# Patient Record
Sex: MALE | Race: WHITE | RURAL
[De-identification: names, ages, dates, MRNs, and addresses within clinical notes are randomized per-mention and may not be internally consistent; named-entity substitution may affect disease eponyms.]

---

## 2017-03-09 ENCOUNTER — HOSPITAL ENCOUNTER (OUTPATIENT)
Dept: OTHER | Age: 67
Discharge: HOME | End: 2017-03-09
Attending: UROLOGY | Admitting: UROLOGY

## 2023-01-13 RX ORDER — SODIUM, POTASSIUM,MAG SULFATES 17.5-3.13G
SOLUTION, RECONSTITUTED, ORAL ORAL
Qty: 354 ML | Refills: 0 | Status: SHIPPED | OUTPATIENT
Start: 2023-01-13

## 2023-01-31 RX ORDER — SODIUM, POTASSIUM,MAG SULFATES 17.5-3.13G
SOLUTION, RECONSTITUTED, ORAL ORAL
Qty: 354 ML | Refills: 0 | Status: SHIPPED | OUTPATIENT
Start: 2023-01-31

## 2023-02-07 ENCOUNTER — OUTSIDE FACILITY SERVICE (OUTPATIENT)
Dept: GASTROENTEROLOGY | Facility: CLINIC | Age: 73
End: 2023-02-07
Payer: MEDICARE

## 2023-02-07 PROCEDURE — 43239 EGD BIOPSY SINGLE/MULTIPLE: CPT | Performed by: INTERNAL MEDICINE

## 2023-02-07 PROCEDURE — 45385 COLONOSCOPY W/LESION REMOVAL: CPT | Performed by: INTERNAL MEDICINE

## 2023-02-07 PROCEDURE — 99204 OFFICE O/P NEW MOD 45 MIN: CPT | Performed by: INTERNAL MEDICINE

## 2023-02-11 RX ORDER — OMEPRAZOLE 40 MG/1
40 CAPSULE, DELAYED RELEASE ORAL DAILY
Qty: 30 CAPSULE | Refills: 11 | Status: SHIPPED | OUTPATIENT
Start: 2023-02-11

## 2023-02-16 ENCOUNTER — PREP FOR SURGERY (OUTPATIENT)
Dept: OTHER | Facility: HOSPITAL | Age: 73
End: 2023-02-16
Payer: MEDICARE

## 2023-02-16 DIAGNOSIS — K31.819 GAVE (GASTRIC ANTRAL VASCULAR ECTASIA): Primary | ICD-10-CM

## 2023-02-17 RX ORDER — SIMETHICONE 20 MG/.3ML
200 EMULSION ORAL ONCE
Status: CANCELLED | OUTPATIENT
Start: 2023-02-17 | End: 2023-02-17

## 2023-02-17 RX ORDER — PEG-3350, SODIUM SULFATE, SODIUM CHLORIDE, POTASSIUM CHLORIDE, SODIUM ASCORBATE AND ASCORBIC ACID 7.5-2.691G
KIT ORAL
Qty: 1000 ML | Refills: 0 | Status: SHIPPED | OUTPATIENT
Start: 2023-02-17

## 2023-02-22 ENCOUNTER — HOSPITAL ENCOUNTER (OUTPATIENT)
Dept: GASTROENTEROLOGY | Facility: HOSPITAL | Age: 73
Discharge: HOME | End: 2023-02-22
Attending: INTERNAL MEDICINE | Admitting: INTERNAL MEDICINE
Payer: MEDICARE

## 2023-02-22 VITALS — DIASTOLIC BLOOD PRESSURE: 69 MMHG | TEMPERATURE: 97.2 F | SYSTOLIC BLOOD PRESSURE: 120 MMHG | OXYGEN SATURATION: 98 %

## 2023-02-22 DIAGNOSIS — K31.819 GAVE (GASTRIC ANTRAL VASCULAR ECTASIA): ICD-10-CM

## 2023-02-22 PROCEDURE — 91110 GI TRC IMG INTRAL ESOPH-ILE: CPT | Performed by: INTERNAL MEDICINE

## 2023-02-22 PROCEDURE — C1889 IMPLANT/INSERT DEVICE, NOC: HCPCS

## 2023-02-22 PROCEDURE — 91110 GI TRC IMG INTRAL ESOPH-ILE: CPT

## 2023-04-17 ENCOUNTER — TELEPHONE (OUTPATIENT)
Dept: GASTROENTEROLOGY | Facility: CLINIC | Age: 73
End: 2023-04-17
Payer: MEDICARE

## 2023-05-04 ENCOUNTER — TELEPHONE (OUTPATIENT)
Dept: GASTROENTEROLOGY | Facility: CLINIC | Age: 73
End: 2023-05-04
Payer: MEDICARE

## 2023-05-04 NOTE — TELEPHONE ENCOUNTER
"----- Message from Salty Amaro MD sent at 4/17/2023  2:01 PM EDT -----  Kimi  Please let patient know that his recent extensive evaluation in February revealed no evidence of vascular ectasia.  There was no evidence of any vascular ectasia in his esophagus, stomach, duodenum, small bowel or colon.      It would probably be best for the physician that is concerned about how to proceed contact me to see what their expectations are.      It may be reasonable to repeat a push enteroscopy but again, we did not identify the source of his bleeding despite extensive GI work-up and I have not received records from any other office showing that he is again becoming anemic or has ongoing blood loss.  When we last time there was nothing \"to take care of \"since there was nothing hemorrhaging and since then I have not seen him.      I am not aware that he is having problems with recurrent anemia, transfusion requirements or iron infusion requirements that would prompt me to make further recommendations.  This information needs to be obtained so we can make a decision on what the next best step is.    Hope that helps.  ----- Message -----  From: Sanjuana Gonzalez MA  Sent: 4/17/2023   1:19 PM EDT  To: Salty Amaro MD    Pt is referring to the Vascular ectasia. She stated that when she asked what does this mean he said that he was bleeding outside of his stomach  and he needs to get a gastro doctor. She said he has one and that is why she is calling. To see if he needs to be seen or what do they do now.   ----- Message -----  From: Salty Amaro MD  Sent: 4/13/2023   5:30 PM EDT  To: Sanjuana Gonzalez MA    Kimi  Patient's wife will need to be more specific.  I do not know what \"this \"is referring to.    Patient is status post recent EGD, colonoscopy and video capsule endoscopy to evaluate anemia.  Although the patient's primary doctor note is indicating that patient had watermelon " "stomach/gastric antral vascular ectasia, on my last EGD in February there were no abnormalities.  Likewise the colonoscopy was unremarkable as was a video capsule endoscopy.  Again when the patient's wife is staying \"gastro takes care of this \"I need to know what she is talking about and what is she requesting  ----- Message -----  From: Sanjuana Gonzalez MA  Sent: 4/13/2023   5:25 PM EDT  To: Salty Amaro MD    Pt wife called [facility] said Gastro takes care of this. Please Advise...        "

## 2023-05-10 ENCOUNTER — TELEPHONE (OUTPATIENT)
Dept: CARDIOLOGY | Facility: CLINIC | Age: 73
End: 2023-05-10

## 2023-05-10 NOTE — TELEPHONE ENCOUNTER
Caller: Yahaira Abraham    Relationship to patient: Emergency Contact    Best call back number:     Chief complaint: PT SPOUSE CALLED IN TO SEE DR THAKKAR FOR A CENTRAL APNEA FOLLOW UP    Type of visit: FOLLOW UP    Requested date: ASA, WANTS TO SEE WHATS AVAILABLE     Additional notes: PT DOESN'T HAVE ANY NOTES FROM CARDIOLOGY OR SLEEP TO SCHEDULE A FOLLOW UP. STATES THAT HE SEES DR THAKKAR FOR SLEEP.

## 2023-05-16 ENCOUNTER — HOSPITAL ENCOUNTER (OUTPATIENT)
Dept: CARDIOLOGY | Facility: HOSPITAL | Age: 73
Discharge: HOME | End: 2023-05-16
Attending: INTERNAL MEDICINE | Admitting: INTERNAL MEDICINE
Payer: MEDICARE

## 2023-05-16 VITALS
HEART RATE: 107 BPM | TEMPERATURE: 97.1 F | BODY MASS INDEX: 31.78 KG/M2 | RESPIRATION RATE: 16 BRPM | OXYGEN SATURATION: 98 % | WEIGHT: 227 LBS | DIASTOLIC BLOOD PRESSURE: 63 MMHG | SYSTOLIC BLOOD PRESSURE: 90 MMHG | HEIGHT: 71 IN

## 2023-05-16 DIAGNOSIS — I48.91 ATRIAL FIBRILLATION, UNSPECIFIED TYPE: ICD-10-CM

## 2023-05-16 LAB
ANION GAP SERPL CALCULATED.3IONS-SCNC: 12 MMOL/L (ref 5–15)
BUN BLDA-MCNC: 32 MG/DL (ref 8–26)
BUN SERPL-MCNC: 34 MG/DL (ref 8–23)
BUN/CREAT SERPL: 24.3 (ref 7–25)
CA-I BLDA-SCNC: 1.17 MMOL/L (ref 1.2–1.32)
CALCIUM SPEC-SCNC: 8.9 MG/DL (ref 8.6–10.5)
CHLORIDE BLDA-SCNC: 96 MMOL/L (ref 98–109)
CHLORIDE SERPL-SCNC: 98 MMOL/L (ref 98–107)
CO2 BLDA-SCNC: 26 MMOL/L (ref 24–29)
CO2 SERPL-SCNC: 27 MMOL/L (ref 22–29)
CREAT BLDA-MCNC: 1.7 MG/DL (ref 0.6–1.3)
CREAT SERPL-MCNC: 1.4 MG/DL (ref 0.76–1.27)
DEPRECATED RDW RBC AUTO: 45.8 FL (ref 37–54)
EGFRCR SERPLBLD CKD-EPI 2021: 42.6 ML/MIN/1.73
EGFRCR SERPLBLD CKD-EPI 2021: 53.7 ML/MIN/1.73
ERYTHROCYTE [DISTWIDTH] IN BLOOD BY AUTOMATED COUNT: 13.4 % (ref 12.3–15.4)
GLUCOSE BLDC GLUCOMTR-MCNC: 116 MG/DL (ref 70–130)
GLUCOSE SERPL-MCNC: 117 MG/DL (ref 65–99)
HCT VFR BLD AUTO: 26.5 % (ref 37.5–51)
HCT VFR BLDA CALC: 27 % (ref 38–51)
HGB BLD-MCNC: 8.8 G/DL (ref 13–17.7)
HGB BLDA-MCNC: 9.2 G/DL (ref 12–17)
INR PPP: 2.8 (ref 0.8–1.2)
INR PPP: 3.19 (ref 0.89–1.12)
MAXIMAL PREDICTED HEART RATE: 149 BPM
MCH RBC QN AUTO: 31.1 PG (ref 26.6–33)
MCHC RBC AUTO-ENTMCNC: 33.2 G/DL (ref 31.5–35.7)
MCV RBC AUTO: 93.6 FL (ref 79–97)
PLATELET # BLD AUTO: 206 10*3/MM3 (ref 140–450)
PMV BLD AUTO: 10.3 FL (ref 6–12)
POTASSIUM BLDA-SCNC: 4.5 MMOL/L (ref 3.5–4.9)
POTASSIUM SERPL-SCNC: 4.7 MMOL/L (ref 3.5–5.2)
PROTHROMBIN TIME: 31.9 SECONDS (ref 12.8–15.2)
PROTHROMBIN TIME: 32.9 SECONDS (ref 12.2–14.5)
RBC # BLD AUTO: 2.83 10*6/MM3 (ref 4.14–5.8)
SODIUM BLD-SCNC: 136 MMOL/L (ref 138–146)
SODIUM SERPL-SCNC: 137 MMOL/L (ref 136–145)
STRESS TARGET HR: 127 BPM
WBC NRBC COR # BLD: 5.19 10*3/MM3 (ref 3.4–10.8)

## 2023-05-16 PROCEDURE — 25010000002 MIDAZOLAM PER 1 MG: Performed by: INTERNAL MEDICINE

## 2023-05-16 PROCEDURE — 85610 PROTHROMBIN TIME: CPT

## 2023-05-16 PROCEDURE — 36415 COLL VENOUS BLD VENIPUNCTURE: CPT

## 2023-05-16 PROCEDURE — 80047 BASIC METABLC PNL IONIZED CA: CPT

## 2023-05-16 PROCEDURE — 85014 HEMATOCRIT: CPT

## 2023-05-16 PROCEDURE — 85027 COMPLETE CBC AUTOMATED: CPT | Performed by: INTERNAL MEDICINE

## 2023-05-16 PROCEDURE — 80048 BASIC METABOLIC PNL TOTAL CA: CPT | Performed by: INTERNAL MEDICINE

## 2023-05-16 PROCEDURE — 85610 PROTHROMBIN TIME: CPT | Performed by: INTERNAL MEDICINE

## 2023-05-16 PROCEDURE — 25010000002 FENTANYL CITRATE (PF) 50 MCG/ML SOLUTION: Performed by: INTERNAL MEDICINE

## 2023-05-16 PROCEDURE — 92960 CARDIOVERSION ELECTRIC EXT: CPT

## 2023-05-16 RX ORDER — FLUMAZENIL 0.1 MG/ML
0.5 INJECTION INTRAVENOUS ONCE AS NEEDED
Status: DISCONTINUED | OUTPATIENT
Start: 2023-05-16 | End: 2023-05-16 | Stop reason: HOSPADM

## 2023-05-16 RX ORDER — LEVOTHYROXINE SODIUM 0.03 MG/1
25 TABLET ORAL
COMMUNITY

## 2023-05-16 RX ORDER — ICOSAPENT ETHYL 1000 MG/1
2 CAPSULE ORAL 2 TIMES DAILY WITH MEALS
COMMUNITY

## 2023-05-16 RX ORDER — HYDROCODONE BITARTRATE AND ACETAMINOPHEN 10; 325 MG/1; MG/1
1 TABLET ORAL EVERY 8 HOURS PRN
COMMUNITY

## 2023-05-16 RX ORDER — BEMPEDOIC ACID AND EZETIMIBE 180; 10 MG/1; MG/1
1 TABLET, FILM COATED ORAL DAILY
COMMUNITY

## 2023-05-16 RX ORDER — ALIROCUMAB 150 MG/ML
150 INJECTION, SOLUTION SUBCUTANEOUS
COMMUNITY

## 2023-05-16 RX ORDER — OXYBUTYNIN CHLORIDE 10 MG/1
10 TABLET, EXTENDED RELEASE ORAL DAILY
COMMUNITY

## 2023-05-16 RX ORDER — ASCORBIC ACID 500 MG
500 TABLET ORAL DAILY
COMMUNITY

## 2023-05-16 RX ORDER — WARFARIN SODIUM 1 MG/1
1 TABLET ORAL
COMMUNITY

## 2023-05-16 RX ORDER — AMITRIPTYLINE HYDROCHLORIDE 25 MG/1
25 TABLET, FILM COATED ORAL NIGHTLY
COMMUNITY

## 2023-05-16 RX ORDER — TIZANIDINE 4 MG/1
4 TABLET ORAL NIGHTLY PRN
COMMUNITY

## 2023-05-16 RX ORDER — ALLOPURINOL 300 MG/1
300 TABLET ORAL DAILY
COMMUNITY

## 2023-05-16 RX ORDER — PHENOL 1.4 %
10 AEROSOL, SPRAY (ML) MUCOUS MEMBRANE DAILY
COMMUNITY

## 2023-05-16 RX ORDER — MIDAZOLAM HYDROCHLORIDE 1 MG/ML
INJECTION INTRAMUSCULAR; INTRAVENOUS
Status: COMPLETED | OUTPATIENT
Start: 2023-05-16 | End: 2023-05-16

## 2023-05-16 RX ORDER — MIDAZOLAM HYDROCHLORIDE 1 MG/ML
2-8 INJECTION INTRAMUSCULAR; INTRAVENOUS ONCE AS NEEDED
Status: DISCONTINUED | OUTPATIENT
Start: 2023-05-16 | End: 2023-05-16 | Stop reason: HOSPADM

## 2023-05-16 RX ORDER — FENOFIBRATE 160 MG/1
160 TABLET ORAL DAILY
COMMUNITY

## 2023-05-16 RX ORDER — PANTOPRAZOLE SODIUM 40 MG/1
40 TABLET, DELAYED RELEASE ORAL DAILY
COMMUNITY

## 2023-05-16 RX ORDER — ALFUZOSIN HYDROCHLORIDE 10 MG/1
10 TABLET, EXTENDED RELEASE ORAL DAILY
COMMUNITY

## 2023-05-16 RX ORDER — MULTIPLE VITAMINS W/ MINERALS TAB 9MG-400MCG
1 TAB ORAL DAILY
COMMUNITY

## 2023-05-16 RX ORDER — FENTANYL CITRATE 50 UG/ML
INJECTION, SOLUTION INTRAMUSCULAR; INTRAVENOUS
Status: COMPLETED | OUTPATIENT
Start: 2023-05-16 | End: 2023-05-16

## 2023-05-16 RX ORDER — NALOXONE HCL 0.4 MG/ML
0.4 VIAL (ML) INJECTION ONCE AS NEEDED
Status: DISCONTINUED | OUTPATIENT
Start: 2023-05-16 | End: 2023-05-16 | Stop reason: HOSPADM

## 2023-05-16 RX ORDER — GABAPENTIN 600 MG/1
600 TABLET ORAL 3 TIMES DAILY
COMMUNITY

## 2023-05-16 RX ORDER — AMIODARONE HYDROCHLORIDE 200 MG/1
200 TABLET ORAL 2 TIMES DAILY
Qty: 180 TABLET | Refills: 3 | Status: SHIPPED | OUTPATIENT
Start: 2023-05-16

## 2023-05-16 RX ORDER — METOPROLOL TARTRATE 50 MG/1
50 TABLET, FILM COATED ORAL 2 TIMES DAILY
COMMUNITY

## 2023-05-16 RX ORDER — LISINOPRIL 40 MG/1
20 TABLET ORAL DAILY
COMMUNITY

## 2023-05-16 RX ORDER — FERROUS SULFATE 325(65) MG
325 TABLET ORAL
COMMUNITY

## 2023-05-16 RX ORDER — FENTANYL CITRATE 50 UG/ML
50-100 INJECTION, SOLUTION INTRAMUSCULAR; INTRAVENOUS ONCE AS NEEDED
Status: DISCONTINUED | OUTPATIENT
Start: 2023-05-16 | End: 2023-05-16 | Stop reason: HOSPADM

## 2023-05-16 RX ADMIN — MIDAZOLAM 2 MG: 1 INJECTION INTRAMUSCULAR; INTRAVENOUS at 13:33

## 2023-05-16 RX ADMIN — FENTANYL CITRATE 50 MCG: 50 INJECTION, SOLUTION INTRAMUSCULAR; INTRAVENOUS at 13:35

## 2023-05-16 RX ADMIN — FENTANYL CITRATE 50 MCG: 50 INJECTION, SOLUTION INTRAMUSCULAR; INTRAVENOUS at 13:33

## 2023-05-16 RX ADMIN — MIDAZOLAM 2 MG: 1 INJECTION INTRAMUSCULAR; INTRAVENOUS at 13:35

## 2023-05-16 NOTE — H&P
[facility] - History & Physical     Kaleb Abraham  1950  2508/1      05/16/23      Identification:  Kaleb Abraham is a 72 y.o. male.      PCP:  Manolo Enamorado MD        Chief Complaint: Symptomatic AFib with RVR    Allergies:  has No Known Allergies.    Medications Prior to Admission   Medication Sig Dispense Refill Last Dose   • omeprazole (priLOSEC) 40 MG capsule Take 1 capsule by mouth Daily. Take a half hour before breakfast 30 capsule 11    • PEG-KCl-NaCl-NaSulf-Na Asc-C (MOVIPREP) 100 g reconstituted solution powder Starting at 5am on morning of procedure, drink 1/4 liter (250ml) every 15 minutes so full liter is consumed in 1 hour. 1000 mL 0    • sodium-potassium-magnesium sulfates (Suprep Bowel Prep Kit) 17.5-3.13-1.6 GM/177ML solution oral solution Please follow the directions in the letter mailed to you by our office for colonoscopy prep. If you have any questions call our office at [phone]. 354 mL 0    • sodium-potassium-magnesium sulfates (Suprep Bowel Prep Kit) 17.5-3.13-1.6 GM/177ML solution oral solution Please follow the directions in the letter mailed to you by our office for colonoscopy prep. If you have any questions call our office at [phone]. 354 mL 0        No current facility-administered medications for this encounter.          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  He was recently seen in the office with complaints of dyspnea, fatigue, pitting edema BLE.  Found to be in Atrial fibrillation.  He wore an outpt monitor that demonstrated AFib throughout.  Min HR 77, AVG , Max .  He presents today to undergo ECV with Dr. Peraza.  He has been compliant with his warfarin and INRs are managed by our office.          Social History     Socioeconomic History   • Marital status: Single     Family History   Problem Relation Age of Onset   • Diabetes Mother     • Stroke Mother    • Thyroid cancer Mother    • Hypertension Father    • Stroke Father      Past Surgical History:   Procedure Laterality Date   • CORONARY ARTERY BYPASS GRAFT     • CORONARY STENT PLACEMENT         Review of Systems:  Pertinent positives are listed above and in physical exam.  All others have been reviewed and are negative.     Objective:   Vitals:   vitals were not taken for this visit.  No intake or output data in the 24 hours ending 05/16/23 1152  Vitals reviewed in room.    Physical Exam:  General Appearance:    Alert, cooperative, in no acute distress   Head:    Normocephalic, without obvious abnormality, atraumatic   Eyes:            Lids and lashes normal, conjunctivae and sclerae normal, no   icterus, no pallor, corneas clear, PERRLA   Ears:    Ears appear intact with no abnormalities noted   Throat:   No oral lesions, no thrush, oral mucosa moist   Neck:   No adenopathy, supple, trachea midline, no thyromegaly, no carotid bruit, no JVD   Back:     No kyphosis present, no scoliosis present, no skin lesions,   erythema or scars, no tenderness to percussion or                  palpation,  range of motion normal   Lungs:     Clear to auscultation,respirations regular, even and               unlabored    Heart:    Irregular rhythm and normal rate, normal S1 and S2, no         murmur, no gallop, no rub, + click   Abdomen:     Normal bowel sounds, no masses, no organomegaly, soft     nontender, nondistended, no guarding, no rebound               tenderness   Extremities:   Moves all extremities well,  no cyanosis, no redness, 1+ edema   Pulses:   Pulses palpable and equal bilaterally   Skin:   No bleeding, bruising or rash   Lymph nodes:   No palpable adenopathy   Neurologic:   Cranial nerves 2 - 12 grossly intact, sensation intact, DTR    present and equal bilaterally          Results Review:  I personally reviewed the patient's clinical results.              Invalid input(s): LABALBU, PROT                         Radiology:  Imaging Results (Last 72 Hours)     ** No results found for the last 72 hours. **          Tele:  Atrial Fibrillation    Assessment and Plan:    1.  Symptomatic Atrial Fibrillation   -  71 yo with known VHDz presents with symptoms of dyspnea, pitting edema and found to be in AFib.   -  Presents today to undergo ECV under the care of Dr. Peraza.  Risks and benefits have been reviewed with the patient and he is willing to proceed.  Further recommendations based on outcomes.    I discussed the patient's findings and my recommendations with the patient, any present family members, and the nursing staff.  Jasvir Peraza MD saw and examined patient, verified hx and PE, read all radiographic studies, reviewed labs and micro data, and formulated dx, plan for treatment and all medical decision making.      Lulu Davison PA-C for Jasvir Peraza MD  05/16/23 11:52 EDT

## 2023-05-17 ENCOUNTER — HOSPITAL ENCOUNTER (EMERGENCY)
Facility: HOSPITAL | Age: 73
Discharge: HOME | End: 2023-05-17
Attending: EMERGENCY MEDICINE
Payer: MEDICARE

## 2023-05-17 ENCOUNTER — APPOINTMENT (OUTPATIENT)
Dept: GENERAL RADIOLOGY | Facility: HOSPITAL | Age: 73
End: 2023-05-17
Payer: MEDICARE

## 2023-05-17 VITALS
SYSTOLIC BLOOD PRESSURE: 114 MMHG | OXYGEN SATURATION: 98 % | BODY MASS INDEX: 32.35 KG/M2 | HEART RATE: 91 BPM | HEIGHT: 70 IN | DIASTOLIC BLOOD PRESSURE: 73 MMHG | WEIGHT: 226 LBS | RESPIRATION RATE: 16 BRPM | TEMPERATURE: 97.8 F

## 2023-05-17 DIAGNOSIS — R53.1 GENERALIZED WEAKNESS: ICD-10-CM

## 2023-05-17 DIAGNOSIS — I95.9 HYPOTENSION, UNSPECIFIED HYPOTENSION TYPE: Primary | ICD-10-CM

## 2023-05-17 LAB
ALBUMIN SERPL-MCNC: 3.8 G/DL (ref 3.5–5.2)
ALBUMIN/GLOB SERPL: 1.5 G/DL
ALP SERPL-CCNC: 30 U/L (ref 39–117)
ALT SERPL W P-5'-P-CCNC: 10 U/L (ref 1–41)
ANION GAP SERPL CALCULATED.3IONS-SCNC: 13 MMOL/L (ref 5–15)
AST SERPL-CCNC: 30 U/L (ref 1–40)
BACTERIA UR QL AUTO: ABNORMAL /HPF
BASOPHILS # BLD AUTO: 0.05 10*3/MM3 (ref 0–0.2)
BASOPHILS NFR BLD AUTO: 1.1 % (ref 0–1.5)
BILIRUB SERPL-MCNC: 0.4 MG/DL (ref 0–1.2)
BILIRUB UR QL STRIP: NEGATIVE
BUN SERPL-MCNC: 32 MG/DL (ref 8–23)
BUN/CREAT SERPL: 21.3 (ref 7–25)
CALCIUM SPEC-SCNC: 8.7 MG/DL (ref 8.6–10.5)
CHLORIDE SERPL-SCNC: 98 MMOL/L (ref 98–107)
CLARITY UR: ABNORMAL
CO2 SERPL-SCNC: 23 MMOL/L (ref 22–29)
COLOR UR: YELLOW
CREAT SERPL-MCNC: 1.5 MG/DL (ref 0.76–1.27)
DEPRECATED RDW RBC AUTO: 47.7 FL (ref 37–54)
EGFRCR SERPLBLD CKD-EPI 2021: 49.5 ML/MIN/1.73
EOSINOPHIL # BLD AUTO: 0.11 10*3/MM3 (ref 0–0.4)
EOSINOPHIL NFR BLD AUTO: 2.5 % (ref 0.3–6.2)
ERYTHROCYTE [DISTWIDTH] IN BLOOD BY AUTOMATED COUNT: 13.6 % (ref 12.3–15.4)
GLOBULIN UR ELPH-MCNC: 2.6 GM/DL
GLUCOSE SERPL-MCNC: 198 MG/DL (ref 65–99)
GLUCOSE UR STRIP-MCNC: ABNORMAL MG/DL
HCT VFR BLD AUTO: 26.7 % (ref 37.5–51)
HGB BLD-MCNC: 8.7 G/DL (ref 13–17.7)
HGB UR QL STRIP.AUTO: ABNORMAL
HOLD SPECIMEN: NORMAL
HYALINE CASTS UR QL AUTO: ABNORMAL /LPF
IMM GRANULOCYTES # BLD AUTO: 0.02 10*3/MM3 (ref 0–0.05)
IMM GRANULOCYTES NFR BLD AUTO: 0.5 % (ref 0–0.5)
INR PPP: 2.83 (ref 0.89–1.12)
KETONES UR QL STRIP: NEGATIVE
LEUKOCYTE ESTERASE UR QL STRIP.AUTO: ABNORMAL
LYMPHOCYTES # BLD AUTO: 1.8 10*3/MM3 (ref 0.7–3.1)
LYMPHOCYTES NFR BLD AUTO: 40.5 % (ref 19.6–45.3)
MAGNESIUM SERPL-MCNC: 1.3 MG/DL (ref 1.6–2.4)
MCH RBC QN AUTO: 31.1 PG (ref 26.6–33)
MCHC RBC AUTO-ENTMCNC: 32.6 G/DL (ref 31.5–35.7)
MCV RBC AUTO: 95.4 FL (ref 79–97)
MONOCYTES # BLD AUTO: 0.5 10*3/MM3 (ref 0.1–0.9)
MONOCYTES NFR BLD AUTO: 11.3 % (ref 5–12)
NEUTROPHILS NFR BLD AUTO: 1.96 10*3/MM3 (ref 1.7–7)
NEUTROPHILS NFR BLD AUTO: 44.1 % (ref 42.7–76)
NITRITE UR QL STRIP: NEGATIVE
NRBC BLD AUTO-RTO: 0 /100 WBC (ref 0–0.2)
PH UR STRIP.AUTO: 5.5 [PH] (ref 5–8)
PLATELET # BLD AUTO: 187 10*3/MM3 (ref 140–450)
PMV BLD AUTO: 10 FL (ref 6–12)
POTASSIUM SERPL-SCNC: 4.3 MMOL/L (ref 3.5–5.2)
PROT SERPL-MCNC: 6.4 G/DL (ref 6–8.5)
PROT UR QL STRIP: ABNORMAL
PROTHROMBIN TIME: 30 SECONDS (ref 12.2–14.5)
RBC # BLD AUTO: 2.8 10*6/MM3 (ref 4.14–5.8)
RBC # UR STRIP: ABNORMAL /HPF
REF LAB TEST METHOD: ABNORMAL
SODIUM SERPL-SCNC: 134 MMOL/L (ref 136–145)
SP GR UR STRIP: 1.02 (ref 1–1.03)
SQUAMOUS #/AREA URNS HPF: ABNORMAL /HPF
TROPONIN T SERPL HS-MCNC: 35 NG/L
UROBILINOGEN UR QL STRIP: ABNORMAL
WBC # UR STRIP: ABNORMAL /HPF
WBC NRBC COR # BLD: 4.44 10*3/MM3 (ref 3.4–10.8)
WHOLE BLOOD HOLD COAG: NORMAL
WHOLE BLOOD HOLD SPECIMEN: NORMAL

## 2023-05-17 PROCEDURE — 93005 ELECTROCARDIOGRAM TRACING: CPT | Performed by: EMERGENCY MEDICINE

## 2023-05-17 PROCEDURE — 36415 COLL VENOUS BLD VENIPUNCTURE: CPT

## 2023-05-17 PROCEDURE — 84484 ASSAY OF TROPONIN QUANT: CPT | Performed by: EMERGENCY MEDICINE

## 2023-05-17 PROCEDURE — 80053 COMPREHEN METABOLIC PANEL: CPT | Performed by: EMERGENCY MEDICINE

## 2023-05-17 PROCEDURE — 81001 URINALYSIS AUTO W/SCOPE: CPT | Performed by: EMERGENCY MEDICINE

## 2023-05-17 PROCEDURE — 96360 HYDRATION IV INFUSION INIT: CPT

## 2023-05-17 PROCEDURE — 99284 EMERGENCY DEPT VISIT MOD MDM: CPT

## 2023-05-17 PROCEDURE — 85025 COMPLETE CBC W/AUTO DIFF WBC: CPT | Performed by: EMERGENCY MEDICINE

## 2023-05-17 PROCEDURE — 93005 ELECTROCARDIOGRAM TRACING: CPT

## 2023-05-17 PROCEDURE — 85610 PROTHROMBIN TIME: CPT | Performed by: EMERGENCY MEDICINE

## 2023-05-17 PROCEDURE — 83735 ASSAY OF MAGNESIUM: CPT | Performed by: EMERGENCY MEDICINE

## 2023-05-17 PROCEDURE — 71045 X-RAY EXAM CHEST 1 VIEW: CPT

## 2023-05-17 RX ORDER — SODIUM CHLORIDE 0.9 % (FLUSH) 0.9 %
10 SYRINGE (ML) INJECTION AS NEEDED
Status: DISCONTINUED | OUTPATIENT
Start: 2023-05-17 | End: 2023-05-17 | Stop reason: HOSPADM

## 2023-05-17 RX ADMIN — SODIUM CHLORIDE 1000 ML: 9 INJECTION, SOLUTION INTRAVENOUS at 16:00

## 2023-05-17 NOTE — ED PROVIDER NOTES
Subjective   History of Present Illness  Mr. Abraham was sent from the hematology clinic with low blood pressure and lightheadedness.  He was seen for scheduled iron infusion.  He was noted to have low blood pressure and labile heart rate.  He was seen by Dr. Peraza yesterday and had cardioversion.  He was started on amnio.  He has had a dose last night and this morning.  He denies pain anywhere.  He denies fevers or chills.  He denies nausea or vomiting.  He does acknowledge pedal edema over the last month.        Review of Systems   Constitutional: Negative for fever.   Respiratory: Negative for shortness of breath.    Cardiovascular: Positive for leg swelling. Negative for chest pain.   Neurological: Positive for dizziness and weakness.   All other systems reviewed and are negative.      Past Medical History:   Diagnosis Date   • Anemia    • Aortic stenosis    • CAD (coronary artery disease)    • Diabetes mellitus    • Hyperlipidemia    • Hypertension    • Hypothyroidism    • Mitral regurgitation        No Known Allergies    Past Surgical History:   Procedure Laterality Date   • CORONARY ARTERY BYPASS GRAFT     • CORONARY STENT PLACEMENT         Family History   Problem Relation Age of Onset   • Diabetes Mother    • Stroke Mother    • Thyroid cancer Mother    • Hypertension Father    • Stroke Father        Social History     Socioeconomic History   • Marital status: Single   Tobacco Use   • Smoking status: Former     Types: Cigarettes   • Smokeless tobacco: Never   Substance and Sexual Activity   • Alcohol use: Never   • Drug use: Never           Objective   Physical Exam  Vitals and nursing note reviewed.   Constitutional:       General: He is not in acute distress.     Appearance: Normal appearance.   HENT:      Head: Normocephalic and atraumatic.      Nose: Nose normal. No congestion or rhinorrhea.   Eyes:      General: No scleral icterus.     Conjunctiva/sclera: Conjunctivae normal.   Neck:      Comments: No  JVD   Cardiovascular:      Rate and Rhythm: Normal rate. Rhythm irregular.      Heart sounds: No murmur heard.    No friction rub.   Pulmonary:      Effort: Pulmonary effort is normal.      Breath sounds: Normal breath sounds. No wheezing or rales.   Abdominal:      General: Bowel sounds are normal.      Palpations: Abdomen is soft.      Tenderness: There is no abdominal tenderness. There is no guarding or rebound.   Musculoskeletal:         General: No tenderness.      Cervical back: Normal range of motion and neck supple.      Right lower leg: Edema present.      Left lower leg: Edema present.   Skin:     General: Skin is warm and dry.      Coloration: Skin is not pale.      Findings: No erythema.   Neurological:      General: No focal deficit present.      Mental Status: He is alert and oriented to person, place, and time.      Motor: No weakness.      Coordination: Coordination normal.   Psychiatric:         Mood and Affect: Mood normal.         Behavior: Behavior normal.         Thought Content: Thought content normal.         Procedures           ED Course  ED Course as of 05/17/23 2254   Wed May 17, 2023   1553 Labs are at baseline. [DT]   1614 D/w Dr Peraza, He tells me his blood pressure was low yesterday as well.  When he was brought back to a room his blood pressure was rechecked and is 80/59. [DT]   1745 Blood pressure is now consistently over 100.  He feels better.  I told him to hold off on any more amiodarone till they talk to Dr. Peraza.  I suspect interaction with his prostate medication and blood pressure medicines causing pronounced hypotension. [DT]      ED Course User Index  [DT] Alen Lane MD                                           Medical Decision Making  On arrival he was hypotensive and had difficulty walking.  Differential diagnosis for this would be myocardial infarction, sepsis, volume depletion, and others.  I ruled these all out with labs and urine analysis.  I gave IV fluids  and had 1 repeat exam.  I reviewed his records thoroughly and felt this was due to medication interaction and had him stop that    Generalized weakness: acute illness or injury that poses a threat to life or bodily functions  Hypotension, unspecified hypotension type: acute illness or injury that poses a threat to life or bodily functions  Amount and/or Complexity of Data Reviewed  External Data Reviewed: notes.  Labs: ordered. Decision-making details documented in ED Course.  Radiology: ordered. Decision-making details documented in ED Course.  ECG/medicine tests: ordered. Decision-making details documented in ED Course.          Final diagnoses:   Hypotension, unspecified hypotension type   Generalized weakness       ED Disposition  ED Disposition     ED Disposition   Discharge    Condition   Stable    Comment   --             Manolo Enamorado MD  [address] [facility], [facility]  [facility] [id number] [phone]          Jasvir Peraza MD  [address] [address] [phone]               Medication List      No changes were made to your prescriptions during this visit.          Alen Lane MD  05/17/23 5134

## 2023-05-19 LAB
QT INTERVAL: 340 MS
QTC INTERVAL: 462 MS

## 2023-07-26 ENCOUNTER — OUTSIDE FACILITY SERVICE (OUTPATIENT)
Dept: GASTROENTEROLOGY | Facility: CLINIC | Age: 73
End: 2023-07-26
Payer: MEDICARE

## 2023-07-26 PROCEDURE — 44376 SMALL BOWEL ENDOSCOPY: CPT | Performed by: INTERNAL MEDICINE

## 2023-08-22 ENCOUNTER — OFFICE VISIT (OUTPATIENT)
Dept: CARDIOLOGY | Facility: CLINIC | Age: 73
End: 2023-08-22
Payer: MEDICARE

## 2023-08-22 VITALS
BODY MASS INDEX: 34.22 KG/M2 | HEART RATE: 120 BPM | DIASTOLIC BLOOD PRESSURE: 74 MMHG | WEIGHT: 239 LBS | HEIGHT: 70 IN | OXYGEN SATURATION: 97 % | SYSTOLIC BLOOD PRESSURE: 118 MMHG

## 2023-08-22 DIAGNOSIS — G47.10 HYPERSOMNIA, UNSPECIFIED: ICD-10-CM

## 2023-08-22 DIAGNOSIS — I48.20 CHRONIC ATRIAL FIBRILLATION: ICD-10-CM

## 2023-08-22 DIAGNOSIS — G47.33 OSA (OBSTRUCTIVE SLEEP APNEA): Primary | ICD-10-CM

## 2023-08-22 PROCEDURE — 3078F DIAST BP <80 MM HG: CPT | Performed by: NURSE PRACTITIONER

## 2023-08-22 PROCEDURE — 1160F RVW MEDS BY RX/DR IN RCRD: CPT | Performed by: NURSE PRACTITIONER

## 2023-08-22 PROCEDURE — 99214 OFFICE O/P EST MOD 30 MIN: CPT | Performed by: NURSE PRACTITIONER

## 2023-08-22 PROCEDURE — 3074F SYST BP LT 130 MM HG: CPT | Performed by: NURSE PRACTITIONER

## 2023-08-22 PROCEDURE — 1159F MED LIST DOCD IN RCRD: CPT | Performed by: NURSE PRACTITIONER

## 2023-08-22 RX ORDER — TIZANIDINE 2 MG/1
2 TABLET ORAL
COMMUNITY
Start: 2023-05-09

## 2023-08-22 RX ORDER — NITROGLYCERIN 0.4 MG/1
TABLET SUBLINGUAL
COMMUNITY
Start: 2015-03-06

## 2023-08-22 RX ORDER — DILTIAZEM HYDROCHLORIDE 240 MG/1
240 CAPSULE, EXTENDED RELEASE ORAL DAILY
COMMUNITY
Start: 2023-05-12

## 2023-08-22 NOTE — ASSESSMENT & PLAN NOTE
He has excessive daytime sleepiness and he begins falling asleep around 6 PM with the evening news.    He reports he will nap in his chair from 6 to 11 PM.    He sleeps in his reclining chair in front of the TV with the TV on.    We discussed good sleep hygiene practices and encouraged to turn the TV off.    Follow up in 3 months to reevaluate his symptoms on CPAP usage during all hours of sleep.

## 2023-08-22 NOTE — ASSESSMENT & PLAN NOTE
"He reports that he has been in A-fib with a fast heart rate and that his daily heart rate has been running about 120 bpm.    He denies any chest pain or shortness of breath with this rhythm or rate.    He follows with Dr. Peraza and the patient reports to me that the physician is aware of his rhythm and rate and that he is compliant with his medications including Coumadin for anticoagulation and medications for rhythm and rate control.  He reports that he was \"shocked out \"of A-fib he thinks it was in May 2023.  But reports that this did not last except about 5 minutes.    I discussed with him sleep apnea as it pertains to his afibrillation.  I have offered an in lab titration study and he has declined.  I have instructed him to increase his use of CPAP to all hours of sleep and to find a mask that fits, tighten up the mask and order to DME for mask fitting if needed to correct the air leak.  "

## 2023-08-22 NOTE — ASSESSMENT & PLAN NOTE
Baseline AHI is 9.  This is mild sleep apnea.  He did have a home sleep test that could have underestimated the severity of his sleep breathing disorder.    He is on CPAP therapy on auto settings.  He is not sleeping with CPAP on.  He reports he puts it on struggles with it for about an hour, mask is not fitting and he does not tighten it up.  Then he reports he takes it off.    He reports he sleeps without his Pap device and then he will experience excessive daytime sleepiness that starts around 6 PM.  He reports his best sleep is in front of the TV from 6 to 11 p.m.    We had a long discussion on sleep apnea and risk of untreated sleep apnea including sleep death, heart attack and stroke and blood clots.    We had a long discussion on CPAP benefits when used properly and nightly during all hours of sleep.    I have offered him an in lab titration study to help him not only with mask fit but with pressure adjustments.  He has declined in lab study.  He reports he wants to try 1 more time to get his mask fitting correctly and to increase his use.    He is given a follow-up appointment in 3 months.

## 2023-08-22 NOTE — PROGRESS NOTES
Follow-Up Sleep Consult     Date:   2023  Name: Kaleb Abraham  :   1950  PCP: Manolo Enamorado MD    Chief Complaint   Patient presents with    Sleep Apnea       Subjective     History of Present Illness  Kaleb Abraham is a 72 y.o. male who presents today for follow-up on MARTINEZ.  10-month follow-up on CPAP therapy for history of mild sleep apnea.  He reports that he puts the mask on but he does not tighten it up.  He reports he falls asleep without the Pap device most nights.  He reports if he wears the PAP device it is only for a few hours.    Sleep history:    MARTINEZ AHI 9 on HST 2022    Current MARTINEZ therapy auto CPAP 6 to 20 cm    Coexisting condition atrial fibrillation      Current mask used is FFM    Device Functioning Well: Yes  Mask Fit Comfortable: No, Air Leak   Air Flow Comfortable: Yes  DME Helpful for Supplies: Yes  Sleep is rested: No, Pain interrupts sleep, Bathroom trips interrupt sleep , and EDS in the last evening that he will doze off in front of the TV 6-8PM, sleeps 11PM. He sleeps in reclining chair in the living room.  He will wake up and put the PAP device on for just a little while and then he takes it back off.        Device Download:           The patient's relevant past medical, surgical, family, and social history reviewed and updated in Epic as appropriate.    Past Medical History:   Diagnosis Date    Anemia     Aortic stenosis     CAD (coronary artery disease)     Diabetes mellitus     Hyperlipidemia     Hypertension     Hypothyroidism     Mitral regurgitation      Past Surgical History:   Procedure Laterality Date    CORONARY ARTERY BYPASS GRAFT      CORONARY STENT PLACEMENT         No Known Allergies  Prior to Admission medications    Medication Sig Start Date End Date Taking? Authorizing Provider   alfuzosin (UROXATRAL) 10 MG 24 hr tablet Take 1 tablet by mouth Daily.   Yes Provider, MD Jessica   Alirocumab (Praluent) 150 MG/ML injection pen Inject 1 mL  under the skin into the appropriate area as directed Every 14 (Fourteen) Days.   Yes Jessica Garcia MD   allopurinol (ZYLOPRIM) 300 MG tablet Take 1 tablet by mouth Daily.   Yes Jessica Garcia MD   amiodarone (PACERONE) 200 MG tablet Take 1 tablet by mouth 2 (Two) Times a Day. 5/16/23  Yes Jasvir Peraza MD   amitriptyline (ELAVIL) 25 MG tablet Take 1 tablet by mouth Every Night.   Yes Jessica Garcia MD   ascorbic acid (VITAMIN C) 500 MG tablet Take 1 tablet by mouth Daily.   Yes Jessica Garcia MD   Bempedoic Acid-Ezetimibe (Nexlizet) 180-10 MG tablet Take 1 tablet by mouth Daily.   Yes Jessica Garcia MD   Cholecalciferol (Vitamin D3) 1.25 MG (38167 UT) tablet Take 1 tablet by mouth Daily.   Yes Jessica Garcia MD   dilTIAZem (TIAZAC) 240 MG 24 hr capsule Take 1 capsule by mouth Daily. 5/12/23  Yes Jessica Garcia MD   fenofibrate 160 MG tablet Take 1 tablet by mouth Daily.   Yes Jessica Garcia MD   ferrous sulfate 325 (65 FE) MG tablet Take 1 tablet by mouth Daily With Breakfast.   Yes Jessica Garcia MD   gabapentin (NEURONTIN) 600 MG tablet Take 1 tablet by mouth 3 (Three) Times a Day.   Yes Jessica Garcia MD   HYDROcodone-acetaminophen (NORCO)  MG per tablet Take 1 tablet by mouth Every 8 (Eight) Hours As Needed for Moderate Pain.   Yes Jessica Garcia MD   icosapent ethyl (VASCEPA) 1 g capsule capsule Take 2 g by mouth 2 (Two) Times a Day With Meals.   Yes Jessica Garcia MD   levothyroxine (SYNTHROID, LEVOTHROID) 25 MCG tablet Take 1 tablet by mouth Every Morning.   Yes Jessica Garcia MD   lisinopril (PRINIVIL,ZESTRIL) 40 MG tablet Take 0.5 tablets by mouth Daily.   Yes Provider, Historical, MD   Melatonin 10 MG tablet Take 1 tablet by mouth Daily.   Yes Provider, Historical, MD   metFORMIN (GLUCOPHAGE) 500 MG tablet Take 1 tablet by mouth Every Night.   Yes Jessica Garcia MD   metoprolol tartrate  "(LOPRESSOR) 50 MG tablet Take 1 tablet by mouth 2 (Two) Times a Day.   Yes Jessica Garcia MD   multivitamin with minerals tablet tablet Take 1 tablet by mouth Daily.   Yes Jessica Garcia MD   Nitrostat 0.4 MG SL tablet Place  under the tongue. 3/6/15  Yes Jessica Garcia MD   oxybutynin XL (DITROPAN-XL) 10 MG 24 hr tablet Take 1 tablet by mouth Daily.   Yes Jessica Garcia MD   pantoprazole (PROTONIX) 40 MG EC tablet Take 1 tablet by mouth Daily.   Yes Jessica Garcia MD   tiZANidine (ZANAFLEX) 2 MG tablet Take 1 tablet by mouth. 5/9/23  Yes Jessica Garcia MD   warfarin (COUMADIN) 1 MG tablet Take 1 tablet by mouth Daily. 2.5 mg for four days and 2 mg for three days   Yes Jessica Garcia MD   Zinc 50 MG capsule Take 50 mg by mouth Daily.   Yes Jessica Garcia MD   tiZANidine (ZANAFLEX) 4 MG tablet Take 1 tablet by mouth At Night As Needed for Muscle Spasms.  8/22/23  Jessica Garcia MD     Family History   Problem Relation Age of Onset    Diabetes Mother     Stroke Mother     Thyroid cancer Mother     Hypertension Father     Stroke Father        Objective     Vital Signs:  /74 (BP Location: Right arm, Patient Position: Sitting)   Pulse 120   Ht 177.8 cm (70\")   Wt 108 kg (239 lb)   SpO2 97%   BMI 34.29 kg/mý     BMI is >= 30 and <35. (Class 1 Obesity). The following options were offered after discussion;: referral to primary care        Physical Exam  HENT:      Head: Normocephalic.      Nose: Nose normal.      Mouth/Throat:      Mouth: Mucous membranes are moist.   Pulmonary:      Effort: Pulmonary effort is normal.   Skin:     General: Skin is warm and dry.   Neurological:      Mental Status: He is alert and oriented to person, place, and time.   Psychiatric:         Mood and Affect: Mood normal.         Behavior: Behavior normal.         Thought Content: Thought content normal.           PAP download reviewed: 7/23 through 8/21/2023.  30-day " download.  I have reviewed and interpreted the download.         Assessment and Plan     Diagnoses and all orders for this visit:    1. MARTINEZ (obstructive sleep apnea) (Primary)  Assessment & Plan:  Baseline AHI is 9.  This is mild sleep apnea.  He did have a home sleep test that could have underestimated the severity of his sleep breathing disorder.    He is on CPAP therapy on auto settings.  He is not sleeping with CPAP on.  He reports he puts it on struggles with it for about an hour, mask is not fitting and he does not tighten it up.  Then he reports he takes it off.    He reports he sleeps without his Pap device and then he will experience excessive daytime sleepiness that starts around 6 PM.  He reports his best sleep is in front of the TV from 6 to 11 p.m.    We had a long discussion on sleep apnea and risk of untreated sleep apnea including sleep death, heart attack and stroke and blood clots.    We had a long discussion on CPAP benefits when used properly and nightly during all hours of sleep.    I have offered him an in lab titration study to help him not only with mask fit but with pressure adjustments.  He has declined in lab study.  He reports he wants to try 1 more time to get his mask fitting correctly and to increase his use.    He is given a follow-up appointment in 3 months.    Orders:  -     PAP Therapy    2. Hypersomnia, unspecified  Assessment & Plan:  He has excessive daytime sleepiness and he begins falling asleep around 6 PM with the evening news.    He reports he will nap in his chair from 6 to 11 PM.    He sleeps in his reclining chair in front of the TV with the TV on.    We discussed good sleep hygiene practices and encouraged to turn the TV off.    Follow up in 3 months to reevaluate his symptoms on CPAP usage during all hours of sleep.      3. Chronic atrial fibrillation  Assessment & Plan:  He reports that he has been in A-fib with a fast heart rate and that his daily heart rate has been  "running about 120 bpm.    He denies any chest pain or shortness of breath with this rhythm or rate.    He follows with Dr. Peraza and the patient reports to me that the physician is aware of his rhythm and rate and that he is compliant with his medications including Coumadin for anticoagulation and medications for rhythm and rate control.  He reports that he was \"shocked out \"of A-fib he thinks it was in May 2023.  But reports that this did not last except about 5 minutes.    I discussed with him sleep apnea as it pertains to his afibrillation.  I have offered an in lab titration study and he has declined.  I have instructed him to increase his use of CPAP to all hours of sleep and to find a mask that fits, tighten up the mask and order to DME for mask fitting if needed to correct the air leak.          Report if any new/changing symptoms immediately, Sleep risks reviewed (driving, medical, sleep death, sedating agents), Increase pap therapy usage, and he was advised but he declined an in lab titration study.          Follow Up  Return in about 3 months (around 11/22/2023) for MARTINEZ.  Patient was given instructions and counseling regarding his condition or for health maintenance advice. Please see specific information pulled into the AVS if appropriate.  "

## 2023-08-23 ENCOUNTER — TELEPHONE (OUTPATIENT)
Dept: CARDIOLOGY | Facility: CLINIC | Age: 73
End: 2023-08-23
Payer: MEDICARE

## 2023-09-01 ENCOUNTER — PREP FOR SURGERY (OUTPATIENT)
Dept: OTHER | Facility: HOSPITAL | Age: 73
End: 2023-09-01
Payer: MEDICARE

## 2023-09-01 ENCOUNTER — TELEPHONE (OUTPATIENT)
Dept: GASTROENTEROLOGY | Facility: CLINIC | Age: 73
End: 2023-09-01
Payer: MEDICARE

## 2023-09-01 DIAGNOSIS — D64.9 ANEMIA, UNSPECIFIED TYPE: Primary | ICD-10-CM

## 2023-09-01 RX ORDER — PEG-3350, SODIUM SULFATE, SODIUM CHLORIDE, POTASSIUM CHLORIDE, SODIUM ASCORBATE AND ASCORBIC ACID 7.5-2.691G
KIT ORAL
Qty: 1000 ML | Refills: 0 | Status: SHIPPED | OUTPATIENT
Start: 2023-09-01

## 2023-09-01 RX ORDER — SIMETHICONE 20 MG/.3ML
200 EMULSION ORAL ONCE
OUTPATIENT
Start: 2023-09-01 | End: 2023-09-01

## 2023-09-01 NOTE — TELEPHONE ENCOUNTER
"Pt wife called and said that her  was supposed to have a pill cam but had not heard from us. I checked Plan and Dr. Amaro said \"Consider repeat Video capsule endoscopy\". She stated that Pt's Hemoglobin was 8.5 yesterday.  He has had a total of 14 iron infusions over 3 month period and his HR stays around 120. She says he has no energy and is just not himself.    Given her recurrent anemia, reasonable to pursue video capsule endoscopy.  Okay to set that up at patient's convenience.  "

## 2023-09-21 ENCOUNTER — TELEPHONE (OUTPATIENT)
Dept: CARDIOLOGY | Facility: CLINIC | Age: 73
End: 2023-09-21
Payer: MEDICARE

## 2023-09-21 NOTE — TELEPHONE ENCOUNTER
Caller: Yahaira Abraham    Relationship: Emergency Contact    Best call back number: [phone] (home)      What is the best time to reach you: ANY    Who are you requesting to speak with (clinical staff, provider,  specific staff member): ANY    What was the call regarding: PATIENT CALLED AND WAS INQUIRING ON THE STATUS OF THEIR SLEEP MASKS. PLEASE CONTACT PATIENT AND ADVISE ON THIS ISSUE.    Is it okay if the provider responds through MyChart: PLEASE CALL

## 2023-09-21 NOTE — TELEPHONE ENCOUNTER
Called pt's wife in regards to needing supplies from [facility] in [place], [place].  Also called Patrick.  Patrick has recent orders.  They will reach out to pt and discuss pt's needs.  She verbalizes understanding.

## 2023-09-25 ENCOUNTER — HOSPITAL ENCOUNTER (OUTPATIENT)
Dept: GASTROENTEROLOGY | Facility: HOSPITAL | Age: 73
Discharge: HOME | End: 2023-09-25
Payer: MEDICARE

## 2023-09-25 DIAGNOSIS — D64.9 ANEMIA, UNSPECIFIED TYPE: ICD-10-CM

## 2023-09-25 PROCEDURE — 91110 GI TRC IMG INTRAL ESOPH-ILE: CPT

## 2023-09-25 PROCEDURE — C1889 IMPLANT/INSERT DEVICE, NOC: HCPCS

## 2023-09-25 RX ORDER — SIMETHICONE 20 MG/.3ML
200 EMULSION ORAL ONCE
Status: COMPLETED | OUTPATIENT
Start: 2023-09-25 | End: 2023-09-25

## 2023-09-25 RX ADMIN — SIMETHICONE 200 MG: 20 SUSPENSION/ DROPS ORAL at 07:25

## 2023-09-29 NOTE — PROCEDURES
Video capsule endoscopy summary  See separate video capsule endoscopy report for details    Nondiagnostic study completed to distal small bowel with no stigmata of active or recent bleeding noted.  No explanation for patient's recurrent iron transfusion requirements noted.  May need to consider endoscopy assisted video capsule endoscopy to guarantee visualization of the distal small bowel.

## 2023-09-29 NOTE — OP NOTE
Video capsule endoscopy summary  See video capsule report for details    Video capsule endoscopy completed to the distal small bowel with excellent visualization.  No stigmata of active or recent bleeding identified nothing to explain patient's recurrent iron transfusion requirements.    Recommend consideration of endoscopy assisted video capsule endoscopy to allow visualization of the nonvisualized portion of the very distal small bowel.

## 2023-10-02 ENCOUNTER — TELEPHONE (OUTPATIENT)
Dept: GASTROENTEROLOGY | Facility: CLINIC | Age: 73
End: 2023-10-02
Payer: MEDICARE

## 2023-10-02 NOTE — TELEPHONE ENCOUNTER
"\"Normal pillcam (see my note). Agree with bone marrow biopsy. Pillcam with EGD to deliver pillcam to small bowel directly recommended if bone marrow biopsy proves non-diagnostic.Very end of small bowel not seen on both occasions.\"    Spoke with Yahaira, She verbalized understanding with no further questions.   "

## 2023-10-02 NOTE — TELEPHONE ENCOUNTER
Pt had Pill Cam 9- and is still not feeling well. Pt would like the results. If this cam does not show anything they was was think about a bone marrow biopsy. Pt is still fatigued no energy and is not eating good.

## 2024-02-05 ENCOUNTER — HOSPITAL ENCOUNTER (EMERGENCY)
Facility: HOSPITAL | Age: 74
Discharge: HOME | End: 2024-02-05
Attending: EMERGENCY MEDICINE | Admitting: EMERGENCY MEDICINE
Payer: MEDICARE

## 2024-02-05 ENCOUNTER — APPOINTMENT (OUTPATIENT)
Dept: GENERAL RADIOLOGY | Facility: HOSPITAL | Age: 74
End: 2024-02-05
Payer: MEDICARE

## 2024-02-05 VITALS
DIASTOLIC BLOOD PRESSURE: 86 MMHG | HEART RATE: 88 BPM | RESPIRATION RATE: 18 BRPM | HEIGHT: 71 IN | WEIGHT: 228 LBS | BODY MASS INDEX: 31.92 KG/M2 | OXYGEN SATURATION: 96 % | SYSTOLIC BLOOD PRESSURE: 110 MMHG | TEMPERATURE: 98.2 F

## 2024-02-05 DIAGNOSIS — I50.9 CONGESTIVE HEART FAILURE, UNSPECIFIED HF CHRONICITY, UNSPECIFIED HEART FAILURE TYPE: ICD-10-CM

## 2024-02-05 DIAGNOSIS — I48.91 ATRIAL FIBRILLATION AND FLUTTER: Primary | ICD-10-CM

## 2024-02-05 DIAGNOSIS — I48.92 ATRIAL FIBRILLATION AND FLUTTER: Primary | ICD-10-CM

## 2024-02-05 LAB
ALBUMIN SERPL-MCNC: 4.1 G/DL (ref 3.5–5.2)
ALBUMIN/GLOB SERPL: 1.8 G/DL
ALP SERPL-CCNC: 31 U/L (ref 39–117)
ALT SERPL W P-5'-P-CCNC: 12 U/L (ref 1–41)
ANION GAP SERPL CALCULATED.3IONS-SCNC: 10 MMOL/L (ref 5–15)
APTT PPP: 37.5 SECONDS (ref 60–90)
AST SERPL-CCNC: 36 U/L (ref 1–40)
BASOPHILS # BLD AUTO: 0.07 10*3/MM3 (ref 0–0.2)
BASOPHILS NFR BLD AUTO: 1 % (ref 0–1.5)
BILIRUB SERPL-MCNC: 0.6 MG/DL (ref 0–1.2)
BUN SERPL-MCNC: 35 MG/DL (ref 8–23)
BUN/CREAT SERPL: 18.5 (ref 7–25)
CALCIUM SPEC-SCNC: 9 MG/DL (ref 8.6–10.5)
CHLORIDE SERPL-SCNC: 99 MMOL/L (ref 98–107)
CO2 SERPL-SCNC: 27 MMOL/L (ref 22–29)
CREAT SERPL-MCNC: 1.89 MG/DL (ref 0.76–1.27)
DEPRECATED RDW RBC AUTO: 52.3 FL (ref 37–54)
EGFRCR SERPLBLD CKD-EPI 2021: 37.3 ML/MIN/1.73
EOSINOPHIL # BLD AUTO: 0.06 10*3/MM3 (ref 0–0.4)
EOSINOPHIL NFR BLD AUTO: 0.9 % (ref 0.3–6.2)
ERYTHROCYTE [DISTWIDTH] IN BLOOD BY AUTOMATED COUNT: 15 % (ref 12.3–15.4)
GLOBULIN UR ELPH-MCNC: 2.3 GM/DL
GLUCOSE SERPL-MCNC: 143 MG/DL (ref 65–99)
HCT VFR BLD AUTO: 29.8 % (ref 37.5–51)
HGB BLD-MCNC: 9.9 G/DL (ref 13–17.7)
HOLD SPECIMEN: NORMAL
IMM GRANULOCYTES # BLD AUTO: 0.03 10*3/MM3 (ref 0–0.05)
IMM GRANULOCYTES NFR BLD AUTO: 0.4 % (ref 0–0.5)
INR PPP: 2.11 (ref 0.89–1.12)
LYMPHOCYTES # BLD AUTO: 1.49 10*3/MM3 (ref 0.7–3.1)
LYMPHOCYTES NFR BLD AUTO: 21.3 % (ref 19.6–45.3)
MCH RBC QN AUTO: 31.3 PG (ref 26.6–33)
MCHC RBC AUTO-ENTMCNC: 33.2 G/DL (ref 31.5–35.7)
MCV RBC AUTO: 94.3 FL (ref 79–97)
MONOCYTES # BLD AUTO: 0.54 10*3/MM3 (ref 0.1–0.9)
MONOCYTES NFR BLD AUTO: 7.7 % (ref 5–12)
NEUTROPHILS NFR BLD AUTO: 4.82 10*3/MM3 (ref 1.7–7)
NEUTROPHILS NFR BLD AUTO: 68.7 % (ref 42.7–76)
NRBC BLD AUTO-RTO: 0 /100 WBC (ref 0–0.2)
NT-PROBNP SERPL-MCNC: 3020 PG/ML (ref 0–900)
PLATELET # BLD AUTO: 183 10*3/MM3 (ref 140–450)
PMV BLD AUTO: 9.8 FL (ref 6–12)
POTASSIUM SERPL-SCNC: 4.9 MMOL/L (ref 3.5–5.2)
PROT SERPL-MCNC: 6.4 G/DL (ref 6–8.5)
PROTHROMBIN TIME: 23.8 SECONDS (ref 12.2–14.5)
QT INTERVAL: 312 MS
QT INTERVAL: 392 MS
QTC INTERVAL: 410 MS
QTC INTERVAL: 463 MS
RBC # BLD AUTO: 3.16 10*6/MM3 (ref 4.14–5.8)
SODIUM SERPL-SCNC: 136 MMOL/L (ref 136–145)
TROPONIN T SERPL HS-MCNC: 34 NG/L
TROPONIN T SERPL HS-MCNC: 37 NG/L
WBC NRBC COR # BLD AUTO: 7.01 10*3/MM3 (ref 3.4–10.8)
WHOLE BLOOD HOLD COAG: NORMAL
WHOLE BLOOD HOLD SPECIMEN: NORMAL

## 2024-02-05 PROCEDURE — 99284 EMERGENCY DEPT VISIT MOD MDM: CPT

## 2024-02-05 PROCEDURE — 80053 COMPREHEN METABOLIC PANEL: CPT | Performed by: EMERGENCY MEDICINE

## 2024-02-05 PROCEDURE — 25810000003 SODIUM CHLORIDE 0.9 % SOLUTION

## 2024-02-05 PROCEDURE — 71045 X-RAY EXAM CHEST 1 VIEW: CPT

## 2024-02-05 PROCEDURE — 85610 PROTHROMBIN TIME: CPT | Performed by: EMERGENCY MEDICINE

## 2024-02-05 PROCEDURE — 85730 THROMBOPLASTIN TIME PARTIAL: CPT

## 2024-02-05 PROCEDURE — 84484 ASSAY OF TROPONIN QUANT: CPT | Performed by: EMERGENCY MEDICINE

## 2024-02-05 PROCEDURE — 93005 ELECTROCARDIOGRAM TRACING: CPT | Performed by: EMERGENCY MEDICINE

## 2024-02-05 PROCEDURE — 84484 ASSAY OF TROPONIN QUANT: CPT

## 2024-02-05 PROCEDURE — 93005 ELECTROCARDIOGRAM TRACING: CPT

## 2024-02-05 PROCEDURE — 83880 ASSAY OF NATRIURETIC PEPTIDE: CPT | Performed by: EMERGENCY MEDICINE

## 2024-02-05 PROCEDURE — 36415 COLL VENOUS BLD VENIPUNCTURE: CPT

## 2024-02-05 PROCEDURE — 85025 COMPLETE CBC W/AUTO DIFF WBC: CPT | Performed by: EMERGENCY MEDICINE

## 2024-02-05 RX ORDER — DILTIAZEM HYDROCHLORIDE 5 MG/ML
10 INJECTION INTRAVENOUS ONCE
Status: DISCONTINUED | OUTPATIENT
Start: 2024-02-05 | End: 2024-02-05 | Stop reason: HOSPADM

## 2024-02-05 RX ORDER — DILTIAZEM HYDROCHLORIDE 180 MG/1
180 CAPSULE, EXTENDED RELEASE ORAL DAILY
Qty: 14 CAPSULE | Refills: 0 | Status: SHIPPED | OUTPATIENT
Start: 2024-02-05

## 2024-02-05 RX ORDER — SODIUM CHLORIDE 0.9 % (FLUSH) 0.9 %
10 SYRINGE (ML) INJECTION AS NEEDED
Status: DISCONTINUED | OUTPATIENT
Start: 2024-02-05 | End: 2024-02-05 | Stop reason: HOSPADM

## 2024-02-05 RX ADMIN — SODIUM CHLORIDE 500 ML: 9 INJECTION, SOLUTION INTRAVENOUS at 13:03

## 2024-02-05 NOTE — ED PROVIDER NOTES
Subjective   History of Present Illness  Patient is a 73-year-old male with past medical history of aortic stenosis, CAD, diabetes, hyperlipidemia, hypertension, mitral regurg who presents from doctor's office for pain A-fib.  Patient says his cardiologist is Dr. Peraza.  Patient states for about a week now he has felt palpitations and has known that he has had A-fib with a higher rate as he has been writing down his heart rate for the last week.  Patient states that he has been taken off diltiazem and amiodarone but is still taking metoprolol.  Patient is on warfarin, states he has not missed any doses.  Patient denies any other medication changes.  Patient denies chest pain, shortness of breath, abdominal pain, back pain, nausea, vomiting.  Review of Systems   Constitutional:  Negative for fever.   Respiratory:  Negative for shortness of breath.    Cardiovascular:  Positive for palpitations. Negative for chest pain.   Gastrointestinal:  Negative for abdominal pain, nausea and vomiting.       Past Medical History:   Diagnosis Date    Anemia     Aortic stenosis     CAD (coronary artery disease)     Diabetes mellitus     Hyperlipidemia     Hypertension     Hypothyroidism     Mitral regurgitation        No Known Allergies    Past Surgical History:   Procedure Laterality Date    CORONARY ARTERY BYPASS GRAFT      CORONARY STENT PLACEMENT         Family History   Problem Relation Age of Onset    Diabetes Mother     Stroke Mother     Thyroid cancer Mother     Hypertension Father     Stroke Father        Social History     Socioeconomic History    Marital status:    Tobacco Use    Smoking status: Former     Types: Cigarettes     Passive exposure: Past    Smokeless tobacco: Never   Vaping Use    Vaping Use: Never used   Substance and Sexual Activity    Alcohol use: Never    Drug use: Never    Sexual activity: Defer           Objective   Physical Exam  Physical Exam  GENERAL:Well developed.  Appears in no acute  "distress.  HENT: Nares patent  EYES: No scleral icterus  CV: Irregular irregular rhythm.  Tachycardic initial exam  RESPIRATORY: Normal effort.  No audible wheezes, rales or rhonchi  ABDOMEN: Soft, nontender, mildly distended. No rebound tenderness or guarding noted.   MUSCULOSKELETAL: No deformities.  Bilateral mild edema noted to lower extremities. (Patient says this is chronic for him)  NEURO: Alert, moves all extremities, follows commands  SKIN: Warm, dry  Procedures           ED Course  ED Course as of 02/05/24 1602   Mon Feb 05, 2024   1233 Reported by patient and family member that cardiologist recently took him off amiodarone and diltiazem.  Patient states he only takes metoprolol now. [OM]   1244 Paged Dr. Peraza as this is patient's cardiologist to see when patient was taken off other medications. [OM]   1334 proBNP(!): 3,020.0 [OM]   1339 Troponin noted to previously be 35, today is 37.  INR is 2.11 [OM]   1341 Chest x-ray does not show pulmonary edema or pleural effusions.  Shows stable cardiomegaly [OM]   1342 Heart rate has now been in the 90s after fluid given. [OM]   1343 Creatinine(!): 1.89 [OM]   1411 Waiting on 2-hour troponin. [OM]   1530 Initial EKG shows atrial flutter with variable AV block, repeat shows A-fib has replaced a flutter.  Rate went from 104 initially to 65 repeat. [OM]   1540 Waiting for 2-hour troponin.  Patient's heart rate has continued to stay below 100. [OM]   1556 HS Troponin T(!): 34 [OM]   1601 INR(!): 2.11 [OM]      ED Course User Index  [OM] Rosalina Robb PA-C                                   Differential diagnosis includes A-fib, a flutter, sinus tachycardia, ACS,      73-year-old male with past medical history of A-fib on warfarin presents with palpitations coming from doctor's office where he was \"getting red blood cell shot.\"  Patient denies chest pain or shortness of breath.  Patient follows with Dr. Peraza.  I contacted Dr. Peraza to discuss the patient and he " "said that he needs to be started back on diltiazem to help with rate control.  Dr. Peraza says patient is in chronic A-fib but may need help with the rate control.  Discussed this with patient he is agreeable with this plan.  Will start patient on diltiazem again and discussed the importance of following up with Dr. Peraza soon as possible.  BNP is elevated, patient is on a fluid pill and chest x-ray does not show any pleural effusion or pulmonary edema.  O2 saturation above 90% on room air without difficulty.  Patient given 500 cc of fluid and heart rate decreased, diltiazem not given in the emergency department as patient's heart rate has been in the 80s and 90s since fluid given.  Discussed with patient that his BNP is elevated along with chest x-ray saying stable cardiomegaly and this likely indicates CHF and he said that he does take a \"water pill for this.\"  INR 2.11.  Initial troponin 37, repeat 34. discussed with patients the results from today's visit. Discussed with patient strict return precautions and they verbalize understanding. Recommend to them following up with primary care as soon as possible. Patient is discharged hemodynamically stable and comfortable.       Medical Decision Making  Problems Addressed:  Atrial fibrillation and flutter: complicated acute illness or injury  Congestive heart failure, unspecified HF chronicity, unspecified heart failure type: complicated acute illness or injury    Amount and/or Complexity of Data Reviewed  Labs: ordered. Decision-making details documented in ED Course.  Radiology: ordered.  ECG/medicine tests: ordered.    Risk  Prescription drug management.        Final diagnoses:   Atrial fibrillation and flutter   Congestive heart failure, unspecified HF chronicity, unspecified heart failure type       ED Disposition  ED Disposition       ED Disposition   Discharge    Condition   Stable    Comment   --               Manolo Enamorado MD  [address] [place] [address] " [address]  [place] [id number]  [phone]    Schedule an appointment as soon as possible for a visit       Jasvir Peraza MD  [address] [facility] [address]  [place] [id number] [phone]    Schedule an appointment as soon as possible for a visit   Re check         Medication List        Changed      dilTIAZem 180 MG 24 hr capsule  Commonly known as: TIAZAC  Take 1 capsule by mouth Daily.  What changed:   medication strength  how much to take               Where to Get Your Medications        These medications were sent to [facility] - [address] - [phone] [place] [id number] [place] [id number]      Phone: [phone]   dilTIAZem 180 MG 24 hr capsule            Rosalina Robb PA-C  02/05/24 [id number]

## 2024-02-05 NOTE — DISCHARGE INSTRUCTIONS
Please follow-up with primary care and cardiologist.  Please return with worsening or change in symptoms.  The only medication that was changed is the diltiazem, take 180 mg 24-hour capsule 1 time per day.  Please continue to take all of the other medications as you have been.

## 2024-02-21 ENCOUNTER — HOSPITAL ENCOUNTER (OUTPATIENT)
Dept: CARDIOLOGY | Facility: HOSPITAL | Age: 74
Discharge: HOME | End: 2024-02-21
Attending: INTERNAL MEDICINE | Admitting: INTERNAL MEDICINE
Payer: MEDICARE

## 2024-02-21 VITALS
OXYGEN SATURATION: 99 % | BODY MASS INDEX: 33.98 KG/M2 | DIASTOLIC BLOOD PRESSURE: 95 MMHG | SYSTOLIC BLOOD PRESSURE: 124 MMHG | WEIGHT: 229.4 LBS | HEIGHT: 69 IN | HEART RATE: 130 BPM | TEMPERATURE: 97.6 F | RESPIRATION RATE: 18 BRPM

## 2024-02-21 DIAGNOSIS — I48.11 LONGSTANDING PERSISTENT ATRIAL FIBRILLATION: ICD-10-CM

## 2024-02-21 LAB
ANION GAP SERPL CALCULATED.3IONS-SCNC: 10 MMOL/L (ref 5–15)
BUN SERPL-MCNC: 23 MG/DL (ref 8–23)
BUN/CREAT SERPL: 26.4 (ref 7–25)
CALCIUM SPEC-SCNC: 8.6 MG/DL (ref 8.6–10.5)
CHLORIDE SERPL-SCNC: 99 MMOL/L (ref 98–107)
CO2 SERPL-SCNC: 26 MMOL/L (ref 22–29)
CREAT SERPL-MCNC: 0.87 MG/DL (ref 0.76–1.27)
DEPRECATED RDW RBC AUTO: 51.5 FL (ref 37–54)
EGFRCR SERPLBLD CKD-EPI 2021: 91.7 ML/MIN/1.73
ERYTHROCYTE [DISTWIDTH] IN BLOOD BY AUTOMATED COUNT: 14.7 % (ref 12.3–15.4)
GLUCOSE SERPL-MCNC: 110 MG/DL (ref 65–99)
HCT VFR BLD AUTO: 30.4 % (ref 37.5–51)
HGB BLD-MCNC: 10.2 G/DL (ref 13–17.7)
INR PPP: 1.4 (ref 0.8–1.2)
INR PPP: 1.78 (ref 0.89–1.12)
MCH RBC QN AUTO: 31.8 PG (ref 26.6–33)
MCHC RBC AUTO-ENTMCNC: 33.6 G/DL (ref 31.5–35.7)
MCV RBC AUTO: 94.7 FL (ref 79–97)
PLATELET # BLD AUTO: 173 10*3/MM3 (ref 140–450)
PMV BLD AUTO: 10.2 FL (ref 6–12)
POTASSIUM SERPL-SCNC: 4.4 MMOL/L (ref 3.5–5.2)
PROTHROMBIN TIME: 17 SECONDS (ref 12.8–15.2)
PROTHROMBIN TIME: 20.9 SECONDS (ref 12.2–14.5)
RBC # BLD AUTO: 3.21 10*6/MM3 (ref 4.14–5.8)
SODIUM SERPL-SCNC: 135 MMOL/L (ref 136–145)
WBC NRBC COR # BLD AUTO: 6.68 10*3/MM3 (ref 3.4–10.8)

## 2024-02-21 PROCEDURE — 85027 COMPLETE CBC AUTOMATED: CPT | Performed by: INTERNAL MEDICINE

## 2024-02-21 PROCEDURE — 85610 PROTHROMBIN TIME: CPT | Performed by: INTERNAL MEDICINE

## 2024-02-21 PROCEDURE — 36415 COLL VENOUS BLD VENIPUNCTURE: CPT

## 2024-02-21 PROCEDURE — 80048 BASIC METABOLIC PNL TOTAL CA: CPT | Performed by: INTERNAL MEDICINE

## 2024-02-21 PROCEDURE — 85610 PROTHROMBIN TIME: CPT

## 2024-02-21 RX ORDER — DILTIAZEM HYDROCHLORIDE 180 MG/1
180 CAPSULE, EXTENDED RELEASE ORAL 2 TIMES DAILY
Qty: 14 CAPSULE | Refills: 0 | Status: SHIPPED | OUTPATIENT
Start: 2024-02-21

## 2024-02-21 NOTE — H&P
[facility] - History & Physical     Kaleb Abraham  1950  2506/1      02/21/24      Identification:  Kaleb Abraham is a 73 y.o. male.      PCP:  Manolo Enamorado MD  Hematologist: Dr. Mark Almendarez      Chief Complaint: Persistent atrial fibrillation    Allergies:  has No Known Allergies.    Medications Prior to Admission   Medication Sig Dispense Refill Last Dose    alfuzosin (UROXATRAL) 10 MG 24 hr tablet Take 1 tablet by mouth Daily.       Alirocumab (Praluent) 150 MG/ML injection pen Inject 1 mL under the skin into the appropriate area as directed Every 14 (Fourteen) Days.       allopurinol (ZYLOPRIM) 300 MG tablet Take 1 tablet by mouth Daily.       amiodarone (PACERONE) 200 MG tablet Take 1 tablet by mouth 2 (Two) Times a Day. 180 tablet 3     amitriptyline (ELAVIL) 25 MG tablet Take 1 tablet by mouth Every Night.       ascorbic acid (VITAMIN C) 500 MG tablet Take 1 tablet by mouth Daily.       Bempedoic Acid-Ezetimibe (Nexlizet) 180-10 MG tablet Take 1 tablet by mouth Daily.       Cholecalciferol (Vitamin D3) 1.25 MG (62487 UT) tablet Take 1 tablet by mouth Daily.       dilTIAZem (TIAZAC) 180 MG 24 hr capsule Take 1 capsule by mouth Daily. 14 capsule 0     fenofibrate 160 MG tablet Take 1 tablet by mouth Daily.       ferrous sulfate 325 (65 FE) MG tablet Take 1 tablet by mouth Daily With Breakfast.       gabapentin (NEURONTIN) 600 MG tablet Take 1 tablet by mouth 3 (Three) Times a Day.       HYDROcodone-acetaminophen (NORCO)  MG per tablet Take 1 tablet by mouth Every 8 (Eight) Hours As Needed for Moderate Pain.       icosapent ethyl (VASCEPA) 1 g capsule capsule Take 2 g by mouth 2 (Two) Times a Day With Meals.       levothyroxine (SYNTHROID, LEVOTHROID) 25 MCG tablet Take 1 tablet by mouth Every Morning.       lisinopril (PRINIVIL,ZESTRIL) 40 MG tablet Take 0.5 tablets by mouth Daily.       Melatonin 10 MG tablet Take 1 tablet by mouth Daily.       metFORMIN (GLUCOPHAGE) 500  MG tablet Take 1 tablet by mouth Every Night.       metoprolol tartrate (LOPRESSOR) 50 MG tablet Take 1 tablet by mouth 2 (Two) Times a Day.       multivitamin with minerals tablet tablet Take 1 tablet by mouth Daily.       Nitrostat 0.4 MG SL tablet Place  under the tongue.       oxybutynin XL (DITROPAN-XL) 10 MG 24 hr tablet Take 1 tablet by mouth Daily.       pantoprazole (PROTONIX) 40 MG EC tablet Take 1 tablet by mouth Daily.       PEG-KCl-NaCl-NaSulf-Na Asc-C (MOVIPREP) 100 g reconstituted solution powder Starting at 5am on morning of procedure, drink 1/4 liter (250ml) every 15 minutes so full liter is consumed in 1 hour. 1000 mL 0     tiZANidine (ZANAFLEX) 2 MG tablet Take 1 tablet by mouth.       warfarin (COUMADIN) 1 MG tablet Take 1 tablet by mouth Daily. 2.5 mg for four days and 2 mg for three days       Zinc 50 MG capsule Take 50 mg by mouth Daily.          No current facility-administered medications for this encounter.      HPI:  Kaleb Abraham is a 73-year-old male with PMHx valvular heart disease (prosthetic AVR/MVR) on chronic Coumadin, CAD/remote CABG, HTN, HLP, DM2, MARTINEZ.  He also has a history of tachybradycardia syndrome with persistent A-fib/flutter.  He was recently seen in the office on February 12 after being seen in local ER on February 9 with recurrent A-fib.  He reports heart rates have been fluctuating anywhere between the 40s and 130s.  This has been ongoing for 2 to 3 weeks quite severe in nature causing significant SOA, pedal edema and overall weakness.  He feels better when he is at rest.  When asked about his heart rates.  He feels worse when his rates are low.  He was put back on Cardize from his local ED on the night of February.  Since that time, rates have been better but he remains in a flutter.  He presents today to undergo elective ECV under the care of Dr. Jasvir Peraza.  He is on chronic warfarin for his valvular heart disease. INRs have not been therapeutic since  early January as patient was holding warfarin short-term for procedure and getting Lovenox bridge.  INR in January 30 was 1.5.  INR on February 13 was 1.5 with increase in dose.  He is followed by our office for INR management.          Social History     Socioeconomic History    Marital status:    Tobacco Use    Smoking status: Former     Types: Cigarettes     Passive exposure: Past    Smokeless tobacco: Never   Vaping Use    Vaping Use: Never used   Substance and Sexual Activity    Alcohol use: Never    Drug use: Never    Sexual activity: Defer     Family History   Problem Relation Age of Onset    Diabetes Mother     Stroke Mother     Thyroid cancer Mother     Hypertension Father     Stroke Father      Past Surgical History:   Procedure Laterality Date    CORONARY ARTERY BYPASS GRAFT      CORONARY STENT PLACEMENT         Review of Systems:  Pertinent positives are listed above and in physical exam.  All others have been reviewed and are negative.     Objective:   Vitals:   vitals were not taken for this visit.  No intake or output data in the 24 hours ending 02/21/24 1100 vitals reviewed in room      Physical Exam:  General Appearance:    Alert, cooperative, in no acute distress   Head:    Normocephalic, without obvious abnormality, atraumatic   Eyes:            Lids and lashes normal, conjunctivae and sclerae normal, no   icterus, no pallor, corneas clear, PERRLA   Ears:    Ears appear intact with no abnormalities noted   Throat:   No oral lesions, no thrush, oral mucosa moist   Neck:   No adenopathy, supple, trachea midline, no thyromegaly, no carotid bruit, no JVD   Back:     No kyphosis present, no scoliosis present, no skin lesions,   erythema or scars, no tenderness to percussion or                  palpation,  range of motion normal   Lungs:     Clear to auscultation,respirations regular, even and               unlabored    Heart:    Irregular rhythm and normal rate, normal S1 and S2, II/VI     "murmur, no gallop, no rub, + click   Abdomen:     Normal bowel sounds, no masses, no organomegaly, soft     nontender, nondistended, no guarding, no rebound               tenderness   Extremities:   Moves all extremities well,  no cyanosis, no redness, + edema BLE   Pulses:   Pulses palpable and equal bilaterally   Skin:   No bleeding, bruising or rash   Lymph nodes:   No palpable adenopathy   Neurologic:   Cranial nerves 2 - 12 grossly intact, sensation intact, DTR    present and equal bilaterally          Results Review:  I personally reviewed the patient's clinical results.              Invalid input(s): \"LABALBU\", \"PROT\"                        Radiology:  Imaging Results (Last 72 Hours)       ** No results found for the last 72 hours. **            Tele: Atrial fibrillation    Assessment and Plan:    1.  73-year-old CM with tachybradycardia syndrome, persistent atrial fibrillation/flutter who was recently seen in local ED with varying rates, restarted on Cardizem.  He is symptomatic with dyspnea, SOA and fatigue.  He presents today to undergo elective ECV under the care of Dr. Peraza.  Unfortunately, his INR's have not been therapeutic for at least 3 weeks.  -Will check INR today and adjust Coumadin dosing accordingly and bring him back for rescheduled ECV.  He will need to be in therapeutic range for at least 3 weeks prior to this rescheduling.  -Change Cardizem to twice daily dosing for better rate control.  All other medications are the same.  -Will refer to EP for discussion regarding ablation, possible pacemaker implantation.  Patient does report he is more symptomatic with slower heart rates.        I discussed the patient's findings and my recommendations with the patient, any present family members, and the nursing staff.  Jasvir Peraza MD saw and examined patient, verified hx and PE, read all radiographic studies, reviewed labs and micro data, and formulated dx, plan for treatment and all " medical decision making.      Lulu Davison PA-C for Jasvir Peraza MD  02/21/24 11:00 EST

## 2024-03-27 ENCOUNTER — HOSPITAL ENCOUNTER (OUTPATIENT)
Dept: CARDIOLOGY | Facility: HOSPITAL | Age: 74
Discharge: HOME | End: 2024-03-27
Attending: INTERNAL MEDICINE | Admitting: INTERNAL MEDICINE
Payer: MEDICARE

## 2024-03-27 VITALS
HEIGHT: 71 IN | RESPIRATION RATE: 12 BRPM | OXYGEN SATURATION: 100 % | SYSTOLIC BLOOD PRESSURE: 126 MMHG | HEART RATE: 58 BPM | BODY MASS INDEX: 32.34 KG/M2 | DIASTOLIC BLOOD PRESSURE: 78 MMHG | TEMPERATURE: 97.4 F | WEIGHT: 231 LBS

## 2024-03-27 DIAGNOSIS — I48.11 LONGSTANDING PERSISTENT ATRIAL FIBRILLATION: ICD-10-CM

## 2024-03-27 LAB
ANION GAP SERPL CALCULATED.3IONS-SCNC: 7 MMOL/L (ref 5–15)
BUN SERPL-MCNC: 21 MG/DL (ref 8–23)
BUN/CREAT SERPL: 20.6 (ref 7–25)
CALCIUM SPEC-SCNC: 9.4 MG/DL (ref 8.6–10.5)
CHLORIDE SERPL-SCNC: 96 MMOL/L (ref 98–107)
CO2 SERPL-SCNC: 29 MMOL/L (ref 22–29)
CREAT SERPL-MCNC: 1.02 MG/DL (ref 0.76–1.27)
DEPRECATED RDW RBC AUTO: 55.6 FL (ref 37–54)
EGFRCR SERPLBLD CKD-EPI 2021: 78.1 ML/MIN/1.73
ERYTHROCYTE [DISTWIDTH] IN BLOOD BY AUTOMATED COUNT: 16.3 % (ref 12.3–15.4)
GLUCOSE SERPL-MCNC: 97 MG/DL (ref 65–99)
HCT VFR BLD AUTO: 28.3 % (ref 37.5–51)
HGB BLD-MCNC: 9.2 G/DL (ref 13–17.7)
MCH RBC QN AUTO: 30.8 PG (ref 26.6–33)
MCHC RBC AUTO-ENTMCNC: 32.5 G/DL (ref 31.5–35.7)
MCV RBC AUTO: 94.6 FL (ref 79–97)
PLATELET # BLD AUTO: 222 10*3/MM3 (ref 140–450)
PMV BLD AUTO: 9.2 FL (ref 6–12)
POTASSIUM SERPL-SCNC: 4.6 MMOL/L (ref 3.5–5.2)
RBC # BLD AUTO: 2.99 10*6/MM3 (ref 4.14–5.8)
SODIUM SERPL-SCNC: 132 MMOL/L (ref 136–145)
WBC NRBC COR # BLD AUTO: 5.25 10*3/MM3 (ref 3.4–10.8)

## 2024-03-27 PROCEDURE — 80048 BASIC METABOLIC PNL TOTAL CA: CPT | Performed by: INTERNAL MEDICINE

## 2024-03-27 PROCEDURE — 25010000002 MIDAZOLAM PER 1 MG: Performed by: INTERNAL MEDICINE

## 2024-03-27 PROCEDURE — 93005 ELECTROCARDIOGRAM TRACING: CPT | Performed by: INTERNAL MEDICINE

## 2024-03-27 PROCEDURE — 85027 COMPLETE CBC AUTOMATED: CPT | Performed by: INTERNAL MEDICINE

## 2024-03-27 PROCEDURE — 36415 COLL VENOUS BLD VENIPUNCTURE: CPT

## 2024-03-27 PROCEDURE — 92960 CARDIOVERSION ELECTRIC EXT: CPT

## 2024-03-27 PROCEDURE — 25010000002 FENTANYL CITRATE (PF) 50 MCG/ML SOLUTION: Performed by: INTERNAL MEDICINE

## 2024-03-27 RX ORDER — MIDAZOLAM HYDROCHLORIDE 1 MG/ML
2-8 INJECTION INTRAMUSCULAR; INTRAVENOUS ONCE AS NEEDED
Status: DISCONTINUED | OUTPATIENT
Start: 2024-03-27 | End: 2024-03-27 | Stop reason: HOSPADM

## 2024-03-27 RX ORDER — CLINDAMYCIN HYDROCHLORIDE 300 MG/1
300 CAPSULE ORAL 4 TIMES DAILY
COMMUNITY

## 2024-03-27 RX ORDER — FLUMAZENIL 0.1 MG/ML
0.5 INJECTION INTRAVENOUS ONCE AS NEEDED
Status: DISCONTINUED | OUTPATIENT
Start: 2024-03-27 | End: 2024-03-27 | Stop reason: HOSPADM

## 2024-03-27 RX ORDER — NALOXONE HCL 0.4 MG/ML
0.4 VIAL (ML) INJECTION ONCE AS NEEDED
Status: DISCONTINUED | OUTPATIENT
Start: 2024-03-27 | End: 2024-03-27 | Stop reason: HOSPADM

## 2024-03-27 RX ORDER — FENTANYL CITRATE 50 UG/ML
INJECTION, SOLUTION INTRAMUSCULAR; INTRAVENOUS
Status: COMPLETED | OUTPATIENT
Start: 2024-03-27 | End: 2024-03-27

## 2024-03-27 RX ORDER — FENTANYL CITRATE 50 UG/ML
50-100 INJECTION, SOLUTION INTRAMUSCULAR; INTRAVENOUS ONCE AS NEEDED
Status: DISCONTINUED | OUTPATIENT
Start: 2024-03-27 | End: 2024-03-27 | Stop reason: HOSPADM

## 2024-03-27 RX ORDER — MIDAZOLAM HYDROCHLORIDE 1 MG/ML
INJECTION INTRAMUSCULAR; INTRAVENOUS
Status: COMPLETED | OUTPATIENT
Start: 2024-03-27 | End: 2024-03-27

## 2024-03-27 RX ADMIN — MIDAZOLAM HYDROCHLORIDE 2 MG: 1 INJECTION, SOLUTION INTRAMUSCULAR; INTRAVENOUS at 13:15

## 2024-03-27 RX ADMIN — MIDAZOLAM HYDROCHLORIDE 2 MG: 1 INJECTION, SOLUTION INTRAMUSCULAR; INTRAVENOUS at 13:12

## 2024-03-27 RX ADMIN — FENTANYL CITRATE 50 MCG: 50 INJECTION, SOLUTION INTRAMUSCULAR; INTRAVENOUS at 13:12

## 2024-03-27 RX ADMIN — FENTANYL CITRATE 50 MCG: 50 INJECTION, SOLUTION INTRAMUSCULAR; INTRAVENOUS at 13:10

## 2024-03-27 RX ADMIN — MIDAZOLAM HYDROCHLORIDE 2 MG: 1 INJECTION, SOLUTION INTRAMUSCULAR; INTRAVENOUS at 13:10

## 2024-03-27 NOTE — H&P
[facility] - History & Physical     Kaleb Abraham  1950  2503/1      03/27/24      Identification:  Kaleb Abraham is a 73 y.o. male.      PCP:  Manolo Enamorado MD        Chief Complaint: Persistent Atrial Fibrillation      Allergies:  has No Known Allergies.    Medications Prior to Admission   Medication Sig Dispense Refill Last Dose    alfuzosin (UROXATRAL) 10 MG 24 hr tablet Take 1 tablet by mouth Daily.       Alirocumab (Praluent) 150 MG/ML injection pen Inject 1 mL under the skin into the appropriate area as directed Every 14 (Fourteen) Days.       allopurinol (ZYLOPRIM) 300 MG tablet Take 1 tablet by mouth Daily.       amitriptyline (ELAVIL) 25 MG tablet Take 1 tablet by mouth Every Night.       Bempedoic Acid-Ezetimibe (Nexlizet) 180-10 MG tablet Take 1 tablet by mouth Daily.       dilTIAZem (TIAZAC) 180 MG 24 hr capsule Take 1 capsule by mouth 2 (Two) Times a Day. 14 capsule 0     fenofibrate 160 MG tablet Take 1 tablet by mouth Daily.       ferrous sulfate 325 (65 FE) MG tablet Take 1 tablet by mouth Daily With Breakfast.       gabapentin (NEURONTIN) 600 MG tablet Take 1 tablet by mouth 3 (Three) Times a Day.       HYDROcodone-acetaminophen (NORCO)  MG per tablet Take 1 tablet by mouth Every 8 (Eight) Hours As Needed for Moderate Pain.       icosapent ethyl (VASCEPA) 1 g capsule capsule Take 2 g by mouth 2 (Two) Times a Day With Meals.       levothyroxine (SYNTHROID, LEVOTHROID) 25 MCG tablet Take 1 tablet by mouth Every Morning.       lisinopril (PRINIVIL,ZESTRIL) 40 MG tablet Take 0.5 tablets by mouth Daily.       Melatonin 10 MG tablet Take 1 tablet by mouth Daily.       metFORMIN (GLUCOPHAGE) 500 MG tablet Take 1 tablet by mouth Every Night.       metoprolol tartrate (LOPRESSOR) 50 MG tablet Take 1.5 tablets by mouth 2 (Two) Times a Day. 1.5 TABS BID       multivitamin with minerals tablet tablet Take 1 tablet by mouth Daily.       Nitrostat 0.4 MG SL tablet Place 1  tablet under the tongue Every 5 (Five) Minutes As Needed. PT. HAS NOT HAD TO TAKE THIS.       oxybutynin XL (DITROPAN-XL) 10 MG 24 hr tablet Take 1 tablet by mouth Daily.       pantoprazole (PROTONIX) 40 MG EC tablet Take 1 tablet by mouth Daily.       tiZANidine (ZANAFLEX) 2 MG tablet Take 1 tablet by mouth As Needed.       warfarin (COUMADIN) 1 MG tablet Take 3.5 tablets by mouth Daily. 2.5 mg for four days and 2 mg for three days          No current facility-administered medications for this encounter.        HPI:  Kaleb Abraham is a 73-year-old male with PMHx valvular heart disease (prosthetic AVR/MVR) on chronic Coumadin, CAD/remote CABG, HTN, HLP, DM2, MARTINEZ.  He also has a history of tachybradycardia syndrome with persistent A-fib/flutter.  He was recently seen in the office on February 12 after being seen in local ER on February 9 with recurrent A-fib.  He reports heart rates have been fluctuating anywhere between the 40s and 130s.  This has been ongoing for 2 to 3 weeks quite severe in nature causing significant SOA, pedal edema and overall weakness.  He feels better when he is at rest.  When asked about his heart rates.  He feels worse when his rates are low.  He was put back on Cardizem from his local ED on the night of February.  Since that time, rates have been better but he remains in a flutter.  He presents today to undergo elective ECV under the care of Dr. Jasvir Peraza.  He is on chronic warfarin for his valvular heart disease. INRs have  been therapeutic for the last 3-4 weeks, we have been following in our office closely.          Social History     Socioeconomic History    Marital status:    Tobacco Use    Smoking status: Former     Types: Cigarettes     Passive exposure: Past    Smokeless tobacco: Never   Vaping Use    Vaping status: Never Used   Substance and Sexual Activity    Alcohol use: Never    Drug use: Never    Sexual activity: Defer     Family History   Problem Relation  Age of Onset    Diabetes Mother     Stroke Mother     Thyroid cancer Mother     Hypertension Father     Stroke Father      Past Surgical History:   Procedure Laterality Date    CORONARY ARTERY BYPASS GRAFT      CORONARY STENT PLACEMENT         Review of Systems:  Pertinent positives are listed above and in physical exam.  All others have been reviewed and are negative.     Objective:   Vitals:   vitals were not taken for this visit.  No intake or output data in the 24 hours ending 03/27/24 1047 Vitals reviewed in room      Physical Exam:  General Appearance:    Alert, cooperative, in no acute distress   Head:    Normocephalic, without obvious abnormality, atraumatic   Eyes:            Lids and lashes normal, conjunctivae and sclerae normal, no   icterus, no pallor, corneas clear, PERRLA   Ears:    Ears appear intact with no abnormalities noted   Throat:   No oral lesions, no thrush, oral mucosa moist   Neck:   No adenopathy, supple, trachea midline, no thyromegaly, no carotid bruit, no JVD   Back:     No kyphosis present, no scoliosis present, no skin lesions,   erythema or scars, no tenderness to percussion or               palpation,  range of motion normal   Lungs:     Clear to auscultation,respirations regular, even and               unlabored    Heart:    Regular rhythm and normal rate, normal S1 and S2, no         murmur, no gallop, no rub, no click   Abdomen:     Normal bowel sounds, no masses, no organomegaly, soft     nontender, nondistended, no guarding, no rebound               tenderness   Extremities:   Moves all extremities well,  no cyanosis, no redness, no edema   Pulses:   Pulses palpable and equal bilaterally   Skin:   No bleeding, bruising or rash   Lymph nodes:   No palpable adenopathy   Neurologic:   Cranial nerves 2 - 12 grossly intact, sensation intact, DTR    present and equal bilaterally          Results Review:  I personally reviewed the patient's clinical results.              Invalid  "input(s): \"LABALBU\", \"PROT\"                        Radiology:  Imaging Results (Last 72 Hours)       ** No results found for the last 72 hours. **            Tele:  Atrial Fibrillation/Flutter    Assessment and Plan:    1.  73-year-old CM with tachybradycardia syndrome, persistent atrial fibrillation/flutter who was recently seen in local ED with varying rates, restarted on Cardizem.  He is symptomatic with dyspnea, SOA and fatigue.  He presents today to undergo elective ECV under the care of Dr. Peraza.       We have been following his INRs very closely in our office, they HAVE been therapeutic for at least 3 1/2 weeks now.  Risks and benefits have been reviewed with the patient and he is willing to proceed.  Further recommendations based on outcomes. EKG today appears to be a flutter.    Will refer to EP for discussion regarding ablation, possible pacemaker implantation.  Patient does report he is more symptomatic with slower heart rates           I discussed the patient's findings and my recommendations with the patient, any present family members, and the nursing staff.  Jasvir Peraza MD saw and examined patient, verified hx and PE, read all radiographic studies, reviewed labs and micro data, and formulated dx, plan for treatment and all medical decision making.      Lulu Davison PA-C for Jasvir Peraza MD  03/27/24 10:47 EDT      "

## 2024-03-28 LAB
QT INTERVAL: 464 MS
QT INTERVAL: 492 MS
QTC INTERVAL: 459 MS
QTC INTERVAL: 478 MS

## 2024-05-06 ENCOUNTER — HOSPITAL ENCOUNTER (INPATIENT)
Facility: HOSPITAL | Age: 74
LOS: 13 days | Discharge: NURSING, REHAB OR LONG-TERM CARE | DRG: 314 | End: 2024-05-20
Attending: EMERGENCY MEDICINE | Admitting: INTERNAL MEDICINE
Payer: MEDICARE

## 2024-05-06 ENCOUNTER — APPOINTMENT (OUTPATIENT)
Dept: GENERAL RADIOLOGY | Facility: HOSPITAL | Age: 74
DRG: 314 | End: 2024-05-06
Payer: MEDICARE

## 2024-05-06 ENCOUNTER — APPOINTMENT (OUTPATIENT)
Dept: CT IMAGING | Facility: HOSPITAL | Age: 74
DRG: 314 | End: 2024-05-06
Payer: MEDICARE

## 2024-05-06 ENCOUNTER — APPOINTMENT (OUTPATIENT)
Dept: MRI IMAGING | Facility: HOSPITAL | Age: 74
DRG: 314 | End: 2024-05-06
Payer: MEDICARE

## 2024-05-06 DIAGNOSIS — B95.2 ENTEROCOCCAL BACTEREMIA: ICD-10-CM

## 2024-05-06 DIAGNOSIS — R53.1 WEAKNESS: ICD-10-CM

## 2024-05-06 DIAGNOSIS — Z87.19 HISTORY OF GI BLEED: ICD-10-CM

## 2024-05-06 DIAGNOSIS — Z95.2 H/O HEART VALVE REPLACEMENT WITH MECHANICAL VALVE: Chronic | ICD-10-CM

## 2024-05-06 DIAGNOSIS — R53.1 GENERALIZED WEAKNESS: ICD-10-CM

## 2024-05-06 DIAGNOSIS — Z79.01 ANTICOAGULATED ON COUMADIN: ICD-10-CM

## 2024-05-06 DIAGNOSIS — R78.81 ENTEROCOCCAL BACTEREMIA: ICD-10-CM

## 2024-05-06 DIAGNOSIS — R79.1 SUPRATHERAPEUTIC INR: ICD-10-CM

## 2024-05-06 DIAGNOSIS — Z86.79 HISTORY OF CHF (CONGESTIVE HEART FAILURE): ICD-10-CM

## 2024-05-06 DIAGNOSIS — D64.9 ACUTE ON CHRONIC ANEMIA: Primary | ICD-10-CM

## 2024-05-06 LAB
ABO GROUP BLD: NORMAL
ABO GROUP BLD: NORMAL
ALBUMIN SERPL-MCNC: 3 G/DL (ref 3.5–5.2)
ALBUMIN/GLOB SERPL: 0.9 G/DL
ALP SERPL-CCNC: 44 U/L (ref 39–117)
ALT SERPL W P-5'-P-CCNC: 8 U/L (ref 1–41)
ANION GAP SERPL CALCULATED.3IONS-SCNC: 9 MMOL/L (ref 5–15)
AST SERPL-CCNC: 27 U/L (ref 1–40)
BACTERIA UR QL AUTO: ABNORMAL /HPF
BASOPHILS # BLD AUTO: 0.03 10*3/MM3 (ref 0–0.2)
BASOPHILS NFR BLD AUTO: 0.4 % (ref 0–1.5)
BILIRUB SERPL-MCNC: 0.6 MG/DL (ref 0–1.2)
BILIRUB UR QL STRIP: ABNORMAL
BLD GP AB SCN SERPL QL: NEGATIVE
BUN SERPL-MCNC: 17 MG/DL (ref 8–23)
BUN/CREAT SERPL: 16.3 (ref 7–25)
CALCIUM SPEC-SCNC: 8.1 MG/DL (ref 8.6–10.5)
CHLORIDE SERPL-SCNC: 93 MMOL/L (ref 98–107)
CLARITY UR: ABNORMAL
CO2 SERPL-SCNC: 29 MMOL/L (ref 22–29)
COLOR UR: ABNORMAL
CREAT SERPL-MCNC: 1.04 MG/DL (ref 0.76–1.27)
D-LACTATE SERPL-SCNC: 1.1 MMOL/L (ref 0.5–2)
DEPRECATED RDW RBC AUTO: 52.6 FL (ref 37–54)
EGFRCR SERPLBLD CKD-EPI 2021: 76.3 ML/MIN/1.73
EOSINOPHIL # BLD AUTO: 0.01 10*3/MM3 (ref 0–0.4)
EOSINOPHIL NFR BLD AUTO: 0.1 % (ref 0.3–6.2)
ERYTHROCYTE [DISTWIDTH] IN BLOOD BY AUTOMATED COUNT: 15.3 % (ref 12.3–15.4)
FERRITIN SERPL-MCNC: 739.8 NG/ML (ref 30–400)
GLOBULIN UR ELPH-MCNC: 3.3 GM/DL
GLUCOSE SERPL-MCNC: 138 MG/DL (ref 65–99)
GLUCOSE UR STRIP-MCNC: NEGATIVE MG/DL
HCT VFR BLD AUTO: 24.3 % (ref 37.5–51)
HGB BLD-MCNC: 7.9 G/DL (ref 13–17.7)
HGB UR QL STRIP.AUTO: NEGATIVE
HOLD SPECIMEN: NORMAL
HYALINE CASTS UR QL AUTO: ABNORMAL /LPF
IMM GRANULOCYTES # BLD AUTO: 0.07 10*3/MM3 (ref 0–0.05)
IMM GRANULOCYTES NFR BLD AUTO: 0.9 % (ref 0–0.5)
INR PPP: 2.33 (ref 0.89–1.12)
INR PPP: >10 (ref 0.89–1.12)
IRON 24H UR-MRATE: 11 MCG/DL (ref 59–158)
IRON SATN MFR SERPL: 5 % (ref 20–50)
KETONES UR QL STRIP: ABNORMAL
LEUKOCYTE ESTERASE UR QL STRIP.AUTO: NEGATIVE
LIPASE SERPL-CCNC: 33 U/L (ref 13–60)
LYMPHOCYTES # BLD AUTO: 0.69 10*3/MM3 (ref 0.7–3.1)
LYMPHOCYTES NFR BLD AUTO: 8.9 % (ref 19.6–45.3)
MCH RBC QN AUTO: 30.5 PG (ref 26.6–33)
MCHC RBC AUTO-ENTMCNC: 32.5 G/DL (ref 31.5–35.7)
MCV RBC AUTO: 93.8 FL (ref 79–97)
MONOCYTES # BLD AUTO: 0.52 10*3/MM3 (ref 0.1–0.9)
MONOCYTES NFR BLD AUTO: 6.7 % (ref 5–12)
MUCOUS THREADS URNS QL MICRO: ABNORMAL /HPF
NEUTROPHILS NFR BLD AUTO: 6.45 10*3/MM3 (ref 1.7–7)
NEUTROPHILS NFR BLD AUTO: 83 % (ref 42.7–76)
NITRITE UR QL STRIP: NEGATIVE
NRBC BLD AUTO-RTO: 0 /100 WBC (ref 0–0.2)
NT-PROBNP SERPL-MCNC: ABNORMAL PG/ML (ref 0–900)
PH UR STRIP.AUTO: 5.5 [PH] (ref 5–8)
PLATELET # BLD AUTO: 271 10*3/MM3 (ref 140–450)
PMV BLD AUTO: 9.3 FL (ref 6–12)
POTASSIUM SERPL-SCNC: 4 MMOL/L (ref 3.5–5.2)
PROT SERPL-MCNC: 6.3 G/DL (ref 6–8.5)
PROT UR QL STRIP: ABNORMAL
PROTHROMBIN TIME: 25.8 SECONDS (ref 12.2–14.5)
PROTHROMBIN TIME: 80.6 SECONDS (ref 12.2–14.5)
RBC # BLD AUTO: 2.59 10*6/MM3 (ref 4.14–5.8)
RBC # UR STRIP: ABNORMAL /HPF
REF LAB TEST METHOD: ABNORMAL
RH BLD: POSITIVE
RH BLD: POSITIVE
SODIUM SERPL-SCNC: 131 MMOL/L (ref 136–145)
SP GR UR STRIP: 1.04 (ref 1–1.03)
SQUAMOUS #/AREA URNS HPF: ABNORMAL /HPF
T&S EXPIRATION DATE: NORMAL
T4 FREE SERPL-MCNC: 1.48 NG/DL (ref 0.92–1.68)
TIBC SERPL-MCNC: 221 MCG/DL (ref 298–536)
TRANSFERRIN SERPL-MCNC: 148 MG/DL (ref 200–360)
TSH SERPL DL<=0.05 MIU/L-ACNC: 2.72 UIU/ML (ref 0.27–4.2)
UROBILINOGEN UR QL STRIP: ABNORMAL
WBC # UR STRIP: ABNORMAL /HPF
WBC NRBC COR # BLD AUTO: 7.77 10*3/MM3 (ref 3.4–10.8)
WHOLE BLOOD HOLD COAG: NORMAL
WHOLE BLOOD HOLD SPECIMEN: NORMAL

## 2024-05-06 PROCEDURE — 87086 URINE CULTURE/COLONY COUNT: CPT | Performed by: STUDENT IN AN ORGANIZED HEALTH CARE EDUCATION/TRAINING PROGRAM

## 2024-05-06 PROCEDURE — 86900 BLOOD TYPING SEROLOGIC ABO: CPT | Performed by: EMERGENCY MEDICINE

## 2024-05-06 PROCEDURE — 99285 EMERGENCY DEPT VISIT HI MDM: CPT

## 2024-05-06 PROCEDURE — 83540 ASSAY OF IRON: CPT | Performed by: NURSE PRACTITIONER

## 2024-05-06 PROCEDURE — 80053 COMPREHEN METABOLIC PANEL: CPT | Performed by: EMERGENCY MEDICINE

## 2024-05-06 PROCEDURE — A9577 INJ MULTIHANCE: HCPCS | Performed by: STUDENT IN AN ORGANIZED HEALTH CARE EDUCATION/TRAINING PROGRAM

## 2024-05-06 PROCEDURE — 84443 ASSAY THYROID STIM HORMONE: CPT | Performed by: EMERGENCY MEDICINE

## 2024-05-06 PROCEDURE — 87186 SC STD MICRODIL/AGAR DIL: CPT | Performed by: STUDENT IN AN ORGANIZED HEALTH CARE EDUCATION/TRAINING PROGRAM

## 2024-05-06 PROCEDURE — G0378 HOSPITAL OBSERVATION PER HR: HCPCS

## 2024-05-06 PROCEDURE — 86901 BLOOD TYPING SEROLOGIC RH(D): CPT | Performed by: EMERGENCY MEDICINE

## 2024-05-06 PROCEDURE — 82607 VITAMIN B-12: CPT | Performed by: NURSE PRACTITIONER

## 2024-05-06 PROCEDURE — 0 GADOBENATE DIMEGLUMINE 529 MG/ML SOLUTION: Performed by: STUDENT IN AN ORGANIZED HEALTH CARE EDUCATION/TRAINING PROGRAM

## 2024-05-06 PROCEDURE — 25810000003 SODIUM CHLORIDE 0.9 % SOLUTION: Performed by: EMERGENCY MEDICINE

## 2024-05-06 PROCEDURE — 70450 CT HEAD/BRAIN W/O DYE: CPT

## 2024-05-06 PROCEDURE — 85025 COMPLETE CBC W/AUTO DIFF WBC: CPT

## 2024-05-06 PROCEDURE — 25010000002 PHYTONADIONE 10 MG/ML SOLUTION 1 ML AMPULE: Performed by: EMERGENCY MEDICINE

## 2024-05-06 PROCEDURE — 94660 CPAP INITIATION&MGMT: CPT

## 2024-05-06 PROCEDURE — 86901 BLOOD TYPING SEROLOGIC RH(D): CPT

## 2024-05-06 PROCEDURE — 87154 CUL TYP ID BLD PTHGN 6+ TRGT: CPT | Performed by: NURSE PRACTITIONER

## 2024-05-06 PROCEDURE — 83605 ASSAY OF LACTIC ACID: CPT | Performed by: EMERGENCY MEDICINE

## 2024-05-06 PROCEDURE — 87186 SC STD MICRODIL/AGAR DIL: CPT | Performed by: NURSE PRACTITIONER

## 2024-05-06 PROCEDURE — 85610 PROTHROMBIN TIME: CPT | Performed by: EMERGENCY MEDICINE

## 2024-05-06 PROCEDURE — 87077 CULTURE AEROBIC IDENTIFY: CPT | Performed by: STUDENT IN AN ORGANIZED HEALTH CARE EDUCATION/TRAINING PROGRAM

## 2024-05-06 PROCEDURE — 74177 CT ABD & PELVIS W/CONTRAST: CPT

## 2024-05-06 PROCEDURE — 72158 MRI LUMBAR SPINE W/O & W/DYE: CPT

## 2024-05-06 PROCEDURE — 87040 BLOOD CULTURE FOR BACTERIA: CPT | Performed by: NURSE PRACTITIONER

## 2024-05-06 PROCEDURE — 71045 X-RAY EXAM CHEST 1 VIEW: CPT

## 2024-05-06 PROCEDURE — P9612 CATHETERIZE FOR URINE SPEC: HCPCS

## 2024-05-06 PROCEDURE — 86850 RBC ANTIBODY SCREEN: CPT | Performed by: EMERGENCY MEDICINE

## 2024-05-06 PROCEDURE — 81001 URINALYSIS AUTO W/SCOPE: CPT | Performed by: EMERGENCY MEDICINE

## 2024-05-06 PROCEDURE — 25010000002 FUROSEMIDE PER 20 MG: Performed by: NURSE PRACTITIONER

## 2024-05-06 PROCEDURE — 82746 ASSAY OF FOLIC ACID SERUM: CPT | Performed by: NURSE PRACTITIONER

## 2024-05-06 PROCEDURE — 25510000001 IOPAMIDOL 61 % SOLUTION: Performed by: EMERGENCY MEDICINE

## 2024-05-06 PROCEDURE — 84466 ASSAY OF TRANSFERRIN: CPT | Performed by: NURSE PRACTITIONER

## 2024-05-06 PROCEDURE — 36415 COLL VENOUS BLD VENIPUNCTURE: CPT

## 2024-05-06 PROCEDURE — 83690 ASSAY OF LIPASE: CPT | Performed by: EMERGENCY MEDICINE

## 2024-05-06 PROCEDURE — 99223 1ST HOSP IP/OBS HIGH 75: CPT | Performed by: STUDENT IN AN ORGANIZED HEALTH CARE EDUCATION/TRAINING PROGRAM

## 2024-05-06 PROCEDURE — 86923 COMPATIBILITY TEST ELECTRIC: CPT

## 2024-05-06 PROCEDURE — 84439 ASSAY OF FREE THYROXINE: CPT | Performed by: EMERGENCY MEDICINE

## 2024-05-06 PROCEDURE — 86900 BLOOD TYPING SEROLOGIC ABO: CPT

## 2024-05-06 PROCEDURE — 83880 ASSAY OF NATRIURETIC PEPTIDE: CPT | Performed by: EMERGENCY MEDICINE

## 2024-05-06 PROCEDURE — 82728 ASSAY OF FERRITIN: CPT | Performed by: NURSE PRACTITIONER

## 2024-05-06 PROCEDURE — 85610 PROTHROMBIN TIME: CPT | Performed by: NURSE PRACTITIONER

## 2024-05-06 RX ORDER — BISACODYL 5 MG/1
5 TABLET, DELAYED RELEASE ORAL DAILY PRN
Status: DISCONTINUED | OUTPATIENT
Start: 2024-05-06 | End: 2024-05-20 | Stop reason: HOSPADM

## 2024-05-06 RX ORDER — SODIUM CHLORIDE 9 MG/ML
40 INJECTION, SOLUTION INTRAVENOUS AS NEEDED
Status: DISCONTINUED | OUTPATIENT
Start: 2024-05-06 | End: 2024-05-20 | Stop reason: HOSPADM

## 2024-05-06 RX ORDER — NICOTINE POLACRILEX 4 MG
15 LOZENGE BUCCAL
Status: DISCONTINUED | OUTPATIENT
Start: 2024-05-06 | End: 2024-05-20 | Stop reason: HOSPADM

## 2024-05-06 RX ORDER — AMOXICILLIN 250 MG
2 CAPSULE ORAL 2 TIMES DAILY PRN
Status: DISCONTINUED | OUTPATIENT
Start: 2024-05-06 | End: 2024-05-20 | Stop reason: HOSPADM

## 2024-05-06 RX ORDER — PANTOPRAZOLE SODIUM 40 MG/1
40 TABLET, DELAYED RELEASE ORAL DAILY
Status: DISCONTINUED | OUTPATIENT
Start: 2024-05-07 | End: 2024-05-08

## 2024-05-06 RX ORDER — LIDOCAINE 4 G/G
1 PATCH TOPICAL
Status: DISCONTINUED | OUTPATIENT
Start: 2024-05-07 | End: 2024-05-20 | Stop reason: HOSPADM

## 2024-05-06 RX ORDER — LEVOTHYROXINE SODIUM 0.03 MG/1
25 TABLET ORAL
Status: DISCONTINUED | OUTPATIENT
Start: 2024-05-07 | End: 2024-05-20 | Stop reason: HOSPADM

## 2024-05-06 RX ORDER — TIZANIDINE 4 MG/1
2 TABLET ORAL AS NEEDED
Status: DISCONTINUED | OUTPATIENT
Start: 2024-05-06 | End: 2024-05-19

## 2024-05-06 RX ORDER — ALLOPURINOL 300 MG/1
300 TABLET ORAL DAILY
Status: DISCONTINUED | OUTPATIENT
Start: 2024-05-07 | End: 2024-05-20 | Stop reason: HOSPADM

## 2024-05-06 RX ORDER — CHOLECALCIFEROL (VITAMIN D3) 125 MCG
5 CAPSULE ORAL NIGHTLY PRN
Status: DISCONTINUED | OUTPATIENT
Start: 2024-05-06 | End: 2024-05-16

## 2024-05-06 RX ORDER — AMITRIPTYLINE HYDROCHLORIDE 25 MG/1
25 TABLET, FILM COATED ORAL NIGHTLY
Status: DISCONTINUED | OUTPATIENT
Start: 2024-05-06 | End: 2024-05-09

## 2024-05-06 RX ORDER — ACETAMINOPHEN 325 MG/1
650 TABLET ORAL EVERY 4 HOURS PRN
Status: DISCONTINUED | OUTPATIENT
Start: 2024-05-06 | End: 2024-05-20 | Stop reason: HOSPADM

## 2024-05-06 RX ORDER — MULTIPLE VITAMINS W/ MINERALS TAB 9MG-400MCG
1 TAB ORAL DAILY
Status: DISCONTINUED | OUTPATIENT
Start: 2024-05-07 | End: 2024-05-10

## 2024-05-06 RX ORDER — FUROSEMIDE 10 MG/ML
20 INJECTION INTRAMUSCULAR; INTRAVENOUS ONCE
Status: COMPLETED | OUTPATIENT
Start: 2024-05-06 | End: 2024-05-06

## 2024-05-06 RX ORDER — SODIUM CHLORIDE 0.9 % (FLUSH) 0.9 %
10 SYRINGE (ML) INJECTION AS NEEDED
Status: DISCONTINUED | OUTPATIENT
Start: 2024-05-06 | End: 2024-05-08

## 2024-05-06 RX ORDER — ACETAMINOPHEN 650 MG/1
650 SUPPOSITORY RECTAL EVERY 4 HOURS PRN
Status: DISCONTINUED | OUTPATIENT
Start: 2024-05-06 | End: 2024-05-20 | Stop reason: HOSPADM

## 2024-05-06 RX ORDER — TAMSULOSIN HYDROCHLORIDE 0.4 MG/1
0.4 CAPSULE ORAL NIGHTLY
Status: DISCONTINUED | OUTPATIENT
Start: 2024-05-06 | End: 2024-05-20 | Stop reason: HOSPADM

## 2024-05-06 RX ORDER — ACETAMINOPHEN 160 MG/5ML
650 SOLUTION ORAL EVERY 4 HOURS PRN
Status: DISCONTINUED | OUTPATIENT
Start: 2024-05-06 | End: 2024-05-08

## 2024-05-06 RX ORDER — DILTIAZEM HYDROCHLORIDE 180 MG/1
180 CAPSULE, COATED, EXTENDED RELEASE ORAL EVERY 12 HOURS
Status: DISCONTINUED | OUTPATIENT
Start: 2024-05-06 | End: 2024-05-07

## 2024-05-06 RX ORDER — SODIUM CHLORIDE 0.9 % (FLUSH) 0.9 %
10 SYRINGE (ML) INJECTION EVERY 12 HOURS SCHEDULED
Status: DISCONTINUED | OUTPATIENT
Start: 2024-05-06 | End: 2024-05-08

## 2024-05-06 RX ORDER — NITROGLYCERIN 0.4 MG/1
0.4 TABLET SUBLINGUAL
Status: DISCONTINUED | OUTPATIENT
Start: 2024-05-06 | End: 2024-05-20 | Stop reason: HOSPADM

## 2024-05-06 RX ORDER — IBUPROFEN 600 MG/1
1 TABLET ORAL
Status: DISCONTINUED | OUTPATIENT
Start: 2024-05-06 | End: 2024-05-20 | Stop reason: HOSPADM

## 2024-05-06 RX ORDER — OXYBUTYNIN CHLORIDE 10 MG/1
10 TABLET, EXTENDED RELEASE ORAL DAILY
Status: DISCONTINUED | OUTPATIENT
Start: 2024-05-07 | End: 2024-05-10

## 2024-05-06 RX ORDER — INSULIN LISPRO 100 [IU]/ML
2-7 INJECTION, SOLUTION INTRAVENOUS; SUBCUTANEOUS
Status: DISCONTINUED | OUTPATIENT
Start: 2024-05-07 | End: 2024-05-20 | Stop reason: HOSPADM

## 2024-05-06 RX ORDER — SODIUM CHLORIDE 9 MG/ML
10 INJECTION, SOLUTION INTRAMUSCULAR; INTRAVENOUS; SUBCUTANEOUS AS NEEDED
Status: DISCONTINUED | OUTPATIENT
Start: 2024-05-06 | End: 2024-05-08

## 2024-05-06 RX ORDER — DEXTROSE MONOHYDRATE 25 G/50ML
25 INJECTION, SOLUTION INTRAVENOUS
Status: DISCONTINUED | OUTPATIENT
Start: 2024-05-06 | End: 2024-05-20 | Stop reason: HOSPADM

## 2024-05-06 RX ORDER — BISACODYL 10 MG
10 SUPPOSITORY, RECTAL RECTAL DAILY PRN
Status: DISCONTINUED | OUTPATIENT
Start: 2024-05-06 | End: 2024-05-20 | Stop reason: HOSPADM

## 2024-05-06 RX ORDER — GABAPENTIN 300 MG/1
600 CAPSULE ORAL EVERY 8 HOURS SCHEDULED
Status: DISCONTINUED | OUTPATIENT
Start: 2024-05-06 | End: 2024-05-11

## 2024-05-06 RX ORDER — POLYETHYLENE GLYCOL 3350 17 G/17G
17 POWDER, FOR SOLUTION ORAL DAILY PRN
Status: DISCONTINUED | OUTPATIENT
Start: 2024-05-06 | End: 2024-05-20 | Stop reason: HOSPADM

## 2024-05-06 RX ORDER — FERROUS SULFATE 325(65) MG
325 TABLET ORAL
Status: DISCONTINUED | OUTPATIENT
Start: 2024-05-07 | End: 2024-05-10

## 2024-05-06 RX ADMIN — AMITRIPTYLINE HYDROCHLORIDE 25 MG: 25 TABLET, FILM COATED ORAL at 23:48

## 2024-05-06 RX ADMIN — IOPAMIDOL 85 ML: 612 INJECTION, SOLUTION INTRAVENOUS at 14:34

## 2024-05-06 RX ADMIN — Medication 5 MG: at 23:49

## 2024-05-06 RX ADMIN — GABAPENTIN 600 MG: 300 CAPSULE ORAL at 23:48

## 2024-05-06 RX ADMIN — FUROSEMIDE 20 MG: 10 INJECTION, SOLUTION INTRAMUSCULAR; INTRAVENOUS at 23:49

## 2024-05-06 RX ADMIN — TIZANIDINE 2 MG: 4 TABLET ORAL at 23:49

## 2024-05-06 RX ADMIN — TAMSULOSIN HYDROCHLORIDE 0.4 MG: 0.4 CAPSULE ORAL at 23:49

## 2024-05-06 RX ADMIN — PHYTONADIONE 10 MG: 10 INJECTION, EMULSION INTRAMUSCULAR; INTRAVENOUS; SUBCUTANEOUS at 17:37

## 2024-05-06 RX ADMIN — SODIUM CHLORIDE 1000 ML: 9 INJECTION, SOLUTION INTRAVENOUS at 15:21

## 2024-05-06 RX ADMIN — DILTIAZEM HYDROCHLORIDE 180 MG: 180 CAPSULE, COATED, EXTENDED RELEASE ORAL at 23:48

## 2024-05-06 RX ADMIN — ACETAMINOPHEN 650 MG: 325 TABLET ORAL at 23:48

## 2024-05-06 RX ADMIN — GADOBENATE DIMEGLUMINE 20 ML: 529 INJECTION, SOLUTION INTRAVENOUS at 23:13

## 2024-05-06 NOTE — ED PROVIDER NOTES
[place]    EMERGENCY DEPARTMENT ENCOUNTER      Pt Name: Kaleb Abraham  MRN: [id number]  YOB: 1950  Date of evaluation: 5/6/2024  Provider: Rowdy Ku MD    CHIEF COMPLAINT       Chief Complaint   Patient presents with    Flank Pain         HISTORY OF PRESENT ILLNESS   Kaleb Abraham is a 73 y.o. male who presents to the emergency department with complaint of a couple of days of right flank pain, generalized weakness, worsening bilateral lower extremity edema, and supratherapeutic INR.  Patient has history of CHF and also recent admission for GI bleeding secondary to gastric AVMs.  Patient's wife checked his INR this morning which was noted to be 8.  He has had some increased dyspnea but no chest pain, cough, fever, or chills.  He denies having any urinary symptoms.      Nursing notes were reviewed.    REVIEW OF SYSTEMS     ROS:  A chief complaint appropriate review of systems was completed and is negative except as noted in the HPI.      PAST MEDICAL HISTORY     Past Medical History:   Diagnosis Date    Anemia     Aortic stenosis     CAD (coronary artery disease)     Diabetes mellitus     Hyperlipidemia     Hypertension     Hypothyroidism     Mitral regurgitation          SURGICAL HISTORY       Past Surgical History:   Procedure Laterality Date    CORONARY ARTERY BYPASS GRAFT      CORONARY STENT PLACEMENT           CURRENT MEDICATIONS       Current Facility-Administered Medications:     Sodium Chloride (PF) 0.9 % 10 mL, 10 mL, Intravenous, PRN, Rowdy Ku MD    Current Outpatient Medications:     alfuzosin (UROXATRAL) 10 MG 24 hr tablet, Take 1 tablet by mouth Daily., Disp: , Rfl:     Alirocumab (Praluent) 150 MG/ML injection pen, Inject 1 mL under the skin into the appropriate area as directed Every 14 (Fourteen) Days., Disp: , Rfl:     allopurinol (ZYLOPRIM) 300 MG tablet, Take 1 tablet by mouth Daily., Disp: , Rfl:     amitriptyline (ELAVIL) 25 MG tablet, Take 1 tablet by  mouth Every Night., Disp: , Rfl:     Bempedoic Acid-Ezetimibe (Nexlizet) 180-10 MG tablet, Take 1 tablet by mouth Daily., Disp: , Rfl:     clindamycin (CLEOCIN) 300 MG capsule, Take 1 capsule by mouth 4 (Four) Times a Day., Disp: , Rfl:     dilTIAZem (TIAZAC) 180 MG 24 hr capsule, Take 1 capsule by mouth 2 (Two) Times a Day., Disp: 14 capsule, Rfl: 0    fenofibrate 160 MG tablet, Take 1 tablet by mouth Daily., Disp: , Rfl:     ferrous sulfate 325 (65 FE) MG tablet, Take 1 tablet by mouth Daily With Breakfast., Disp: , Rfl:     gabapentin (NEURONTIN) 600 MG tablet, Take 1 tablet by mouth 3 (Three) Times a Day., Disp: , Rfl:     HYDROcodone-acetaminophen (NORCO)  MG per tablet, Take 1 tablet by mouth Every 8 (Eight) Hours As Needed for Moderate Pain., Disp: , Rfl:     icosapent ethyl (VASCEPA) 1 g capsule capsule, Take 2 g by mouth 2 (Two) Times a Day With Meals., Disp: , Rfl:     levothyroxine (SYNTHROID, LEVOTHROID) 25 MCG tablet, Take 1 tablet by mouth Every Morning., Disp: , Rfl:     lisinopril (PRINIVIL,ZESTRIL) 40 MG tablet, Take 0.5 tablets by mouth Daily., Disp: , Rfl:     Melatonin 10 MG tablet, Take 1 tablet by mouth Daily., Disp: , Rfl:     metFORMIN (GLUCOPHAGE) 500 MG tablet, Take 1 tablet by mouth Every Night., Disp: , Rfl:     metoprolol tartrate (LOPRESSOR) 50 MG tablet, Take 1.5 tablets by mouth 2 (Two) Times a Day. 1.5 TABS BID, Disp: , Rfl:     multivitamin with minerals tablet tablet, Take 1 tablet by mouth Daily., Disp: , Rfl:     Nitrostat 0.4 MG SL tablet, Place 1 tablet under the tongue Every 5 (Five) Minutes As Needed. PT. HAS NOT HAD TO TAKE THIS., Disp: , Rfl:     oxybutynin XL (DITROPAN-XL) 10 MG 24 hr tablet, Take 1 tablet by mouth Daily., Disp: , Rfl:     pantoprazole (PROTONIX) 40 MG EC tablet, Take 1 tablet by mouth Daily., Disp: , Rfl:     tiZANidine (ZANAFLEX) 2 MG tablet, Take 1 tablet by mouth As Needed., Disp: , Rfl:     warfarin (COUMADIN) 1 MG tablet, Take 3.5 tablets by  mouth Daily. 2.5 mg for four days and 2 mg for three days, Disp: , Rfl:     ALLERGIES     Patient has no known allergies.    FAMILY HISTORY       Family History   Problem Relation Age of Onset    Diabetes Mother     Stroke Mother     Thyroid cancer Mother     Hypertension Father     Stroke Father           SOCIAL HISTORY       Social History     Socioeconomic History    Marital status:    Tobacco Use    Smoking status: Former     Types: Cigarettes     Passive exposure: Past    Smokeless tobacco: Never   Vaping Use    Vaping status: Never Used   Substance and Sexual Activity    Alcohol use: Never    Drug use: Never    Sexual activity: Defer         PHYSICAL EXAM    (up to 7 for level 4, 8 or more for level 5)     Vitals:    05/06/24 1616 05/06/24 1717 05/06/24 1800 05/06/24 1900   BP: 114/67 102/74 104/74 99/62   BP Location:       Patient Position:       Pulse: 80 71 82 88   Resp:       Temp:       TempSrc:       SpO2: 98% 98% 96% 95%   Weight:       Height:           General: Awake, alert, no acute distress.  HEENT: Conjunctivae normal.  Neck: Trachea midline.  Cardiac: Heart regular rate, rhythm, no murmurs, rubs, or gallops  Lungs: Lungs are clear to auscultation, there is no wheezing, rhonchi, or rales. There is no use of accessory muscles.  Chest wall: There is no tenderness to palpation over the chest wall or over ribs  Abdomen: Abdomen is soft, nontender, nondistended. There are no firm or pulsatile masses, no rebound rigidity or guarding.   Musculoskeletal: 2+ pitting edema to bilateral lower extremities  Neuro: Alert and oriented x 4.  Dermatology: Skin is warm and dry  Psych: Mentation is grossly normal, cognition is grossly normal. Affect is appropriate.        DIAGNOSTIC RESULTS     EKG: All EKGs are interpreted by the Emergency Department Physician who either signs or Co-signs this chart in the absence of a cardiologist.    No orders to display         RADIOLOGY:   [x] Radiologist's Report  Reviewed:  CT Abdomen Pelvis With Contrast   Final Result   Impression:   1.No acute abnormality identified within the abdomen or pelvis.   2.Colonic diverticulosis.   3.3 cm infrarenal abdominal aortic aneurysm.   4.Additional findings as detailed above.         Electronically Signed: Matthias Dobbs MD     5/6/2024 2:53 PM EDT     Workstation ID: [id number]      CT Head Without Contrast   Final Result   Impression:   1.No acute intracranial process identified.   2.Findings suggestive of mild chronic small vessel ischemic disease.   3.Mild frothy mucosal disease within left maxillary sinus. Correlate for acute sinusitis.            Electronically Signed: Donnie Skelton MD     5/6/2024 2:53 PM EDT     Workstation ID: [id number]      XR Chest 1 View   Final Result   Impression:   No acute cardiopulmonary findings.         Electronically Signed: Giovany Tineo MD     5/6/2024 2:18 PM EDT     Workstation ID: [id number]          I ordered and independently reviewed the above noted radiographic studies.        LABS:    I have reviewed and interpreted all of the currently available lab results from this visit (if applicable):  Results for orders placed or performed during the hospital encounter of 05/06/24   Comprehensive Metabolic Panel    Specimen: Blood   Result Value Ref Range    Glucose 138 (H) 65 - 99 mg/dL    BUN 17 8 - 23 mg/dL    Creatinine 1.04 0.76 - 1.27 mg/dL    Sodium 131 (L) 136 - 145 mmol/L    Potassium 4.0 3.5 - 5.2 mmol/L    Chloride 93 (L) 98 - 107 mmol/L    CO2 29.0 22.0 - 29.0 mmol/L    Calcium 8.1 (L) 8.6 - 10.5 mg/dL    Total Protein 6.3 6.0 - 8.5 g/dL    Albumin 3.0 (L) 3.5 - 5.2 g/dL    ALT (SGPT) 8 1 - 41 U/L    AST (SGOT) 27 1 - 40 U/L    Alkaline Phosphatase 44 39 - 117 U/L    Total Bilirubin 0.6 0.0 - 1.2 mg/dL    Globulin 3.3 gm/dL    A/G Ratio 0.9 g/dL    BUN/Creatinine Ratio 16.3 7.0 - 25.0    Anion Gap 9.0 5.0 - 15.0 mmol/L    eGFR 76.3 >60.0 mL/min/1.73   Lipase    Specimen: Blood   Result  Value Ref Range    Lipase 33 13 - 60 U/L   Urinalysis With Microscopic If Indicated (No Culture) - Straight Cath    Specimen: Straight Cath; Urine   Result Value Ref Range    Color, UA Dark Yellow (A) Yellow, Straw    Appearance, UA Cloudy (A) Clear    pH, UA 5.5 5.0 - 8.0    Specific Gravity, UA 1.040 (H) 1.001 - 1.030    Glucose, UA Negative Negative    Ketones, UA Trace (A) Negative    Bilirubin, UA Small (1+) (A) Negative    Blood, UA Negative Negative    Protein, UA 30 mg/dL (1+) (A) Negative    Leuk Esterase, UA Negative Negative    Nitrite, UA Negative Negative    Urobilinogen, UA 1.0 E.U./dL 0.2 - 1.0 E.U./dL   Lactic Acid, Plasma    Specimen: Blood   Result Value Ref Range    Lactate 1.1 0.5 - 2.0 mmol/L   CBC Auto Differential    Specimen: Blood   Result Value Ref Range    WBC 7.77 3.40 - 10.80 10*3/mm3    RBC 2.59 (L) 4.14 - 5.80 10*6/mm3    Hemoglobin 7.9 (L) 13.0 - 17.7 g/dL    Hematocrit 24.3 (L) 37.5 - 51.0 %    MCV 93.8 79.0 - 97.0 fL    MCH 30.5 26.6 - 33.0 pg    MCHC 32.5 31.5 - 35.7 g/dL    RDW 15.3 12.3 - 15.4 %    RDW-SD 52.6 37.0 - 54.0 fl    MPV 9.3 6.0 - 12.0 fL    Platelets 271 140 - 450 10*3/mm3    Neutrophil % 83.0 (H) 42.7 - 76.0 %    Lymphocyte % 8.9 (L) 19.6 - 45.3 %    Monocyte % 6.7 5.0 - 12.0 %    Eosinophil % 0.1 (L) 0.3 - 6.2 %    Basophil % 0.4 0.0 - 1.5 %    Immature Grans % 0.9 (H) 0.0 - 0.5 %    Neutrophils, Absolute 6.45 1.70 - 7.00 10*3/mm3    Lymphocytes, Absolute 0.69 (L) 0.70 - 3.10 10*3/mm3    Monocytes, Absolute 0.52 0.10 - 0.90 10*3/mm3    Eosinophils, Absolute 0.01 0.00 - 0.40 10*3/mm3    Basophils, Absolute 0.03 0.00 - 0.20 10*3/mm3    Immature Grans, Absolute 0.07 (H) 0.00 - 0.05 10*3/mm3    nRBC 0.0 0.0 - 0.2 /100 WBC   Protime-INR    Specimen: Arm, Left; Blood   Result Value Ref Range    Protime 80.6 (C) 12.2 - 14.5 Seconds    INR >10.00 (C) 0.89 - 1.12   TSH    Specimen: Blood   Result Value Ref Range    TSH 2.720 0.270 - 4.200 uIU/mL   T4, Free    Specimen: Blood    Result Value Ref Range    Free T4 1.48 0.92 - 1.68 ng/dL   Urinalysis, Microscopic Only - Straight Cath    Specimen: Straight Cath; Urine   Result Value Ref Range    RBC, UA 0-2 None Seen, 0-2 /HPF    WBC, UA 3-5 (A) None Seen, 0-2 /HPF    Bacteria, UA None Seen None Seen, Trace /HPF    Squamous Epithelial Cells, UA 3-6 (A) None Seen, 0-2 /HPF    Hyaline Casts, UA 0-6 0 - 6 /LPF    Mucus, UA Small/1+ (A) None Seen, Trace /HPF    Methodology Manual Light Microscopy    Type & Screen    Specimen: Arm, Right; Blood   Result Value Ref Range    ABO Type O     RH type Positive     Antibody Screen Negative     T&S Expiration Date 5/9/2024 11:59:59 PM    ABO RH Specimen Verification    Specimen: Blood   Result Value Ref Range    ABO Type O     RH type Positive    Green Top (Gel)   Result Value Ref Range    Extra Tube Hold for add-ons.    Lavender Top   Result Value Ref Range    Extra Tube hold for add-on    Gold Top - SST   Result Value Ref Range    Extra Tube Hold for add-ons.    Gray Top   Result Value Ref Range    Extra Tube Hold for add-ons.    Light Blue Top   Result Value Ref Range    Extra Tube Hold for add-ons.         If labs were ordered, I independently reviewed the results and considered them in treating the patient.      EMERGENCY DEPARTMENT COURSE and DIFFERENTIAL DIAGNOSIS/MDM:   Vitals:  AS OF 19:45 EDT    BP - 99/62  HR - 88  TEMP - 98.9 °F (37.2 °C) (Oral)  O2 SATS - 95%        Discussion below represents my analysis of pertinent findings related to patient's condition, differential diagnosis, treatment plan and final disposition.      Differential diagnosis:  The differential diagnosis associated with the patient's presentation includes: Ureteral stone, AAA, GI bleeding, biliary pathology, appendicitis, urinary tract infection      Independent interpretations (ECG/rhythm strip/X-ray/US/CT scan): I independently interpreted the patient's head CT and abdominal CT.  There is no evidence of intracranial  hemorrhage and there is no ureteral stone.      Additional sources:  Discussed/obtained information from independent historians:   [x] Spouse: Additional history taken from the patient's wife.  Patient recently admitted to [facility] with GI bleeding and had endoscopic management of AVMs.   [] Parent:   [] Friend:   [] EMS:   [] Other:  External (non-ED) record review:   [] Inpatient record:   [] Office record:   [] Outpatient record:   [] Prior Outpatient labs:   [] Prior Outpatient radiology:   [] Primary Care record:   [] Outside ED record:   [] Other:       Patient's care impacted by:   [x] Diabetes   [] Hypertension   [x] Coronary Artery Disease   [] Cancer   [x] Other: History of mechanical heart valve replacement anticoagulated chronically on Coumadin, history of CHF    Care significantly affected by Social Determinants of Health (housing and economic circumstances, unemployment)    [] Yes     [x] No   If yes, Patient's care significantly limited by  Social Determinants of Health including:    [] Inadequate housing    [] Low income    [] Alcoholism and drug addiction in family    [] Problems related to primary support group    [] Unemployment    [] Problems related to employment    [] Other Social Determinants of Health:       Consideration of admission/observation vs discharge: Patient presents with acute anemia and has supratherapeutic INR warrants admission for further monitoring      I considered prescription management with:    [] Pain medication:   [] Antiviral:   [] Antibiotic:   [x] Other: Patient given vitamin K    ED Course:    ED Course as of 05/06/24 1945   Mon May 06, 2024   1941 Patient presents with worsening generalized weakness, bilateral leg edema, right flank pain over the course of the past several days. Was admitted to [facility] a couple weeks ago for GI bleeding and had gastric AVMs which were managed endoscopically. He is on Coumadin due to mechanical heart valve replacement. He also has  a history of CHF. He has had worsening leg edema, weakness, dyspnea, and right flank pain over the course of the past several days. His wife said that they checked his INR this morning which was at 8. He has been hemodynamically stable here. Abdominal CT shows nothing acute. His hemoglobin today is 7.9 down from 9.2 at previous check. Rectal exam showed brown stool that was Hemoccult negative. His INR here was greater than 10. No evidence of significant volume overload on his chest x-ray. I have given him a dose of vitamin K. Also spoke with GI about this patient who feel that there is nothing for them to do unless he he began to have some rectal bleeding or hemoglobin continued to decline. [NS]   1941 I discussed case with hospitalist Dr. Brennan.  Discussed patient history, presentation, workup.  Accepts patient for admission. [NS]      ED Course User Index  [NS] Rowdy Ku MD         PROCEDURES:  Procedures    CRITICAL CARE TIME        FINAL IMPRESSION      1. Acute on chronic anemia    2. Supratherapeutic INR    3. History of GI bleed    4. Anticoagulated on Coumadin    5. Generalized weakness    6. History of CHF (congestive heart failure)          DISPOSITION/PLAN     ED Disposition       ED Disposition   Decision to Admit    Condition   --    Comment   --                 Comment: Please note this report has been produced using speech recognition software.      Rowdy Ku MD  Attending Emergency Physician             Rowdy Ku MD  05/06/24 1946

## 2024-05-06 NOTE — LETTER
EMS Transport Request  For use at [facility] [place], and [place] only   Patient Name: Kaleb Abraham : 1950   Weight:99.7 kg (219 lb 12.8 oz) Pick-up Location: [facility] BLS/ALS: BLS/ALS: BLS   Insurance: [employer] MEDICARE REPLACEMENT Auth End Date:    Pre-Cert #: D/C Summary complete:    Destination: [facility] [address] [place] [address] [address], [facility] [id number]   Contact Precautions: None   Equipment (O2, Fluids, etc.): None   Arrive By Date/Time: 24 leave by 1300 Stretcher/WC: Stretcher   CM Requesting: Renea Sood RN Ext: [phone]   Notes/Medical Necessity: impaired balance/endurance/activity tolerance/strength/  coordination, poor postural/trunk control, endocarditis, bacteremia, MARTINEZ     ______________________________________________________________________    *Only 2 patient bags OR 1 carry-on size bag are permitted.  Wheelchairs and walkers CANNOT transported with the patient. Acknowledge: Yes

## 2024-05-07 ENCOUNTER — APPOINTMENT (OUTPATIENT)
Dept: CT IMAGING | Facility: HOSPITAL | Age: 74
DRG: 314 | End: 2024-05-07
Payer: MEDICARE

## 2024-05-07 ENCOUNTER — APPOINTMENT (OUTPATIENT)
Dept: CARDIOLOGY | Facility: HOSPITAL | Age: 74
DRG: 314 | End: 2024-05-07
Payer: MEDICARE

## 2024-05-07 ENCOUNTER — APPOINTMENT (OUTPATIENT)
Dept: GENERAL RADIOLOGY | Facility: HOSPITAL | Age: 74
DRG: 314 | End: 2024-05-07
Payer: MEDICARE

## 2024-05-07 PROBLEM — Z95.2 S/P AORTIC VALVE REPLACEMENT WITH PROSTHETIC VALVE: Status: ACTIVE | Noted: 2024-05-07

## 2024-05-07 PROBLEM — Z79.01 CHRONIC ANTICOAGULATION: Status: ACTIVE | Noted: 2024-05-07

## 2024-05-07 PROBLEM — Z95.2 H/O HEART VALVE REPLACEMENT WITH MECHANICAL VALVE: Chronic | Status: ACTIVE | Noted: 2024-05-07

## 2024-05-07 PROBLEM — G47.33 OSA (OBSTRUCTIVE SLEEP APNEA): Chronic | Status: ACTIVE | Noted: 2023-08-22

## 2024-05-07 PROBLEM — Z95.2 S/P AORTIC VALVE REPLACEMENT WITH PROSTHETIC VALVE: Chronic | Status: ACTIVE | Noted: 2024-05-07

## 2024-05-07 PROBLEM — I48.20 CHRONIC ATRIAL FIBRILLATION: Chronic | Status: ACTIVE | Noted: 2023-08-22

## 2024-05-07 PROBLEM — Z95.2 H/O HEART VALVE REPLACEMENT WITH MECHANICAL VALVE: Status: ACTIVE | Noted: 2024-05-07

## 2024-05-07 LAB
AMMONIA BLD-SCNC: 15 UMOL/L (ref 16–60)
ANION GAP SERPL CALCULATED.3IONS-SCNC: 8 MMOL/L (ref 5–15)
ARTERIAL PATENCY WRIST A: ABNORMAL
ARTERIAL PATENCY WRIST A: POSITIVE
ATMOSPHERIC PRESS: ABNORMAL MM[HG]
ATMOSPHERIC PRESS: ABNORMAL MM[HG]
BACTERIA BLD CULT: ABNORMAL
BASE EXCESS BLDA CALC-SCNC: 5.4 MMOL/L (ref 0–2)
BASE EXCESS BLDA CALC-SCNC: 6.4 MMOL/L (ref 0–2)
BASOPHILS # BLD AUTO: 0.02 10*3/MM3 (ref 0–0.2)
BASOPHILS NFR BLD AUTO: 0.3 % (ref 0–1.5)
BDY SITE: ABNORMAL
BDY SITE: ABNORMAL
BH CV ECHO MEAS - AO MAX PG: 12 MMHG
BH CV ECHO MEAS - AO MEAN PG: 6 MMHG
BH CV ECHO MEAS - AO ROOT DIAM: 3.8 CM
BH CV ECHO MEAS - AO V2 MAX: 173 CM/SEC
BH CV ECHO MEAS - AO V2 VTI: 45.7 CM
BH CV ECHO MEAS - AVA(I,D): 1.84 CM2
BH CV ECHO MEAS - EDV(CUBED): 140.6 ML
BH CV ECHO MEAS - EDV(MOD-SP2): 137 ML
BH CV ECHO MEAS - EDV(MOD-SP4): 131 ML
BH CV ECHO MEAS - EF(MOD-BP): 56 %
BH CV ECHO MEAS - EF(MOD-SP2): 52 %
BH CV ECHO MEAS - EF(MOD-SP4): 58.8 %
BH CV ECHO MEAS - ESV(CUBED): 12.2 ML
BH CV ECHO MEAS - ESV(MOD-SP2): 65.8 ML
BH CV ECHO MEAS - ESV(MOD-SP4): 54 ML
BH CV ECHO MEAS - FS: 55.8 %
BH CV ECHO MEAS - IVS/LVPW: 1 CM
BH CV ECHO MEAS - IVSD: 1.3 CM
BH CV ECHO MEAS - LA DIMENSION: 6.1 CM
BH CV ECHO MEAS - LAT PEAK E' VEL: 6.9 CM/SEC
BH CV ECHO MEAS - LV DIASTOLIC VOL/BSA (35-75): 58.9 CM2
BH CV ECHO MEAS - LV MASS(C)D: 278.4 GRAMS
BH CV ECHO MEAS - LV MAX PG: 4 MMHG
BH CV ECHO MEAS - LV MEAN PG: 2 MMHG
BH CV ECHO MEAS - LV SYSTOLIC VOL/BSA (12-30): 24.3 CM2
BH CV ECHO MEAS - LV V1 MAX: 99.9 CM/SEC
BH CV ECHO MEAS - LV V1 VTI: 26.7 CM
BH CV ECHO MEAS - LVIDD: 5.2 CM
BH CV ECHO MEAS - LVIDS: 2.3 CM
BH CV ECHO MEAS - LVOT AREA: 3.1 CM2
BH CV ECHO MEAS - LVOT DIAM: 2 CM
BH CV ECHO MEAS - LVPWD: 1.3 CM
BH CV ECHO MEAS - MED PEAK E' VEL: 7.9 CM/SEC
BH CV ECHO MEAS - MV A MAX VEL: 75.8 CM/SEC
BH CV ECHO MEAS - MV DEC SLOPE: 306 CM/SEC2
BH CV ECHO MEAS - MV DEC TIME: 0.28 SEC
BH CV ECHO MEAS - MV E MAX VEL: 146 CM/SEC
BH CV ECHO MEAS - MV E/A: 1.93
BH CV ECHO MEAS - MV MAX PG: 11.8 MMHG
BH CV ECHO MEAS - MV MEAN PG: 5 MMHG
BH CV ECHO MEAS - MV P1/2T: 147.4 MSEC
BH CV ECHO MEAS - MV V2 VTI: 64.2 CM
BH CV ECHO MEAS - MVA(P1/2T): 1.49 CM2
BH CV ECHO MEAS - MVA(VTI): 1.31 CM2
BH CV ECHO MEAS - PA ACC TIME: 0.11 SEC
BH CV ECHO MEAS - PI END-D VEL: 126 CM/SEC
BH CV ECHO MEAS - RAP SYSTOLE: 15 MMHG
BH CV ECHO MEAS - RVSP: 32 MMHG
BH CV ECHO MEAS - SV(LVOT): 83.9 ML
BH CV ECHO MEAS - SV(MOD-SP2): 71.2 ML
BH CV ECHO MEAS - SV(MOD-SP4): 77 ML
BH CV ECHO MEAS - SVI(LVOT): 37.7 ML/M2
BH CV ECHO MEAS - SVI(MOD-SP2): 32 ML/M2
BH CV ECHO MEAS - SVI(MOD-SP4): 34.6 ML/M2
BH CV ECHO MEAS - TAPSE (>1.6): 1.35 CM
BH CV ECHO MEAS - TR MAX PG: 16.8 MMHG
BH CV ECHO MEAS - TR MAX VEL: 203 CM/SEC
BH CV ECHO MEASUREMENTS AVERAGE E/E' RATIO: 19.73
BH CV VAS BP RIGHT ARM: NORMAL MMHG
BH CV XLRA - RV BASE: 4.2 CM
BH CV XLRA - RV LENGTH: 9.2 CM
BH CV XLRA - RV MID: 3.4 CM
BH CV XLRA - TDI S': 9.1 CM/SEC
BODY TEMPERATURE: 37
BODY TEMPERATURE: 37
BOTTLE TYPE: ABNORMAL
BUN SERPL-MCNC: 16 MG/DL (ref 8–23)
BUN/CREAT SERPL: 15.8 (ref 7–25)
CALCIUM SPEC-SCNC: 8.6 MG/DL (ref 8.6–10.5)
CHLORIDE SERPL-SCNC: 97 MMOL/L (ref 98–107)
CHOLEST SERPL-MCNC: 105 MG/DL (ref 0–200)
CO2 BLDA-SCNC: 30.3 MMOL/L (ref 22–33)
CO2 BLDA-SCNC: 30.7 MMOL/L (ref 22–33)
CO2 SERPL-SCNC: 29 MMOL/L (ref 22–29)
COHGB MFR BLD: 1.4 % (ref 0–2)
COHGB MFR BLD: 1.5 % (ref 0–2)
CORTIS SERPL-MCNC: 16.37 MCG/DL
CREAT SERPL-MCNC: 1.01 MG/DL (ref 0.76–1.27)
D-LACTATE SERPL-SCNC: 1.2 MMOL/L (ref 0.5–2)
DEPRECATED RDW RBC AUTO: 53.5 FL (ref 37–54)
EGFRCR SERPLBLD CKD-EPI 2021: 79 ML/MIN/1.73
EOSINOPHIL # BLD AUTO: 0.01 10*3/MM3 (ref 0–0.4)
EOSINOPHIL NFR BLD AUTO: 0.1 % (ref 0.3–6.2)
EPAP: 0
EPAP: 6
ERYTHROCYTE [DISTWIDTH] IN BLOOD BY AUTOMATED COUNT: 16.2 % (ref 12.3–15.4)
FOLATE SERPL-MCNC: 8.44 NG/ML (ref 4.78–24.2)
GLUCOSE BLDC GLUCOMTR-MCNC: 110 MG/DL (ref 70–130)
GLUCOSE BLDC GLUCOMTR-MCNC: 115 MG/DL (ref 70–130)
GLUCOSE BLDC GLUCOMTR-MCNC: 115 MG/DL (ref 70–130)
GLUCOSE BLDC GLUCOMTR-MCNC: 121 MG/DL (ref 70–130)
GLUCOSE BLDC GLUCOMTR-MCNC: 149 MG/DL (ref 70–130)
GLUCOSE SERPL-MCNC: 104 MG/DL (ref 65–99)
HBA1C MFR BLD: 5.3 % (ref 4.8–5.6)
HCO3 BLDA-SCNC: 29.1 MMOL/L (ref 20–26)
HCO3 BLDA-SCNC: 29.6 MMOL/L (ref 20–26)
HCT VFR BLD AUTO: 20.1 % (ref 37.5–51)
HCT VFR BLD AUTO: 24.3 % (ref 37.5–51)
HCT VFR BLD AUTO: 25.7 % (ref 37.5–51)
HCT VFR BLD CALC: 23.9 % (ref 38–51)
HCT VFR BLD CALC: 24.5 % (ref 38–51)
HDLC SERPL-MCNC: 23 MG/DL (ref 40–60)
HGB BLD-MCNC: 6.6 G/DL (ref 13–17.7)
HGB BLD-MCNC: 8.1 G/DL (ref 13–17.7)
HGB BLD-MCNC: 8.4 G/DL (ref 13–17.7)
HGB BLDA-MCNC: 7.8 G/DL (ref 13.5–17.5)
HGB BLDA-MCNC: 8 G/DL (ref 13.5–17.5)
IMM GRANULOCYTES # BLD AUTO: 0.05 10*3/MM3 (ref 0–0.05)
IMM GRANULOCYTES NFR BLD AUTO: 0.7 % (ref 0–0.5)
INHALED O2 CONCENTRATION: 21 %
INHALED O2 CONCENTRATION: 35 %
INR PPP: 1.55 (ref 0.89–1.12)
IPAP: 0
IPAP: 14
LDLC SERPL CALC-MCNC: 57 MG/DL (ref 0–100)
LDLC/HDLC SERPL: 2.35 {RATIO}
LEFT ATRIUM VOLUME INDEX: 60.1 ML/M2
LYMPHOCYTES # BLD AUTO: 0.68 10*3/MM3 (ref 0.7–3.1)
LYMPHOCYTES NFR BLD AUTO: 9.3 % (ref 19.6–45.3)
MAGNESIUM SERPL-MCNC: 1.3 MG/DL (ref 1.6–2.4)
MCH RBC QN AUTO: 29.4 PG (ref 26.6–33)
MCHC RBC AUTO-ENTMCNC: 32.7 G/DL (ref 31.5–35.7)
MCV RBC AUTO: 89.9 FL (ref 79–97)
METHGB BLD QL: 0.1 % (ref 0–1.5)
METHGB BLD QL: 0.4 % (ref 0–1.5)
MODALITY: ABNORMAL
MODALITY: ABNORMAL
MONOCYTES # BLD AUTO: 0.41 10*3/MM3 (ref 0.1–0.9)
MONOCYTES NFR BLD AUTO: 5.6 % (ref 5–12)
NEUTROPHILS NFR BLD AUTO: 6.15 10*3/MM3 (ref 1.7–7)
NEUTROPHILS NFR BLD AUTO: 84 % (ref 42.7–76)
NRBC BLD AUTO-RTO: 0 /100 WBC (ref 0–0.2)
OXYHGB MFR BLDV: 92.5 % (ref 94–99)
OXYHGB MFR BLDV: 98.1 % (ref 94–99)
PAW @ PEAK INSP FLOW SETTING VENT: 0 CMH2O
PAW @ PEAK INSP FLOW SETTING VENT: 0 CMH2O
PCO2 BLDA: 35.8 MM HG (ref 35–45)
PCO2 BLDA: 38.2 MM HG (ref 35–45)
PCO2 TEMP ADJ BLD: 35.8 MM HG (ref 35–48)
PCO2 TEMP ADJ BLD: 38.2 MM HG (ref 35–48)
PH BLDA: 7.49 PH UNITS (ref 7.35–7.45)
PH BLDA: 7.53 PH UNITS (ref 7.35–7.45)
PH, TEMP CORRECTED: 7.49 PH UNITS
PH, TEMP CORRECTED: 7.53 PH UNITS
PLATELET # BLD AUTO: 218 10*3/MM3 (ref 140–450)
PMV BLD AUTO: 9 FL (ref 6–12)
PO2 BLDA: 114 MM HG (ref 83–108)
PO2 BLDA: 64.1 MM HG (ref 83–108)
PO2 TEMP ADJ BLD: 114 MM HG (ref 83–108)
PO2 TEMP ADJ BLD: 64.1 MM HG (ref 83–108)
POTASSIUM SERPL-SCNC: 4.3 MMOL/L (ref 3.5–5.2)
PROCALCITONIN SERPL-MCNC: 0.17 NG/ML (ref 0–0.25)
PROTHROMBIN TIME: 18.7 SECONDS (ref 12.2–14.5)
RBC # BLD AUTO: 2.86 10*6/MM3 (ref 4.14–5.8)
RETICS # AUTO: 0.03 10*6/MM3 (ref 0.02–0.13)
RETICS/RBC NFR AUTO: 1.4 % (ref 0.7–1.9)
SODIUM SERPL-SCNC: 134 MMOL/L (ref 136–145)
TOTAL RATE: 0 BREATHS/MINUTE
TOTAL RATE: 16 BREATHS/MINUTE
TRIGL SERPL-MCNC: 140 MG/DL (ref 0–150)
TSH SERPL DL<=0.05 MIU/L-ACNC: 2.25 UIU/ML (ref 0.27–4.2)
VENTILATOR MODE: ABNORMAL
VIT B12 BLD-MCNC: 895 PG/ML (ref 211–946)
VLDLC SERPL-MCNC: 25 MG/DL (ref 5–40)
WBC NRBC COR # BLD AUTO: 7.32 10*3/MM3 (ref 3.4–10.8)

## 2024-05-07 PROCEDURE — 82375 ASSAY CARBOXYHB QUANT: CPT

## 2024-05-07 PROCEDURE — 80061 LIPID PANEL: CPT | Performed by: NURSE PRACTITIONER

## 2024-05-07 PROCEDURE — 99291 CRITICAL CARE FIRST HOUR: CPT | Performed by: INTERNAL MEDICINE

## 2024-05-07 PROCEDURE — 94660 CPAP INITIATION&MGMT: CPT

## 2024-05-07 PROCEDURE — C1894 INTRO/SHEATH, NON-LASER: HCPCS

## 2024-05-07 PROCEDURE — 25010000002 AMPICILLIN PER 500 MG: Performed by: INTERNAL MEDICINE

## 2024-05-07 PROCEDURE — 93005 ELECTROCARDIOGRAM TRACING: CPT | Performed by: STUDENT IN AN ORGANIZED HEALTH CARE EDUCATION/TRAINING PROGRAM

## 2024-05-07 PROCEDURE — 25010000002 CEFTRIAXONE PER 250 MG: Performed by: INTERNAL MEDICINE

## 2024-05-07 PROCEDURE — 86900 BLOOD TYPING SEROLOGIC ABO: CPT

## 2024-05-07 PROCEDURE — 82140 ASSAY OF AMMONIA: CPT | Performed by: NURSE PRACTITIONER

## 2024-05-07 PROCEDURE — 25810000003 LACTATED RINGERS SOLUTION: Performed by: STUDENT IN AN ORGANIZED HEALTH CARE EDUCATION/TRAINING PROGRAM

## 2024-05-07 PROCEDURE — 82533 TOTAL CORTISOL: CPT | Performed by: INTERNAL MEDICINE

## 2024-05-07 PROCEDURE — 85045 AUTOMATED RETICULOCYTE COUNT: CPT | Performed by: NURSE PRACTITIONER

## 2024-05-07 PROCEDURE — 82948 REAGENT STRIP/BLOOD GLUCOSE: CPT

## 2024-05-07 PROCEDURE — 82805 BLOOD GASES W/O2 SATURATION: CPT

## 2024-05-07 PROCEDURE — 25010000002 CALCIUM GLUCONATE 2-0.675 GM/100ML-% SOLUTION: Performed by: STUDENT IN AN ORGANIZED HEALTH CARE EDUCATION/TRAINING PROGRAM

## 2024-05-07 PROCEDURE — 83735 ASSAY OF MAGNESIUM: CPT | Performed by: NURSE PRACTITIONER

## 2024-05-07 PROCEDURE — 36430 TRANSFUSION BLD/BLD COMPNT: CPT

## 2024-05-07 PROCEDURE — 83036 HEMOGLOBIN GLYCOSYLATED A1C: CPT | Performed by: NURSE PRACTITIONER

## 2024-05-07 PROCEDURE — 83050 HGB METHEMOGLOBIN QUAN: CPT

## 2024-05-07 PROCEDURE — 71045 X-RAY EXAM CHEST 1 VIEW: CPT

## 2024-05-07 PROCEDURE — 85025 COMPLETE CBC W/AUTO DIFF WBC: CPT | Performed by: HOSPITALIST

## 2024-05-07 PROCEDURE — 80048 BASIC METABOLIC PNL TOTAL CA: CPT | Performed by: HOSPITALIST

## 2024-05-07 PROCEDURE — 25810000003 SODIUM CHLORIDE 0.9 % SOLUTION: Performed by: HOSPITALIST

## 2024-05-07 PROCEDURE — 93005 ELECTROCARDIOGRAM TRACING: CPT | Performed by: NURSE PRACTITIONER

## 2024-05-07 PROCEDURE — 70496 CT ANGIOGRAPHY HEAD: CPT

## 2024-05-07 PROCEDURE — 85018 HEMOGLOBIN: CPT | Performed by: NURSE PRACTITIONER

## 2024-05-07 PROCEDURE — 99222 1ST HOSP IP/OBS MODERATE 55: CPT | Performed by: NURSE PRACTITIONER

## 2024-05-07 PROCEDURE — 85018 HEMOGLOBIN: CPT | Performed by: HOSPITALIST

## 2024-05-07 PROCEDURE — 99233 SBSQ HOSP IP/OBS HIGH 50: CPT | Performed by: HOSPITALIST

## 2024-05-07 PROCEDURE — 84145 PROCALCITONIN (PCT): CPT | Performed by: NURSE PRACTITIONER

## 2024-05-07 PROCEDURE — 25010000002 VANCOMYCIN 10 G RECONSTITUTED SOLUTION

## 2024-05-07 PROCEDURE — C1751 CATH, INF, PER/CENT/MIDLINE: HCPCS

## 2024-05-07 PROCEDURE — 83605 ASSAY OF LACTIC ACID: CPT | Performed by: STUDENT IN AN ORGANIZED HEALTH CARE EDUCATION/TRAINING PROGRAM

## 2024-05-07 PROCEDURE — 25010000002 MAGNESIUM SULFATE 2 GM/50ML SOLUTION: Performed by: HOSPITALIST

## 2024-05-07 PROCEDURE — 36600 WITHDRAWAL OF ARTERIAL BLOOD: CPT

## 2024-05-07 PROCEDURE — 85014 HEMATOCRIT: CPT | Performed by: HOSPITALIST

## 2024-05-07 PROCEDURE — 93306 TTE W/DOPPLER COMPLETE: CPT

## 2024-05-07 PROCEDURE — P9016 RBC LEUKOCYTES REDUCED: HCPCS

## 2024-05-07 PROCEDURE — 71275 CT ANGIOGRAPHY CHEST: CPT

## 2024-05-07 PROCEDURE — 25510000001 IOPAMIDOL PER 1 ML: Performed by: STUDENT IN AN ORGANIZED HEALTH CARE EDUCATION/TRAINING PROGRAM

## 2024-05-07 PROCEDURE — 84443 ASSAY THYROID STIM HORMONE: CPT | Performed by: NURSE PRACTITIONER

## 2024-05-07 PROCEDURE — 25810000003 SODIUM CHLORIDE 0.9 % SOLUTION

## 2024-05-07 PROCEDURE — 93010 ELECTROCARDIOGRAM REPORT: CPT | Performed by: INTERNAL MEDICINE

## 2024-05-07 PROCEDURE — 74174 CTA ABD&PLVS W/CONTRAST: CPT

## 2024-05-07 PROCEDURE — 94799 UNLISTED PULMONARY SVC/PX: CPT

## 2024-05-07 PROCEDURE — 25010000002 DEXAMETHASONE PER 1 MG: Performed by: NURSE PRACTITIONER

## 2024-05-07 PROCEDURE — 85014 HEMATOCRIT: CPT | Performed by: NURSE PRACTITIONER

## 2024-05-07 PROCEDURE — 25010000002 PIPERACILLIN SOD-TAZOBACTAM PER 1 G: Performed by: HOSPITALIST

## 2024-05-07 PROCEDURE — 83735 ASSAY OF MAGNESIUM: CPT | Performed by: HOSPITALIST

## 2024-05-07 PROCEDURE — 85610 PROTHROMBIN TIME: CPT | Performed by: STUDENT IN AN ORGANIZED HEALTH CARE EDUCATION/TRAINING PROGRAM

## 2024-05-07 RX ORDER — PROMETHAZINE HYDROCHLORIDE 12.5 MG/1
12.5 SUPPOSITORY RECTAL EVERY 6 HOURS PRN
Status: DISCONTINUED | OUTPATIENT
Start: 2024-05-07 | End: 2024-05-11

## 2024-05-07 RX ORDER — PANTOPRAZOLE SODIUM 40 MG/10ML
40 INJECTION, POWDER, LYOPHILIZED, FOR SOLUTION INTRAVENOUS EVERY 12 HOURS SCHEDULED
Status: DISCONTINUED | OUTPATIENT
Start: 2024-05-07 | End: 2024-05-12

## 2024-05-07 RX ORDER — MAGNESIUM SULFATE HEPTAHYDRATE 40 MG/ML
2 INJECTION, SOLUTION INTRAVENOUS
Status: COMPLETED | OUTPATIENT
Start: 2024-05-07 | End: 2024-05-07

## 2024-05-07 RX ORDER — CALCIUM GLUCONATE 20 MG/ML
2000 INJECTION, SOLUTION INTRAVENOUS ONCE
Status: COMPLETED | OUTPATIENT
Start: 2024-05-07 | End: 2024-05-07

## 2024-05-07 RX ORDER — NOREPINEPHRINE BITARTRATE 0.03 MG/ML
.02-.3 INJECTION, SOLUTION INTRAVENOUS
Status: DISCONTINUED | OUTPATIENT
Start: 2024-05-07 | End: 2024-05-13

## 2024-05-07 RX ORDER — HYDROCODONE BITARTRATE AND ACETAMINOPHEN 5; 325 MG/1; MG/1
1 TABLET ORAL EVERY 6 HOURS PRN
Status: DISCONTINUED | OUTPATIENT
Start: 2024-05-07 | End: 2024-05-08

## 2024-05-07 RX ORDER — DEXAMETHASONE SODIUM PHOSPHATE 4 MG/ML
4 INJECTION, SOLUTION INTRA-ARTICULAR; INTRALESIONAL; INTRAMUSCULAR; INTRAVENOUS; SOFT TISSUE ONCE
Status: COMPLETED | OUTPATIENT
Start: 2024-05-07 | End: 2024-05-07

## 2024-05-07 RX ORDER — VANCOMYCIN 2 GRAM/500 ML IN 0.9 % SODIUM CHLORIDE INTRAVENOUS
2000 ONCE
Status: COMPLETED | OUTPATIENT
Start: 2024-05-07 | End: 2024-05-07

## 2024-05-07 RX ORDER — SODIUM CHLORIDE 0.9 % (FLUSH) 0.9 %
20 SYRINGE (ML) INJECTION AS NEEDED
Status: DISCONTINUED | OUTPATIENT
Start: 2024-05-07 | End: 2024-05-11

## 2024-05-07 RX ORDER — SODIUM CHLORIDE 0.9 % (FLUSH) 0.9 %
10 SYRINGE (ML) INJECTION AS NEEDED
Status: DISCONTINUED | OUTPATIENT
Start: 2024-05-07 | End: 2024-05-12

## 2024-05-07 RX ORDER — SODIUM CHLORIDE 0.9 % (FLUSH) 0.9 %
10 SYRINGE (ML) INJECTION EVERY 12 HOURS SCHEDULED
Status: DISCONTINUED | OUTPATIENT
Start: 2024-05-07 | End: 2024-05-12

## 2024-05-07 RX ORDER — VANCOMYCIN/0.9 % SOD CHLORIDE 1.5G/250ML
15 PLASTIC BAG, INJECTION (ML) INTRAVENOUS EVERY 24 HOURS
Status: DISCONTINUED | OUTPATIENT
Start: 2024-05-07 | End: 2024-05-07

## 2024-05-07 RX ORDER — ATROPINE SULFATE 0.4 MG/ML
0.4 INJECTION, SOLUTION INTRAMUSCULAR; INTRAVENOUS; SUBCUTANEOUS ONCE
Status: COMPLETED | OUTPATIENT
Start: 2024-05-07 | End: 2024-05-07

## 2024-05-07 RX ORDER — MIDODRINE HYDROCHLORIDE 5 MG/1
5 TABLET ORAL
Status: DISCONTINUED | OUTPATIENT
Start: 2024-05-07 | End: 2024-05-11

## 2024-05-07 RX ADMIN — ATROPINE SULFATE 0.4 MG: 0.4 INJECTION, SOLUTION INTRAVENOUS at 06:04

## 2024-05-07 RX ADMIN — AMITRIPTYLINE HYDROCHLORIDE 25 MG: 25 TABLET, FILM COATED ORAL at 20:24

## 2024-05-07 RX ADMIN — ACETAMINOPHEN 650 MG: 325 TABLET ORAL at 22:09

## 2024-05-07 RX ADMIN — IOPAMIDOL 115 ML: 755 INJECTION, SOLUTION INTRAVENOUS at 04:18

## 2024-05-07 RX ADMIN — SODIUM CHLORIDE, POTASSIUM CHLORIDE, SODIUM LACTATE AND CALCIUM CHLORIDE 500 ML: 600; 310; 30; 20 INJECTION, SOLUTION INTRAVENOUS at 06:16

## 2024-05-07 RX ADMIN — TIZANIDINE 2 MG: 4 TABLET ORAL at 20:24

## 2024-05-07 RX ADMIN — MAGNESIUM SULFATE IN WATER FOR 2 G: 40 INJECTION INTRAVENOUS at 12:32

## 2024-05-07 RX ADMIN — ACETAMINOPHEN 650 MG: 325 TABLET ORAL at 11:01

## 2024-05-07 RX ADMIN — LIDOCAINE 1 PATCH: 4 PATCH TOPICAL at 10:06

## 2024-05-07 RX ADMIN — Medication 10 ML: at 00:18

## 2024-05-07 RX ADMIN — MIDODRINE HYDROCHLORIDE 5 MG: 5 TABLET ORAL at 16:46

## 2024-05-07 RX ADMIN — MAGNESIUM SULFATE IN WATER FOR 2 G: 40 INJECTION INTRAVENOUS at 14:55

## 2024-05-07 RX ADMIN — ACETAMINOPHEN 650 MG: 325 TABLET ORAL at 17:53

## 2024-05-07 RX ADMIN — PIPERACILLIN SODIUM AND TAZOBACTAM SODIUM 3.38 G: 3; .375 INJECTION, POWDER, LYOPHILIZED, FOR SOLUTION INTRAVENOUS at 12:32

## 2024-05-07 RX ADMIN — DEXAMETHASONE SODIUM PHOSPHATE 4 MG: 4 INJECTION, SOLUTION INTRA-ARTICULAR; INTRALESIONAL; INTRAMUSCULAR; INTRAVENOUS; SOFT TISSUE at 19:49

## 2024-05-07 RX ADMIN — HYDROCODONE BITARTRATE AND ACETAMINOPHEN 1 TABLET: 5; 325 TABLET ORAL at 22:49

## 2024-05-07 RX ADMIN — PANTOPRAZOLE SODIUM 40 MG: 40 INJECTION, POWDER, FOR SOLUTION INTRAVENOUS at 20:24

## 2024-05-07 RX ADMIN — Medication 10 ML: at 20:25

## 2024-05-07 RX ADMIN — VANCOMYCIN HYDROCHLORIDE 2000 MG: 10 INJECTION, POWDER, LYOPHILIZED, FOR SOLUTION INTRAVENOUS at 13:24

## 2024-05-07 RX ADMIN — SODIUM CHLORIDE 500 ML: 9 INJECTION, SOLUTION INTRAVENOUS at 08:44

## 2024-05-07 RX ADMIN — CEFTRIAXONE 2000 MG: 2 INJECTION, POWDER, FOR SOLUTION INTRAMUSCULAR; INTRAVENOUS at 16:46

## 2024-05-07 RX ADMIN — Medication 10 ML: at 08:52

## 2024-05-07 RX ADMIN — AMPICILLIN SODIUM 2 G: 2 INJECTION, POWDER, FOR SOLUTION INTRAMUSCULAR; INTRAVENOUS at 16:47

## 2024-05-07 RX ADMIN — SODIUM CHLORIDE 500 ML: 9 INJECTION, SOLUTION INTRAVENOUS at 13:24

## 2024-05-07 RX ADMIN — MAGNESIUM SULFATE IN WATER FOR 2 G: 40 INJECTION INTRAVENOUS at 17:07

## 2024-05-07 RX ADMIN — AMPICILLIN SODIUM 2 G: 2 INJECTION, POWDER, FOR SOLUTION INTRAMUSCULAR; INTRAVENOUS at 20:24

## 2024-05-07 RX ADMIN — CALCIUM GLUCONATE 2000 MG: 20 INJECTION, SOLUTION INTRAVENOUS at 06:07

## 2024-05-07 RX ADMIN — PANTOPRAZOLE SODIUM 40 MG: 40 INJECTION, POWDER, FOR SOLUTION INTRAVENOUS at 04:36

## 2024-05-07 NOTE — PLAN OF CARE
Goal Outcome Evaluation:      Pt transferred from the floor to 2A ICU. RA. VSS. SBP 90's-100's. MAP goal 65 or higher. Levo ordered if needed to maintain MAP goal. Abx infusing. MD Gardner paged in regards to INR lvl and pts mechanical valve. MD did not want to start any meds tonight. Scheduled labs to monitor INR and H&H. Mag replaced. PICC line placed. Int. saw pt at bedside.

## 2024-05-07 NOTE — CASE MANAGEMENT/SOCIAL WORK
Discharge Planning Assessment  [facility]     Patient Name: Kaleb Abraham  MRN: [id number]  Today's Date: 5/7/2024    Admit Date: 5/6/2024    Plan: Home with Family   Discharge Needs Assessment       Row Name 05/07/24 1025       Living Environment    People in Home spouse    Name(s) of People in Home Yahaira schmidt    Current Living Arrangements home    Primary Care Provided by self    Provides Primary Care For no one    Family Caregiver if Needed spouse    Family Caregiver Names Yahaira schmidt    Quality of Family Relationships helpful;involved;supportive    Able to Return to Prior Arrangements other (see comments)  TBD       Transition Planning    Patient/Family Anticipates Transition to home with family    Transportation Anticipated family or friend will provide       Discharge Needs Assessment    Readmission Within the Last 30 Days no previous admission in last 30 days    Equipment Currently Used at Home cane, straight;wheelchair;cpap    Concerns to be Addressed discharge planning    Current Discharge Risk chronically ill;physical impairment                   Discharge Plan       Row Name 05/07/24 1028       Plan    Plan Home with Family    Patient/Family in Agreement with Plan yes    Plan Comments I have met [facility] Mr. Abraham and his wife at the bedside.  Mr. Abraham is currently on a bipap and sleeping.  Mrs. Abraham states that she and her  share a trailer in [facility]. She reports that he is independent with activities of daily living and requires a st cane and wheelchair to assist with mobility.  She states that he also uses a CPAP at night.  She denies current receipt of home health/OP services.  She denies use of home oxygen.  I have confirmed that his PCP is Manolo Enamorado and that his insurance is [employer].  She denies difficlty affording/obtaining prescription medication. PT/OT consults are pending.  CM will contt to follow the plan of care and assist with discharge  planning as recommendations become available.    Final Discharge Disposition Code 01 - home or self-care                  Continued Care and Services - Admitted Since 5/6/2024    No active coordination exists for this encounter.          Demographic Summary       Row Name 05/07/24 1024       General Information    Arrived From home    Referral Source admission list    Reason for Consult discharge planning       Contact Information    Permission Granted to Share Info With                    Functional Status       Row Name 05/07/24 1024       Functional Status, IADL    Medications independent    Meal Preparation assistive person    Housekeeping independent    Laundry independent    Shopping assistive person       Employment/    Employment Status retired                   Psychosocial    No documentation.                  Abuse/Neglect    No documentation.                  Legal    No documentation.                  Substance Abuse    No documentation.                  Patient Forms    No documentation.                     Marisa Quinn RN
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
## 2024-05-07 NOTE — ED NOTES
Kaleb Abraham    Nursing Report ED to Floor:  Mental status: A+Ox4  Ambulatory status: 2+ assist  Oxygen Therapy:  Room Air  Cardiac Rhythm: Normal Sinus  Admitted from: ED/Home  Safety Concerns:  Fall Risk? Potential for urinary retention, pt had difficulty voiding today.   Social Issues: NA  ED Room #:  15    ED Nurse Phone Extension - [phone] or may call [phone].      HPI:   Chief Complaint   Patient presents with    Flank Pain       Past Medical History:  Past Medical History:   Diagnosis Date    Anemia     Aortic stenosis     CAD (coronary artery disease)     Diabetes mellitus     Hyperlipidemia     Hypertension     Hypothyroidism     Mitral regurgitation         Past Surgical History:  Past Surgical History:   Procedure Laterality Date    CORONARY ARTERY BYPASS GRAFT      CORONARY STENT PLACEMENT          Admitting Doctor:   Kala Brennan DO    Consulting Provider(s):  Consults       No orders found from 4/7/2024 to 5/7/2024.             Admitting Diagnosis:   The primary encounter diagnosis was Acute on chronic anemia. Diagnoses of Supratherapeutic INR, History of GI bleed, Anticoagulated on Coumadin, Generalized weakness, and History of CHF (congestive heart failure) were also pertinent to this visit.    Most Recent Vitals:   Vitals:    05/06/24 1800 05/06/24 1900 05/06/24 1930 05/06/24 2030   BP: 104/74 99/62 98/61 107/65   BP Location:       Patient Position:       Pulse: 82 88 84 85   Resp:       Temp:       TempSrc:       SpO2: 96% 95% 96% 97%   Weight:       Height:           Active LDAs/IV Access:   Lines, Drains & Airways       Active LDAs       Name Placement date Placement time Site Days    Peripheral IV 05/06/24 1320 Left Antecubital 05/06/24  1320  Antecubital  less than 1                    Labs (abnormal labs have a star):   Labs Reviewed   COMPREHENSIVE METABOLIC PANEL - Abnormal; Notable for the following components:       Result Value    Glucose 138 (*)     Sodium 131 (*)     Chloride  93 (*)     Calcium 8.1 (*)     Albumin 3.0 (*)     All other components within normal limits    Narrative:     GFR Normal >60  Chronic Kidney Disease <60  Kidney Failure <15    The GFR formula is only valid for adults with stable renal function between ages 18 and 70.   URINALYSIS W/ MICROSCOPIC IF INDICATED (NO CULTURE) - Abnormal; Notable for the following components:    Color, UA Dark Yellow (*)     Appearance, UA Cloudy (*)     Specific Gravity, UA 1.040 (*)     Ketones, UA Trace (*)     Bilirubin, UA Small (1+) (*)     Protein, UA 30 mg/dL (1+) (*)     All other components within normal limits   CBC WITH AUTO DIFFERENTIAL - Abnormal; Notable for the following components:    RBC 2.59 (*)     Hemoglobin 7.9 (*)     Hematocrit 24.3 (*)     Neutrophil % 83.0 (*)     Lymphocyte % 8.9 (*)     Eosinophil % 0.1 (*)     Immature Grans % 0.9 (*)     Lymphocytes, Absolute 0.69 (*)     Immature Grans, Absolute 0.07 (*)     All other components within normal limits   PROTIME-INR - Abnormal; Notable for the following components:    Protime 80.6 (*)     INR >10.00 (*)     All other components within normal limits   URINALYSIS, MICROSCOPIC ONLY - Abnormal; Notable for the following components:    WBC, UA 3-5 (*)     Squamous Epithelial Cells, UA 3-6 (*)     Mucus, UA Small/1+ (*)     All other components within normal limits   BNP (IN-HOUSE) - Abnormal; Notable for the following components:    proBNP 22,664.0 (*)     All other components within normal limits    Narrative:     This assay is used as an aid in the diagnosis of individuals suspected of having heart failure. It can be used as an aid in the diagnosis of acute decompensated heart failure (ADHF) in patients presenting with signs and symptoms of ADHF to the emergency department (ED). In addition, NT-proBNP of <300 pg/mL indicates ADHF is not likely.    Age Range Result Interpretation  NT-proBNP Concentration (pg/mL:      <50             Positive            >450                    Gray                 300-450                    Negative             <300    50-75           Positive            >900                  Gray                300-900                  Negative            <300      >75             Positive            >1800                  Gray                300-1800                  Negative            <300   LIPASE - Normal   LACTIC ACID, PLASMA - Normal   TSH - Normal   T4, FREE - Normal   RAINBOW DRAW    Narrative:     The following orders were created for panel order [place] Draw.  Procedure                               Abnormality         Status                     ---------                               -----------         ------                     Green Top (Gel)[[id number]]                                  Final result               Lavender Top[[id number]]                                     Final result               Gold Top - SST[[id number]]                                   Final result               Gray Top[[id number]]                                         Final result               Light Blue Top[[id number]]                                   Final result                 Please view results for these tests on the individual orders.   TYPE AND SCREEN   ABORH 2ND SPECIMEN VERIFICATION   CBC AND DIFFERENTIAL    Narrative:     The following orders were created for panel order CBC & Differential.  Procedure                               Abnormality         Status                     ---------                               -----------         ------                     CBC Auto Differential[[id number]]        Abnormal            Final result                 Please view results for these tests on the individual orders.   GREEN TOP   LAVENDER TOP   GOLD TOP - SST   GRAY TOP   LIGHT BLUE TOP       Meds Given in ED:   Medications   Sodium Chloride (PF) 0.9 % 10 mL (has no administration in time range)   iopamidol (ISOVUE-300) 61 % injection 85 mL (85 mL Intravenous Given  5/6/24 1434)   sodium chloride 0.9 % bolus 1,000 mL (0 mL Intravenous Stopped 5/6/24 1617)   phytonadione (AQUA-MEPHYTON, VITAMIN K) 10 mg in sodium chloride 0.9 % 50 mL IVPB (0 mg Intravenous Stopped 5/6/24 1829)           Last NIH score:                                                          Dysphagia screening results:        [place] Coma Scale:  No data recorded     CIWA:        Restraint Type:            Isolation Status:  No active isolations
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
## 2024-05-07 NOTE — CONSULTS
PICC placed per Dominique Lopez RN [facility], CXR ordered to confirm tip placement.  Please page with results
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
## 2024-05-07 NOTE — SIGNIFICANT NOTE
Patient will be transferred to the ICU team for pressors needs.  Patient was graciously accepted by .  Transfer orders placed.

## 2024-05-07 NOTE — PROGRESS NOTES
Pharmacy Consult-Vancomycin Dosing  Kaleb Abraham is a  73 y.o. male receiving vancomycin therapy.     Indication: bacteremia  Consulting Provider: hospitalist  ID Consult: Y    Goal AUC: 400 - 600 mg/L*hr    Current Antimicrobial Therapy  Anti-Infectives (From admission, onward)      Ordered     Dose/Rate Route Frequency Start Stop    05/07/24 1302  ! Vancomycin trough on 5/9. Please hold 1300 dose on 5/9 until pharmacy evaluates level.        Ordering Provider: Bernabe Martines, PharmD     Does not apply Once 05/09/24 1300      05/07/24 1302  vancomycin (VANCOCIN) 1,000 mg in sodium chloride 0.9 % 250 mL IVPB-VTB        Ordering Provider: Bernabe Martines, LiatD    1,000 mg  250 mL/hr over 60 Minutes Intravenous Every 12 Hours 05/08/24 0200 05/12/24 1359    05/07/24 1225  piperacillin-tazobactam (ZOSYN) 3.375 g IVPB in 100 mL NS MBP (CD)        Ordering Provider: Desiree Garcia MD    3.375 g  over 4 Hours Intravenous Every 8 Hours 05/07/24 2000 05/12/24 1959    05/07/24 1302  vancomycin IVPB 2000 mg in 0.9% Sodium Chloride 500 mL        Ordering Provider: Bernabe Martines PharmD    2,000 mg  250 mL/hr over 120 Minutes Intravenous Once 05/07/24 1400      05/07/24 1225  piperacillin-tazobactam (ZOSYN) 3.375 g IVPB in 100 mL NS MBP (CD)        Ordering Provider: Desiree Garcia MD    3.375 g  over 30 Minutes Intravenous Once 05/07/24 1315 05/07/24 1302    05/07/24 1256  Pharmacy to dose vancomycin        Ordering Provider: Desiree Garcia MD     Does not apply Continuous PRN 05/07/24 1253 05/12/24 1252            Allergies  Allergies as of 05/06/2024    (No Known Allergies)       Labs    Results from last 7 days   Lab Units 05/07/24  0959 05/06/24  1206   BUN mg/dL 16 17   CREATININE mg/dL 1.01 1.04       Results from last 7 days   Lab Units 05/07/24  0959 05/06/24  1206   WBC 10*3/mm3 7.32 7.77       Evaluation of Dosing     Last Dose Received in the ED/Outside Facility: N  Is Patient on Dialysis or Renal Replacement:  "N    Ht - 180.3 cm (70.98\")  Wt - 103 kg (227 lb 1.2 oz)    Estimated Creatinine Clearance: 80.8 mL/min (by C-G formula based on SCr of 1.01 mg/dL).    I/O last 3 completed shifts:  In: 1351.3 [Blood:301.3; IV Piggyback:1050]  Out: -     Microbiology and Radiology  Microbiology Results (last 10 days)       Procedure Component Value - Date/Time    Blood Culture - Blood, Arm, Right [[id number]]  (Abnormal) Collected: 05/06/24 2218    Lab Status: Preliminary result Specimen: Blood from Arm, Right Updated: 05/07/24 1212     Blood Culture Abnormal Stain     Gram Stain Aerobic Bottle Gram positive cocci in pairs and chains    Narrative:      Aerobic Bottle Only      Blood Culture - Blood, Arm, Right [[id number]]  (Abnormal) Collected: 05/06/24 2205    Lab Status: Preliminary result Specimen: Blood from Arm, Right Updated: 05/07/24 1211     Blood Culture Abnormal Stain     Gram Stain Aerobic Bottle Gram positive cocci in pairs and chains      Anaerobic Bottle Gram positive cocci in pairs and chains            Reported Vancomycin Levels                         [facility] AUC Calculation:    Current AUC:   --- mg/L*hr    Predicted Steady State AUC on Current Dose: --- mg/L*hr  _________________________________    Predicted Steady State AUC on New Dose:   520 mg/L*hr    Assessment/Plan:    Pharmacy to dose vancomycin for bacteremia. Goal AUC: 400-600 mg/L*hr.  Patient will receive loading dose of vancomycin 2000mg today at 1400, followed by maintenance dose of vancomycin 1000mg every 12 hours beginning 5/8 @ 0200. Predicted Steady State AUC at current dose: 520 mg/L*hr.  Assess clearance by obtaining vancomycin trough level on 5/9 prior to dose #5.  Patient is afebrile and hypotensive. Renal function at baseline, WBC WNL. Procalcitonin unremarkable. Blood cultures growing GPC in pairs and chains in 2/2 sets.   Pharmacy will continue to monitor cultures, sensitivities, renal function, and clinical status, and will adjust regimen " as necessary.    Thank you.  Bernabe Martines, PharmD  5/7/2024  13:06 EDT

## 2024-05-07 NOTE — CONSULTS
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 identified. Bones are demineralized. There are degenerative changes within the spine. Lung bases: Trace bilateral pleural effusions with bibasilar atelectasis. Cardiomegaly. Mitral valvular prosthesis is partially imaged. Calcified granuloma within the right lower lobe.     Impression: 1.No acute abnormality identified within the abdomen or pelvis. 2.Colonic diverticulosis. 3.3 cm infrarenal abdominal aortic aneurysm. 4.Additional findings as detailed above. Electronically Signed: Matthias Dobbs MD  5/6/2024 2:53 PM EDT  Workstation ID: [id number]    CT Head Without Contrast    Result Date: 5/6/2024  CT HEAD WO CONTRAST Date of Exam: 5/6/2024 2:25 PM EDT Indication: ams. Comparison: None available. Technique: Axial CT images were obtained of the head without contrast administration.  Automated exposure control and iterative construction methods were used. Findings: No acute intracranial hemorrhage or extra-axial collection is identified. The ventricles appear normal in caliber, with no evidence of mass effect or midline shift. The basal cisterns appear patent. The gray-white differentiation appears preserved. The calvarium appear intact. There is mild frothy mucosal disease in the left  maxillary sinus. The mastoid air cells are well-aerated. Scattered foci of periventricular and subcortical white matter hypodensities are nonspecific, but likely the sequela of  mild chronic small vessel ischemic disease.     Impression: 1.No acute intracranial process identified. 2.Findings suggestive of mild chronic small vessel ischemic disease. 3.Mild frothy mucosal disease within left maxillary sinus. Correlate for acute sinusitis. Electronically Signed: Donnie Skelton MD  2024 2:53 PM EDT  Workstation ID: [id number]    XR Chest 1 View    Result Date: 2024  XR CHEST 1 VW Date of Exam: 2024 1:58 PM EDT Indication: sob Comparison: Chest radiograph 2024. Findings: Sternotomy. Enlarged cardiac silhouette, unchanged. Pulmonary vascular congestion. No overt pulmonary edema. No focal consolidation. No significant pleural effusion. No pneumothorax. Osseous structures are unchanged.     Impression: No acute cardiopulmonary findings. Electronically Signed: Giovany Tineo MD  2024 2:18 PM EDT  Workstation ID: [id number]    COLEEN with poss cardioversion    Result Date: 2024  This result has an attachment that is not available. COLEEN REPORT  Demographics  Patient Name:           REYES CAMACHO      :            1950  Medical Record Number:  [id number]         Age:            73 year(s)  Corporate ID Number:    [id number]         Gender          Male  Account Number:         [id number]  Sonographer:            Princess Chapman CS Height:         73 inches  Referring Physician:    WING DUMONT MD   Weight:         231.99 pounds  Interpreting Physician: WING DUMONT MD   BMI:            30.61 kg/m^2  Date of Service:        2024         Blood Pressure: 114/68 mmHg  Room Number:            334 The procedure was explained in detail to the patient. Risks, complications and alternative treatments were reviewed. Written consent was obtained. Type of Study:  COLEEN procedure: Echo COLEEN D W or W/O M-mode,  Echo Doppler Colour Flow  Velocity, Echo Pulsed + / or Continuous Wave, Echo COLEEN 2 D imaging, Echo -  Contrast Echo, EC Cardioversion, ECHOCARDIOGRAM TRANSESOPHAGEAL W/  POSSIBLE CARDIOV.  HR: 105 bpmRhythm: Atrial fibrillationPatient Status: Routine IP  Study Location: Echo LabTechnical Quality: Good visualization Procedure consent form was obtained Airway assessment was performed. Procedure Medications   - Fentanyl I.V. 75 mcg.   - Versed I.V. 4 mg.  Impression:  ##############################################################################  #################################################  COLEEN / DCC DX : Typical Atrial Flutter i48.3  Normal sized left ventricle.  Normal left ventricular wall thickness.  Visually estimated ejection fraction 55% +/- 5%.  Normal left ventricular systolic function.  Mechanical mitral valve.  Moderate multi jet mitral regurgitation.  Normally functioning bioprosthetic aortic valve.  Successful cardioversion with restoration of sinus rhythm with 100 joules of  biphasic energy X 1 attempt .  ########################################  Left Ventricle  Normal sized left ventricle.  Normal left ventricular wall thickness.  Visually estimated ejection fraction 55% +/- 5%.  Normal left ventricular systolic function.  No left ventricular masses or thrombi.  Right Ventricle  Normal sized right ventricle.  Left Atrium  Normal sized left atrium.  Intact atrial septum.  No atrial mass or thrombus.  Left atrial appendage well visualized, no thrombus.  Right Atrium  Normal sized right atrium.  Intact atrial septum.  No atrial mass or thrombus.  Mitral Valve  Mechanical mitral valve.  Moderate multi jet mitral regurgitation.  No mitral stenosis.  No masses or vegetations seen.  Aortic Valve  Normally functioning bioprosthetic aortic valve.  Trace aortic regurgitation.  No aortic stenosis.  No masses or vegetations seen.  Tricuspid Valve  Structurally normal tricuspid valve.  Mild tricuspid  regurgitation.  No tricuspid stenosis.  No masses or vegetations seen.  Pulmonic Valve  Structurally normal pulmonic valve.  Trace pulmonic regurgitation.  No pulmonic stenosis.  No masses or vegetations seen. Great Vessels Visualized aorta is normal. Normal aortic root. No evidence of dissection.  Pericardium / Pleura No pericardial effusion.  Other  COLEEN probe passed without difficulty.  No signs of immediate complications.  Procedural sedation administered by attending RN.  See medication section for total dose give.  Sedation duration < or = to 15 minutes. See procedure record for start and  stop times.  ----------------------------------------------------------------  Electronically signed by WING DUMONT MD(Interpreting  Physician) on 2024 14:47  ----------------------------------------------------------------    XR chest AP portable    Result Date: 2024  PORTABLE CHEST      2024 8:59 PM HISTORY: Chest pain. Fever. COMPARISON: None. FINDINGS: The patient is status post median sternotomy for CABG. Diffuse interstitial changes are again noted and appear chronic. There is evidence of old calcified granulomatous disease.  The lungs are otherwise clear.  There is no pneumothorax. The osseous structures  are otherwise unremarkable.    Chronic findings but no evidence of an acute cardiopulmonary process. Images reviewed, interpreted, and dictated by Dr. Leon Dow. Transcribed by Rolly Beltrán.    ECHO COMPLETE (DOPPLER / COLOR) W OR WO CONTRAST    Result Date: 2024  TRANSTHORACIC ECHOCARDIOGRAPHY REPORT  Demographics  Patient Name:          REYES CAMACHO       :            1950  Medical Record Number: [id number]          Age:            73 year(s)  Corporate ID Number:   [id number]          Gender          Male  Account Number:        [id number]  Sonographer:           Brooklyn Taylor     Height:         73 inches                         [facility]  Referring Physician:   Ilana Pavon       Weight:         231.99 pounds  Interpreting           SUE HADDAD DO      BMI:            30.61 kg/m^2  Physician:  Date of Service:       2024          Blood Pressure: 136/84 mmHg  Room Number:           334 Type of Study:  TTE procedure: ECHO COMPLETE (DOPPLER / COLOR) W OR WO CONTRAST.  Patient Status: Routine IP  Study Location: [facility] Quality: Adequate visualization  Impression:  ########################################  Indication: syncope R55  Normal sized left ventricle.  Moderate left ventricular hypertrophy.  Visually estimated ejection fraction 55% +/- 5%.  Normal left ventricular systolic function.  Indeterminate diastolic function.  Status post mechanical mitral valve replacement. Peak PmmHG Mean PG:  3mmHG  Status post bioprosthetic aortic valve replacement. Peak PmmHg; Mean  PmmHg.  Elevated central venous pressure (>15mmHg).  ######################################## Measurements Summary:  LVEDd: 3.49 cm         LVESd: 2.71 cm          IVSEd: 1.63 cm  AO Root:3.51 cm        LVPWd: 1.63 cm  Contractility Score  Normal Left Ventricular contractility was noted.  LV regional wall motion: (0-Not visualized 1-Normal 2-Hypokinesis  3-Akinesis 4-Dyskinesis 5-Aneurysm)  Left Ventricle  Volume esynbhfgx38.53 ml         LV length: 8.11 cm  Volume mrbhhztg26.59 ml  LVOT diameter: 2 cm  Normal sized left ventricle.  Moderate left ventricular hypertrophy.  Visually estimated ejection fraction 55% +/- 5%.  Normal left ventricular systolic function.  Unable to obtain bullseye average for strain due to irregular heart  rhythm.  Indeterminate diastolic function.  No left ventricular masses or thrombi.  Right Ventricle  Diastolic dimension: 3.64     RV systolic pressure: 54.41 mmHg  cm  Normal sized right ventricle.  Abnormal TAPSE. Right ventricular function appears normal.  Left Atrium  LA dimension: 3.4 cm               LA volume:80.53 ml  LA/Aorta: 0.97  Moderately dilated left  atrium.  Normal left atrial volume index  Intact atrial septum.  No atrial mass or thrombus.  Right Atrium  Normal sized right atrium.  Intact atrial septum.  No atrial mass or thrombus.  Mitral Valve  Area PHT: 2.57 cm^2            Mean velocity: 0.74 m/s  P1/2t: 85.71 msec              Mean gradient: 3.18 mmHg  Area (continuity): 1.69 cm^2   Peak gradient: 11.59 mmHg  Status post mechanical mitral valve replacement. Peak gradient 11mmHG Mean  gradient 3mmHG  Unable to adequately assess for regurgitation due to valve shadowing  No mitral stenosis.  No masses or vegetations seen.  Aortic Valve  AV VTI: 20.72   Area continuity: 3.52   Peak velocity: 1.44  cm              cm^2                    m/s  LVOT VTI: 23.26 Mean velocity: 0.9 m/s  Peak gradient: 8.27  cm                                      mmHg  Mean gradient: 3.9  mmHg  Status post bioprosthetic aortic valve replacement. Peak PmmHg; Mean  PmmHg.  No aortic regurgitation.  No aortic stenosis.  No masses or vegetations seen.  Tricuspid Valve  TR velocity: 3.14 m/s      TR gradient: 39.32409 mmHg  Estimated RAP: 15 mmHg     RVSP: 54.41 mmHg  Structurally normal tricuspid valve.  Mild (1+) tricuspid regurgitation.  Mild pulmonary hypertension.  RVSP 42 mmHg.  No tricuspid stenosis.  No masses or vegetations seen.  Pulmonic Valve  Acceleration time: 69.2 msec           PASP: 54.41 mmHg  Structurally normal pulmonic valve.  Mild (1+) pulmonic valve regurgitation.  Abnormal pulmonary acceleration time.  No pulmonic stenosis.  No masses or vegetations seen. Great Vessels Aorta  Aortic Root: 3.51 cm  Ascending Aorta: 3.45 cm  LVOT Diameter: 2 cm Visualized aorta is normal. Normal aortic root. No evidence of dissection. Dilated IVC with no inspiratory collapse. Elevated central venous pressure (>15mmHg).  Pericardium / Pleura No pericardial effusion.  ----------------------------------------------------------------  Electronically signed by SUE HADDAD  "DO(Interpreting  Physician) on 04/21/2024 09:38  ----------------------------------------------------------------      No results found for: \"COVID19\"    Blood Culture   Date Value Ref Range Status   05/06/2024 Abnormal Stain (C)  Preliminary     BCID, PCR   Date Value Ref Range Status   05/06/2024 (A) Negative by BCID PCR. Culture to Follow. Final    Enterococcus faecalis. Arcelia/B (vancomycin resistance gene) not detected. Identification by BCID2 PCR.       ASSESSMENTS/PLANS  Kaleb Abraham is a 73 y.o. male with   Chronic Anemia  2. History of AVMs and GAVE  - transfuse per protocol per primary team  - continue PPI  - 2 prior capsule endoscopies without etiology for anemia in 2023  - history of GAVE per Dr. White but not found by Dr. Amaro at time of repeat EGD 2/2023. History of AVMs at [facility] treated last month (4/2024)  - suspect hypotension due to bacteremia, anemia possibly due to sepsis  - please notify the GI team if overt GI blood loss, consider repeat EGD with push enteroscopy and possible repeat capsule endoscopy if overt GI blood loss  - consider iron supplement daily  (he has history of elevated ferritin but unknown when ferritin was collected in relation to transfusion when admitted at [facility])  - consider evaluation by hematology if not previously done  3. London's esophagus  - continue PPI  - repeat EGD in 3 years due 2/2026  4. History of colon polyps  - due for surveillance colonoscopy 2/2026  5. Abdominal discomfort, decreased intake  - monitor bowel habits, reevaluate appetite and abdominal discomfort/ description of abdomen feeling tight during admission with hopeful improvement in symptoms with overall improvement in blood pressure and treatment of bacteremia     Continue close monitoring in ICU setting as patient is at risk for further decline with coexisting medical conditions     I discussed the patient's findings and my recommendations with patient and family    Mirela BUTLER" FRANCHESCA Cornelius  05/07/24  17:04 EDT
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
## 2024-05-07 NOTE — CONSULTS
INFECTIOUS DISEASE CONSULT/INITIAL HOSPITAL VISIT    Kaleb Abraham  1950  [id number]    Date of Consult: 5/7/2024    Admission Date: 5/6/2024      Requesting Provider: No ref. provider found  Evaluating Physician: Lino Jacob MD    Reason for Consultation: Weakness    History of present illness:    Patient is a 73 y.o. male with coronary disease history of CABG, type 2 diabetes mellitus, hypertension, hyperlipidemia, hypothyroidism, atrial fibrillation, valvular heart disease with history of mechanical heart valve (prosthetic aortic valve and prosthetic mitral valve/) recently treated [facility] for GI bleed from April 22 patient found to have gastric arteriovenous malformations patient now admitted to [facility] with supratherapeutic INR.    Hospitalist concerned about underlying infection    Patient has low-grade fever 100.3, bradycardia and hypotension    Getting picc line    MVR/AVR performed 9 years ago here at [facility]  Past Medical History:   Diagnosis Date    Anemia     Aortic stenosis     CAD (coronary artery disease)     Diabetes mellitus     Hyperlipidemia     Hypertension     Hypothyroidism     Mitral regurgitation        Past Surgical History:   Procedure Laterality Date    CORONARY ARTERY BYPASS GRAFT      CORONARY STENT PLACEMENT         Family History   Problem Relation Age of Onset    Diabetes Mother     Stroke Mother     Thyroid cancer Mother     Hypertension Father     Stroke Father        Social History     Socioeconomic History    Marital status:    Tobacco Use    Smoking status: Former     Types: Cigarettes     Passive exposure: Past    Smokeless tobacco: Never   Vaping Use    Vaping status: Never Used   Substance and Sexual Activity    Alcohol use: Never    Drug use: Never    Sexual activity: Defer       No Known Allergies      Medication:    Current Facility-Administered Medications:     [START ON 5/9/2024] ! Vancomycin trough on 5/9. Please  hold 1400 dose on 5/9 until pharmacy evaluates level., , Does not apply, Once, Bernabe Martines, PharmD    acetaminophen (TYLENOL) tablet 650 mg, 650 mg, Oral, Q4H PRN, 650 mg at 05/07/24 1101 **OR** acetaminophen (TYLENOL) 160 MG/5ML oral solution 650 mg, 650 mg, Oral, Q4H PRN **OR** acetaminophen (TYLENOL) suppository 650 mg, 650 mg, Rectal, Q4H PRN, Florina Higuera APRN    [Held by provider] allopurinol (ZYLOPRIM) tablet 300 mg, 300 mg, Oral, Daily, Florina Higuera APRN    amitriptyline (ELAVIL) tablet 25 mg, 25 mg, Oral, Nightly, Florina Higuera APRN, 25 mg at 05/06/24 2348    sennosides-docusate (PERICOLACE) 8.6-50 MG per tablet 2 tablet, 2 tablet, Oral, BID PRN **AND** polyethylene glycol (MIRALAX) packet 17 g, 17 g, Oral, Daily PRN **AND** bisacodyl (DULCOLAX) EC tablet 5 mg, 5 mg, Oral, Daily PRN **AND** bisacodyl (DULCOLAX) suppository 10 mg, 10 mg, Rectal, Daily PRN, Florina Higuera APRN    dextrose (D50W) (25 g/50 mL) IV injection 25 g, 25 g, Intravenous, Q15 Min PRN, Florina Higuera APRN    dextrose (GLUTOSE) oral gel 15 g, 15 g, Oral, Q15 Min PRN, Florina Higuera APRN    [Held by provider] ferrous sulfate tablet 325 mg, 325 mg, Oral, Daily With Breakfast, Florina Higuera APRN    [Held by provider] gabapentin (NEURONTIN) capsule 600 mg, 600 mg, Oral, Q8H, Florina Higuera APRN, 600 mg at 05/06/24 2348    glucagon (GLUCAGEN) injection 1 mg, 1 mg, Intramuscular, Q15 Min PRN, Florina Higuera APRN    Insulin Lispro (humaLOG) injection 2-7 Units, 2-7 Units, Subcutaneous, 4x Daily AC & at Bedtime, Florina Higuera APRN    levothyroxine (SYNTHROID, LEVOTHROID) tablet 25 mcg, 25 mcg, Oral, Q AM, Florina Higuera APRN    Lidocaine 4 % 1 patch, 1 patch, Transdermal, Q24H, Florina Higuera APRN, 1 patch at 05/07/24 1006    Magnesium Standard Dose Replacement - Follow Nurse / BPA Driven Protocol, , Does not apply, PRN, Desiree Garcia MD    magnesium  sulfate 2g/50 mL (PREMIX) infusion, 2 g, Intravenous, Q2H, Desiree Garcia MD, 2 g at 05/07/24 1232    melatonin tablet 5 mg, 5 mg, Oral, Nightly PRN, Florina Higuera APRN, 5 mg at 05/06/24 2349    midodrine (PROAMATINE) tablet 5 mg, 5 mg, Oral, TID AC, Desiree Garcia MD    [Held by provider] multivitamin with minerals 1 tablet, 1 tablet, Oral, Daily, Florina Higuera APRN    nitroglycerin (NITROSTAT) SL tablet 0.4 mg, 0.4 mg, Sublingual, Q5 Min PRN, Florina Higuera APRN    [Held by provider] oxybutynin XL (DITROPAN-XL) 24 hr tablet 10 mg, 10 mg, Oral, Daily, Florina Higuera APRN    [Held by provider] pantoprazole (PROTONIX) EC tablet 40 mg, 40 mg, Oral, Daily, Florina Higuera APRN    pantoprazole (PROTONIX) injection 40 mg, 40 mg, Intravenous, Q12H, Carlos Russo MD, 40 mg at 05/07/24 0436    Pharmacy to dose vancomycin, , Does not apply, Continuous PRN, Desiree Garcia MD    Phosphorus Replacement - Follow Nurse / BPA Driven Protocol, , Does not apply, PREUGENE, Desiree Garcia MD    piperacillin-tazobactam (ZOSYN) 3.375 g IVPB in 100 mL NS MBP (CD), 3.375 g, Intravenous, Q8H, Desiree Garcia MD    Potassium Replacement - Follow Nurse / BPA Driven Protocol, , Does not apply, PRJose KNIGHT Rabie, MD    Sodium Chloride (PF) 0.9 % 10 mL, 10 mL, Intravenous, PRN, Florina Higuera APRN    sodium chloride 0.9 % flush 10 mL, 10 mL, Intravenous, Q12H, Florina Higuera APRN, 10 mL at 05/07/24 0852    sodium chloride 0.9 % flush 10 mL, 10 mL, Intravenous, PRN, Florina Higuera APRN    sodium chloride 0.9 % infusion 40 mL, 40 mL, Intravenous, PRN, Florina Higuera APRN    [Held by provider] tamsulosin (FLOMAX) 24 hr capsule 0.4 mg, 0.4 mg, Oral, Nightly, Florina Higuera APRN, 0.4 mg at 05/06/24 2349    tiZANidine (ZANAFLEX) tablet 2 mg, 2 mg, Oral, PRN, Florina Higuera APRN, 2 mg at 05/06/24 2349    [START ON 5/8/2024] vancomycin (VANCOCIN) 1,000 mg in sodium  chloride 0.9 % 250 mL IVPB-VTB, 1,000 mg, Intravenous, Q12H, Bernabe Martines, Trung    vancomycin IVPB 2000 mg in 0.9% Sodium Chloride 500 mL, 2,000 mg, Intravenous, Once, Bernabe Martines PharmD, Last Rate: 250 mL/hr at 24 1324, 2,000 mg at 24 1324    Antibiotics:  Anti-Infectives (From admission, onward)      Ordered     Dose/Rate Route Frequency Start Stop    24 1302  ! Vancomycin trough on . Please hold 1400 dose on  until pharmacy evaluates level.        Ordering Provider: Bernabe Martines PharmD     Does not apply Once 24 1300      24 1302  vancomycin (VANCOCIN) 1,000 mg in sodium chloride 0.9 % 250 mL IVPB-VTB        Ordering Provider: Bernabe Martines PharmD    1,000 mg  250 mL/hr over 60 Minutes Intravenous Every 12 Hours 24 0200 24 1359    24 1225  piperacillin-tazobactam (ZOSYN) 3.375 g IVPB in 100 mL NS MBP (CD)        Ordering Provider: Desiree Garcia MD    3.375 g  over 4 Hours Intravenous Every 8 Hours 24 1959    24 1302  vancomycin IVPB 2000 mg in 0.9% Sodium Chloride 500 mL        Ordering Provider: Bernabe Martines PharmD    2,000 mg  250 mL/hr over 120 Minutes Intravenous Once 24 1400      24 1225  piperacillin-tazobactam (ZOSYN) 3.375 g IVPB in 100 mL NS MBP (CD)        Ordering Provider: Desiree Garcia MD    3.375 g  over 30 Minutes Intravenous Once 24 1315 24 1302    24 1256  Pharmacy to dose vancomycin        Ordering Provider: Desiree Garcia MD     Does not apply Continuous PRN 24 1253 24 1252              Review of Systems:    Reports fatigue fever appetite change leg swelling nausea vomiting back pain dizziness weakness and lightheadedness      Physical Exam:   Vital Signs  Temp (24hrs), Av.7 °F (37.1 °C), Min:97.5 °F (36.4 °C), Max:100.3 °F (37.9 °C)    Temp  Min: 97.5 °F (36.4 °C)  Max: 100.3 °F (37.9 °C)  BP  Min: 70/49  Max: 114/67  Pulse  Min: 47  Max: 88  Resp  Min: 16   "Max: 20  SpO2  Min: 93 %  Max: 100 %    GENERAL: Awake and alert, in no acute distress.   HEENT: Normocephalic, atraumatic.  PERRL. EOMI. No conjunctival injection. No icterus.  No external oral lesions    HEART: RRR; No murmur,  LUNGS: Clear to auscultation bilaterally   ABDOMEN: Soft, nontender,   EXT:  1+ edema  :  Without Wilkinson catheter.  MSK: No joint effusions or erythema  SKIN: Warm and dry without cutaneous eruptions on Inspection/palpation.    NEURO: Oriented to PPT.  Motor 5/5 strength  PSYCHIATRIC: Normal insight and judgment. Cooperative with PE    Laboratory Data    Results from last 7 days   Lab Units 05/07/24  0959 05/07/24  0051 05/06/24  1206   WBC 10*3/mm3 7.32  --  7.77   HEMOGLOBIN g/dL 8.4* 6.6* 7.9*   HEMATOCRIT % 25.7* 20.1* 24.3*   PLATELETS 10*3/mm3 218  --  271     Results from last 7 days   Lab Units 05/07/24  0959   SODIUM mmol/L 134*   POTASSIUM mmol/L 4.3   CHLORIDE mmol/L 97*   CO2 mmol/L 29.0   BUN mg/dL 16   CREATININE mg/dL 1.01   GLUCOSE mg/dL 104*   CALCIUM mg/dL 8.6     Results from last 7 days   Lab Units 05/06/24  1206   ALK PHOS U/L 44   BILIRUBIN mg/dL 0.6   ALT (SGPT) U/L 8   AST (SGOT) U/L 27             Results from last 7 days   Lab Units 05/07/24  0959   LACTATE mmol/L 1.2             Estimated Creatinine Clearance: 80.8 mL/min (by C-G formula based on SCr of 1.01 mg/dL).      Microbiology:  Blood Culture   Date Value Ref Range Status   05/06/2024 Abnormal Stain (C)  Preliminary     BCID, PCR   Date Value Ref Range Status   05/06/2024 (A) Negative by BCID PCR. Culture to Follow. Final    Enterococcus faecalis. Arcelia/B (vancomycin resistance gene) not detected. Identification by BCID2 PCR.     No results found for: \"CULTURES\", \"HSVCX\", \"URCX\"  No results found for: \"EYECULTURE\", \"GCCX\", \"HSVCULTURE\", \"LABHSV\"  No results found for: \"LEGIONELLA\", \"MRSACX\", \"MUMPSCX\", \"MYCOPLASCX\"  No results found for: \"NOCARDIACX\", \"STOOLCX\"  No results found for: \"THROATCX\", " "\"UNSTIMCULT\", \"URINECX\", \"CULTURE\", \"VZVCULTUR\"  No results found for: \"VIRALCULTU\", \"WOUNDCX\"        Radiology:  Imaging Results (Last 72 Hours)       Procedure Component Value Units Date/Time    CT Angiogram Abdomen Pelvis [[id number]] Collected: 05/07/24 0433     Updated: 05/07/24 0440    Narrative:      CT ANGIOGRAM ABDOMEN PELVIS    Date of Exam: 5/7/2024 3:59 AM EDT    Indication: hypotension, anemia.    Comparison: 5/6/2024.    Technique: CTA of the abdomen and pelvis was performed after the uneventful intravenous administration of 115 mL Isovue-370. Reconstructed coronal and sagittal images were also obtained. In addition, a 3-D volume rendered image was created for   interpretation. Automated exposure control and iterative reconstruction methods were used.      Findings:  There is mild body wall edema. There is mild multifocal muscular atrophy. No soft tissue hematoma. Findings in the chest discussed in a separate report. There is moderate diffuse lumbar spondylosis. There is severe osteoarthritis of both hips.    The liver is homogeneous. Patient is status post cholecystectomy. The stomach, duodenum, spleen and left adrenal gland appear within normal limits. There is a small intermediate density nodule at the right adrenal gland measuring 11 mm, likely adenoma.   The kidneys enhance homogeneously. There is no hydronephrosis. Urinary bladder is nondistended. Prostate gland is small in size. The appendix is normal. There is colonic diverticulosis without evidence of acute diverticulitis. Small bowel is   nondistended. There is mild mesenteric and retroperitoneal edema. No ascites, pneumoperitoneum or lymphadenopathy. There is moderate aortoiliac atherosclerotic disease. There is an infrarenal abdominal aortic aneurysm measuring 32 mm diameter. There is   mild aneurysmal dilatation of the left common iliac artery up to 19 mm.      Impression:      Impression:    1. No soft tissue hematoma or obvious source of " bleeding.  2. No acute intra-abdominal or pelvic abnormality.  3. Colonic diverticulosis without acute diverticulitis.  4. 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  4. Mild body wall edema.  5. Moderate diffuse lumbar spondylosis.        Electronically Signed: Al Justin MD    5/7/2024 4:37 AM EDT    Workstation ID: [id number]    CT Angiogram Chest [[id number]] Collected: 05/07/24 0426     Updated: 05/07/24 0436    Narrative:      CT ANGIOGRAM CHEST    Date of Exam: 5/7/2024 3:59 AM EDT    Indication: hgb drop in setting of supratheraperutic INR..    Comparison: None available.    Technique: CTA of the chest was performed after the uneventful intravenous administration of 115 mL Isovue-370. Reconstructed coronal and sagittal images were also obtained. In addition, a 3-D volume rendered image was created for interpretation.   Automated exposure control and iterative reconstruction methods were used.      Findings:  Thyroid, trachea and esophagus appear within normal limits. There is evidence of prior median sternotomy and CABG. There is heavy native coronary artery calcification. There is evidence of prior mitral valve replacement. There are dense aortic valve   calcifications. The heart is mildly enlarged. No pericardial effusion. No mediastinal lymphadenopathy. Mild aortic atherosclerosis. No aneurysm.    There are small bilateral pleural effusions with dependent bibasilar atelectasis. There is mild interstitial opacity in the lung bases that may reflect mild interstitial edema. No pneumothorax or lobar consolidation. Central airways are patent.    There are no acute findings in the superficial soft tissues. There is severe DJD of both glenohumeral joints. No acute osseous abnormality or destructive bone lesion. There is mild diffuse thoracic spondylosis. There is ossification along the   supraspinous ligament and there are  bridging thoracic osteophytes.      Impression:      Impression:    1. No hematoma or obvious source of bleeding noted.  2. Small bilateral pleural effusions, mild dependent bibasilar atelectasis and mild interstitial pulmonary edema.  3. Coronary artery disease status post CABG. There is a prosthetic mitral valve in place. Heart is mildly enlarged.        Electronically Signed: Al Justin MD    5/7/2024 4:33 AM EDT    Workstation ID: [id number]    CT Angiogram Head [[id number]] Collected: 05/07/24 0421     Updated: 05/07/24 0429    Narrative:      CT ANGIOGRAM HEAD    Date of Exam: 5/7/2024 3:59 AM EDT    Indication: AMS possible bleed.    Comparison: None available.    Technique: CTA of the head was performed after the uneventful intravenous administration of 115 mL Isovue-370. Reconstructed coronal and sagittal images were also obtained. In addition, a 3-D volume rendered image was created for interpretation.   Automated exposure control and iterative reconstruction methods were used.      Findings:  Right carotid: Distal cervical ICA is normal. Intracranial ICA segments demonstrate mild nonstenosing calcific plaque. Small patent P-comm. Ophthalmic artery origin is normal. Patent A-Comm. The A1 and M1 segments and distal EFREN and MCA branches appear   patent.    Left carotid: The distal cervical ICA is normal. Intracranial ICA segments demonstrate mild nonstenosing calcific plaque. The ophthalmic artery origin is normal. Small patent P-comm. A1 and M1 segments and distal EFREN and MCA branches appear patent.    Posterior circulation: V3 and V4 segments appear widely patent with minimal calcific disease. The basilar artery is normal. Superior cerebellar and posterior cerebral arteries appear patent.    Nonvascular findings: Thinning of the orbital lenses would suggest prior cataract surgery. No focal hypoattenuating lesions in the brain. No mass effect or midline shift. The calvarium appears intact. Sinuses and  mastoids appear clear. Superficial soft   tissues are unremarkable.      Impression:      Impression:  No significant vascular abnormality in the head.        Electronically Signed: Al Justin MD    5/7/2024 4:26 AM EDT    Workstation ID: [id number]    MRI Lumbar Spine With & Without Contrast [[id number]] Collected: 05/06/24 2331     Updated: 05/06/24 2343    Narrative:        MRI LUMBAR SPINE W WO CONTRAST    Date of Exam: 5/6/2024 10:54 PM EDT    Indication: right lower back pain, fevers.     Comparison: None available.    Technique:  Routine multiplanar/multisequence sequence images of the lumbar spine were obtained before and after the uneventful administration of 20 mL Multihance.        Findings:  Five lumbar type vertebral bodies are identified.  Alignment is anatomic. There is moderate narrowing of the L4-L5 disc. There is a transitional L5 vertebra with sacralization on the left. There is mild narrowing of the other lumbar discs and there is a   rudimentary L5-S1 disc. Vertebral bodies maintain normal height.  Distal spinal cord and conus medullaris appear unremarkable terminating at the T12-L1 level.  Cauda equina appears unremarkable. There is mild degenerative bone marrow heterogeneity. There   is mild diffuse paraspinal muscular atrophy. There is aneurysmal dilatation of the infrarenal abdominal aorta up to 33 mm. There is also aneurysmal dilatation of the common iliac arteries measuring up to 20 mm each.    L1-L2: There is concentric disc bulge and marginal osteophyte formation asymmetric to the right. There is mild facet and ligamentum flavum hypertrophy. There is mild bilateral neuroforaminal narrowing and mild narrowing of the spinal canal.    L2-L3: There is concentric disc bulge and marginal osteophyte formation. There is a large posterior disc osteophyte complex resulting in severe narrowing of the spinal canal. There is moderate facet and ligamentum flavum hypertrophy. There is moderate to    severe bilateral neuroforaminal narrowing.    L3-L4: There is concentric disc bulge and marginal osteophyte formation with significant posterior disc osteophyte complex resulting in moderate narrowing of the spinal canal. There is moderate facet and ligamentum flavum hypertrophy and mild to moderate   bilateral neuroforaminal narrowing.    L4-L5: There is concentric disc bulge and mild facet and ligamentum flavum hypertrophy. There is mild to moderate bilateral neuroforaminal narrowing without spinal canal compromise.    L5-S1: No focal disc protrusion or extrusion. Facet joints appear unremarkable. No significant neural foraminal or spinal canal stenosis.      Impression:      Impression:    1. No acute findings.  2. Moderate to severe lumbar spondylosis including severe narrowing of the spinal canal at L2-L3 and varying degrees of neuroforaminal narrowing as discussed above.        Electronically Signed: Al Justin MD    5/6/2024 11:40 PM EDT    Workstation ID: [id number]    CT Abdomen Pelvis With Contrast [[id number]] Collected: 05/06/24 1446     Updated: 05/06/24 1457    Narrative:      CT ABDOMEN PELVIS W CONTRAST    Date of Exam: 5/6/2024 2:25 PM EDT    Indication: R flank pain.    Comparison: None available.    Technique: Axial CT images were obtained of the abdomen and pelvis following the uneventful intravenous administration of 85 mL Isovue-300. Reconstructed coronal and sagittal images were also obtained. Automated exposure control and iterative   construction methods were used.      Findings:    Liver: The liver is unremarkable in morphology. No focal liver lesion is seen. No biliary dilation is seen.    Gallbladder: Surgically absent.    Pancreas: Unremarkable.    Spleen: Unremarkable.    Adrenal glands: 1.2 cm nodule within the lateral limb of the right adrenal gland, incompletely characterized on this contrast-enhanced study. Left adrenal gland is unremarkable.    Genitourinary tract: There is mild  bilateral perinephric stranding, nonspecific. Otherwise, the kidneys, visualized portions of the ureters, and urinary bladder appear unremarkable. Prostate gland is unremarkable.    Gastrointestinal tract: Colonic diverticulosis is present. Hollow viscera appear otherwise unremarkable. There is no evidence of bowel obstruction.    Appendix: No findings to suggest acute appendicitis.    Other findings: No free air or free fluid is identified. No pathologically enlarged lymph nodes are seen. Vascular calcifications are present. There is a 3 cm infrarenal abdominal aortic aneurysm. Mild ectasia of the right common femoral artery.    Bones and soft tissues: No acute or suspicious osseous or soft tissue lesion is identified. Bones are demineralized. There are degenerative changes within the spine.    Lung bases: Trace bilateral pleural effusions with bibasilar atelectasis. Cardiomegaly. Mitral valvular prosthesis is partially imaged. Calcified granuloma within the right lower lobe.      Impression:      Impression:  1.No acute abnormality identified within the abdomen or pelvis.  2.Colonic diverticulosis.  3.3 cm infrarenal abdominal aortic aneurysm.  4.Additional findings as detailed above.      Electronically Signed: Matthias Dobbs MD    5/6/2024 2:53 PM EDT    Workstation ID: [id number]    CT Head Without Contrast [[id number]] Collected: 05/06/24 1447     Updated: 05/06/24 1456    Narrative:      CT HEAD WO CONTRAST    Date of Exam: 5/6/2024 2:25 PM EDT    Indication: ams.    Comparison: None available.    Technique: Axial CT images were obtained of the head without contrast administration.  Automated exposure control and iterative construction methods were used.      Findings:  No acute intracranial hemorrhage or extra-axial collection is identified. The ventricles appear normal in caliber, with no evidence of mass effect or midline shift. The basal cisterns appear patent. The gray-white differentiation appears  preserved.    The calvarium appear intact. There is mild frothy mucosal disease in the left maxillary sinus. The mastoid air cells are well-aerated. Scattered foci of periventricular and subcortical white matter hypodensities are nonspecific, but likely the sequela of   mild chronic small vessel ischemic disease.      Impression:      Impression:  1.No acute intracranial process identified.  2.Findings suggestive of mild chronic small vessel ischemic disease.  3.Mild frothy mucosal disease within left maxillary sinus. Correlate for acute sinusitis.        Electronically Signed: Donnie Skelton MD    5/6/2024 2:53 PM EDT    Workstation ID: [id number]    XR Chest 1 View [[id number]] Collected: 05/06/24 1416     Updated: 05/06/24 1421    Narrative:      XR CHEST 1 VW    Date of Exam: 5/6/2024 1:58 PM EDT    Indication: sob    Comparison: Chest radiograph 2/5/2024.    Findings:  Sternotomy. Enlarged cardiac silhouette, unchanged. Pulmonary vascular congestion. No overt pulmonary edema. No focal consolidation. No significant pleural effusion. No pneumothorax. Osseous structures are unchanged.      Impression:      Impression:  No acute cardiopulmonary findings.      Electronically Signed: Giovany Tineo MD    5/6/2024 2:18 PM EDT    Workstation ID: [id number]              Impression:   Enterococcus faecalis bacteremia  Hypotension  Bioprosthetic AVR  Mechanical MVR  Supratherapeutic INR  Anemia  Elevated procalcitonin  History of GI bleed with gastric AVM    PLAN/RECOMMENDATIONS:   Thank you for asking us to see Kaleb Abraham, I recommend the following:  High-grade enterococcal bacteremia is concerning in the setting of endovascular hardware.    Patient could have a GI source such as inflammation of colonic viscus (superimposed diverticulitis and diverticulosis) or bacteremia  following endoscopy regarding management of a GIbleed.    Gallbladder has been removed    Repeat blood cultures x 2 sets    Anticipate that the  Enterococcus isolate is susceptible to ampicillin and ceftriaxone    Await susceptibilities    Start ampicillin 2 g IV every 4 hours    Start ceftriaxone 2 g IV every 12 hours    Continue vancomycin dosing per pharmacy while bl cx ID/susceptibilties pending    Consider transesophageal echocardiogram    Patient has substantial imaging of chest abdomen and pelvis by CT scan with the exception of a and from renal abdominal aortic aneurysm and left common iliac artery aneurysm was not substantial inflammation  Lumbar MRI shows no acute findings and moderate to severe lumbar spondylosis with severe spinal narrowing at L2-L3    TTE already performed     COLEEN while indicated will not be able to exclude endocarditis.    Complicated case    Lino Jacob MD  5/7/2024  14:29 EDT

## 2024-05-08 LAB
ANION GAP SERPL CALCULATED.3IONS-SCNC: 16 MMOL/L (ref 5–15)
APTT PPP: 38.7 SECONDS (ref 60–90)
BASOPHILS # BLD AUTO: 0.01 10*3/MM3 (ref 0–0.2)
BASOPHILS NFR BLD AUTO: 0.1 % (ref 0–1.5)
BH BB BLOOD EXPIRATION DATE: NORMAL
BH BB BLOOD EXPIRATION DATE: NORMAL
BH BB BLOOD TYPE BARCODE: 5100
BH BB BLOOD TYPE BARCODE: 5100
BH BB DISPENSE STATUS: NORMAL
BH BB DISPENSE STATUS: NORMAL
BH BB PRODUCT CODE: NORMAL
BH BB PRODUCT CODE: NORMAL
BH BB UNIT NUMBER: NORMAL
BH BB UNIT NUMBER: NORMAL
BUN SERPL-MCNC: 14 MG/DL (ref 8–23)
BUN/CREAT SERPL: 17.5 (ref 7–25)
CALCIUM SPEC-SCNC: 8.2 MG/DL (ref 8.6–10.5)
CHLORIDE SERPL-SCNC: 97 MMOL/L (ref 98–107)
CO2 SERPL-SCNC: 19 MMOL/L (ref 22–29)
CREAT SERPL-MCNC: 0.8 MG/DL (ref 0.76–1.27)
CROSSMATCH INTERPRETATION: NORMAL
CROSSMATCH INTERPRETATION: NORMAL
DEPRECATED RDW RBC AUTO: 51.9 FL (ref 37–54)
DEPRECATED RDW RBC AUTO: 53.4 FL (ref 37–54)
EGFRCR SERPLBLD CKD-EPI 2021: 94 ML/MIN/1.73
EOSINOPHIL # BLD AUTO: 0 10*3/MM3 (ref 0–0.4)
EOSINOPHIL NFR BLD AUTO: 0 % (ref 0.3–6.2)
ERYTHROCYTE [DISTWIDTH] IN BLOOD BY AUTOMATED COUNT: 15.9 % (ref 12.3–15.4)
ERYTHROCYTE [DISTWIDTH] IN BLOOD BY AUTOMATED COUNT: 16.3 % (ref 12.3–15.4)
GLUCOSE BLDC GLUCOMTR-MCNC: 168 MG/DL (ref 70–130)
GLUCOSE BLDC GLUCOMTR-MCNC: 198 MG/DL (ref 70–130)
GLUCOSE BLDC GLUCOMTR-MCNC: 221 MG/DL (ref 70–130)
GLUCOSE BLDC GLUCOMTR-MCNC: 235 MG/DL (ref 70–130)
GLUCOSE SERPL-MCNC: 204 MG/DL (ref 65–99)
HCT VFR BLD AUTO: 24.9 % (ref 37.5–51)
HCT VFR BLD AUTO: 25.9 % (ref 37.5–51)
HCT VFR BLD AUTO: 26.4 % (ref 37.5–51)
HCT VFR BLD AUTO: 26.8 % (ref 37.5–51)
HCT VFR BLD AUTO: 28.2 % (ref 37.5–51)
HCT VFR BLD AUTO: 28.2 % (ref 37.5–51)
HGB BLD-MCNC: 8.5 G/DL (ref 13–17.7)
HGB BLD-MCNC: 8.5 G/DL (ref 13–17.7)
HGB BLD-MCNC: 8.9 G/DL (ref 13–17.7)
HGB BLD-MCNC: 8.9 G/DL (ref 13–17.7)
HGB BLD-MCNC: 9.2 G/DL (ref 13–17.7)
HGB BLD-MCNC: 9.2 G/DL (ref 13–17.7)
IMM GRANULOCYTES # BLD AUTO: 0.06 10*3/MM3 (ref 0–0.05)
IMM GRANULOCYTES NFR BLD AUTO: 0.7 % (ref 0–0.5)
INR PPP: 1.46 (ref 0.89–1.12)
LYMPHOCYTES # BLD AUTO: 0.74 10*3/MM3 (ref 0.7–3.1)
LYMPHOCYTES NFR BLD AUTO: 9 % (ref 19.6–45.3)
MAGNESIUM SERPL-MCNC: 2.3 MG/DL (ref 1.6–2.4)
MCH RBC QN AUTO: 29.1 PG (ref 26.6–33)
MCH RBC QN AUTO: 29.3 PG (ref 26.6–33)
MCHC RBC AUTO-ENTMCNC: 32.6 G/DL (ref 31.5–35.7)
MCHC RBC AUTO-ENTMCNC: 32.8 G/DL (ref 31.5–35.7)
MCV RBC AUTO: 88.7 FL (ref 79–97)
MCV RBC AUTO: 89.8 FL (ref 79–97)
MONOCYTES # BLD AUTO: 0.47 10*3/MM3 (ref 0.1–0.9)
MONOCYTES NFR BLD AUTO: 5.7 % (ref 5–12)
NEUTROPHILS NFR BLD AUTO: 6.96 10*3/MM3 (ref 1.7–7)
NEUTROPHILS NFR BLD AUTO: 84.5 % (ref 42.7–76)
NRBC BLD AUTO-RTO: 0 /100 WBC (ref 0–0.2)
PLATELET # BLD AUTO: 238 10*3/MM3 (ref 140–450)
PLATELET # BLD AUTO: 243 10*3/MM3 (ref 140–450)
PMV BLD AUTO: 9.2 FL (ref 6–12)
PMV BLD AUTO: 9.9 FL (ref 6–12)
POTASSIUM SERPL-SCNC: 3.9 MMOL/L (ref 3.5–5.2)
PROTHROMBIN TIME: 17.9 SECONDS (ref 12.2–14.5)
RBC # BLD AUTO: 2.92 10*6/MM3 (ref 4.14–5.8)
RBC # BLD AUTO: 3.14 10*6/MM3 (ref 4.14–5.8)
SODIUM SERPL-SCNC: 132 MMOL/L (ref 136–145)
UFH PPP CHRO-ACNC: 0.1 IU/ML (ref 0.3–0.7)
UFH PPP CHRO-ACNC: 0.1 IU/ML (ref 0.3–0.7)
UNIT  ABO: NORMAL
UNIT  ABO: NORMAL
UNIT  RH: NORMAL
UNIT  RH: NORMAL
WBC NRBC COR # BLD AUTO: 8.24 10*3/MM3 (ref 3.4–10.8)
WBC NRBC COR # BLD AUTO: 8.92 10*3/MM3 (ref 3.4–10.8)

## 2024-05-08 PROCEDURE — 82948 REAGENT STRIP/BLOOD GLUCOSE: CPT

## 2024-05-08 PROCEDURE — 85730 THROMBOPLASTIN TIME PARTIAL: CPT | Performed by: PHYSICIAN ASSISTANT

## 2024-05-08 PROCEDURE — 97161 PT EVAL LOW COMPLEX 20 MIN: CPT

## 2024-05-08 PROCEDURE — 97166 OT EVAL MOD COMPLEX 45 MIN: CPT

## 2024-05-08 PROCEDURE — 25010000002 VANCOMYCIN 1 G RECONSTITUTED SOLUTION 1 EACH VIAL: Performed by: HOSPITALIST

## 2024-05-08 PROCEDURE — 85018 HEMOGLOBIN: CPT | Performed by: HOSPITALIST

## 2024-05-08 PROCEDURE — 99291 CRITICAL CARE FIRST HOUR: CPT | Performed by: INTERNAL MEDICINE

## 2024-05-08 PROCEDURE — 25010000002 HEPARIN (PORCINE) 25000-0.45 UT/250ML-% SOLUTION: Performed by: PHYSICIAN ASSISTANT

## 2024-05-08 PROCEDURE — 85520 HEPARIN ASSAY: CPT

## 2024-05-08 PROCEDURE — 94761 N-INVAS EAR/PLS OXIMETRY MLT: CPT

## 2024-05-08 PROCEDURE — 97530 THERAPEUTIC ACTIVITIES: CPT

## 2024-05-08 PROCEDURE — 93005 ELECTROCARDIOGRAM TRACING: CPT | Performed by: PHYSICIAN ASSISTANT

## 2024-05-08 PROCEDURE — 25010000002 MORPHINE PER 10 MG: Performed by: INTERNAL MEDICINE

## 2024-05-08 PROCEDURE — 85025 COMPLETE CBC W/AUTO DIFF WBC: CPT | Performed by: PHYSICIAN ASSISTANT

## 2024-05-08 PROCEDURE — 25010000002 CEFTRIAXONE PER 250 MG: Performed by: INTERNAL MEDICINE

## 2024-05-08 PROCEDURE — 25810000003 SODIUM CHLORIDE 0.9 % SOLUTION 250 ML FLEX CONT: Performed by: HOSPITALIST

## 2024-05-08 PROCEDURE — 85520 HEPARIN ASSAY: CPT | Performed by: PHYSICIAN ASSISTANT

## 2024-05-08 PROCEDURE — 85014 HEMATOCRIT: CPT | Performed by: HOSPITALIST

## 2024-05-08 PROCEDURE — 85610 PROTHROMBIN TIME: CPT | Performed by: HOSPITALIST

## 2024-05-08 PROCEDURE — 25010000002 AMPICILLIN PER 500 MG: Performed by: INTERNAL MEDICINE

## 2024-05-08 PROCEDURE — 63710000001 INSULIN LISPRO (HUMAN) PER 5 UNITS: Performed by: HOSPITALIST

## 2024-05-08 PROCEDURE — 25010000002 DOPAMINE PER 40 MG

## 2024-05-08 PROCEDURE — 94799 UNLISTED PULMONARY SVC/PX: CPT

## 2024-05-08 PROCEDURE — 25010000002 ATROPINE SULFATE: Performed by: NURSE PRACTITIONER

## 2024-05-08 PROCEDURE — 25010000002 FENTANYL CITRATE (PF) 50 MCG/ML SOLUTION: Performed by: NURSE PRACTITIONER

## 2024-05-08 PROCEDURE — 80048 BASIC METABOLIC PNL TOTAL CA: CPT | Performed by: HOSPITALIST

## 2024-05-08 PROCEDURE — 85027 COMPLETE CBC AUTOMATED: CPT | Performed by: HOSPITALIST

## 2024-05-08 RX ORDER — WARFARIN SODIUM 2.5 MG/1
2.5 TABLET ORAL
Status: DISCONTINUED | OUTPATIENT
Start: 2024-05-08 | End: 2024-05-08

## 2024-05-08 RX ORDER — WARFARIN SODIUM 4 MG/1
4 TABLET ORAL
COMMUNITY
End: 2024-05-20 | Stop reason: HOSPADM

## 2024-05-08 RX ORDER — HEPARIN SODIUM 10000 [USP'U]/100ML
21 INJECTION, SOLUTION INTRAVENOUS
Status: DISCONTINUED | OUTPATIENT
Start: 2024-05-08 | End: 2024-05-12

## 2024-05-08 RX ORDER — MORPHINE SULFATE 2 MG/ML
2 INJECTION, SOLUTION INTRAMUSCULAR; INTRAVENOUS
Status: DISCONTINUED | OUTPATIENT
Start: 2024-05-08 | End: 2024-05-16

## 2024-05-08 RX ORDER — FENTANYL CITRATE 50 UG/ML
25 INJECTION, SOLUTION INTRAMUSCULAR; INTRAVENOUS ONCE
Status: COMPLETED | OUTPATIENT
Start: 2024-05-08 | End: 2024-05-08

## 2024-05-08 RX ORDER — LEVOTHYROXINE SODIUM 0.07 MG/1
75 TABLET ORAL DAILY
COMMUNITY
End: 2024-05-20 | Stop reason: HOSPADM

## 2024-05-08 RX ORDER — HYDROCODONE BITARTRATE AND ACETAMINOPHEN 10; 325 MG/1; MG/1
1 TABLET ORAL EVERY 8 HOURS PRN
Status: DISCONTINUED | OUTPATIENT
Start: 2024-05-08 | End: 2024-05-16

## 2024-05-08 RX ORDER — WARFARIN SODIUM 3 MG/1
3 TABLET ORAL
Status: DISCONTINUED | OUTPATIENT
Start: 2024-05-08 | End: 2024-05-10

## 2024-05-08 RX ORDER — DOPAMINE HYDROCHLORIDE 160 MG/100ML
2-20 INJECTION, SOLUTION INTRAVENOUS
Status: DISCONTINUED | OUTPATIENT
Start: 2024-05-08 | End: 2024-05-10

## 2024-05-08 RX ADMIN — NOREPINEPHRINE BITARTRATE 0.02 MCG/KG/MIN: 0.03 INJECTION, SOLUTION INTRAVENOUS at 17:31

## 2024-05-08 RX ADMIN — Medication 10 ML: at 08:17

## 2024-05-08 RX ADMIN — LEVOTHYROXINE SODIUM 25 MCG: 25 TABLET ORAL at 05:42

## 2024-05-08 RX ADMIN — CEFTRIAXONE 2000 MG: 2 INJECTION, POWDER, FOR SOLUTION INTRAMUSCULAR; INTRAVENOUS at 16:45

## 2024-05-08 RX ADMIN — MIDODRINE HYDROCHLORIDE 5 MG: 5 TABLET ORAL at 11:28

## 2024-05-08 RX ADMIN — HEPARIN SODIUM 9 UNITS/KG/HR: 10000 INJECTION, SOLUTION INTRAVENOUS at 09:44

## 2024-05-08 RX ADMIN — INSULIN LISPRO 2 UNITS: 100 INJECTION, SOLUTION INTRAVENOUS; SUBCUTANEOUS at 16:54

## 2024-05-08 RX ADMIN — CEFTRIAXONE 2000 MG: 2 INJECTION, POWDER, FOR SOLUTION INTRAMUSCULAR; INTRAVENOUS at 04:13

## 2024-05-08 RX ADMIN — DOPAMINE HYDROCHLORIDE 2 MCG/KG/MIN: 160 INJECTION, SOLUTION INTRAVENOUS at 01:51

## 2024-05-08 RX ADMIN — INSULIN LISPRO 2 UNITS: 100 INJECTION, SOLUTION INTRAVENOUS; SUBCUTANEOUS at 08:17

## 2024-05-08 RX ADMIN — INSULIN LISPRO 3 UNITS: 100 INJECTION, SOLUTION INTRAVENOUS; SUBCUTANEOUS at 11:28

## 2024-05-08 RX ADMIN — MIDODRINE HYDROCHLORIDE 5 MG: 5 TABLET ORAL at 16:54

## 2024-05-08 RX ADMIN — ATROPINE SULFATE 0.5 MG: 0.1 INJECTION INTRAVENOUS at 00:03

## 2024-05-08 RX ADMIN — MORPHINE SULFATE 2 MG: 2 INJECTION, SOLUTION INTRAMUSCULAR; INTRAVENOUS at 20:05

## 2024-05-08 RX ADMIN — PANTOPRAZOLE SODIUM 40 MG: 40 INJECTION, POWDER, FOR SOLUTION INTRAVENOUS at 08:16

## 2024-05-08 RX ADMIN — MORPHINE SULFATE 2 MG: 2 INJECTION, SOLUTION INTRAMUSCULAR; INTRAVENOUS at 22:07

## 2024-05-08 RX ADMIN — INSULIN LISPRO 2 UNITS: 100 INJECTION, SOLUTION INTRAVENOUS; SUBCUTANEOUS at 20:10

## 2024-05-08 RX ADMIN — WARFARIN SODIUM 3 MG: 3 TABLET ORAL at 17:21

## 2024-05-08 RX ADMIN — HYDROCODONE BITARTRATE AND ACETAMINOPHEN 1 TABLET: 10; 325 TABLET ORAL at 05:42

## 2024-05-08 RX ADMIN — PANTOPRAZOLE SODIUM 40 MG: 40 INJECTION, POWDER, FOR SOLUTION INTRAVENOUS at 20:11

## 2024-05-08 RX ADMIN — FENTANYL CITRATE 25 MCG: 50 INJECTION, SOLUTION INTRAMUSCULAR; INTRAVENOUS at 04:12

## 2024-05-08 RX ADMIN — HYDROCODONE BITARTRATE AND ACETAMINOPHEN 1 TABLET: 10; 325 TABLET ORAL at 13:56

## 2024-05-08 RX ADMIN — AMPICILLIN SODIUM 2 G: 2 INJECTION, POWDER, FOR SOLUTION INTRAMUSCULAR; INTRAVENOUS at 08:17

## 2024-05-08 RX ADMIN — MIDODRINE HYDROCHLORIDE 5 MG: 5 TABLET ORAL at 08:16

## 2024-05-08 RX ADMIN — AMITRIPTYLINE HYDROCHLORIDE 25 MG: 25 TABLET, FILM COATED ORAL at 20:10

## 2024-05-08 RX ADMIN — Medication 10 ML: at 20:11

## 2024-05-08 RX ADMIN — AMPICILLIN SODIUM 2 G: 2 INJECTION, POWDER, FOR SOLUTION INTRAMUSCULAR; INTRAVENOUS at 15:45

## 2024-05-08 RX ADMIN — TIZANIDINE 2 MG: 4 TABLET ORAL at 11:32

## 2024-05-08 RX ADMIN — VANCOMYCIN HYDROCHLORIDE 1000 MG: 1 INJECTION, POWDER, LYOPHILIZED, FOR SOLUTION INTRAVENOUS at 02:06

## 2024-05-08 RX ADMIN — LIDOCAINE 1 PATCH: 4 PATCH TOPICAL at 08:16

## 2024-05-08 RX ADMIN — AMPICILLIN SODIUM 2 G: 2 INJECTION, POWDER, FOR SOLUTION INTRAMUSCULAR; INTRAVENOUS at 04:13

## 2024-05-08 RX ADMIN — VANCOMYCIN HYDROCHLORIDE 1000 MG: 1 INJECTION, POWDER, LYOPHILIZED, FOR SOLUTION INTRAVENOUS at 13:46

## 2024-05-08 RX ADMIN — AMPICILLIN SODIUM 2 G: 2 INJECTION, POWDER, FOR SOLUTION INTRAMUSCULAR; INTRAVENOUS at 20:11

## 2024-05-08 NOTE — PLAN OF CARE
Goal Outcome Evaluation:  Plan of Care Reviewed With: patient, spouse        Progress: no change  Outcome Evaluation: Pt presents to OT evaluation w/ deficits in functional balance, activity tolerance, self-care and generalized weakness. Pt would benefit from IPOT services to address deficits in order to progress towards PLOF. d/c rec is IPR.      Anticipated Discharge Disposition (OT): inpatient rehabilitation facility

## 2024-05-08 NOTE — PROGRESS NOTES
INFECTIOUS DISEASE f/u     Kaleb Abraham  1950  [id number]    Date of Consult: 5/8/2024    Admission Date: 5/6/2024      Requesting Provider: No ref. provider found  Evaluating Physician: Lino Jacob MD    Reason for Consultation: Weakness    History of present illness:    Patient is a 73 y.o. male with coronary disease history of CABG, type 2 diabetes mellitus, hypertension, hyperlipidemia, hypothyroidism, atrial fibrillation, valvular heart disease with history of mechanical heart valve (prosthetic aortic valve and prosthetic mitral valve/) recently treated [facility] for GI bleed from April 22 patient found to have gastric arteriovenous malformations patient now admitted to [facility] with supratherapeutic INR.    Hospitalist concerned about underlying infection    Patient has low-grade fever 100.3, bradycardia and hypotension    Getting picc line    MVR/AVR performed 9 years ago here at [facility]    5/8/24; awake, has back pain, following commands; no fevers, rash, sore throat  Past Medical History:   Diagnosis Date    Anemia     Aortic stenosis     CAD (coronary artery disease)     Diabetes mellitus     Hyperlipidemia     Hypertension     Hypothyroidism     Mitral regurgitation        Past Surgical History:   Procedure Laterality Date    CAPSULE ENDOSCOPY      2/22/2023 and 9/25/2023 per dr. schrader    COLONOSCOPY      2/2023 Dr. Schrader    CORONARY ARTERY BYPASS GRAFT      CORONARY STENT PLACEMENT      UPPER GASTROINTESTINAL ENDOSCOPY      2/7/2023 Dr. Schrader, 7/26/2023 Dr. Liu, 4/2024 Dr. Stout Gorham       Family History   Problem Relation Age of Onset    Diabetes Mother     Stroke Mother     Thyroid cancer Mother     Hypertension Father     Stroke Father        Social History     Socioeconomic History    Marital status:    Tobacco Use    Smoking status: Former     Types: Cigarettes     Passive exposure: Past    Smokeless tobacco:  Never   Vaping Use    Vaping status: Never Used   Substance and Sexual Activity    Alcohol use: Never    Drug use: Never    Sexual activity: Defer       No Known Allergies      Medication:    Current Facility-Administered Medications:     acetaminophen (TYLENOL) tablet 650 mg, 650 mg, Oral, Q4H PRN, 650 mg at 05/07/24 2209 **OR** [DISCONTINUED] acetaminophen (TYLENOL) 160 MG/5ML oral solution 650 mg, 650 mg, Oral, Q4H PRN **OR** acetaminophen (TYLENOL) suppository 650 mg, 650 mg, Rectal, Q4H PRN, Desiree Garcia MD    [Held by provider] allopurinol (ZYLOPRIM) tablet 300 mg, 300 mg, Oral, Daily, Florina Higuera APRN    amitriptyline (ELAVIL) tablet 25 mg, 25 mg, Oral, Nightly, Desiree Garcia MD, 25 mg at 05/07/24 2024    ampicillin 2000 mg IVPB in 100 mL NS (MBP), 2 g, Intravenous, Q6H, Lino Jacob MD, Last Rate: 200 mL/hr at 05/08/24 1545, 2 g at 05/08/24 1545    sennosides-docusate (PERICOLACE) 8.6-50 MG per tablet 2 tablet, 2 tablet, Oral, BID PRN **AND** polyethylene glycol (MIRALAX) packet 17 g, 17 g, Oral, Daily PRN **AND** bisacodyl (DULCOLAX) EC tablet 5 mg, 5 mg, Oral, Daily PRN **AND** bisacodyl (DULCOLAX) suppository 10 mg, 10 mg, Rectal, Daily PRN, Desiree Garcia MD    cefTRIAXone (ROCEPHIN) 2,000 mg in sodium chloride 0.9 % 100 mL MBP, 2,000 mg, Intravenous, Q12H, Lino Jacob MD, Last Rate: 200 mL/hr at 05/08/24 0413, 2,000 mg at 05/08/24 0413    dextrose (D50W) (25 g/50 mL) IV injection 25 g, 25 g, Intravenous, Q15 Min PRN, Desiree Garcia MD    dextrose (GLUTOSE) oral gel 15 g, 15 g, Oral, Q15 Min PRN, Desiree Garcia MD    DOPamine 400 mg in 250 mL D5W infusion, 2-20 mcg/kg/min, Intravenous, Titrated, Renea Blue PA-C, Held at 05/08/24 1128    [Held by provider] ferrous sulfate tablet 325 mg, 325 mg, Oral, Daily With Breakfast, Florina Higuera APRN    [Held by provider] gabapentin (NEURONTIN) capsule 600 mg, 600 mg, Oral, Q8H, Florina Higuera APRN, 600 mg  at 05/06/24 2348    glucagon (GLUCAGEN) injection 1 mg, 1 mg, Intramuscular, Q15 Min PRN, Desiree Garcia MD    heparin 52223 units/250 mL (100 units/mL) in 0.45 % NaCl infusion, 9 Units/kg/hr, Intravenous, Titrated, Lulu Davison PA, Last Rate: 9.63 mL/hr at 05/08/24 0945, 9 Units/kg/hr at 05/08/24 0945    HYDROcodone-acetaminophen (NORCO)  MG per tablet 1 tablet, 1 tablet, Oral, Q8H PRN, Rowdy Burnett APRN, 1 tablet at 05/08/24 1356    Insulin Lispro (humaLOG) injection 2-7 Units, 2-7 Units, Subcutaneous, 4x Daily AC & at Bedtime, Desiree Garcia MD, 3 Units at 05/08/24 1128    levothyroxine (SYNTHROID, LEVOTHROID) tablet 25 mcg, 25 mcg, Oral, Q AM, Desiree Garcia MD, 25 mcg at 05/08/24 0542    Lidocaine 4 % 1 patch, 1 patch, Transdermal, Q24H, Desiree Garcia MD, 1 patch at 05/08/24 0816    Magnesium Standard Dose Replacement - Follow Nurse / BPA Driven Protocol, , Does not apply, PRN, Desiree Garcia MD    melatonin tablet 5 mg, 5 mg, Oral, Nightly PRN, Desiree Garcia MD, 5 mg at 05/06/24 2349    midodrine (PROAMATINE) tablet 5 mg, 5 mg, Oral, TID AC, Desiree Garcia MD, 5 mg at 05/08/24 1128    morphine injection 2 mg, 2 mg, Intravenous, Q2H PRN, Shahid Peter MD    [Held by provider] multivitamin with minerals 1 tablet, 1 tablet, Oral, Daily, Florina Higuera APRN    nitroglycerin (NITROSTAT) SL tablet 0.4 mg, 0.4 mg, Sublingual, Q5 Min PRN, Desiree Garcia MD    norepinephrine (LEVOPHED) 8 mg in 250 mL NS infusion (premix), 0.02-0.3 mcg/kg/min, Intravenous, Titrated, Yadira Quesada APRN, Stopped at 05/08/24 0533    [Held by provider] oxybutynin XL (DITROPAN-XL) 24 hr tablet 10 mg, 10 mg, Oral, Daily, Florina Higuera, FRANCHESCA    pantoprazole (PROTONIX) injection 40 mg, 40 mg, Intravenous, Q12H, Desiree Garcia MD, 40 mg at 05/08/24 0816    Pharmacy to Dose Heparin, , Does not apply, Continuous PRN, Lulu Davison PA    Pharmacy to dose vancomycin, , Does not apply, Continuous  Jose YOUNGER Rabie, MD    Pharmacy to dose warfarin, , Does not apply, Continuous PRN, Lulu Davison PA    phenylephrine (WENDI-SYNEPHRINE) 50 mg in sodium chloride 0.9 % 250 mL infusion, 0.5-3 mcg/kg/min, Intravenous, Titrated, Rowdy Burntet APRN    Phosphorus Replacement - Follow Nurse / BPA Driven Protocol, , Does not apply, Jose YOUNGER Rabie, MD    Potassium Replacement - Follow Nurse / BPA Driven Protocol, , Does not apply, Jose YOUNGER Rabie, MD    promethazine (PHENERGAN) suppository 12.5 mg, 12.5 mg, Rectal, Q6H PRN, Rowdy Burnett APRN    sodium chloride 0.9 % flush 10 mL, 10 mL, Intravenous, Q12H, Keyur Alexander MD, 10 mL at 05/08/24 0817    sodium chloride 0.9 % flush 10 mL, 10 mL, Intravenous, PRNBenjamin Matthew M, MD    sodium chloride 0.9 % flush 20 mL, 20 mL, Intravenous, PRN, Keyur Alexander MD    sodium chloride 0.9 % infusion 40 mL, 40 mL, Intravenous, Jose YOUNGER Rabie, MD    [Held by provider] tamsulosin (FLOMAX) 24 hr capsule 0.4 mg, 0.4 mg, Oral, Nightly, Florina Higuera, APRN, 0.4 mg at 05/06/24 2349    tiZANidine (ZANAFLEX) tablet 2 mg, 2 mg, Oral, PRN, Desiree Garcia MD, 2 mg at 05/08/24 1132    vancomycin (VANCOCIN) 1,000 mg in sodium chloride 0.9 % 250 mL IVPB-VTB, 1,000 mg, Intravenous, Q12H, Desiree Garcia MD, Last Rate: 250 mL/hr at 05/08/24 1346, 1,000 mg at 05/08/24 1346    warfarin (COUMADIN) tablet 3 mg, 3 mg, Oral, Daily, Milady Yeager, PharmD    Antibiotics:  Anti-Infectives (From admission, onward)      Ordered     Dose/Rate Route Frequency Start Stop    05/07/24 1302  vancomycin (VANCOCIN) 1,000 mg in sodium chloride 0.9 % 250 mL IVPB-VTB        Ordering Provider: Desiree Garcia MD    1,000 mg  250 mL/hr over 60 Minutes Intravenous Every 12 Hours 05/08/24 0200 05/12/24 1359    05/07/24 1443  cefTRIAXone (ROCEPHIN) 2,000 mg in sodium chloride 0.9 % 100 mL MBP        Ordering Provider: Lino Jacob MD    2,000 mg  200 mL/hr over 30  Minutes Intravenous Every 12 Hours 24 1600 24 1559    24 1443  ampicillin 2000 mg IVPB in 100 mL NS (MBP)        Ordering Provider: Lino Jacob MD    2 g  200 mL/hr over 30 Minutes Intravenous Every 6 Hours 24 1500 24 1459    24 1302  vancomycin IVPB 2000 mg in 0.9% Sodium Chloride 500 mL        Ordering Provider: Bernabe Martines, Trung    2,000 mg  250 mL/hr over 120 Minutes Intravenous Once 24 1400 24 1524    24 1225  piperacillin-tazobactam (ZOSYN) 3.375 g IVPB in 100 mL NS MBP (CD)        Ordering Provider: Desiree Garcia MD    3.375 g  over 30 Minutes Intravenous Once 24 1315 24 1302    24 1256  Pharmacy to dose vancomycin        Ordering Provider: Desiree Garcia MD     Does not apply Continuous PRN 24 1253 24 1252              Review of Systems:    See hpi      Physical Exam:   Vital Signs  Temp (24hrs), Av.9 °F (36.6 °C), Min:97 °F (36.1 °C), Max:98.9 °F (37.2 °C)    Temp  Min: 97 °F (36.1 °C)  Max: 98.9 °F (37.2 °C)  BP  Min: 80/43  Max: 140/65  Pulse  Min: 43  Max: 64  Resp  Min: 18  Max: 22  SpO2  Min: 91 %  Max: 100 %    GENERAL: Awake and alert, in no acute distress.   HEENT: Normocephalic, atraumatic.  PERRL. EOMI. No conjunctival injection. No icterus.  No external oral lesions    HEART: RRR; No murmur,  LUNGS: Clear to auscultation bilaterally   ABDOMEN: Soft, nontender,   EXT:  1+ edema  :  Without Wilkinson catheter.  MSK: No joint effusions or erythema  SKIN: Warm and dry without cutaneous eruptions on Inspection/palpation.        Laboratory Data    Results from last 7 days   Lab Units 24  1142 24  0709 24  2359 24  1611 24  0959 24  0051 24  1206   WBC 10*3/mm3  --  8.92  --   --  7.32  --  7.77   HEMOGLOBIN g/dL 8.9* 9.2*  9.2* 8.9*   < > 8.4*   < > 7.9*   HEMATOCRIT % 26.4* 28.2*  28.2* 26.8*   < > 25.7*   < > 24.3*   PLATELETS 10*3/mm3  --  243  --   --   "218  --  271    < > = values in this interval not displayed.     Results from last 7 days   Lab Units 05/08/24  0709   SODIUM mmol/L 132*   POTASSIUM mmol/L 3.9   CHLORIDE mmol/L 97*   CO2 mmol/L 19.0*   BUN mg/dL 14   CREATININE mg/dL 0.80   GLUCOSE mg/dL 204*   CALCIUM mg/dL 8.2*     Results from last 7 days   Lab Units 05/06/24  1206   ALK PHOS U/L 44   BILIRUBIN mg/dL 0.6   ALT (SGPT) U/L 8   AST (SGOT) U/L 27             Results from last 7 days   Lab Units 05/07/24  0959   LACTATE mmol/L 1.2             Estimated Creatinine Clearance: 103.9 mL/min (by C-G formula based on SCr of 0.8 mg/dL).      Microbiology:  Blood Culture   Date Value Ref Range Status   05/06/2024 Abnormal Stain (C)  Preliminary     BCID, PCR   Date Value Ref Range Status   05/06/2024 (A) Negative by BCID PCR. Culture to Follow. Final    Enterococcus faecalis. Arcelia/B (vancomycin resistance gene) not detected. Identification by BCID2 PCR.     No results found for: \"CULTURES\", \"HSVCX\", \"URCX\"  No results found for: \"EYECULTURE\", \"GCCX\", \"HSVCULTURE\", \"LABHSV\"  No results found for: \"LEGIONELLA\", \"MRSACX\", \"MUMPSCX\", \"MYCOPLASCX\"  No results found for: \"NOCARDIACX\", \"STOOLCX\"  No results found for: \"THROATCX\", \"UNSTIMCULT\", \"URINECX\", \"CULTURE\", \"VZVCULTUR\"  No results found for: \"VIRALCULTU\", \"WOUNDCX\"        Radiology:  Imaging Results (Last 72 Hours)       Procedure Component Value Units Date/Time    XR Chest 1 View [[id number]] Collected: 05/07/24 1629     Updated: 05/07/24 1638    Narrative:      XR CHEST 1 VW    Date of Exam: 5/7/2024 3:57 PM EDT    Indication: PICC placement    Comparison: 5/6/2024     Findings:  Right upper extremity PICC line is seen with its tip in the distal SVC. The heart is enlarged. Diffuse interstitial disease pattern presumably represents interstitial edema, increased from yesterday's study. There is minimal if any effusion and no   evidence of pneumothorax.         Impression:      Impression:    1. PICC line " tip in the distal SVC.    2. Worsening congestive heart failure      Electronically Signed: Lance Mei MD    5/7/2024 4:35 PM EDT    Workstation ID: [id number]    CT Angiogram Abdomen Pelvis [[id number]] Collected: 05/07/24 0433     Updated: 05/07/24 0440    Narrative:      CT ANGIOGRAM ABDOMEN PELVIS    Date of Exam: 5/7/2024 3:59 AM EDT    Indication: hypotension, anemia.    Comparison: 5/6/2024.    Technique: CTA of the abdomen and pelvis was performed after the uneventful intravenous administration of 115 mL Isovue-370. Reconstructed coronal and sagittal images were also obtained. In addition, a 3-D volume rendered image was created for   interpretation. Automated exposure control and iterative reconstruction methods were used.      Findings:  There is mild body wall edema. There is mild multifocal muscular atrophy. No soft tissue hematoma. Findings in the chest discussed in a separate report. There is moderate diffuse lumbar spondylosis. There is severe osteoarthritis of both hips.    The liver is homogeneous. Patient is status post cholecystectomy. The stomach, duodenum, spleen and left adrenal gland appear within normal limits. There is a small intermediate density nodule at the right adrenal gland measuring 11 mm, likely adenoma.   The kidneys enhance homogeneously. There is no hydronephrosis. Urinary bladder is nondistended. Prostate gland is small in size. The appendix is normal. There is colonic diverticulosis without evidence of acute diverticulitis. Small bowel is   nondistended. There is mild mesenteric and retroperitoneal edema. No ascites, pneumoperitoneum or lymphadenopathy. There is moderate aortoiliac atherosclerotic disease. There is an infrarenal abdominal aortic aneurysm measuring 32 mm diameter. There is   mild aneurysmal dilatation of the left common iliac artery up to 19 mm.      Impression:      Impression:    1. No soft tissue hematoma or obvious source of bleeding.  2. No acute  intra-abdominal or pelvic abnormality.  3. Colonic diverticulosis without acute diverticulitis.  4. 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  4. Mild body wall edema.  5. Moderate diffuse lumbar spondylosis.        Electronically Signed: Al Justin MD    5/7/2024 4:37 AM EDT    Workstation ID: [id number]    CT Angiogram Chest [[id number]] Collected: 05/07/24 0426     Updated: 05/07/24 0436    Narrative:      CT ANGIOGRAM CHEST    Date of Exam: 5/7/2024 3:59 AM EDT    Indication: hgb drop in setting of supratheraperutic INR..    Comparison: None available.    Technique: CTA of the chest was performed after the uneventful intravenous administration of 115 mL Isovue-370. Reconstructed coronal and sagittal images were also obtained. In addition, a 3-D volume rendered image was created for interpretation.   Automated exposure control and iterative reconstruction methods were used.      Findings:  Thyroid, trachea and esophagus appear within normal limits. There is evidence of prior median sternotomy and CABG. There is heavy native coronary artery calcification. There is evidence of prior mitral valve replacement. There are dense aortic valve   calcifications. The heart is mildly enlarged. No pericardial effusion. No mediastinal lymphadenopathy. Mild aortic atherosclerosis. No aneurysm.    There are small bilateral pleural effusions with dependent bibasilar atelectasis. There is mild interstitial opacity in the lung bases that may reflect mild interstitial edema. No pneumothorax or lobar consolidation. Central airways are patent.    There are no acute findings in the superficial soft tissues. There is severe DJD of both glenohumeral joints. No acute osseous abnormality or destructive bone lesion. There is mild diffuse thoracic spondylosis. There is ossification along the   supraspinous ligament and there are bridging thoracic  osteophytes.      Impression:      Impression:    1. No hematoma or obvious source of bleeding noted.  2. Small bilateral pleural effusions, mild dependent bibasilar atelectasis and mild interstitial pulmonary edema.  3. Coronary artery disease status post CABG. There is a prosthetic mitral valve in place. Heart is mildly enlarged.        Electronically Signed: Al Justin MD    5/7/2024 4:33 AM EDT    Workstation ID: [id number]    CT Angiogram Head [[id number]] Collected: 05/07/24 0421     Updated: 05/07/24 0429    Narrative:      CT ANGIOGRAM HEAD    Date of Exam: 5/7/2024 3:59 AM EDT    Indication: AMS possible bleed.    Comparison: None available.    Technique: CTA of the head was performed after the uneventful intravenous administration of 115 mL Isovue-370. Reconstructed coronal and sagittal images were also obtained. In addition, a 3-D volume rendered image was created for interpretation.   Automated exposure control and iterative reconstruction methods were used.      Findings:  Right carotid: Distal cervical ICA is normal. Intracranial ICA segments demonstrate mild nonstenosing calcific plaque. Small patent P-comm. Ophthalmic artery origin is normal. Patent A-Comm. The A1 and M1 segments and distal EFREN and MCA branches appear   patent.    Left carotid: The distal cervical ICA is normal. Intracranial ICA segments demonstrate mild nonstenosing calcific plaque. The ophthalmic artery origin is normal. Small patent P-comm. A1 and M1 segments and distal EFREN and MCA branches appear patent.    Posterior circulation: V3 and V4 segments appear widely patent with minimal calcific disease. The basilar artery is normal. Superior cerebellar and posterior cerebral arteries appear patent.    Nonvascular findings: Thinning of the orbital lenses would suggest prior cataract surgery. No focal hypoattenuating lesions in the brain. No mass effect or midline shift. The calvarium appears intact. Sinuses and mastoids appear clear.  Superficial soft   tissues are unremarkable.      Impression:      Impression:  No significant vascular abnormality in the head.        Electronically Signed: Al Justin MD    5/7/2024 4:26 AM EDT    Workstation ID: [id number]    MRI Lumbar Spine With & Without Contrast [[id number]] Collected: 05/06/24 2331     Updated: 05/06/24 2343    Narrative:        MRI LUMBAR SPINE W WO CONTRAST    Date of Exam: 5/6/2024 10:54 PM EDT    Indication: right lower back pain, fevers.     Comparison: None available.    Technique:  Routine multiplanar/multisequence sequence images of the lumbar spine were obtained before and after the uneventful administration of 20 mL Multihance.        Findings:  Five lumbar type vertebral bodies are identified.  Alignment is anatomic. There is moderate narrowing of the L4-L5 disc. There is a transitional L5 vertebra with sacralization on the left. There is mild narrowing of the other lumbar discs and there is a   rudimentary L5-S1 disc. Vertebral bodies maintain normal height.  Distal spinal cord and conus medullaris appear unremarkable terminating at the T12-L1 level.  Cauda equina appears unremarkable. There is mild degenerative bone marrow heterogeneity. There   is mild diffuse paraspinal muscular atrophy. There is aneurysmal dilatation of the infrarenal abdominal aorta up to 33 mm. There is also aneurysmal dilatation of the common iliac arteries measuring up to 20 mm each.    L1-L2: There is concentric disc bulge and marginal osteophyte formation asymmetric to the right. There is mild facet and ligamentum flavum hypertrophy. There is mild bilateral neuroforaminal narrowing and mild narrowing of the spinal canal.    L2-L3: There is concentric disc bulge and marginal osteophyte formation. There is a large posterior disc osteophyte complex resulting in severe narrowing of the spinal canal. There is moderate facet and ligamentum flavum hypertrophy. There is moderate to   severe bilateral  neuroforaminal narrowing.    L3-L4: There is concentric disc bulge and marginal osteophyte formation with significant posterior disc osteophyte complex resulting in moderate narrowing of the spinal canal. There is moderate facet and ligamentum flavum hypertrophy and mild to moderate   bilateral neuroforaminal narrowing.    L4-L5: There is concentric disc bulge and mild facet and ligamentum flavum hypertrophy. There is mild to moderate bilateral neuroforaminal narrowing without spinal canal compromise.    L5-S1: No focal disc protrusion or extrusion. Facet joints appear unremarkable. No significant neural foraminal or spinal canal stenosis.      Impression:      Impression:    1. No acute findings.  2. Moderate to severe lumbar spondylosis including severe narrowing of the spinal canal at L2-L3 and varying degrees of neuroforaminal narrowing as discussed above.        Electronically Signed: Al Justin MD    5/6/2024 11:40 PM EDT    Workstation ID: [id number]    CT Abdomen Pelvis With Contrast [[id number]] Collected: 05/06/24 1446     Updated: 05/06/24 1457    Narrative:      CT ABDOMEN PELVIS W CONTRAST    Date of Exam: 5/6/2024 2:25 PM EDT    Indication: R flank pain.    Comparison: None available.    Technique: Axial CT images were obtained of the abdomen and pelvis following the uneventful intravenous administration of 85 mL Isovue-300. Reconstructed coronal and sagittal images were also obtained. Automated exposure control and iterative   construction methods were used.      Findings:    Liver: The liver is unremarkable in morphology. No focal liver lesion is seen. No biliary dilation is seen.    Gallbladder: Surgically absent.    Pancreas: Unremarkable.    Spleen: Unremarkable.    Adrenal glands: 1.2 cm nodule within the lateral limb of the right adrenal gland, incompletely characterized on this contrast-enhanced study. Left adrenal gland is unremarkable.    Genitourinary tract: There is mild bilateral  perinephric stranding, nonspecific. Otherwise, the kidneys, visualized portions of the ureters, and urinary bladder appear unremarkable. Prostate gland is unremarkable.    Gastrointestinal tract: Colonic diverticulosis is present. Hollow viscera appear otherwise unremarkable. There is no evidence of bowel obstruction.    Appendix: No findings to suggest acute appendicitis.    Other findings: No free air or free fluid is identified. No pathologically enlarged lymph nodes are seen. Vascular calcifications are present. There is a 3 cm infrarenal abdominal aortic aneurysm. Mild ectasia of the right common femoral artery.    Bones and soft tissues: No acute or suspicious osseous or soft tissue lesion is identified. Bones are demineralized. There are degenerative changes within the spine.    Lung bases: Trace bilateral pleural effusions with bibasilar atelectasis. Cardiomegaly. Mitral valvular prosthesis is partially imaged. Calcified granuloma within the right lower lobe.      Impression:      Impression:  1.No acute abnormality identified within the abdomen or pelvis.  2.Colonic diverticulosis.  3.3 cm infrarenal abdominal aortic aneurysm.  4.Additional findings as detailed above.      Electronically Signed: Matthias Dobbs MD    5/6/2024 2:53 PM EDT    Workstation ID: [id number]    CT Head Without Contrast [[id number]] Collected: 05/06/24 1447     Updated: 05/06/24 1456    Narrative:      CT HEAD WO CONTRAST    Date of Exam: 5/6/2024 2:25 PM EDT    Indication: ams.    Comparison: None available.    Technique: Axial CT images were obtained of the head without contrast administration.  Automated exposure control and iterative construction methods were used.      Findings:  No acute intracranial hemorrhage or extra-axial collection is identified. The ventricles appear normal in caliber, with no evidence of mass effect or midline shift. The basal cisterns appear patent. The gray-white differentiation appears preserved.    The  calvarium appear intact. There is mild frothy mucosal disease in the left maxillary sinus. The mastoid air cells are well-aerated. Scattered foci of periventricular and subcortical white matter hypodensities are nonspecific, but likely the sequela of   mild chronic small vessel ischemic disease.      Impression:      Impression:  1.No acute intracranial process identified.  2.Findings suggestive of mild chronic small vessel ischemic disease.  3.Mild frothy mucosal disease within left maxillary sinus. Correlate for acute sinusitis.        Electronically Signed: Donnie Skelton MD    5/6/2024 2:53 PM EDT    Workstation ID: [id number]    XR Chest 1 View [[id number]] Collected: 05/06/24 1416     Updated: 05/06/24 1421    Narrative:      XR CHEST 1 VW    Date of Exam: 5/6/2024 1:58 PM EDT    Indication: sob    Comparison: Chest radiograph 2/5/2024.    Findings:  Sternotomy. Enlarged cardiac silhouette, unchanged. Pulmonary vascular congestion. No overt pulmonary edema. No focal consolidation. No significant pleural effusion. No pneumothorax. Osseous structures are unchanged.      Impression:      Impression:  No acute cardiopulmonary findings.      Electronically Signed: Giovany Tineo MD    5/6/2024 2:18 PM EDT    Workstation ID: [id number]              Impression:   Enterococcus faecalis bacteremia  Hypotension  Bioprosthetic AVR  Mechanical MVR  Supratherapeutic INR  Anemia  Elevated procalcitonin  History of GI bleed with gastric AVM    PLAN/RECOMMENDATIONS:   Thank you for asking us to see Kaleb Abraham, I recommend the following:  High-grade enterococcal bacteremia is concerning in the setting of endovascular hardware.    Estimated Creatinine Clearance: 103.9 mL/min (by C-G formula based on SCr of 0.8 mg/dL).    Patient could have a GI source such as inflammation of colonic viscus (superimposed diverticulitis and diverticulosis) or bacteremia  following endoscopy regarding management of a GIbleed.    Gallbladder has  been removed    Repeat blood cultures x 2 sets    Anticipate that the Enterococcus isolate is susceptible to ampicillin and ceftriaxone    Await susceptibilities    cont ampicillin 2 g IV every 4 hours    cont ceftriaxone 2 g IV every 12 hours    Continue vancomycin dosing per pharmacy while bl cx ID/susceptibilties pending    Consider transesophageal echocardiogram    Patient has substantial imaging of chest abdomen and pelvis by CT scan with the exception of a and from renal abdominal aortic aneurysm and left common iliac artery aneurysm was not substantial inflammation  Lumbar MRI shows no acute findings and moderate to severe lumbar spondylosis with severe spinal narrowing at L2-L3    TTE already performed    Will ask Dr. Gardner if they can do COLEEN;    D/w family    Place picc line    Complicated case    Lino Jacob MD  5/8/2024  16:11 EDT

## 2024-05-08 NOTE — PROGRESS NOTES
"Pharmacy Consult  -  Warfarin  Kaleb Abraham is a  73 y.o. male   Height - 180.3 cm (70.98\")  Weight - 107 kg (234 lb 12.6 oz)    Consulting Provider: - Kendra  Indication: - Afib, Mechanical Mitral valve  Goal INR: - 2.5 - 3.5  Home Regimen: Most recent dose of warfarin was 5mg daily although was adjusted to 4mg over the weekend due to elevated INR   - Patient has medication bottle of 4mg tablets and 1mg tablets at home     Bridge Therapy: Yes   and unfractionated heparin    Drug-Drug Interactions with current regimen:   Received 10mg IV Vitamin K on 5/6    Warfarin Dosing During Admission:  Date  5/8           INR  1.46           Dose  3 mg              Education Provided: Warfarin education provided on 5/8/2024 verbally.  Discussed effects of warfarin, importance of checking INR, drug-drug and drug-food interactions, and signs/symptoms of bleeding and clotting.  Patient deferred to wife who as in the room and manages his medications. She monitors his INR at home and then faxes results to Dr Maradiaga office.  All pertinent questions were answered.      Discharge Follow up:   Following Provider - Dr Peraza   Follow up time range or appointment - 2-3 days post discharge    Labs:  Results from last 7 days   Lab Units 05/08/24  1142 05/08/24  0709 05/07/24  2359 05/07/24  1611 05/07/24  0959 05/07/24  0051 05/06/24  2223 05/06/24  1447 05/06/24  1206   INR   --  1.46*  --   --  1.55*  --  2.33* >10.00*  --    APTT seconds  --  38.7*  --   --   --   --   --   --   --    HEMOGLOBIN g/dL 8.9* 9.2*  9.2* 8.9* 8.1* 8.4* 6.6*  --   --  7.9*   HEMATOCRIT % 26.4* 28.2*  28.2* 26.8* 24.3* 25.7* 20.1*  --   --  24.3*     Results from last 7 days   Lab Units 05/08/24  0709 05/07/24  0959 05/06/24  1206   SODIUM mmol/L 132* 134* 131*   POTASSIUM mmol/L 3.9 4.3 4.0   CHLORIDE mmol/L 97* 97* 93*   CO2 mmol/L 19.0* 29.0 29.0   BUN mg/dL 14 16 17   CREATININE mg/dL 0.80 1.01 1.04   CALCIUM mg/dL 8.2* 8.6 8.1*   BILIRUBIN " mg/dL  --   --  0.6   ALK PHOS U/L  --   --  44   ALT (SGPT) U/L  --   --  8   AST (SGOT) U/L  --   --  27   GLUCOSE mg/dL 204* 104* 138*     Current dietary intake:   Diet Order   Procedures    Diet: Cardiac, Diabetic; Healthy Heart (2-3 Na+); Consistent Carbohydrate; Fluid Consistency: Thin (IDDSI 0)     Assessment/Plan:   Initiate warfarin lower dose of 3mg due to recent elevation of INR.  Pharmacy will monitor daily PT/INR and adjust warfarin dose as needed.     Thank you  Milady Yeager, PharmD  5/8/2024  14:44 EDT

## 2024-05-08 NOTE — PLAN OF CARE
Goal Outcome Evaluation:   Patient in significant pain overnight, home meds restarted   Patient converted to a-flutter at 1945   HR dropping to as low as 43, cardiology notified, dopamine started and levo weaned down.   1x stool occurrence with no blood noted

## 2024-05-08 NOTE — PLAN OF CARE
Problem: Adult Inpatient Plan of Care  Goal: Plan of Care Review  Outcome: Ongoing, Progressing  Flowsheets (Taken 5/8/2024 1656)  Plan of Care Reviewed With:   patient   spouse  Outcome Evaluation: Pt pleasant a/o x 4. Aflutter, hr 44-60s. Afebrile. Norco increased to 10/325. Up to the chair with PT. Hep gtt started. Coumadin restarted. Dopamin on hold. Levophed restarted.   Goal Outcome Evaluation:  Plan of Care Reviewed With: patient, spouse           Outcome Evaluation: Pt pleasant a/o x 4. Aflutter, hr 44-60s. Afebrile. Norco increased to 10/325. Up to the chair with PT. Hep gtt started. Coumadin restarted. Dopamin on hold.
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
## 2024-05-08 NOTE — THERAPY EVALUATION
Patient Name: Kaleb Abraham  : 1950    MRN: [id number]                              Today's Date: 2024       Admit Date: 2024    Visit Dx:     ICD-10-CM ICD-9-CM   1. Acute on chronic anemia  D64.9 285.9   2. S/P mechanical aortic valve  Z95.2 V43.3   3. Supratherapeutic INR  R79.1 790.92   4. History of GI bleed  Z87.19 V12.79   5. Anticoagulated on Coumadin  Z79.01 V58.61   6. Generalized weakness  R53.1 780.79   7. History of CHF (congestive heart failure)  Z86.79 V12.59     Patient Active Problem List   Diagnosis    Anemia    Aortic stenosis    CAD s/p CABG    T2DM    Hyperlipidemia    Hypertension    Hypothyroidism    Mitral regurgitation    MARTINEZ    Hypersomnia, unspecified    Chronic atrial fibrillation    Supratherapeutic INR    S/P prosthetic aortic valve    S/P mechanical aortic valve    Chronic anticoagulation (warfarin)     Past Medical History:   Diagnosis Date    Anemia     Aortic stenosis     CAD (coronary artery disease)     Diabetes mellitus     Hyperlipidemia     Hypertension     Hypothyroidism     Mitral regurgitation      Past Surgical History:   Procedure Laterality Date    CAPSULE ENDOSCOPY      2023 and 2023 per dr. schrader    COLONOSCOPY      2023 Dr. Schrader    CORONARY ARTERY BYPASS GRAFT      CORONARY STENT PLACEMENT      UPPER GASTROINTESTINAL ENDOSCOPY      2023 Dr. Schrader, 2023 Dr. Liu, 2024 Dr. Stout [employer]      General Information       Row Name 24 1513          OT Time and Intention    Document Type evaluation  -     Mode of Treatment occupational therapy  -       Row Name 24 1513          General Information    Patient Profile Reviewed yes  -KL     Prior Level of Function independent:;all household mobility;community mobility;gait;transfer;bed mobility;ADL's  Uses SPC/FWW at baseline. Endorses recent falls  -TORIN     Existing Precautions/Restrictions fall;oxygen therapy device and L/min;other (see  comments)  c/o R hip/LBP  -     Barriers to Rehab medically complex;previous functional deficit  -       Row Name 05/08/24 1513          Living Environment    People in Home spouse  -       Row Name 05/08/24 1513          Home Main Entrance    Number of Stairs, Main Entrance other (see comments)  ramp  -       Row Name 05/08/24 1513          Stairs Within Home, Primary    Number of Stairs, Within Home, Primary none  -       Row Name 05/08/24 1513          Cognition    Orientation Status (Cognition) oriented x 3  -       Row Name 05/08/24 1513          Safety Issues, Functional Mobility    Safety Issues Affecting Function (Mobility) awareness of need for assistance;insight into deficits/self-awareness;judgment;problem-solving;safety precaution awareness;safety precautions follow-through/compliance;sequencing abilities  -     Impairments Affecting Function (Mobility) balance;coordination;endurance/activity tolerance;pain;strength  -               User Key  (r) = Recorded By, (t) = Taken By, (c) = Cosigned By      Initials Name Provider Type     Margaret Ramírez OT Occupational Therapist                     Mobility/ADL's       Row Name 05/08/24 1516          Bed Mobility    Bed Mobility supine-sit  -     Supine-Sit [place] (Bed Mobility) moderate assist (50% patient effort);1 person assist;verbal cues  -     Bed Mobility, Safety Issues impaired trunk control for bed mobility  -     Assistive Device (Bed Mobility) bed rails;head of bed elevated  -       Row Name 05/08/24 1516          Transfers    Transfers sit-stand transfer;stand-sit transfer;bed-chair transfer  -[facility] Name 05/08/24 1516          Bed-Chair Transfer    Bed-Chair [place] (Transfers) maximum assist (25% patient effort);2 person assist;verbal cues  -     Assistive Device (Bed-Chair Transfers) other (see comments)  BUE support  -       Row Name 05/08/24 1516          Sit-Stand Transfer    Sit-Stand [place]  (Transfers) maximum assist (25% patient effort);verbal cues  -     Assistive Device (Sit-Stand Transfers) walker, front-wheeled  -[facility] Name 05/08/24 1516          Activities of Daily Living    BADL Assessment/Intervention lower body dressing  -[facility] Name 05/08/24 1516          Lower Body Dressing Assessment/Training    [place] Level (Lower Body Dressing) don;socks;dependent (less than 25% patient effort)  -     Position (Lower Body Dressing) edge of bed sitting  -               User Key  (r) = Recorded By, (t) = Taken By, (c) = Cosigned By      Initials Name Provider Type    Margaret Flores OT Occupational Therapist                   Obj/Interventions       [facility] Name 05/08/24 1517          Sensory Assessment (Somatosensory)    Sensory Assessment (Somatosensory) UE sensation intact  -KL       Row Name 05/08/24 1517          Range of Motion Comprehensive    Comment, General Range of Motion B shldr AROM impaired R>L; remaining BUE WFL  -KL       Row Name 05/08/24 1517          Strength Comprehensive (MMT)    Comment, General Manual Muscle Testing (MMT) Assessment B shldr 3-/5; remaining 4-/5  -KL       [facility] Name 05/08/24 1517          Balance    Static Sitting Balance minimal assist  -     Dynamic Sitting Balance moderate assist  -     Position, Sitting Balance supported;sitting edge of bed  -     Static Standing Balance maximum assist;1-person assist  -     Dynamic Standing Balance maximum assist;2-person assist  -     Position/Device Used, Standing Balance supported;walker, front-wheeled  -     Balance Interventions sitting;standing;sit to stand;supported;static;dynamic;occupation based/functional task  -               User Key  (r) = Recorded By, (t) = Taken By, (c) = Cosigned By      Initials Name Provider Type    Margaret Flores OT Occupational Therapist                   Goals/Plan       Row Name 05/08/24 1527          Transfer Goal 1 (OT)    Activity/Assistive Device  (Transfer Goal 1, OT) sit-to-stand/stand-to-sit;toilet  -     [place] Level/Cues Needed (Transfer Goal 1, OT) minimum assist (75% or more patient effort)  -     Time Frame (Transfer Goal 1, OT) long term goal (LTG);10 days  -     Progress/Outcome (Transfer Goal 1, OT) new goal  -       Row Name 05/08/24 1527          Dressing Goal 1 (OT)    Activity/Device (Dressing Goal 1, OT) lower body dressing;reacher;sock-aid;long-handled shoe horn  -     [place]/Cues Needed (Dressing Goal 1, OT) moderate assist (50-74% patient effort)  -     Time Frame (Dressing Goal 1, OT) long term goal (LTG);10 days  -     Progress/Outcome (Dressing Goal 1, OT) new goal  -       Row Name 05/08/24 1527          Toileting Goal 1 (OT)    Activity/Device (Toileting Goal 1, OT) adjust/manage clothing;perform perineal hygiene;commode, bedside without drop arms  -     [place] Level/Cues Needed (Toileting Goal 1, OT) moderate assist (50-74% patient effort)  -     Time Frame (Toileting Goal 1, OT) long term goal (LTG);10 days  -     Progress/Outcome (Toileting Goal 1, OT) new goal  -       Row Name 05/08/24 1527          Grooming Goal 1 (OT)    Activity/Device (Grooming Goal 1, OT) oral care;wash face, hands  -     [place] (Grooming Goal 1, OT) minimum assist (75% or more patient effort)  -     Time Frame (Grooming Goal 1, OT) long term goal (LTG);10 days  -     Progress/Outcome (Grooming Goal 1, OT) new [facility]  -       Row Name 05/08/24 1527          Therapy Assessment/Plan (OT)    Planned Therapy Interventions (OT) activity tolerance training;adaptive equipment training;functional balance retraining;occupation/activity based interventions;patient/caregiver education/training;ROM/therapeutic exercise;strengthening exercise;transfer/mobility retraining  -               User Key  (r) = Recorded By, (t) = Taken By, (c) = Cosigned By      Initials Name Provider Type    Margaret Flores OT Occupational  Therapist                   Clinical Impression       [facility] Name 05/08/24 1523          Pain Assessment    Pain Intervention(s) Repositioned;Ambulation/increased activity  -[facility] Name 05/08/24 1523          Pain Scale: FACES Pre/Post-Treatment    Pain: FACES Scale, Pretreatment 2-->hurts little bit  -     Posttreatment Pain Rating 4-->hurts little more  -     Pain Location - back  -[facility] Name 05/08/24 1523          Plan of Care Review    Plan of Care Reviewed With patient;spouse  -     Progress no change  -     Outcome Evaluation Pt presents to OT evaluation w/ deficits in functional balance, activity tolerance, self-care and generalized weakness. Pt would benefit from IPOT services to address deficits in order to progress towards PLOF. d/c rec is IPR.  -[facility] Name 05/08/24 1523          Therapy Assessment/Plan (OT)    Patient/Family Therapy Goal Statement (OT) Return to PLOF  -     Rehab Potential (OT) good, to achieve stated therapy goals  -     Criteria for Skilled Therapeutic Interventions Met (OT) yes  -     Therapy Frequency (OT) daily  -[facility] Name 05/08/24 1523          Therapy Plan Review/Discharge Plan (OT)    Anticipated Discharge Disposition (OT) inpatient rehabilitation facility  -[facility] Name 05/08/24 1523          Vital Signs    Pre Systolic BP Rehab 118  -KL     Pre Treatment Diastolic BP 68  -KL     Post Systolic BP Rehab 118  -KL     Post Treatment Diastolic BP 66  -KL     Pretreatment Heart Rate (beats/min) 50  -KL     Posttreatment Heart Rate (beats/min) 53  -KL     Pre SpO2 (%) 98  -KL     O2 Delivery Pre Treatment nasal cannula  -     Post SpO2 (%) 96  -KL     O2 Delivery Post Treatment nasal cannula  -     Pre Patient Position Supine  -     Intra Patient Position Standing  -     Post Patient Position Sitting  -[facility] Name 05/08/24 1523          Positioning and Restraints    Pre-Treatment Position in bed  -     Post Treatment Position  chair  -KL     In Chair notified nsg;reclined;sitting;call light within reach;encouraged to call for assist;exit alarm on;with family/caregiver;waffle cushion;legs elevated;on mechanical lift sling  -KL               User Key  (r) = Recorded By, (t) = Taken By, (c) = Cosigned By      Initials Name Provider Type    Margaret Flores OT Occupational Therapist                   Outcome Measures       Row Name 05/08/24 1528          How much help from another is currently needed...    Putting on and taking off regular lower body clothing? 1  -KL     Bathing (including washing, rinsing, and drying) 2  -KL     Toileting (which includes using toilet bed pan or urinal) 1  -KL     Putting on and taking off regular upper body clothing 2  -KL     Taking care of personal grooming (such as brushing teeth) 3  -KL     Eating meals 3  -KL     AM-PAC 6 Clicks Score (OT) 12  -KL       Row Name 05/08/24 1405 05/08/24 0800       How much help from another person do you currently need...    Turning from your back to your side while in flat bed without using bedrails? 2  -ES 2  -AS    Moving from lying on back to sitting on the side of a flat bed without bedrails? 2  -ES 2  -AS    Moving to and from a bed to a chair (including a wheelchair)? 2  -ES 2  -AS    Standing up from a chair using your arms (e.g., wheelchair, bedside chair)? 2  -ES 2  -AS    Climbing 3-5 steps with a railing? 1  -ES 1  -AS    To walk in hospital room? 2  -ES 1  -AS    AM-PAC 6 Clicks Score (PT) 11  -ES 10  -AS    Highest Level of Mobility Goal 4 --> Transfer to chair/commode  -ES 4 --> Transfer to chair/commode  -AS      Row Name 05/08/24 1528 05/08/24 1405       Functional Assessment    Outcome Measure Options AM-PAC 6 Clicks Daily Activity (OT)  -KL AM-PAC 6 Clicks Basic Mobility (PT)  -ES              User Key  (r) = Recorded By, (t) = Taken By, (c) = Cosigned By      Initials Name Provider Type    AS Milady Olsen, RN Registered Nurse    Renee Erazo, PT  Physical Therapist    Margaret Flores OT Occupational Therapist                    Occupational Therapy Education       Title: PT OT SLP Therapies (In Progress)       Topic: Occupational Therapy (In Progress)       Point: ADL training (In Progress)       Description:   Instruct learner(s) on proper safety adaptation and remediation techniques during self care or transfers.   Instruct in proper use of assistive devices.                  Learning Progress Summary             Patient Acceptance, E, NR by TORIN at 5/8/2024 1529   Significant Other Acceptance, E, NR by TORIN at 5/8/2024 1529                         Point: Home exercise program (Not Started)       Description:   Instruct learner(s) on appropriate technique for monitoring, assisting and/or progressing therapeutic exercises/activities.                  Learner Progress:  Not documented in this visit.              Point: Precautions (In Progress)       Description:   Instruct learner(s) on prescribed precautions during self-care and functional transfers.                  Learning Progress Summary             Patient Acceptance, E, NR by TORIN at 5/8/2024 1529   Significant Other Acceptance, E, NR by TORIN at 5/8/2024 1529                         Point: Body mechanics (In Progress)       Description:   Instruct learner(s) on proper positioning and spine alignment during self-care, functional mobility activities and/or exercises.                  Learning Progress Summary             Patient Acceptance, E, NR by TORIN at 5/8/2024 1529   Significant Other Acceptance, E, NR by TORIN at 5/8/2024 1529                                         User Key       Initials Effective Dates Name Provider Type Discipline    TORIN 02/05/24 -  Margaret Ramírez OT Occupational Therapist OT                  OT Recommendation and Plan  Planned Therapy Interventions (OT): activity tolerance training, adaptive equipment training, functional balance retraining, occupation/activity based interventions,  patient/caregiver education/training, ROM/therapeutic exercise, strengthening exercise, transfer/mobility retraining  Therapy Frequency (OT): daily  Plan of Care Review  Plan of Care Reviewed With: patient, spouse  Progress: no change  Outcome Evaluation: Pt presents to OT evaluation w/ deficits in functional balance, activity tolerance, self-care and generalized weakness. Pt would benefit from IPOT services to address deficits in order to progress towards PLOF. d/c rec is IPR.     Time Calculation:   Evaluation Complexity (OT)  Review Occupational Profile/Medical/Therapy History Complexity: expanded/moderate complexity  Assessment, Occupational Performance/Identification of Deficit Complexity: 3-5 performance deficits  Clinical Decision Making Complexity (OT): detailed assessment/moderate complexity  Overall Complexity of Evaluation (OT): moderate complexity     Time Calculation- OT       Row Name 05/08/24 1529             Time Calculation- OT    OT Start Time 1049  -KL      OT Received On 05/08/24  -KL      OT Goal Re-Cert Due Date 05/18/24  -KL         Timed Charges    20777 - OT Therapeutic Activity Minutes 5  -KL      38690 - OT Self Care/Mgmt Minutes 5  -KL         Untimed Charges    OT Eval/Re-eval Minutes 41  -KL         Total Minutes    Timed Charges Total Minutes 10  -KL      Untimed Charges Total Minutes 41  -KL       Total Minutes 51  -KL                User Key  (r) = Recorded By, (t) = Taken By, (c) = Cosigned By      Initials Name Provider Type    Margaret Flores OT Occupational Therapist                  Therapy Charges for Today       Code Description Service Date Service Provider Modifiers Qty    [id number]  OT THERAPEUTIC ACT EA 15 MIN 5/8/2024 Margaret Ramírez OT GO 1    80520972642 HC OT EVAL MOD COMPLEXITY 3 5/8/2024 Margaret Ramírez OT GO 1                 Margaret Ramírez OT  5/8/2024
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
## 2024-05-08 NOTE — PROGRESS NOTES
HEPARIN INFUSION  Kaleb Abraham is a  73 y.o. male receiving heparin infusion.   Therapy for [employer], Mechanical Mitral Valve   Patient Weight: 107 kg  Any Bolus (Y/N):   No bolus ever      Signs or Symptoms of Bleeding: Elevated INR on admit    Cardiac or Other (Not VTE)  Initial rate: 12 units/kg/hr (Max 1,000 units/hr)   Anti Xa Rebolus Infusion Hold time Change infusion Dose (Units/kg/hr) Next Anti Xa or aPTT Level Due   < 0.11 0 None Increase by  3 Units/kg/hr 6 hours   0.11- 0.19 0 None Increase by  2 Units/kg/hr 6 hours   0.2 - 0.29 0 None Increase by  1 Units/kg/hr 6 hours   0.3 - 0.5 0 None No Change 6 hours (after 2 consecutive levels in range check qAM)   0.51 - 0.6 0 None Decrease by  1 Units/kg/hr 6 hours   0.61 - 0.8 0 30 Minutes Decrease by  2 Units/kg/hr 6 hours   0.81 - 1 0 60 Minutes Decrease by  3 Units/kg/hr 6 hours   >1 0 Hold  After Anti Xa less than 0.5 decrease previous rate by  4 Units/kg/hr  Every 2 hours until Anti Xa  less than 0.5 then when infusion restarts in 6 hours     Results from last 7 days   Lab Units 05/08/24  1142 05/08/24  0709 05/07/24  2359 05/07/24  1611 05/07/24  0959 05/07/24  0051 05/06/24  2223 05/06/24  1447 05/06/24  1206   INR   --  1.46*  --   --  1.55*  --  2.33*   < >  --    HEMOGLOBIN g/dL 8.9* 9.2*  9.2* 8.9*   < > 8.4*   < >  --   --  7.9*   HEMATOCRIT % 26.4* 28.2*  28.2* 26.8*   < > 25.7*   < >  --   --  24.3*   PLATELETS 10*3/mm3  --  243  --   --  218  --   --   --  271    < > = values in this interval not displayed.       Date   Time   Anti-Xa Current Rate (Unit/kg/hr) Bolus   (Units) Rate Change   (Unit/kg/hr) New Rate (Unit/kg/hr) Next   Anti-Xa Comments  Pump Check Daily   5/8 9680 0.1  --  9 [address]       --           --           --           --           --           --           --           --           --                                                                                                                                  Milady CARDONA  Shobha PharmD  5/8/2024  12:21 EDT

## 2024-05-08 NOTE — PLAN OF CARE
Goal Outcome Evaluation:  Plan of Care Reviewed With: patient, spouse        Progress: no change (PT IE)  Outcome Evaluation: PT eval complete. Pt presents with generalized weakness, decreased activity tolerance, and balance deficits warranting skilled IPPT services. Pt required maxA x2 for mobility with cues for sequencing and was limited by c/o LBP. PT rec IRF at d/c.      Anticipated Discharge Disposition (PT): inpatient rehabilitation facility

## 2024-05-08 NOTE — THERAPY EVALUATION
Patient Name: Kaleb Abraham  : 1950    MRN: [id number]                              Today's Date: 2024       Admit Date: 2024    Visit Dx:     ICD-10-CM ICD-9-CM   1. Acute on chronic anemia  D64.9 285.9   2. S/P mechanical aortic valve  Z95.2 V43.3   3. Supratherapeutic INR  R79.1 790.92   4. History of GI bleed  Z87.19 V12.79   5. Anticoagulated on Coumadin  Z79.01 V58.61   6. Generalized weakness  R53.1 780.79   7. History of CHF (congestive heart failure)  Z86.79 V12.59     Patient Active Problem List   Diagnosis    Anemia    Aortic stenosis    CAD s/p CABG    T2DM    Hyperlipidemia    Hypertension    Hypothyroidism    Mitral regurgitation    MARTINEZ    Hypersomnia, unspecified    Chronic atrial fibrillation    Supratherapeutic INR    S/P prosthetic aortic valve    S/P mechanical aortic valve    Chronic anticoagulation (warfarin)     Past Medical History:   Diagnosis Date    Anemia     Aortic stenosis     CAD (coronary artery disease)     Diabetes mellitus     Hyperlipidemia     Hypertension     Hypothyroidism     Mitral regurgitation      Past Surgical History:   Procedure Laterality Date    CAPSULE ENDOSCOPY      2023 and 2023 per dr. schrader    COLONOSCOPY      2023 Dr. Schrader    CORONARY ARTERY BYPASS GRAFT      CORONARY STENT PLACEMENT      UPPER GASTROINTESTINAL ENDOSCOPY      2023 Dr. Schrader, 2023 Dr. Liu, 2024 Dr. Stout Burna      General Information       Row Name 24 1148          Physical Therapy Time and Intention    Document Type evaluation  -ES     Mode of Treatment physical therapy  -ES       Row Name 24 1148          General Information    Patient Profile Reviewed yes  -ES     Prior Level of Function independent:;all household mobility;transfer;bed mobility;ADL's  Uses SPC/FWW at baseline. Endorses recent falls  -ES     Existing Precautions/Restrictions fall;oxygen therapy device and L/min;other (see comments)  c/o R  hip/LBP  -ES     Barriers to Rehab medically complex;previous functional deficit  -ES       Row Name 05/08/24 1148          Living Environment    People in Home spouse  -ES       Row Name 05/08/24 1148          Home Main Entrance    Number of Stairs, Main Entrance other (see comments)  ramp  -ES       Row Name 05/08/24 1148          Stairs Within Home, Primary    Number of Stairs, Within Home, Primary none  -ES       Row Name 05/08/24 1148          Cognition    Orientation Status (Cognition) oriented x 3  -ES       Row Name 05/08/24 1148          Safety Issues, Functional Mobility    Safety Issues Affecting Function (Mobility) awareness of need for assistance;insight into deficits/self-awareness;safety precaution awareness;safety precautions follow-through/compliance;sequencing abilities  -ES     Impairments Affecting Function (Mobility) balance;coordination;endurance/activity tolerance;pain;strength  -ES               User Key  (r) = Recorded By, (t) = Taken By, (c) = Cosigned By      Initials Name Provider Type    Renee Erazo PT Physical Therapist                   Mobility       Row Name 05/08/24 1152          Bed Mobility    Comment, (Bed Mobility) received EOB  -ES       Row Name 05/08/24 1152          Bed-Chair Transfer    Bed-Chair [place] (Transfers) maximum assist (25% patient effort);2 person assist;verbal cues  -ES     Assistive Device (Bed-Chair Transfers) other (see comments)  BUE support  -ES     Comment, (Bed-Chair Transfer) v/c for sequencing. Demo'd forward flexed posture during t/f due to LBP  -ES       Row Name 05/08/24 1152          Sit-Stand Transfer    Sit-Stand [place] (Transfers) maximum assist (25% patient effort);verbal cues  -ES     Assistive Device (Sit-Stand Transfers) walker, front-wheeled  BUE support  -ES     Comment, (Sit-Stand Transfer) STS x2: from EOB, from chair. v/c for hand placement. demo'd forward flexed posture due to c/o LBP  -ES       Row Name 05/08/24  1152          Gait/Stairs (Locomotion)    [place] (Gait) unable to assess  -ES     Comment, (Gait/Stairs) unable to assess further mobility due to pain and fatigue  -ES               User Key  (r) = Recorded By, (t) = Taken By, (c) = Cosigned By      Initials Name Provider Type    ES Renee Mendoza PT Physical Therapist                   Obj/Interventions       Row Name 05/08/24 1154          Range of Motion Comprehensive    General Range of Motion bilateral lower extremity ROM WFL  -ES       Row Name 05/08/24 1154          Strength Comprehensive (MMT)    General Manual Muscle Testing (MMT) Assessment lower extremity strength deficits identified  -ES     Comment, General Manual Muscle Testing (MMT) Assessment BLE grossly 4-/5  -ES       Row Name 05/08/24 1154          Balance    Balance Assessment sitting static balance;sitting dynamic balance;sit to stand dynamic balance;standing dynamic balance;standing static balance  -ES     Static Sitting Balance minimal assist  -ES     Dynamic Sitting Balance moderate assist  -ES     Position, Sitting Balance supported;sitting edge of bed;sitting in chair  -ES     Sit to Stand Dynamic Balance maximum assist;verbal cues  -ES     Static Standing Balance maximum assist;verbal cues  -ES     Dynamic Standing Balance maximum assist;2-person assist;verbal cues  -ES     Position/Device Used, Standing Balance supported;walker, front-wheeled;other (see comments)  BUE support  -ES     Balance Interventions standing;sit to stand;sitting;supported;static;dynamic;occupation based/functional task  -ES       Row Name 05/08/24 1154          Sensory Assessment (Somatosensory)    Sensory Assessment (Somatosensory) LE sensation intact  -ES               User Key  (r) = Recorded By, (t) = Taken By, (c) = Cosigned By      Initials Name Provider Type    Renee Erazo PT Physical Therapist                   Goals/Plan       Row Name 05/08/24 1404          Bed Mobility Goal 1 (PT)     Activity/Assistive Device (Bed Mobility Goal 1, PT) sit to supine/supine to sit  -ES     [place] Level/Cues Needed (Bed Mobility Goal 1, PT) contact guard required  -ES     Time Frame (Bed Mobility Goal 1, PT) 10 days  -ES       Row Name 05/08/24 1404          Transfer Goal 1 (PT)    Activity/Assistive Device (Transfer Goal 1, PT) sit-to-stand/stand-to-sit;bed-to-chair/chair-to-bed;walker, rolling  -ES     [place] Level/Cues Needed (Transfer Goal 1, PT) contact guard required  -ES     Time Frame (Transfer Goal 1, PT) long term goal (LTG);10 days  -ES       Row Name 05/08/24 1404          Gait Training Goal 1 (PT)    Activity/Assistive Device (Gait Training Goal 1, PT) gait (walking locomotion);assistive device use;decrease fall risk;increase endurance/gait distance;walker, rolling  -ES     [place] Level (Gait Training Goal 1, PT) contact guard required  -ES     Distance (Gait Training Goal 1, PT) 100  -ES     Time Frame (Gait Training Goal 1, PT) long term goal (LTG);10 days  -ES       Row Name 05/08/24 1404          Therapy Assessment/Plan (PT)    Planned Therapy Interventions (PT) balance training;bed mobility training;gait training;home exercise program;neuromuscular re-education;patient/family education;strengthening;stair training;transfer training;postural re-education  -ES               User Key  (r) = Recorded By, (t) = Taken By, (c) = Cosigned By      Initials Name Provider Type    ES Renee Mendoza, PT Physical Therapist                   Clinical Impression       Row Name 05/08/24 5267          Pain    Pain Intervention(s) Repositioned;Ambulation/increased activity  -ES     Additional Documentation Pain Scale: FACES Pre/Post-Treatment (Group)  -ES       Row Name 05/08/24 7002          Pain Scale: FACES Pre/Post-Treatment    Pain: FACES Scale, Pretreatment 2-->hurts little bit  -ES     Posttreatment Pain Rating 4-->hurts little more  -ES     Pain Location lower  -ES     Pain Location -  back  -ES     Pre/Posttreatment Pain Comment RN aware and managing  -ES       Row Name 05/08/24 1155          Plan of Care Review    Plan of Care Reviewed With patient;spouse  -ES     Progress no change  PT IE  -ES     Outcome Evaluation PT eval complete. Pt presents with generalized weakness, decreased activity tolerance, and balance deficits warranting skilled IPPT services. Pt required maxA x2 for mobility with cues for sequencing and was limited by c/o LBP. PT rec IRF at d/c.  -ES       Row Name 05/08/24 1155          Therapy Assessment/Plan (PT)    Rehab Potential (PT) good, to achieve stated therapy goals  -ES     Criteria for Skilled Interventions Met (PT) yes;meets criteria;skilled treatment is necessary  -ES     Therapy Frequency (PT) daily  -ES       Row Name 05/08/24 1155          Vital Signs    Pre Systolic BP Rehab 118  -ES     Pre Treatment Diastolic BP 68  -ES     Post Systolic BP Rehab 118  -ES     Post Treatment Diastolic BP 66  -ES     Pretreatment Heart Rate (beats/min) 49  -ES     Posttreatment Heart Rate (beats/min) 55  -ES     Pre SpO2 (%) 97  -ES     O2 Delivery Pre Treatment nasal cannula  -ES     O2 Delivery Intra Treatment nasal cannula  -ES     Post SpO2 (%) 96  -ES     O2 Delivery Post Treatment nasal cannula  -ES     Pre Patient Position Supine  -ES     Intra Patient Position Standing  -ES     Post Patient Position Sitting  -ES       Row Name 05/08/24 1155          Positioning and Restraints    Pre-Treatment Position in bed  -ES     Post Treatment Position chair  -ES     In Chair notified nsg;reclined;sitting;call light within reach;encouraged to call for assist;exit alarm on;on mechanical lift sling;legs elevated;heels elevated;with family/caregiver;waffle cushion  -ES               User Key  (r) = Recorded By, (t) = Taken By, (c) = Cosigned By      Initials Name Provider Type    ES Renee Mendoza, PT Physical Therapist                   Outcome Measures       Row Name 05/08/24 7308  05/08/24 0800       How much help from another person do you currently need...    Turning from your back to your side while in flat bed without using bedrails? 2  -ES 2  -AS    Moving from lying on back to sitting on the side of a flat bed without bedrails? 2  -ES 2  -AS    Moving to and from a bed to a chair (including a wheelchair)? 2  -ES 2  -AS    Standing up from a chair using your arms (e.g., wheelchair, bedside chair)? 2  -ES 2  -AS    Climbing 3-5 steps with a railing? 1  -ES 1  -AS    To walk in hospital room? 2  -ES 1  -AS    AM-PAC 6 Clicks Score (PT) 11  -ES 10  -AS    Highest Level of Mobility Goal 4 --> Transfer to chair/commode  -ES 4 --> Transfer to chair/commode  -AS      Row Name 05/08/24 1405          Functional Assessment    Outcome Measure Options AM-PAC 6 Clicks Basic Mobility (PT)  -ES               User Key  (r) = Recorded By, (t) = Taken By, (c) = Cosigned By      Initials Name Provider Type    AS Milady Olsen, RN Registered Nurse    Renee Erazo, PT Physical Therapist                                 Physical Therapy Education       Title: PT OT SLP Therapies (In Progress)       Topic: Physical Therapy (In Progress)       Point: Mobility training (In Progress)       Learning Progress Summary             Patient Acceptance, E,TB, NR by ES at 5/8/2024 1405   Significant Other Acceptance, E,TB, NR by ES at 5/8/2024 1405                         Point: Home exercise program (Not Started)       Learner Progress:  Not documented in this visit.              Point: Body mechanics (In Progress)       Learning Progress Summary             Patient Acceptance, E,TB, NR by ES at 5/8/2024 1405   Significant Other Acceptance, E,TB, NR by ES at 5/8/2024 1405                         Point: Precautions (In Progress)       Learning Progress Summary             Patient Acceptance, E,TB, NR by ES at 5/8/2024 1405   Significant Other Acceptance, E,TB, NR by ES at 5/8/2024 1405                                          User Key       Initials Effective Dates Name Provider Type Discipline     08/11/22 -  Renee Mendoza PT Physical Therapist PT                  PT Recommendation and Plan  Planned Therapy Interventions (PT): balance training, bed mobility training, gait training, home exercise program, neuromuscular re-education, patient/family education, strengthening, stair training, transfer training, postural re-education  Plan of Care Reviewed With: patient, spouse  Progress: no change (PT IE)  Outcome Evaluation: PT eval complete. Pt presents with generalized weakness, decreased activity tolerance, and balance deficits warranting skilled IPPT services. Pt required maxA x2 for mobility with cues for sequencing and was limited by c/o LBP. PT rec IRF at d/c.     Time Calculation:   PT Evaluation Complexity  History, PT Evaluation Complexity: 1-2 personal factors and/or comorbidities  Examination of Body Systems (PT Eval Complexity): total of 3 or more elements  Clinical Presentation (PT Evaluation Complexity): evolving  Clinical Decision Making (PT Evaluation Complexity): low complexity  Overall Complexity (PT Evaluation Complexity): low complexity     PT Charges       Row Name 05/08/24 1407             Time Calculation    Start Time 1048  -ES      PT Received On 05/08/24  -ES      PT Goal Re-Cert Due Date 05/18/24  -ES         Time Calculation- PT    Total Timed Code Minutes- PT 12 minute(s)  -ES         Timed Charges    22782 - PT Therapeutic Activity Minutes 12  -ES         Untimed Charges    PT Eval/Re-eval Minutes 40  -ES         Total Minutes    Timed Charges Total Minutes 12  -ES      Untimed Charges Total Minutes 40  -ES       Total Minutes 52  -ES                User Key  (r) = Recorded By, (t) = Taken By, (c) = Cosigned By      Initials Name Provider Type    Renee Erazo PT Physical Therapist                  Therapy Charges for Today       Code Description Service Date Service Provider Modifiers Qty     11244367083 HC PT THERAPEUTIC ACT EA 15 MIN 5/8/2024 Renee Mendoza, PT GP 1    97309438156 HC PT EVAL LOW COMPLEXITY 3 5/8/2024 Renee Mendoza, PT GP 1            PT G-Codes  Outcome Measure Options: AM-PAC 6 Clicks Basic Mobility (PT)  AM-PAC 6 Clicks Score (PT): 11  PT Discharge Summary  Anticipated Discharge Disposition (PT): inpatient rehabilitation facility    Renee Mendoza PT  5/8/2024

## 2024-05-09 ENCOUNTER — APPOINTMENT (OUTPATIENT)
Dept: CARDIOLOGY | Facility: HOSPITAL | Age: 74
DRG: 314 | End: 2024-05-09
Payer: MEDICARE

## 2024-05-09 LAB
ANION GAP SERPL CALCULATED.3IONS-SCNC: 9 MMOL/L (ref 5–15)
BACTERIA SPEC AEROBE CULT: ABNORMAL
BUN SERPL-MCNC: 13 MG/DL (ref 8–23)
BUN/CREAT SERPL: 17.6 (ref 7–25)
CALCIUM SPEC-SCNC: 7.9 MG/DL (ref 8.6–10.5)
CHLORIDE SERPL-SCNC: 100 MMOL/L (ref 98–107)
CO2 SERPL-SCNC: 26 MMOL/L (ref 22–29)
CREAT SERPL-MCNC: 0.74 MG/DL (ref 0.76–1.27)
DEPRECATED RDW RBC AUTO: 48.8 FL (ref 37–54)
EGFRCR SERPLBLD CKD-EPI 2021: 96.3 ML/MIN/1.73
ERYTHROCYTE [DISTWIDTH] IN BLOOD BY AUTOMATED COUNT: 15.6 % (ref 12.3–15.4)
GLUCOSE BLDC GLUCOMTR-MCNC: 102 MG/DL (ref 70–130)
GLUCOSE BLDC GLUCOMTR-MCNC: 123 MG/DL (ref 70–130)
GLUCOSE BLDC GLUCOMTR-MCNC: 126 MG/DL (ref 70–130)
GLUCOSE BLDC GLUCOMTR-MCNC: 127 MG/DL (ref 70–130)
GLUCOSE SERPL-MCNC: 117 MG/DL (ref 65–99)
GRAM STN SPEC: ABNORMAL
HCT VFR BLD AUTO: 26.2 % (ref 37.5–51)
HCT VFR BLD AUTO: 26.5 % (ref 37.5–51)
HCT VFR BLD AUTO: 28.1 % (ref 37.5–51)
HCT VFR BLD AUTO: 28.1 % (ref 37.5–51)
HGB BLD-MCNC: 8.8 G/DL (ref 13–17.7)
HGB BLD-MCNC: 8.9 G/DL (ref 13–17.7)
HGB BLD-MCNC: 9.3 G/DL (ref 13–17.7)
HGB BLD-MCNC: 9.4 G/DL (ref 13–17.7)
INR PPP: 1.58 (ref 0.89–1.12)
ISOLATED FROM: ABNORMAL
ISOLATED FROM: ABNORMAL
MCH RBC QN AUTO: 29.1 PG (ref 26.6–33)
MCHC RBC AUTO-ENTMCNC: 33.6 G/DL (ref 31.5–35.7)
MCV RBC AUTO: 86.8 FL (ref 79–97)
PLATELET # BLD AUTO: 272 10*3/MM3 (ref 140–450)
PMV BLD AUTO: 9.5 FL (ref 6–12)
POTASSIUM SERPL-SCNC: 4.1 MMOL/L (ref 3.5–5.2)
PROTHROMBIN TIME: 19 SECONDS (ref 12.2–14.5)
RBC # BLD AUTO: 3.02 10*6/MM3 (ref 4.14–5.8)
SODIUM SERPL-SCNC: 135 MMOL/L (ref 136–145)
UFH PPP CHRO-ACNC: 0.1 IU/ML (ref 0.3–0.7)
UFH PPP CHRO-ACNC: 0.1 IU/ML (ref 0.3–0.7)
UFH PPP CHRO-ACNC: 0.3 IU/ML (ref 0.3–0.7)
UFH PPP CHRO-ACNC: 0.3 IU/ML (ref 0.3–0.7)
WBC NRBC COR # BLD AUTO: 9.22 10*3/MM3 (ref 3.4–10.8)

## 2024-05-09 PROCEDURE — 85520 HEPARIN ASSAY: CPT

## 2024-05-09 PROCEDURE — 25010000002 HEPARIN (PORCINE) 25000-0.45 UT/250ML-% SOLUTION

## 2024-05-09 PROCEDURE — 82948 REAGENT STRIP/BLOOD GLUCOSE: CPT

## 2024-05-09 PROCEDURE — 25010000002 PROCHLORPERAZINE 10 MG/2ML SOLUTION: Performed by: INTERNAL MEDICINE

## 2024-05-09 PROCEDURE — 85014 HEMATOCRIT: CPT | Performed by: HOSPITALIST

## 2024-05-09 PROCEDURE — 25010000002 MORPHINE PER 10 MG: Performed by: INTERNAL MEDICINE

## 2024-05-09 PROCEDURE — 80048 BASIC METABOLIC PNL TOTAL CA: CPT | Performed by: HOSPITALIST

## 2024-05-09 PROCEDURE — 25010000002 VANCOMYCIN 1 G RECONSTITUTED SOLUTION 1 EACH VIAL: Performed by: HOSPITALIST

## 2024-05-09 PROCEDURE — 25810000003 SODIUM CHLORIDE 0.9 % SOLUTION 250 ML FLEX CONT: Performed by: HOSPITALIST

## 2024-05-09 PROCEDURE — 25010000002 CEFTRIAXONE PER 250 MG: Performed by: INTERNAL MEDICINE

## 2024-05-09 PROCEDURE — 93005 ELECTROCARDIOGRAM TRACING: CPT | Performed by: PHYSICIAN ASSISTANT

## 2024-05-09 PROCEDURE — 99291 CRITICAL CARE FIRST HOUR: CPT | Performed by: INTERNAL MEDICINE

## 2024-05-09 PROCEDURE — 85018 HEMOGLOBIN: CPT | Performed by: HOSPITALIST

## 2024-05-09 PROCEDURE — 85027 COMPLETE CBC AUTOMATED: CPT | Performed by: HOSPITALIST

## 2024-05-09 PROCEDURE — 85610 PROTHROMBIN TIME: CPT | Performed by: HOSPITALIST

## 2024-05-09 PROCEDURE — 25010000002 AMPICILLIN PER 500 MG: Performed by: INTERNAL MEDICINE

## 2024-05-09 RX ORDER — PROCHLORPERAZINE EDISYLATE 5 MG/ML
5 INJECTION INTRAMUSCULAR; INTRAVENOUS EVERY 6 HOURS PRN
Status: DISCONTINUED | OUTPATIENT
Start: 2024-05-09 | End: 2024-05-11

## 2024-05-09 RX ORDER — SIMETHICONE 80 MG
80 TABLET,CHEWABLE ORAL 4 TIMES DAILY PRN
Status: DISCONTINUED | OUTPATIENT
Start: 2024-05-09 | End: 2024-05-20 | Stop reason: HOSPADM

## 2024-05-09 RX ADMIN — CEFTRIAXONE 2000 MG: 2 INJECTION, POWDER, FOR SOLUTION INTRAMUSCULAR; INTRAVENOUS at 04:38

## 2024-05-09 RX ADMIN — WARFARIN SODIUM 3 MG: 3 TABLET ORAL at 17:24

## 2024-05-09 RX ADMIN — AMPICILLIN SODIUM 2 G: 2 INJECTION, POWDER, FOR SOLUTION INTRAMUSCULAR; INTRAVENOUS at 03:32

## 2024-05-09 RX ADMIN — LIDOCAINE 1 PATCH: 4 PATCH TOPICAL at 08:02

## 2024-05-09 RX ADMIN — CEFTRIAXONE 2000 MG: 2 INJECTION, POWDER, FOR SOLUTION INTRAMUSCULAR; INTRAVENOUS at 15:26

## 2024-05-09 RX ADMIN — MORPHINE SULFATE 2 MG: 2 INJECTION, SOLUTION INTRAMUSCULAR; INTRAVENOUS at 04:38

## 2024-05-09 RX ADMIN — Medication 10 ML: at 08:02

## 2024-05-09 RX ADMIN — Medication 5 MG: at 20:22

## 2024-05-09 RX ADMIN — HEPARIN SODIUM 18 UNITS/KG/HR: 10000 INJECTION, SOLUTION INTRAVENOUS at 19:04

## 2024-05-09 RX ADMIN — MORPHINE SULFATE 2 MG: 2 INJECTION, SOLUTION INTRAMUSCULAR; INTRAVENOUS at 12:53

## 2024-05-09 RX ADMIN — PANTOPRAZOLE SODIUM 40 MG: 40 INJECTION, POWDER, FOR SOLUTION INTRAVENOUS at 20:14

## 2024-05-09 RX ADMIN — HEPARIN SODIUM 15 UNITS/KG/HR: 10000 INJECTION, SOLUTION INTRAVENOUS at 05:47

## 2024-05-09 RX ADMIN — HYDROCODONE BITARTRATE AND ACETAMINOPHEN 1 TABLET: 10; 325 TABLET ORAL at 08:01

## 2024-05-09 RX ADMIN — MORPHINE SULFATE 2 MG: 2 INJECTION, SOLUTION INTRAMUSCULAR; INTRAVENOUS at 02:23

## 2024-05-09 RX ADMIN — HYDROCODONE BITARTRATE AND ACETAMINOPHEN 1 TABLET: 10; 325 TABLET ORAL at 16:36

## 2024-05-09 RX ADMIN — PANTOPRAZOLE SODIUM 40 MG: 40 INJECTION, POWDER, FOR SOLUTION INTRAVENOUS at 08:01

## 2024-05-09 RX ADMIN — AMPICILLIN SODIUM 2 G: 2 INJECTION, POWDER, FOR SOLUTION INTRAMUSCULAR; INTRAVENOUS at 08:02

## 2024-05-09 RX ADMIN — HYDROCODONE BITARTRATE AND ACETAMINOPHEN 1 TABLET: 10; 325 TABLET ORAL at 00:11

## 2024-05-09 RX ADMIN — MORPHINE SULFATE 2 MG: 2 INJECTION, SOLUTION INTRAMUSCULAR; INTRAVENOUS at 00:20

## 2024-05-09 RX ADMIN — PROCHLORPERAZINE EDISYLATE 5 MG: 5 INJECTION INTRAMUSCULAR; INTRAVENOUS at 16:02

## 2024-05-09 RX ADMIN — MORPHINE SULFATE 2 MG: 2 INJECTION, SOLUTION INTRAMUSCULAR; INTRAVENOUS at 20:12

## 2024-05-09 RX ADMIN — Medication 10 ML: at 20:14

## 2024-05-09 RX ADMIN — MIDODRINE HYDROCHLORIDE 5 MG: 5 TABLET ORAL at 17:24

## 2024-05-09 RX ADMIN — MIDODRINE HYDROCHLORIDE 5 MG: 5 TABLET ORAL at 08:01

## 2024-05-09 RX ADMIN — AMPICILLIN SODIUM 2 G: 2 INJECTION, POWDER, FOR SOLUTION INTRAMUSCULAR; INTRAVENOUS at 20:14

## 2024-05-09 RX ADMIN — MIDODRINE HYDROCHLORIDE 5 MG: 5 TABLET ORAL at 12:49

## 2024-05-09 RX ADMIN — AMPICILLIN SODIUM 2 G: 2 INJECTION, POWDER, FOR SOLUTION INTRAMUSCULAR; INTRAVENOUS at 15:26

## 2024-05-09 RX ADMIN — SIMETHICONE 80 MG: 80 TABLET, CHEWABLE ORAL at 23:23

## 2024-05-09 RX ADMIN — SIMETHICONE 80 MG: 80 TABLET, CHEWABLE ORAL at 05:55

## 2024-05-09 RX ADMIN — VANCOMYCIN HYDROCHLORIDE 1000 MG: 1 INJECTION, POWDER, LYOPHILIZED, FOR SOLUTION INTRAVENOUS at 01:59

## 2024-05-09 RX ADMIN — LEVOTHYROXINE SODIUM 25 MCG: 25 TABLET ORAL at 05:43

## 2024-05-09 RX ADMIN — MORPHINE SULFATE 2 MG: 2 INJECTION, SOLUTION INTRAMUSCULAR; INTRAVENOUS at 23:17

## 2024-05-09 NOTE — PLAN OF CARE
Goal Outcome Evaluation:  Pt remains in aflutter with bradycardia, otherwise VSS on RA  Levophed off  Heparin gtt remains  Afebrile  UOP only 330 mL  PRN morphine and norco given for back pain  Spouse at bedside and updated
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
## 2024-05-09 NOTE — PLAN OF CARE
Goal Outcome Evaluation:      VSS. Remains on heparin gtt. Up to chair x2 assist. UOP around 300. MD aware. RA. No BM. Constant complaints of pain.

## 2024-05-09 NOTE — DISCHARGE PLACEMENT REQUEST
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 members, and the nursing staff.  Hunter Gardner MD saw and examined patient, verified hx and PE, read all radiographic studies, reviewed labs and micro data, and formulated dx, plan for treatment and all medical decision making.      Lulu Davison PA-C  24, 08:14 EDT                      Electronically signed by Hunter Gardner MD at 24 1021          Physical Therapy Notes (most recent note)        Renee Mendoza, PT at 24 1048  Version 1 of 1         Patient Name: Kaleb Abraham  : 1950    MRN: [id number]                              Today's Date: 2024       Admit Date: 2024    Visit Dx:     ICD-10-CM ICD-9-CM   1. Acute on chronic anemia  D64.9 285.9   2. S/P mechanical aortic valve  Z95.2 V43.3   3. Supratherapeutic INR  R79.1 790.92   4. History of GI bleed  Z87.19 V12.79   5. Anticoagulated on Coumadin  Z79.01 V58.61   6. Generalized weakness  R53.1 780.79   7. History of CHF (congestive heart failure)  Z86.79 V12.59     Patient Active Problem List   Diagnosis    Anemia    Aortic stenosis    CAD s/p CABG    T2DM    Hyperlipidemia    Hypertension    Hypothyroidism    Mitral regurgitation     MARTINEZ    Hypersomnia, unspecified    Chronic atrial fibrillation    Supratherapeutic INR    S/P prosthetic aortic valve    S/P mechanical aortic valve    Chronic anticoagulation (warfarin)     Past Medical History:   Diagnosis Date    Anemia     Aortic stenosis     CAD (coronary artery disease)     Diabetes mellitus     Hyperlipidemia     Hypertension     Hypothyroidism     Mitral regurgitation      Past Surgical History:   Procedure Laterality Date    CAPSULE ENDOSCOPY      2/22/2023 and 9/25/2023 per dr. schrader    COLONOSCOPY      2/2023 Dr. Schrader    CORONARY ARTERY BYPASS GRAFT      CORONARY STENT PLACEMENT      UPPER GASTROINTESTINAL ENDOSCOPY      2/7/2023 Dr. Schrader, 7/26/2023 Dr. Liu, 4/2024 Dr. Stout Rafael Gonzalez      General Information       Row Name 05/08/24 1148          Physical Therapy Time and Intention    Document Type evaluation  -ES     Mode of Treatment physical therapy  -ES       Row Name 05/08/24 1148          General Information    Patient Profile Reviewed yes  -ES     Prior Level of Function independent:;all household mobility;transfer;bed mobility;ADL's  Uses SPC/FWW at baseline. Endorses recent falls  -ES     Existing Precautions/Restrictions fall;oxygen therapy device and L/min;other (see comments)  c/o R hip/LBP  -ES     Barriers to Rehab medically complex;previous functional deficit  -ES       Row Name 05/08/24 1148          Living Environment    People in Home spouse  -ES       Row Name 05/08/24 1148          Home Main Entrance    Number of Stairs, Main Entrance other (see comments)  ramp  -ES       Row Name 05/08/24 1148          Stairs Within Home, Primary    Number of Stairs, Within Home, Primary none  -ES       Row Name 05/08/24 1148          Cognition    Orientation Status (Cognition) oriented x 3  -ES       Row Name 05/08/24 1148          Safety Issues, Functional Mobility    Safety Issues Affecting Function (Mobility) awareness of need for assistance;insight  into deficits/self-awareness;safety precaution awareness;safety precautions follow-through/compliance;sequencing abilities  -ES     Impairments Affecting Function (Mobility) balance;coordination;endurance/activity tolerance;pain;strength  -ES               User Key  (r) = Recorded By, (t) = Taken By, (c) = Cosigned By      Initials Name Provider Type    Renee Erazo PT Physical Therapist                   Mobility       Row Name 05/08/24 1152          Bed Mobility    Comment, (Bed Mobility) received EOB  -ES       Row Name 05/08/24 1152          Bed-Chair Transfer    Bed-Chair [place] (Transfers) maximum assist (25% patient effort);2 person assist;verbal cues  -ES     Assistive Device (Bed-Chair Transfers) other (see comments)  BUE support  -ES     Comment, (Bed-Chair Transfer) v/c for sequencing. Demo'd forward flexed posture during t/f due to LBP  -ES       Row Name 05/08/24 1152          Sit-Stand Transfer    Sit-Stand [place] (Transfers) maximum assist (25% patient effort);verbal cues  -ES     Assistive Device (Sit-Stand Transfers) walker, front-wheeled  BUE support  -ES     Comment, (Sit-Stand Transfer) STS x2: from EOB, from chair. v/c for hand placement. demo'd forward flexed posture due to c/o LBP  -ES       Row Name 05/08/24 1152          Gait/Stairs (Locomotion)    [place] Level (Gait) unable to assess  -ES     Comment, (Gait/Stairs) unable to assess further mobility due to pain and fatigue  -ES               User Key  (r) = Recorded By, (t) = Taken By, (c) = Cosigned By      Initials Name Provider Type    Renee Erazo PT Physical Therapist                   Obj/Interventions       Row Name 05/08/24 1154          Range of Motion Comprehensive    General Range of Motion bilateral lower extremity ROM WFL  -ES       Row Name 05/08/24 1154          Strength Comprehensive (MMT)    General Manual Muscle Testing (MMT) Assessment lower extremity strength deficits identified  -ES      Comment, General Manual Muscle Testing (MMT) Assessment BLE grossly 4-/5  -ES       Row Name 05/08/24 1154          Balance    Balance Assessment sitting static balance;sitting dynamic balance;sit to stand dynamic balance;standing dynamic balance;standing static balance  -ES     Static Sitting Balance minimal assist  -ES     Dynamic Sitting Balance moderate assist  -ES     Position, Sitting Balance supported;sitting edge of bed;sitting in chair  -ES     Sit to Stand Dynamic Balance maximum assist;verbal cues  -ES     Static Standing Balance maximum assist;verbal cues  -ES     Dynamic Standing Balance maximum assist;2-person assist;verbal cues  -ES     Position/Device Used, Standing Balance supported;walker, front-wheeled;other (see comments)  BUE support  -ES     Balance Interventions standing;sit to stand;sitting;supported;static;dynamic;occupation based/functional task  -ES       Row Name 05/08/24 1154          Sensory Assessment (Somatosensory)    Sensory Assessment (Somatosensory) LE sensation intact  -ES               User Key  (r) = Recorded By, (t) = Taken By, (c) = Cosigned By      Initials Name Provider Type    ES Renee Mendoza, PT Physical Therapist                   Goals/Plan       Row Name 05/08/24 1403          Bed Mobility Goal 1 (PT)    Activity/Assistive Device (Bed Mobility Goal 1, PT) sit to supine/supine to sit  -ES     [place] Level/Cues Needed (Bed Mobility Goal 1, PT) contact guard required  -ES     Time Frame (Bed Mobility Goal 1, PT) 10 days  -ES       Row Name 05/08/24 1403          Transfer Goal 1 (PT)    Activity/Assistive Device (Transfer Goal 1, PT) sit-to-stand/stand-to-sit;bed-to-chair/chair-to-bed;walker, rolling  -ES     [place] Level/Cues Needed (Transfer Goal 1, PT) contact guard required  -ES     Time Frame (Transfer Goal 1, PT) long term goal (LTG);10 days  -ES       Row Name 05/08/24 1406          Gait Training Goal 1 (PT)    Activity/Assistive Device (Gait  Training Goal 1, PT) gait (walking locomotion);assistive device use;decrease fall risk;increase endurance/gait distance;walker, rolling  -ES     [place] Level (Gait Training Goal 1, PT) contact guard required  -ES     Distance (Gait Training Goal 1, PT) 100  -ES     Time Frame (Gait Training Goal 1, PT) long term goal (LTG);10 days  -ES       Row Name 05/08/24 1404          Therapy Assessment/Plan (PT)    Planned Therapy Interventions (PT) balance training;bed mobility training;gait training;home exercise program;neuromuscular re-education;patient/family education;strengthening;stair training;transfer training;postural re-education  -ES               User Key  (r) = Recorded By, (t) = Taken By, (c) = Cosigned By      Initials Name Provider Type    ES Renee Mendoza PT Physical Therapist                   Clinical Impression       Row Name 05/08/24 1151          Pain    Pain Intervention(s) Repositioned;Ambulation/increased activity  -ES     Additional Documentation Pain Scale: FACES Pre/Post-Treatment (Group)  -ES       Row Name 05/08/24 1153          Pain Scale: FACES Pre/Post-Treatment    Pain: FACES Scale, Pretreatment 2-->hurts little bit  -ES     Posttreatment Pain Rating 4-->hurts little more  -ES     Pain Location lower  -ES     Pain Location - back  -ES     Pre/Posttreatment Pain Comment RN aware and managing  -ES       Row Name 05/08/24 1153          Plan of Care Review    Plan of Care Reviewed With patient;spouse  -ES     Progress no change  PT IE  -ES     Outcome Evaluation PT eval complete. Pt presents with generalized weakness, decreased activity tolerance, and balance deficits warranting skilled IPPT services. Pt required maxA x2 for mobility with cues for sequencing and was limited by c/o LBP. PT rec IRF at d/c.  -ES       Row Name 05/08/24 7563          Therapy Assessment/Plan (PT)    Rehab Potential (PT) good, to achieve stated therapy goals  -ES     Criteria for Skilled Interventions Met  (PT) yes;meets criteria;skilled treatment is necessary  -ES     Therapy Frequency (PT) daily  -ES       Row Name 05/08/24 1155          Vital Signs    Pre Systolic BP Rehab 118  -ES     Pre Treatment Diastolic BP 68  -ES     Post Systolic BP Rehab 118  -ES     Post Treatment Diastolic BP 66  -ES     Pretreatment Heart Rate (beats/min) 49  -ES     Posttreatment Heart Rate (beats/min) 55  -ES     Pre SpO2 (%) 97  -ES     O2 Delivery Pre Treatment nasal cannula  -ES     O2 Delivery Intra Treatment nasal cannula  -ES     Post SpO2 (%) 96  -ES     O2 Delivery Post Treatment nasal cannula  -ES     Pre Patient Position Supine  -ES     Intra Patient Position Standing  -ES     Post Patient Position Sitting  -ES       Row Name 05/08/24 1155          Positioning and Restraints    Pre-Treatment Position in bed  -ES     Post Treatment Position chair  -ES     In Chair notified nsg;reclined;sitting;call light within reach;encouraged to call for assist;exit alarm on;on mechanical lift sling;legs elevated;heels elevated;with family/caregiver;waffle cushion  -ES               User Key  (r) = Recorded By, (t) = Taken By, (c) = Cosigned By      Initials Name Provider Type    ES Renee Mendoza, PT Physical Therapist                   Outcome Measures       Row Name 05/08/24 1405 05/08/24 0800       How much help from another person do you currently need...    Turning from your back to your side while in flat bed without using bedrails? 2  -ES 2  -AS    Moving from lying on back to sitting on the side of a flat bed without bedrails? 2  -ES 2  -AS    Moving to and from a bed to a chair (including a wheelchair)? 2  -ES 2  -AS    Standing up from a chair using your arms (e.g., wheelchair, bedside chair)? 2  -ES 2  -AS    Climbing 3-5 steps with a railing? 1  -ES 1  -AS    To walk in hospital room? 2  -ES 1  -AS    AM-PAC 6 Clicks Score (PT) 11  -ES 10  -AS    Highest Level of Mobility Goal 4 --> Transfer to chair/commode  -ES 4 -->  Transfer to chair/commode  -AS      Row Name 05/08/24 1405          Functional Assessment    Outcome Measure Options AM-PAC 6 Clicks Basic Mobility (PT)  -ES               User Key  (r) = Recorded By, (t) = Taken By, (c) = Cosigned By      Initials Name Provider Type    AS Milady Olsen, RN Registered Nurse    Renee Erazo PT Physical Therapist                                 Physical Therapy Education       Title: PT OT SLP Therapies (In Progress)       Topic: Physical Therapy (In Progress)       Point: Mobility training (In Progress)       Learning Progress Summary             Patient Acceptance, E,TB, NR by ES at 5/8/2024 1405   Significant Other Acceptance, E,TB, NR by ES at 5/8/2024 1405                         Point: Home exercise program (Not Started)       Learner Progress:  Not documented in this visit.              Point: Body mechanics (In Progress)       Learning Progress Summary             Patient Acceptance, E,TB, NR by ES at 5/8/2024 1405   Significant Other Acceptance, E,TB, NR by ES at 5/8/2024 1405                         Point: Precautions (In Progress)       Learning Progress Summary             Patient Acceptance, E,TB, NR by ES at 5/8/2024 1405   Significant Other Acceptance, E,TB, NR by ES at 5/8/2024 1405                                         User Key       Initials Effective Dates Name Provider Type Discipline     08/11/22 -  Renee Mendoza PT Physical Therapist PT                  PT Recommendation and Plan  Planned Therapy Interventions (PT): balance training, bed mobility training, gait training, home exercise program, neuromuscular re-education, patient/family education, strengthening, stair training, transfer training, postural re-education  Plan of Care Reviewed With: patient, spouse  Progress: no change (PT IE)  Outcome Evaluation: PT eval complete. Pt presents with generalized weakness, decreased activity tolerance, and balance deficits warranting skilled IPPT services.  Pt required maxA x2 for mobility with cues for sequencing and was limited by c/o LBP. PT rec IRF at d/c.     Time Calculation:   PT Evaluation Complexity  History, PT Evaluation Complexity: 1-2 personal factors and/or comorbidities  Examination of Body Systems (PT Eval Complexity): total of 3 or more elements  Clinical Presentation (PT Evaluation Complexity): evolving  Clinical Decision Making (PT Evaluation Complexity): low complexity  Overall Complexity (PT Evaluation Complexity): low complexity     PT Charges       Row Name 24 1407             Time Calculation    Start Time 1048  -ES      PT Received On 24  -ES      PT Goal Re-Cert Due Date 24  -ES         Time Calculation- PT    Total Timed Code Minutes- PT 12 minute(s)  -ES         Timed Charges    42716 - PT Therapeutic Activity Minutes 12  -ES         Untimed Charges    PT Eval/Re-eval Minutes 40  -ES         Total Minutes    Timed Charges Total Minutes 12  -ES      Untimed Charges Total Minutes 40  -ES       Total Minutes 52  -ES                User Key  (r) = Recorded By, (t) = Taken By, (c) = Cosigned By      Initials Name Provider Type    ES Renee Mendoza, PT Physical Therapist                  Therapy Charges for Today       Code Description Service Date Service Provider Modifiers Qty    89362841766 HC PT THERAPEUTIC ACT EA 15 MIN 2024 Renee Mendoza, PT GP 1    18749883422 HC PT EVAL LOW COMPLEXITY 3 2024 Renee Mendoza, PT GP 1            PT G-Codes  Outcome Measure Options: AM-PAC 6 Clicks Basic Mobility (PT)  AM-PAC 6 Clicks Score (PT): 11  PT Discharge Summary  Anticipated Discharge Disposition (PT): inpatient rehabilitation facility    Renee Mendoza PT  2024      Electronically signed by Renee Mendoza PT at 24 1408          Occupational Therapy Notes (most recent note)        Margaret Ramírez OT at 24 1049          Patient Name: Kaleb Abraham  : 1950    MRN: [id number]                               Today's Date: 5/8/2024       Admit Date: 5/6/2024    Visit Dx:     ICD-10-CM ICD-9-CM   1. Acute on chronic anemia  D64.9 285.9   2. S/P mechanical aortic valve  Z95.2 V43.3   3. Supratherapeutic INR  R79.1 790.92   4. History of GI bleed  Z87.19 V12.79   5. Anticoagulated on Coumadin  Z79.01 V58.61   6. Generalized weakness  R53.1 780.79   7. History of CHF (congestive heart failure)  Z86.79 V12.59     Patient Active Problem List   Diagnosis    Anemia    Aortic stenosis    CAD s/p CABG    T2DM    Hyperlipidemia    Hypertension    Hypothyroidism    Mitral regurgitation    MARTINEZ    Hypersomnia, unspecified    Chronic atrial fibrillation    Supratherapeutic INR    S/P prosthetic aortic valve    S/P mechanical aortic valve    Chronic anticoagulation (warfarin)     Past Medical History:   Diagnosis Date    Anemia     Aortic stenosis     CAD (coronary artery disease)     Diabetes mellitus     Hyperlipidemia     Hypertension     Hypothyroidism     Mitral regurgitation      Past Surgical History:   Procedure Laterality Date    CAPSULE ENDOSCOPY      2/22/2023 and 9/25/2023 per dr. schrader    COLONOSCOPY      2/2023 Dr. Schrader    CORONARY ARTERY BYPASS GRAFT      CORONARY STENT PLACEMENT      UPPER GASTROINTESTINAL ENDOSCOPY      2/7/2023 Dr. Schrader, 7/26/2023 Dr. Liu, 4/2024 Dr. Stout [place]      General Information       Row Name 05/08/24 1513          OT Time and Intention    Document Type evaluation  -KL     Mode of Treatment occupational therapy  -       Row Name 05/08/24 1513          General Information    Patient Profile Reviewed yes  -KL     Prior Level of Function independent:;all household mobility;community mobility;gait;transfer;bed mobility;ADL's  Uses SPC/FWW at baseline. Endorses recent falls  -     Existing Precautions/Restrictions fall;oxygen therapy device and L/min;other (see comments)  c/o R hip/LBP  -KL     Barriers to Rehab medically complex;previous functional  deficit  -       Row Name 05/08/24 1513          Living Environment    People in Home spouse  -[facility] Name 05/08/24 1513          Home Main Entrance    Number of Stairs, Main Entrance other (see comments)  ramp  -       Row Name 05/08/24 1513          Stairs Within Home, Primary    Number of Stairs, Within Home, Primary none  -       Row Name 05/08/24 1513          Cognition    Orientation Status (Cognition) oriented x 3  -[facility] Name 05/08/24 1513          Safety Issues, Functional Mobility    Safety Issues Affecting Function (Mobility) awareness of need for assistance;insight into deficits/self-awareness;judgment;problem-solving;safety precaution awareness;safety precautions follow-through/compliance;sequencing abilities  -     Impairments Affecting Function (Mobility) balance;coordination;endurance/activity tolerance;pain;strength  -               User Key  (r) = Recorded By, (t) = Taken By, (c) = Cosigned By      Initials Name Provider Type    Margaret Flores OT Occupational Therapist                     Mobility/ADL's       [facility] Name 05/08/24 1516          Bed Mobility    Bed Mobility supine-sit  -     Supine-Sit [place] (Bed Mobility) moderate assist (50% patient effort);1 person assist;verbal cues  -     Bed Mobility, Safety Issues impaired trunk control for bed mobility  -     Assistive Device (Bed Mobility) bed rails;head of bed elevated  -[facility] Name 05/08/24 1516          Transfers    Transfers sit-stand transfer;stand-sit transfer;bed-chair transfer  -[facility] Name 05/08/24 1516          Bed-Chair Transfer    Bed-Chair [place] (Transfers) maximum assist (25% patient effort);2 person assist;verbal cues  -     Assistive Device (Bed-Chair Transfers) other (see comments)  BUE support  -[facility] Name 05/08/24 1516          Sit-Stand Transfer    Sit-Stand [place] (Transfers) maximum assist (25% patient effort);verbal cues  -     Assistive Device  (Sit-Stand Transfers) walker, front-wheeled  -[facility] Name 05/08/24 1516          Activities of Daily Living    BADL Assessment/Intervention lower body dressing  -[facility] Name 05/08/24 1516          Lower Body Dressing Assessment/Training    [place] Level (Lower Body Dressing) don;socks;dependent (less than 25% patient effort)  -     Position (Lower Body Dressing) edge of bed sitting  -               User Key  (r) = Recorded By, (t) = Taken By, (c) = Cosigned By      Initials Name Provider Type    Margaret Flores OT Occupational Therapist                   Obj/Interventions       [facility] Name 05/08/24 1517          Sensory Assessment (Somatosensory)    Sensory Assessment (Somatosensory) UE sensation intact  -KL       Row Name 05/08/24 1517          Range of Motion Comprehensive    Comment, General Range of Motion B shldr AROM impaired R>L; remaining BUE WFL  -KL       Row Name 05/08/24 1517          Strength Comprehensive (MMT)    Comment, General Manual Muscle Testing (MMT) Assessment B shldr 3-/5; remaining 4-/5  -KL       Row Name 05/08/24 1517          Balance    Static Sitting Balance minimal assist  -     Dynamic Sitting Balance moderate assist  -     Position, Sitting Balance supported;sitting edge of bed  -     Static Standing Balance maximum assist;1-person assist  -     Dynamic Standing Balance maximum assist;2-person assist  -     Position/Device Used, Standing Balance supported;walker, front-wheeled  -     Balance Interventions sitting;standing;sit to stand;supported;static;dynamic;occupation based/functional task  -               User Key  (r) = Recorded By, (t) = Taken By, (c) = Cosigned By      Initials Name Provider Type    Margaret Flores OT Occupational Therapist                   Goals/Plan       Row Name 05/08/24 1527          Transfer Goal 1 (OT)    Activity/Assistive Device (Transfer Goal 1, OT) sit-to-stand/stand-to-sit;toilet  -     [place] Level/Cues  Needed (Transfer Goal 1, OT) minimum assist (75% or more patient effort)  -     Time Frame (Transfer Goal 1, OT) long term goal (LTG);10 days  -     Progress/Outcome (Transfer Goal 1, OT) new goal  -       Row Name 05/08/24 1527          Dressing Goal 1 (OT)    Activity/Device (Dressing Goal 1, OT) lower body dressing;reacher;sock-aid;long-handled shoe horn  -     [place]/Cues Needed (Dressing Goal 1, OT) moderate assist (50-74% patient effort)  -     Time Frame (Dressing Goal 1, OT) long term goal (LTG);10 days  -     Progress/Outcome (Dressing Goal 1, OT) new goal  -       Row Name 05/08/24 1527          Toileting Goal 1 (OT)    Activity/Device (Toileting Goal 1, OT) adjust/manage clothing;perform perineal hygiene;commode, bedside without drop arms  -     [place] Level/Cues Needed (Toileting Goal 1, OT) moderate assist (50-74% patient effort)  -     Time Frame (Toileting Goal 1, OT) long term goal (LTG);10 days  -     Progress/Outcome (Toileting Goal 1, OT) new goal  -       Row Name 05/08/24 1527          Grooming Goal 1 (OT)    Activity/Device (Grooming Goal 1, OT) oral care;wash face, hands  -     [place] (Grooming Goal 1, OT) minimum assist (75% or more patient effort)  -     Time Frame (Grooming Goal 1, OT) long term goal (LTG);10 days  -     Progress/Outcome (Grooming Goal 1, OT) new goal  -       Row Name 05/08/24 1527          Therapy Assessment/Plan (OT)    Planned Therapy Interventions (OT) activity tolerance training;adaptive equipment training;functional balance retraining;occupation/activity based interventions;patient/caregiver education/training;ROM/therapeutic exercise;strengthening exercise;transfer/mobility retraining  -               User Key  (r) = Recorded By, (t) = Taken By, (c) = Cosigned By      Initials Name Provider Type    Margaret Flores OT Occupational Therapist                   Clinical Impression       Row Name 05/08/24 1521           Pain Assessment    Pain Intervention(s) Repositioned;Ambulation/increased activity  -[facility] Name 05/08/24 1523          Pain Scale: FACES Pre/Post-Treatment    Pain: FACES Scale, Pretreatment 2-->hurts little bit  -KL     Posttreatment Pain Rating 4-->hurts little more  -     Pain Location - back  -[facility] Name 05/08/24 1523          Plan of Care Review    Plan of Care Reviewed With patient;spouse  -     Progress no change  -     Outcome Evaluation Pt presents to OT evaluation w/ deficits in functional balance, activity tolerance, self-care and generalized weakness. Pt would benefit from IPOT services to address deficits in order to progress towards PLOF. d/c rec is IPR.  -[facility] Name 05/08/24 1523          Therapy Assessment/Plan (OT)    Patient/Family Therapy Goal Statement (OT) Return to PLOF  -     Rehab Potential (OT) good, to achieve stated therapy goals  -     Criteria for Skilled Therapeutic Interventions Met (OT) yes  -     Therapy Frequency (OT) daily  -[facility] Name 05/08/24 1523          Therapy Plan Review/Discharge Plan (OT)    Anticipated Discharge Disposition (OT) inpatient rehabilitation facility  -[facility] Name 05/08/24 1523          Vital Signs    Pre Systolic BP Rehab 118  -KL     Pre Treatment Diastolic BP 68  -KL     Post Systolic BP Rehab 118  -KL     Post Treatment Diastolic BP 66  -KL     Pretreatment Heart Rate (beats/min) 50  -KL     Posttreatment Heart Rate (beats/min) 53  -KL     Pre SpO2 (%) 98  -KL     O2 Delivery Pre Treatment nasal cannula  -     Post SpO2 (%) 96  -KL     O2 Delivery Post Treatment nasal cannula  -     Pre Patient Position Supine  -     Intra Patient Position Standing  -     Post Patient Position Sitting  -[facility] Name 05/08/24 1523          Positioning and Restraints    Pre-Treatment Position in bed  -     Post Treatment Position chair  -     In Chair notified nsg;reclined;sitting;call light within  reach;encouraged to call for assist;exit alarm on;with family/caregiver;waffle cushion;legs elevated;on mechanical lift sling  -KL               User Key  (r) = Recorded By, (t) = Taken By, (c) = Cosigned By      Initials Name Provider Type    Margaret Flores OT Occupational Therapist                   Outcome Measures       Row Name 05/08/24 1528          How much help from another is currently needed...    Putting on and taking off regular lower body clothing? 1  -KL     Bathing (including washing, rinsing, and drying) 2  -KL     Toileting (which includes using toilet bed pan or urinal) 1  -KL     Putting on and taking off regular upper body clothing 2  -KL     Taking care of personal grooming (such as brushing teeth) 3  -KL     Eating meals 3  -KL     AM-PAC 6 Clicks Score (OT) 12  -KL       Row Name 05/08/24 1405 05/08/24 0800       How much help from another person do you currently need...    Turning from your back to your side while in flat bed without using bedrails? 2  -ES 2  -AS    Moving from lying on back to sitting on the side of a flat bed without bedrails? 2  -ES 2  -AS    Moving to and from a bed to a chair (including a wheelchair)? 2  -ES 2  -AS    Standing up from a chair using your arms (e.g., wheelchair, bedside chair)? 2  -ES 2  -AS    Climbing 3-5 steps with a railing? 1  -ES 1  -AS    To walk in hospital room? 2  -ES 1  -AS    AM-PAC 6 Clicks Score (PT) 11  -ES 10  -AS    Highest Level of Mobility Goal 4 --> Transfer to chair/commode  -ES 4 --> Transfer to chair/commode  -AS      Row Name 05/08/24 1528 05/08/24 1405       Functional Assessment    Outcome Measure Options AM-PAC 6 Clicks Daily Activity (OT)  -KL AM-PAC 6 Clicks Basic Mobility (PT)  -ES              User Key  (r) = Recorded By, (t) = Taken By, (c) = Cosigned By      Initials Name Provider Type    AS Milady Olsen, RN Registered Nurse    Renee Erazo, PT Physical Therapist    Margaret Flores OT Occupational Therapist                     Occupational Therapy Education       Title: PT OT SLP Therapies (In Progress)       Topic: Occupational Therapy (In Progress)       Point: ADL training (In Progress)       Description:   Instruct learner(s) on proper safety adaptation and remediation techniques during self care or transfers.   Instruct in proper use of assistive devices.                  Learning Progress Summary             Patient Acceptance, E, NR by  at 5/8/2024 1529   Significant Other Acceptance, E, NR by  at 5/8/2024 1529                         Point: Home exercise program (Not Started)       Description:   Instruct learner(s) on appropriate technique for monitoring, assisting and/or progressing therapeutic exercises/activities.                  Learner Progress:  Not documented in this visit.              Point: Precautions (In Progress)       Description:   Instruct learner(s) on prescribed precautions during self-care and functional transfers.                  Learning Progress Summary             Patient Acceptance, E, NR by  at 5/8/2024 1529   Significant Other Acceptance, E, NR by  at 5/8/2024 1529                         Point: Body mechanics (In Progress)       Description:   Instruct learner(s) on proper positioning and spine alignment during self-care, functional mobility activities and/or exercises.                  Learning Progress Summary             Patient Acceptance, E, NR by  at 5/8/2024 1529   Significant Other Acceptance, E, NR by  at 5/8/2024 1529                                         User Key       Initials Effective Dates Name Provider Type Discipline     02/05/24 -  Margaret Ramírez OT Occupational Therapist OT                  OT Recommendation and Plan  Planned Therapy Interventions (OT): activity tolerance training, adaptive equipment training, functional balance retraining, occupation/activity based interventions, patient/caregiver education/training, ROM/therapeutic exercise, strengthening  exercise, transfer/mobility retraining  Therapy Frequency (OT): daily  Plan of Care Review  Plan of Care Reviewed With: patient, spouse  Progress: no change  Outcome Evaluation: Pt presents to OT evaluation w/ deficits in functional balance, activity tolerance, self-care and generalized weakness. Pt would benefit from IPOT services to address deficits in order to progress towards PLOF. d/c rec is IPR.     Time Calculation:   Evaluation Complexity (OT)  Review Occupational Profile/Medical/Therapy History Complexity: expanded/moderate complexity  Assessment, Occupational Performance/Identification of Deficit Complexity: 3-5 performance deficits  Clinical Decision Making Complexity (OT): detailed assessment/moderate complexity  Overall Complexity of Evaluation (OT): moderate complexity     Time Calculation- OT       Row Name 05/08/24 1529             Time Calculation- OT    OT Start Time 1049  -KL      OT Received On 05/08/24  -KL      OT Goal Re-Cert Due Date 05/18/24  -KL         Timed Charges    29084 - OT Therapeutic Activity Minutes 5  -KL      92874 - OT Self Care/Mgmt Minutes 5  -KL         Untimed Charges    OT Eval/Re-eval Minutes 41  -KL         Total Minutes    Timed Charges Total Minutes 10  -KL      Untimed Charges Total Minutes 41  -KL       Total Minutes 51  -KL                User Key  (r) = Recorded By, (t) = Taken By, (c) = Cosigned By      Initials Name Provider Type    Margaret Flores OT Occupational Therapist                  Therapy Charges for Today       Code Description Service Date Service Provider Modifiers Qty    [id number]  OT THERAPEUTIC ACT EA 15 MIN 5/8/2024 Margaret Ramírez OT GO 1    [id number]  OT EVAL MOD COMPLEXITY 3 5/8/2024 Margaret Ramírez OT GO 1                 Margaret Ramírez OT  5/8/2024    Electronically signed by Margaret Ramírez OT at 05/08/24 8767

## 2024-05-09 NOTE — PROGRESS NOTES
INFECTIOUS DISEASE f/u     Kaleb Abraham  1950  [id number]    Date of Consult: 5/9/2024    Admission Date: 5/6/2024      Requesting Provider: No ref. provider found  Evaluating Physician: Lino Jacob MD    Reason for Consultation: Weakness    History of present illness:    Patient is a 73 y.o. male with coronary disease history of CABG, type 2 diabetes mellitus, hypertension, hyperlipidemia, hypothyroidism, atrial fibrillation, valvular heart disease with history of mechanical heart valve (prosthetic aortic valve and prosthetic mitral valve/) recently treated [facility] for GI bleed from April 22 patient found to have gastric arteriovenous malformations patient now [facility] with supratherapeutic [facility].    Hospitalist concerned about underlying infection    Patient has low-grade fever 100.3, bradycardia and hypotension    Getting picc line    MVR/AVR performed 9 years ago here at [facility]    5/8/24; awake, has back pain, following commands; no fevers, rash, sore throat    5/9/24; has back pain; afebrile, normotensive,  has COLEEN tomorrow; picc placed  Past Medical History:   Diagnosis Date    Anemia     Aortic stenosis     CAD (coronary artery disease)     Diabetes mellitus     Hyperlipidemia     Hypertension     Hypothyroidism     Mitral regurgitation        Past Surgical History:   Procedure Laterality Date    CAPSULE ENDOSCOPY      2/22/2023 and 9/25/2023 per dr. schrader    COLONOSCOPY      2/2023 Dr. Schrader    CORONARY ARTERY BYPASS GRAFT      CORONARY STENT PLACEMENT      UPPER GASTROINTESTINAL ENDOSCOPY      2/7/2023 Dr. Schrader, 7/26/2023 Dr. Liu, 4/2024 Dr. Stout Longstreet       Family History   Problem Relation Age of Onset    Diabetes Mother     Stroke Mother     Thyroid cancer Mother     Hypertension Father     Stroke Father        Social History     Socioeconomic History    Marital status:    Tobacco Use    Smoking status:  Former     Types: Cigarettes     Passive exposure: Past    Smokeless tobacco: Never   Vaping Use    Vaping status: Never Used   Substance and Sexual Activity    Alcohol use: Never    Drug use: Never    Sexual activity: Defer       No Known Allergies      Medication:    Current Facility-Administered Medications:     acetaminophen (TYLENOL) tablet 650 mg, 650 mg, Oral, Q4H PRN, 650 mg at 05/07/24 2209 **OR** [DISCONTINUED] acetaminophen (TYLENOL) 160 MG/5ML oral solution 650 mg, 650 mg, Oral, Q4H PRN **OR** acetaminophen (TYLENOL) suppository 650 mg, 650 mg, Rectal, Q4H PRN, Desiree Garcia MD    [Held by provider] allopurinol (ZYLOPRIM) tablet 300 mg, 300 mg, Oral, Daily, Florina Higuera APRN    ampicillin 2000 mg IVPB in 100 mL NS (MBP), 2 g, Intravenous, Q6H, Lino Jacob MD, Last Rate: 200 mL/hr at 05/09/24 1526, 2 g at 05/09/24 1526    sennosides-docusate (PERICOLACE) 8.6-50 MG per tablet 2 tablet, 2 tablet, Oral, BID PRN **AND** polyethylene glycol (MIRALAX) packet 17 g, 17 g, Oral, Daily PRN **AND** bisacodyl (DULCOLAX) EC tablet 5 mg, 5 mg, Oral, Daily PRN **AND** bisacodyl (DULCOLAX) suppository 10 mg, 10 mg, Rectal, Daily PRN, Desiree Garcia MD    cefTRIAXone (ROCEPHIN) 2,000 mg in sodium chloride 0.9 % 100 mL MBP, 2,000 mg, Intravenous, Q12H, Lino Jacob MD, Last Rate: 200 mL/hr at 05/09/24 1526, 2,000 mg at 05/09/24 1526    dextrose (D50W) (25 g/50 mL) IV injection 25 g, 25 g, Intravenous, Q15 Min PRN, Desiree Garcia MD    dextrose (GLUTOSE) oral gel 15 g, 15 g, Oral, Q15 Min PRN, Desiree Garcia MD    DOPamine 400 mg in 250 mL D5W infusion, 2-20 mcg/kg/min, Intravenous, Titrated, Renea Blue PA-C, Held at 05/08/24 1128    [Held by provider] ferrous sulfate tablet 325 mg, 325 mg, Oral, Daily With Breakfast, Florina Higuera APRN    [Held by provider] gabapentin (NEURONTIN) capsule 600 mg, 600 mg, Oral, Q8H, Florina Higuera APRN, 600 mg at 05/06/24 3736     glucagon (GLUCAGEN) injection 1 mg, 1 mg, Intramuscular, Q15 Min PRN, Desiree Garcia MD    heparin 28947 units/250 mL (100 units/mL) in 0.45 % NaCl infusion, 18 Units/kg/hr, Intravenous, Titrated, Dawood Campa, [facility], Last Rate: 19.26 mL/hr at 05/09/24 0718, 18 Units/kg/hr at 05/09/24 0718    HYDROcodone-acetaminophen (NORCO)  MG per tablet 1 tablet, 1 tablet, Oral, Q8H PRN, Rowdy Burnett APRN, 1 tablet at 05/09/24 1636    Insulin Lispro (humaLOG) injection 2-7 Units, 2-7 Units, Subcutaneous, 4x Daily AC & at Bedtime, Desiree Garcia MD, 2 Units at 05/08/24 2010    levothyroxine (SYNTHROID, LEVOTHROID) tablet 25 mcg, 25 mcg, Oral, Q AM, Desiree Garcia MD, 25 mcg at 05/09/24 0543    Lidocaine 4 % 1 patch, 1 patch, Transdermal, Q24H, Desiree Garcia MD, 1 patch at 05/09/24 0802    Magnesium Standard Dose Replacement - Follow Nurse / BPA Driven Protocol, , Does not apply, PRN, Desiree Garcia MD    melatonin tablet 5 mg, 5 mg, Oral, Nightly PRN, Desiree Garcia MD, 5 mg at 05/06/24 2349    midodrine (PROAMATINE) tablet 5 mg, 5 mg, Oral, TID AC, Desiree Garcia MD, 5 mg at 05/09/24 1249    morphine injection 2 mg, 2 mg, Intravenous, Q2H PRN, Shahid Peter MD, 2 mg at 05/09/24 1253    [Held by provider] multivitamin with minerals 1 tablet, 1 tablet, Oral, Daily, Florina Higuera APRN    nitroglycerin (NITROSTAT) SL tablet 0.4 mg, 0.4 mg, Sublingual, Q5 Min PRN, Desiree Garcia MD    norepinephrine (LEVOPHED) 8 mg in 250 mL NS infusion (premix), 0.02-0.3 mcg/kg/min, Intravenous, Titrated, Yadira Quesada, [facility], Stopped at 05/09/24 0113    [Held by provider] oxybutynin XL (DITROPAN-XL) 24 hr tablet 10 mg, 10 mg, Oral, Daily, Florina Higuera, FRANCHESCA    pantoprazole (PROTONIX) injection 40 mg, 40 mg, Intravenous, Q12H, Desiree Garcia MD, 40 mg at 05/09/24 0801    Pharmacy to Dose Heparin, , Does not apply, Continuous PRN, Lulu Davison PA    Pharmacy to dose vancomycin, , Does not apply,  Continuous PRN, Desiree Garcia MD    Pharmacy to dose warfarin, , Does not apply, Continuous PRN, Lulu Davison PA    Phosphorus Replacement - Follow Nurse / BPA Driven Protocol, , Does not apply, Jose YOUNGER Rabie, MD    Potassium Replacement - Follow Nurse / BPA Driven Protocol, , Does not apply, Jose YOUNGER Rabie, MD    prochlorperazine (COMPAZINE) injection 5 mg, 5 mg, Intravenous, Q6H PRN, Shahid Peter MD, 5 mg at 05/09/24 1602    promethazine (PHENERGAN) suppository 12.5 mg, 12.5 mg, Rectal, Q6H PRN, Rowdy Burnett APRN    simethicone (MYLICON) chewable tablet 80 mg, 80 mg, Oral, 4x Daily PRN, Nick Tilley APRN, 80 mg at 05/09/24 0555    sodium chloride 0.9 % flush 10 mL, 10 mL, Intravenous, Q12H, Keyur Alexander MD, 10 mL at 05/09/24 0802    sodium chloride 0.9 % flush 10 mL, 10 mL, Intravenous, PRN, Keyur Alexander MD    sodium chloride 0.9 % flush 20 mL, 20 mL, Intravenous, PRN, Keyur Alexander MD    sodium chloride 0.9 % infusion 40 mL, 40 mL, Intravenous, PRN, Desiree Garcia MD    [Held by provider] tamsulosin (FLOMAX) 24 hr capsule 0.4 mg, 0.4 mg, Oral, Nightly, Florina Higuera APRN, 0.4 mg at 05/06/24 2349    tiZANidine (ZANAFLEX) tablet 2 mg, 2 mg, Oral, PRN, Desiree Garcia MD, 2 mg at 05/08/24 1132    warfarin (COUMADIN) tablet 3 mg, 3 mg, Oral, Daily, Milady Yeager, PharmD, 3 mg at 05/08/24 1721    Antibiotics:  Anti-Infectives (From admission, onward)      Ordered     Dose/Rate Route Frequency Start Stop    05/07/24 1443  cefTRIAXone (ROCEPHIN) 2,000 mg in sodium chloride 0.9 % 100 mL MBP        Ordering Provider: Lino Jacob MD    2,000 mg  200 mL/hr over 30 Minutes Intravenous Every 12 Hours 05/07/24 1600 05/14/24 1559    05/07/24 1443  ampicillin 2000 mg IVPB in 100 mL NS (MBP)        Ordering Provider: Lino Jacob MD    2 g  200 mL/hr over 30 Minutes Intravenous Every 6 Hours 05/07/24 1500 05/14/24 1459    05/07/24 1302   vancomycin IVPB 2000 mg in 0.9% Sodium Chloride 500 mL        Ordering Provider: Bernabe Martines, PharmD    2,000 mg  250 mL/hr over 120 Minutes Intravenous Once 24 1400 24 1524    24 1225  piperacillin-tazobactam (ZOSYN) 3.375 g IVPB in 100 mL NS MBP (CD)        Ordering Provider: Desiree Garcia MD    3.375 g  over 30 Minutes Intravenous Once 24 1315 24 1302    24 1256  Pharmacy to dose vancomycin        Ordering Provider: Desiree Garcia MD     Does not apply Continuous PRN 24 1253 24 1252              Review of Systems:    See hpi      Physical Exam:   Vital Signs  Temp (24hrs), Av.1 °F (36.7 °C), Min:97.7 °F (36.5 °C), Max:98.7 °F (37.1 °C)    Temp  Min: 97.7 °F (36.5 °C)  Max: 98.7 °F (37.1 °C)  BP  Min: 85/62  Max: 150/104  Pulse  Min: 44  Max: 84  Resp  Min: 18  Max: 22  SpO2  Min: 84 %  Max: 97 %    GENERAL: Awake and alert, in mild distress. Follows commands  HEENT: Normocephalic, atraumatic.  PERRL. EOMI. No conjunctival injection. No icterus.  No external oral lesions    HEART: RRR; No murmur,  LUNGS: Clear to auscultation bilaterally   ABDOMEN: Soft, nontender,   EXT:  1+ edema  :  Without Wilkinson catheter.  MSK: No joint effusions or erythema  SKIN: no rash      Laboratory Data    Results from last 7 days   Lab Units 24  1254 24  0820 24  0016 24  1626 24  1142 24  0709   WBC 10*3/mm3  --  9.22  --  8.24  --  8.92   HEMOGLOBIN g/dL 9.3* 8.8* 8.9* 8.5*  8.5*   < > 9.2*  9.2*   HEMATOCRIT % 28.1* 26.2* 26.5* 25.9*  24.9*   < > 28.2*  28.2*   PLATELETS 10*3/mm3  --  272  --  238  --  243    < > = values in this interval not displayed.     Results from last 7 days   Lab Units 24  0628   SODIUM mmol/L 135*   POTASSIUM mmol/L 4.1   CHLORIDE mmol/L 100   CO2 mmol/L 26.0   BUN mg/dL 13   CREATININE mg/dL 0.74*   GLUCOSE mg/dL 117*   CALCIUM mg/dL 7.9*     Results from last 7 days   Lab Units 24  1206   ALK PHOS  "U/L 44   BILIRUBIN mg/dL 0.6   ALT (SGPT) U/L 8   AST (SGOT) U/L 27             Results from last 7 days   Lab Units 05/07/24  0959   LACTATE mmol/L 1.2             Estimated Creatinine Clearance: 113.3 mL/min (A) (by C-G formula based on SCr of 0.74 mg/dL (L)).      Microbiology:  Blood Culture   Date Value Ref Range Status   05/06/2024 Abnormal Stain (C)  Preliminary     BCID, PCR   Date Value Ref Range Status   05/06/2024 (A) Negative by BCID PCR. Culture to Follow. Final    Enterococcus faecalis. Arcelia/B (vancomycin resistance gene) not detected. Identification by BCID2 PCR.     No results found for: \"CULTURES\", \"HSVCX\", \"URCX\"  No results found for: \"EYECULTURE\", \"GCCX\", \"HSVCULTURE\", \"LABHSV\"  No results found for: \"LEGIONELLA\", \"MRSACX\", \"MUMPSCX\", \"MYCOPLASCX\"  No results found for: \"NOCARDIACX\", \"STOOLCX\"  No results found for: \"THROATCX\", \"UNSTIMCULT\", \"URINECX\", \"CULTURE\", \"VZVCULTUR\"  No results found for: \"VIRALCULTU\", \"WOUNDCX\"        Radiology:  Imaging Results (Last 72 Hours)       Procedure Component Value Units Date/Time    XR Chest 1 View [[id number]] Collected: 05/07/24 1629     Updated: 05/07/24 1638    Narrative:      XR CHEST 1 VW    Date of Exam: 5/7/2024 3:57 PM EDT    Indication: PICC placement    Comparison: 5/6/2024     Findings:  Right upper extremity PICC line is seen with its tip in the distal SVC. The heart is enlarged. Diffuse interstitial disease pattern presumably represents interstitial edema, increased from yesterday's study. There is minimal if any effusion and no   evidence of pneumothorax.         Impression:      Impression:    1. PICC line tip in the distal SVC.    2. Worsening congestive heart failure      Electronically Signed: Lance Mei MD    5/7/2024 4:35 PM EDT    Workstation ID: [id number]    CT Angiogram Abdomen Pelvis [[id number]] Collected: 05/07/24 0433     Updated: 05/07/24 0440    Narrative:      CT ANGIOGRAM ABDOMEN PELVIS    Date of Exam: 5/7/2024 3:59 AM " EDT    Indication: hypotension, anemia.    Comparison: 5/6/2024.    Technique: CTA of the abdomen and pelvis was performed after the uneventful intravenous administration of 115 mL Isovue-370. Reconstructed coronal and sagittal images were also obtained. In addition, a 3-D volume rendered image was created for   interpretation. Automated exposure control and iterative reconstruction methods were used.      Findings:  There is mild body wall edema. There is mild multifocal muscular atrophy. No soft tissue hematoma. Findings in the chest discussed in a separate report. There is moderate diffuse lumbar spondylosis. There is severe osteoarthritis of both hips.    The liver is homogeneous. Patient is status post cholecystectomy. The stomach, duodenum, spleen and left adrenal gland appear within normal limits. There is a small intermediate density nodule at the right adrenal gland measuring 11 mm, likely adenoma.   The kidneys enhance homogeneously. There is no hydronephrosis. Urinary bladder is nondistended. Prostate gland is small in size. The appendix is normal. There is colonic diverticulosis without evidence of acute diverticulitis. Small bowel is   nondistended. There is mild mesenteric and retroperitoneal edema. No ascites, pneumoperitoneum or lymphadenopathy. There is moderate aortoiliac atherosclerotic disease. There is an infrarenal abdominal aortic aneurysm measuring 32 mm diameter. There is   mild aneurysmal dilatation of the left common iliac artery up to 19 mm.      Impression:      Impression:    1. No soft tissue hematoma or obvious source of bleeding.  2. No acute intra-abdominal or pelvic abnormality.  3. Colonic diverticulosis without acute diverticulitis.  4. 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  4. Mild body wall edema.  5. Moderate diffuse lumbar spondylosis.        Electronically Signed: Al Justin MD     5/7/2024 4:37 AM EDT    Workstation ID: [id number]    CT Angiogram Chest [[id number]] Collected: 05/07/24 0426     Updated: 05/07/24 0436    Narrative:      CT ANGIOGRAM CHEST    Date of Exam: 5/7/2024 3:59 AM EDT    Indication: hgb drop in setting of supratheraperutic INR..    Comparison: None available.    Technique: CTA of the chest was performed after the uneventful intravenous administration of 115 mL Isovue-370. Reconstructed coronal and sagittal images were also obtained. In addition, a 3-D volume rendered image was created for interpretation.   Automated exposure control and iterative reconstruction methods were used.      Findings:  Thyroid, trachea and esophagus appear within normal limits. There is evidence of prior median sternotomy and CABG. There is heavy native coronary artery calcification. There is evidence of prior mitral valve replacement. There are dense aortic valve   calcifications. The heart is mildly enlarged. No pericardial effusion. No mediastinal lymphadenopathy. Mild aortic atherosclerosis. No aneurysm.    There are small bilateral pleural effusions with dependent bibasilar atelectasis. There is mild interstitial opacity in the lung bases that may reflect mild interstitial edema. No pneumothorax or lobar consolidation. Central airways are patent.    There are no acute findings in the superficial soft tissues. There is severe DJD of both glenohumeral joints. No acute osseous abnormality or destructive bone lesion. There is mild diffuse thoracic spondylosis. There is ossification along the   supraspinous ligament and there are bridging thoracic osteophytes.      Impression:      Impression:    1. No hematoma or obvious source of bleeding noted.  2. Small bilateral pleural effusions, mild dependent bibasilar atelectasis and mild interstitial pulmonary edema.  3. Coronary artery disease status post CABG. There is a prosthetic mitral valve in place. Heart is mildly  enlarged.        Electronically Signed: Al Justin MD    5/7/2024 4:33 AM EDT    Workstation ID: [id number]    CT Angiogram Head [[id number]] Collected: 05/07/24 0421     Updated: 05/07/24 0429    Narrative:      CT ANGIOGRAM HEAD    Date of Exam: 5/7/2024 3:59 AM EDT    Indication: AMS possible bleed.    Comparison: None available.    Technique: CTA of the head was performed after the uneventful intravenous administration of 115 mL Isovue-370. Reconstructed coronal and sagittal images were also obtained. In addition, a 3-D volume rendered image was created for interpretation.   Automated exposure control and iterative reconstruction methods were used.      Findings:  Right carotid: Distal cervical ICA is normal. Intracranial ICA segments demonstrate mild nonstenosing calcific plaque. Small patent P-comm. Ophthalmic artery origin is normal. Patent A-Comm. The A1 and M1 segments and distal EFREN and MCA branches appear   patent.    Left carotid: The distal cervical ICA is normal. Intracranial ICA segments demonstrate mild nonstenosing calcific plaque. The ophthalmic artery origin is normal. Small patent P-comm. A1 and M1 segments and distal EFREN and MCA branches appear patent.    Posterior circulation: V3 and V4 segments appear widely patent with minimal calcific disease. The basilar artery is normal. Superior cerebellar and posterior cerebral arteries appear patent.    Nonvascular findings: Thinning of the orbital lenses would suggest prior cataract surgery. No focal hypoattenuating lesions in the brain. No mass effect or midline shift. The calvarium appears intact. Sinuses and mastoids appear clear. Superficial soft   tissues are unremarkable.      Impression:      Impression:  No significant vascular abnormality in the head.        Electronically Signed: Al Justin MD    5/7/2024 4:26 AM EDT    Workstation ID: [id number]    MRI Lumbar Spine With & Without Contrast [[id number]] Collected: 05/06/24 2331     Updated:  05/06/24 2343    Narrative:        MRI LUMBAR SPINE W WO CONTRAST    Date of Exam: 5/6/2024 10:54 PM EDT    Indication: right lower back pain, fevers.     Comparison: None available.    Technique:  Routine multiplanar/multisequence sequence images of the lumbar spine were obtained before and after the uneventful administration of 20 mL Multihance.        Findings:  Five lumbar type vertebral bodies are identified.  Alignment is anatomic. There is moderate narrowing of the L4-L5 disc. There is a transitional L5 vertebra with sacralization on the left. There is mild narrowing of the other lumbar discs and there is a   rudimentary L5-S1 disc. Vertebral bodies maintain normal height.  Distal spinal cord and conus medullaris appear unremarkable terminating at the T12-L1 level.  Cauda equina appears unremarkable. There is mild degenerative bone marrow heterogeneity. There   is mild diffuse paraspinal muscular atrophy. There is aneurysmal dilatation of the infrarenal abdominal aorta up to 33 mm. There is also aneurysmal dilatation of the common iliac arteries measuring up to 20 mm each.    L1-L2: There is concentric disc bulge and marginal osteophyte formation asymmetric to the right. There is mild facet and ligamentum flavum hypertrophy. There is mild bilateral neuroforaminal narrowing and mild narrowing of the spinal canal.    L2-L3: There is concentric disc bulge and marginal osteophyte formation. There is a large posterior disc osteophyte complex resulting in severe narrowing of the spinal canal. There is moderate facet and ligamentum flavum hypertrophy. There is moderate to   severe bilateral neuroforaminal narrowing.    L3-L4: There is concentric disc bulge and marginal osteophyte formation with significant posterior disc osteophyte complex resulting in moderate narrowing of the spinal canal. There is moderate facet and ligamentum flavum hypertrophy and mild to moderate   bilateral neuroforaminal narrowing.    L4-L5:  There is concentric disc bulge and mild facet and ligamentum flavum hypertrophy. There is mild to moderate bilateral neuroforaminal narrowing without spinal canal compromise.    L5-S1: No focal disc protrusion or extrusion. Facet joints appear unremarkable. No significant neural foraminal or spinal canal stenosis.      Impression:      Impression:    1. No acute findings.  2. Moderate to severe lumbar spondylosis including severe narrowing of the spinal canal at L2-L3 and varying degrees of neuroforaminal narrowing as discussed above.        Electronically Signed: Al Justin MD    5/6/2024 11:40 PM EDT    Workstation ID: [id number]              Impression:   Enterococcus faecalis bacteremia  Hypotension  Bioprosthetic AVR  Mechanical MVR  Supratherapeutic INR  Anemia  Elevated procalcitonin  History of GI bleed with gastric AVM  Thoracic back pain  PLAN/RECOMMENDATIONS:   Thank you for asking us to see Kaleb Abraham, I recommend the following:  High-grade enterococcal bacteremia is concerning in the setting of endovascular hardware.    Estimated Creatinine Clearance: 113.3 mL/min (A) (by C-G formula based on SCr of 0.74 mg/dL (L)).    Patient could have a GI source such as inflammation of colonic viscus (superimposed diverticulitis and diverticulosis) or bacteremia  following endoscopy regarding management of a GIbleed.    Gallbladder has been removed    Repeat blood cultures x 2 sets     Enterococcus isolate is susceptible to ampicillin and ceftriaxone        cont ampicillin 2 g IV every 4 hours    cont ceftriaxone 2 g IV every 12 hours    DC vancomycin    Agree with transesophageal echocardiogram    Repeat blood cultures x 2 sets    At this moment maximum medical therapy will be dual IV antibiotic therapy for a 6-week course followed by suppression with oral amoxicillin    Not unreasonable to get an opinion from CT surgery after transesophageal echocardiogram is performed anticipate patient would have high  mortality with a revision of aortic root mitral valve replacement.    This case involves complex medical decision making    Lino Jacob MD  5/9/2024  16:39 EDT

## 2024-05-09 NOTE — PROGRESS NOTES
"Pharmacy Consult  -  Warfarin  Kaleb Abraham is a  73 y.o. male   Height - 180.3 cm (70.98\")  Weight - 109 kg (239 lb 13.8 oz)    Consulting Provider: - Kendra  Indication: - Afib, Mechanical Mitral valve  Goal INR: - 2.5 - 3.5  Home Regimen: Most recent dose of warfarin was 5mg daily although was adjusted to 4mg over the weekend due to elevated INR   - Patient has medication bottle of 4mg tablets and 1mg tablets at home     Bridge Therapy: Yes   and unfractionated heparin    Drug-Drug Interactions with current regimen:   Received 10mg IV Vitamin K on 5/6    Warfarin Dosing During Admission:  Date  5/8 5/9          INR  1.46 1.58          Dose  3 mg 3 mg             Education Provided: Warfarin education provided on 5/8/2024 verbally.  Discussed effects of warfarin, importance of checking INR, drug-drug and drug-food interactions, and signs/symptoms of bleeding and clotting.  Patient deferred to wife who as in the room and manages his medications. She monitors his INR at home and then faxes results to Dr Maradiaga office.  All pertinent questions were answered.      Discharge Follow up:   Following Provider - Dr Peraza   Follow up time range or appointment - 2-3 days post discharge    Labs:  Results from last 7 days   Lab Units 05/09/24  0628 05/09/24  0016 05/08/24  1626 05/08/24  1142 05/08/24  0709 05/07/24  2359 05/07/24  1611 05/07/24  0959 05/07/24  0051 05/06/24  2223 05/06/24  1447   INR  1.58*  --   --   --  1.46*  --   --  1.55*  --  2.33* >10.00*   APTT seconds  --   --   --   --  38.7*  --   --   --   --   --   --    HEMOGLOBIN g/dL  --  8.9* 8.5*  8.5* 8.9* 9.2*  9.2* 8.9* 8.1* 8.4*   < >  --   --    HEMATOCRIT %  --  26.5* 25.9*  24.9* 26.4* 28.2*  28.2* 26.8* 24.3* 25.7*   < >  --   --     < > = values in this interval not displayed.     Results from last 7 days   Lab Units 05/09/24  0628 05/08/24  0709 05/07/24  0959 05/06/24  1206   SODIUM mmol/L 135* 132* 134* 131*   POTASSIUM mmol/L 4.1 " 3.9 4.3 4.0   CHLORIDE mmol/L 100 97* 97* 93*   CO2 mmol/L 26.0 19.0* 29.0 29.0   BUN mg/dL 13 14 16 17   CREATININE mg/dL 0.74* 0.80 1.01 1.04   CALCIUM mg/dL 7.9* 8.2* 8.6 8.1*   BILIRUBIN mg/dL  --   --   --  0.6   ALK PHOS U/L  --   --   --  44   ALT (SGPT) U/L  --   --   --  8   AST (SGOT) U/L  --   --   --  27   GLUCOSE mg/dL 117* 204* 104* 138*     Current dietary intake:  0-50% meals  Diet Order   Procedures    Diet: Cardiac, Diabetic; Healthy Heart (2-3 Na+); Consistent Carbohydrate; Fluid Consistency: Thin (IDDSI 0)     Assessment/Plan:  Pharmacy to dose warfarin for mechanical mitral valve, goal INR 2.5-3.5.  Patient received Vitamin K 10mg IV on 5/6. Warfarin restarted on 5/8.  INR today subtherapeutic at 1.58, but climbing. Will give warfarin 3mg again this evening.  Pharmacy will continue to monitor daily INR, signs/symptoms of bleeding, drug-drug interactions and dietary intake to adjust dose as necessary.     Thank you  Dawood Campa RPH  5/9/2024  07:13 EDT

## 2024-05-09 NOTE — PROGRESS NOTES
HEPARIN INFUSION  Kaleb Abraham is a  73 y.o. male receiving heparin infusion.   Therapy for Afb, Mechanical Mitral Valve   Patient Weight: 107 kg  Any Bolus (Y/N):   No bolus ever      Signs or Symptoms of Bleeding: Elevated INR on admit    Cardiac or Other (Not VTE)  Initial rate: 12 units/kg/hr (Max 1,000 units/hr)   Anti Xa Rebolus Infusion Hold time Change infusion Dose (Units/kg/hr) Next Anti Xa or aPTT Level Due   < 0.11 0 None Increase by  3 Units/kg/hr 6 hours   0.11- 0.19 0 None Increase by  2 Units/kg/hr 6 hours   0.2 - 0.29 0 None Increase by  1 Units/kg/hr 6 hours   0.3 - 0.5 0 None No Change 6 hours (after 2 consecutive levels in range check qAM)   0.51 - 0.6 0 None Decrease by  1 Units/kg/hr 6 hours   0.61 - 0.8 0 30 Minutes Decrease by  2 Units/kg/hr 6 hours   0.81 - 1 0 60 Minutes Decrease by  3 Units/kg/hr 6 hours   >1 0 Hold  After Anti Xa less than 0.5 decrease previous rate by  4 Units/kg/hr  Every 2 hours until Anti Xa  less than 0.5 then when infusion restarts in 6 hours     Results from last 7 days   Lab Units 05/09/24  0628 05/09/24  0016 05/08/24  1626 05/08/24  1142 05/08/24  0709 05/07/24  1611 05/07/24  0959   INR  1.58*  --   --   --  1.46*  --  1.55*   HEMOGLOBIN g/dL  --  8.9* 8.5*  8.5* 8.9* 9.2*  9.2*   < > 8.4*   HEMATOCRIT %  --  26.5* 25.9*  24.9* 26.4* 28.2*  28.2*   < > 25.7*   PLATELETS 10*3/mm3  --   --  238  --  243  --  218    < > = values in this interval not displayed.       Date   Time   Anti-Xa Current Rate (Unit/kg/hr) Bolus   (Units) Rate Change   (Unit/kg/hr) New Rate (Unit/kg/hr) Next   Anti-Xa Comments  Pump Check Daily   5/8 0925 0.1  --  9 [address] RN Milady   5/8 1626 0.1 9 -- +3 12 0000 DW RN   5/9 0016 0.1 12 -- +3 15 0600 Sherry 6398 [facility]   5/9 0628 0.1 15 -- +3 18 1300 DW RN       --           --           --           --           --           --                                                                                                                                 Dawood Campa, [facility]  5/9/2024  07:08 EDT
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
## 2024-05-09 NOTE — CASE MANAGEMENT/SOCIAL WORK
Continued Stay Note   [facility]     Patient Name: Kaleb Abraham  MRN: [id number]  Today's Date: 5/9/2024    Admit Date: 5/6/2024    Plan: Home with Home Health if possible   Discharge Plan       Row Name 05/09/24 1408       Plan    Plan Home with Home Health if possible    Patient/Family in Agreement with Plan yes    Plan Comments I spoke with the patient at the bedside. I discussed with him therapy's recommendation for rehab. Patient expressed that he does not want to go to rehab. He would prefer to do therapy at home. We discussed home health options. He would like me to call [facility]. I called [phone] and spoke with Sudha at [facility]. I faxed the referral to her at [phone]. Medical plan at this time is for him to have a COLEEN tomorrow. CM following.    Final Discharge Disposition Code 06 - home with home health care                   Discharge Codes    No documentation.                       Shelia Santos, RN

## 2024-05-10 ENCOUNTER — APPOINTMENT (OUTPATIENT)
Dept: CARDIOLOGY | Facility: HOSPITAL | Age: 74
DRG: 314 | End: 2024-05-10
Payer: MEDICARE

## 2024-05-10 LAB
ALBUMIN SERPL-MCNC: 3 G/DL (ref 3.5–5.2)
ALBUMIN/GLOB SERPL: 0.8 G/DL
ALP SERPL-CCNC: 69 U/L (ref 39–117)
ALT SERPL W P-5'-P-CCNC: 15 U/L (ref 1–41)
ANION GAP SERPL CALCULATED.3IONS-SCNC: 11 MMOL/L (ref 5–15)
ANION GAP SERPL CALCULATED.3IONS-SCNC: 12 MMOL/L (ref 5–15)
AST SERPL-CCNC: 42 U/L (ref 1–40)
BASOPHILS # BLD MANUAL: 0.17 10*3/MM3 (ref 0–0.2)
BASOPHILS NFR BLD MANUAL: 1 % (ref 0–1.5)
BH CV ECHO MEAS - AO MAX PG: 31.4 MMHG
BH CV ECHO MEAS - AO MEAN PG: 12.7 MMHG
BH CV ECHO MEAS - AO V2 MAX: 277.7 CM/SEC
BH CV ECHO MEAS - AO V2 VTI: 49.7 CM
BH CV ECHO MEAS - LV MAX PG: 3.7 MMHG
BH CV ECHO MEAS - LV MEAN PG: 1.86 MMHG
BH CV ECHO MEAS - LV V1 MAX: 96.1 CM/SEC
BH CV ECHO MEAS - LV V1 VTI: 21.5 CM
BH CV ECHO MEAS - MV MAX PG: 19.4 MMHG
BH CV ECHO MEAS - MV MEAN PG: 7.2 MMHG
BH CV ECHO MEAS - MV V2 VTI: 45.4 CM
BH CV VAS BP LEFT ARM: NORMAL MMHG
BILIRUB SERPL-MCNC: 0.4 MG/DL (ref 0–1.2)
BUN SERPL-MCNC: 12 MG/DL (ref 8–23)
BUN SERPL-MCNC: 9 MG/DL (ref 8–23)
BUN/CREAT SERPL: 16 (ref 7–25)
BUN/CREAT SERPL: 9.3 (ref 7–25)
CALCIUM SPEC-SCNC: 11.5 MG/DL (ref 8.6–10.5)
CALCIUM SPEC-SCNC: 8 MG/DL (ref 8.6–10.5)
CHLORIDE SERPL-SCNC: 97 MMOL/L (ref 98–107)
CHLORIDE SERPL-SCNC: 99 MMOL/L (ref 98–107)
CO2 SERPL-SCNC: 27 MMOL/L (ref 22–29)
CO2 SERPL-SCNC: 27 MMOL/L (ref 22–29)
CREAT SERPL-MCNC: 0.75 MG/DL (ref 0.76–1.27)
CREAT SERPL-MCNC: 0.97 MG/DL (ref 0.76–1.27)
DEPRECATED RDW RBC AUTO: 51.1 FL (ref 37–54)
DEPRECATED RDW RBC AUTO: 53.2 FL (ref 37–54)
EGFRCR SERPLBLD CKD-EPI 2021: 82.9 ML/MIN/1.73
EGFRCR SERPLBLD CKD-EPI 2021: 95.9 ML/MIN/1.73
EOSINOPHIL # BLD MANUAL: 0.34 10*3/MM3 (ref 0–0.4)
EOSINOPHIL NFR BLD MANUAL: 2 % (ref 0.3–6.2)
ERYTHROCYTE [DISTWIDTH] IN BLOOD BY AUTOMATED COUNT: 15.6 % (ref 12.3–15.4)
ERYTHROCYTE [DISTWIDTH] IN BLOOD BY AUTOMATED COUNT: 15.9 % (ref 12.3–15.4)
GLOBULIN UR ELPH-MCNC: 3.6 GM/DL
GLUCOSE BLDC GLUCOMTR-MCNC: 108 MG/DL (ref 70–130)
GLUCOSE BLDC GLUCOMTR-MCNC: 125 MG/DL (ref 70–130)
GLUCOSE BLDC GLUCOMTR-MCNC: 95 MG/DL (ref 70–130)
GLUCOSE BLDC GLUCOMTR-MCNC: 99 MG/DL (ref 70–130)
GLUCOSE SERPL-MCNC: 134 MG/DL (ref 65–99)
GLUCOSE SERPL-MCNC: 88 MG/DL (ref 65–99)
HCT VFR BLD AUTO: 27.4 % (ref 37.5–51)
HCT VFR BLD AUTO: 28.8 % (ref 37.5–51)
HCT VFR BLD AUTO: 28.8 % (ref 37.5–51)
HCT VFR BLD AUTO: 31 % (ref 37.5–51)
HCT VFR BLD AUTO: 37 % (ref 37.5–51)
HGB BLD-MCNC: 10.2 G/DL (ref 13–17.7)
HGB BLD-MCNC: 11.7 G/DL (ref 13–17.7)
HGB BLD-MCNC: 9 G/DL (ref 13–17.7)
HGB BLD-MCNC: 9.5 G/DL (ref 13–17.7)
HGB BLD-MCNC: 9.5 G/DL (ref 13–17.7)
INR PPP: 1.88 (ref 0.89–1.12)
INR PPP: 2.03 (ref 0.89–1.12)
LYMPHOCYTES # BLD MANUAL: 4.95 10*3/MM3 (ref 0.7–3.1)
LYMPHOCYTES NFR BLD MANUAL: 1 % (ref 5–12)
MAGNESIUM SERPL-MCNC: 3.5 MG/DL (ref 1.6–2.4)
MCH RBC QN AUTO: 29 PG (ref 26.6–33)
MCH RBC QN AUTO: 29.8 PG (ref 26.6–33)
MCHC RBC AUTO-ENTMCNC: 31.6 G/DL (ref 31.5–35.7)
MCHC RBC AUTO-ENTMCNC: 33 G/DL (ref 31.5–35.7)
MCV RBC AUTO: 90.3 FL (ref 79–97)
MCV RBC AUTO: 91.6 FL (ref 79–97)
METAMYELOCYTES NFR BLD MANUAL: 1 % (ref 0–0)
MONOCYTES # BLD: 0.17 10*3/MM3 (ref 0.1–0.9)
MYELOCYTES NFR BLD MANUAL: 1 % (ref 0–0)
NEUTROPHILS # BLD AUTO: 11.1 10*3/MM3 (ref 1.7–7)
NEUTROPHILS NFR BLD MANUAL: 64 % (ref 42.7–76)
NEUTS BAND NFR BLD MANUAL: 1 % (ref 0–5)
NRBC SPEC MANUAL: 0 /100 WBC (ref 0–0.2)
PHOSPHATE SERPL-MCNC: 2.8 MG/DL (ref 2.5–4.5)
PLAT MORPH BLD: NORMAL
PLATELET # BLD AUTO: 275 10*3/MM3 (ref 140–450)
PLATELET # BLD AUTO: 316 10*3/MM3 (ref 140–450)
PMV BLD AUTO: 9 FL (ref 6–12)
PMV BLD AUTO: 9.3 FL (ref 6–12)
POTASSIUM SERPL-SCNC: 3.9 MMOL/L (ref 3.5–5.2)
POTASSIUM SERPL-SCNC: 4.9 MMOL/L (ref 3.5–5.2)
PROT SERPL-MCNC: 6.6 G/DL (ref 6–8.5)
PROTHROMBIN TIME: 21.8 SECONDS (ref 12.2–14.5)
PROTHROMBIN TIME: 23.1 SECONDS (ref 12.2–14.5)
QT INTERVAL: 534 MS
QT INTERVAL: 570 MS
QT INTERVAL: 658 MS
QTC INTERVAL: 573 MS
QTC INTERVAL: 584 MS
QTC INTERVAL: 599 MS
RBC # BLD AUTO: 3.19 10*6/MM3 (ref 4.14–5.8)
RBC # BLD AUTO: 4.04 10*6/MM3 (ref 4.14–5.8)
RBC MORPH BLD: NORMAL
SODIUM SERPL-SCNC: 136 MMOL/L (ref 136–145)
SODIUM SERPL-SCNC: 137 MMOL/L (ref 136–145)
UFH PPP CHRO-ACNC: 0.2 IU/ML (ref 0.3–0.7)
UFH PPP CHRO-ACNC: 0.22 IU/ML (ref 0.3–0.7)
UFH PPP CHRO-ACNC: 0.26 IU/ML (ref 0.3–0.7)
VARIANT LYMPHS NFR BLD MANUAL: 26 % (ref 19.6–45.3)
VARIANT LYMPHS NFR BLD MANUAL: 3 % (ref 0–5)
WBC MORPH BLD: NORMAL
WBC NRBC COR # BLD AUTO: 17.07 10*3/MM3 (ref 3.4–10.8)
WBC NRBC COR # BLD AUTO: 9.5 10*3/MM3 (ref 3.4–10.8)

## 2024-05-10 PROCEDURE — 85027 COMPLETE CBC AUTOMATED: CPT | Performed by: HOSPITALIST

## 2024-05-10 PROCEDURE — 93010 ELECTROCARDIOGRAM REPORT: CPT | Performed by: INTERNAL MEDICINE

## 2024-05-10 PROCEDURE — 25010000002 AMPICILLIN PER 500 MG: Performed by: INTERNAL MEDICINE

## 2024-05-10 PROCEDURE — 25010000002 CEFTRIAXONE PER 250 MG: Performed by: INTERNAL MEDICINE

## 2024-05-10 PROCEDURE — 85520 HEPARIN ASSAY: CPT

## 2024-05-10 PROCEDURE — 85007 BL SMEAR W/DIFF WBC COUNT: CPT | Performed by: INTERNAL MEDICINE

## 2024-05-10 PROCEDURE — 99232 SBSQ HOSP IP/OBS MODERATE 35: CPT | Performed by: INTERNAL MEDICINE

## 2024-05-10 PROCEDURE — 87040 BLOOD CULTURE FOR BACTERIA: CPT | Performed by: INTERNAL MEDICINE

## 2024-05-10 PROCEDURE — 99152 MOD SED SAME PHYS/QHP 5/>YRS: CPT

## 2024-05-10 PROCEDURE — 25010000002 MORPHINE PER 10 MG: Performed by: INTERNAL MEDICINE

## 2024-05-10 PROCEDURE — 85025 COMPLETE CBC W/AUTO DIFF WBC: CPT | Performed by: INTERNAL MEDICINE

## 2024-05-10 PROCEDURE — 97530 THERAPEUTIC ACTIVITIES: CPT

## 2024-05-10 PROCEDURE — 80053 COMPREHEN METABOLIC PANEL: CPT | Performed by: HOSPITALIST

## 2024-05-10 PROCEDURE — B24BZZ4 ULTRASONOGRAPHY OF HEART WITH AORTA, TRANSESOPHAGEAL: ICD-10-PCS | Performed by: INTERNAL MEDICINE

## 2024-05-10 PROCEDURE — 93325 DOPPLER ECHO COLOR FLOW MAPG: CPT | Performed by: INTERNAL MEDICINE

## 2024-05-10 PROCEDURE — 93325 DOPPLER ECHO COLOR FLOW MAPG: CPT

## 2024-05-10 PROCEDURE — 92950 HEART/LUNG RESUSCITATION CPR: CPT

## 2024-05-10 PROCEDURE — 93005 ELECTROCARDIOGRAM TRACING: CPT | Performed by: INTERNAL MEDICINE

## 2024-05-10 PROCEDURE — 93005 ELECTROCARDIOGRAM TRACING: CPT

## 2024-05-10 PROCEDURE — 85610 PROTHROMBIN TIME: CPT | Performed by: INTERNAL MEDICINE

## 2024-05-10 PROCEDURE — 85018 HEMOGLOBIN: CPT | Performed by: HOSPITALIST

## 2024-05-10 PROCEDURE — 82948 REAGENT STRIP/BLOOD GLUCOSE: CPT

## 2024-05-10 PROCEDURE — 25010000002 MIDAZOLAM PER 1 MG: Performed by: INTERNAL MEDICINE

## 2024-05-10 PROCEDURE — 25010000002 HEPARIN (PORCINE) 25000-0.45 UT/250ML-% SOLUTION

## 2024-05-10 PROCEDURE — 92950 HEART/LUNG RESUSCITATION CPR: CPT | Performed by: INTERNAL MEDICINE

## 2024-05-10 PROCEDURE — 25010000002 EPINEPHRINE 1 MG/10ML SOLUTION PREFILLED SYRINGE: Performed by: INTERNAL MEDICINE

## 2024-05-10 PROCEDURE — 25010000002 FENTANYL CITRATE (PF) 50 MCG/ML SOLUTION: Performed by: INTERNAL MEDICINE

## 2024-05-10 PROCEDURE — 85014 HEMATOCRIT: CPT | Performed by: HOSPITALIST

## 2024-05-10 PROCEDURE — 93321 DOPPLER ECHO F-UP/LMTD STD: CPT

## 2024-05-10 PROCEDURE — 93312 ECHO TRANSESOPHAGEAL: CPT

## 2024-05-10 PROCEDURE — 25010000002 MAGNESIUM SULFATE PER 500 MG OF MAGNESIUM: Performed by: INTERNAL MEDICINE

## 2024-05-10 PROCEDURE — 83735 ASSAY OF MAGNESIUM: CPT | Performed by: INTERNAL MEDICINE

## 2024-05-10 PROCEDURE — 93321 DOPPLER ECHO F-UP/LMTD STD: CPT | Performed by: INTERNAL MEDICINE

## 2024-05-10 PROCEDURE — 93312 ECHO TRANSESOPHAGEAL: CPT | Performed by: INTERNAL MEDICINE

## 2024-05-10 PROCEDURE — 25010000002 LORAZEPAM PER 2 MG

## 2024-05-10 PROCEDURE — 85610 PROTHROMBIN TIME: CPT | Performed by: HOSPITALIST

## 2024-05-10 PROCEDURE — 84100 ASSAY OF PHOSPHORUS: CPT | Performed by: INTERNAL MEDICINE

## 2024-05-10 RX ORDER — WARFARIN SODIUM 5 MG/1
5 TABLET ORAL
Status: DISCONTINUED | OUTPATIENT
Start: 2024-05-10 | End: 2024-05-10

## 2024-05-10 RX ORDER — MIDAZOLAM HYDROCHLORIDE 1 MG/ML
6 INJECTION INTRAMUSCULAR; INTRAVENOUS ONCE
Qty: 6 ML | Refills: 0 | Status: COMPLETED | OUTPATIENT
Start: 2024-05-10 | End: 2024-05-10

## 2024-05-10 RX ORDER — FENTANYL CITRATE 50 UG/ML
100 INJECTION, SOLUTION INTRAMUSCULAR; INTRAVENOUS ONCE
Qty: 2 ML | Refills: 0 | Status: COMPLETED | OUTPATIENT
Start: 2024-05-10 | End: 2024-05-10

## 2024-05-10 RX ORDER — MAGNESIUM SULFATE HEPTAHYDRATE 500 MG/ML
INJECTION, SOLUTION INTRAMUSCULAR; INTRAVENOUS
Status: COMPLETED | OUTPATIENT
Start: 2024-05-10 | End: 2024-05-10

## 2024-05-10 RX ORDER — WARFARIN SODIUM 3 MG/1
3 TABLET ORAL
Status: DISCONTINUED | OUTPATIENT
Start: 2024-05-10 | End: 2024-05-11

## 2024-05-10 RX ORDER — CALCIUM CHLORIDE 100 MG/ML
INJECTION INTRAVENOUS; INTRAVENTRICULAR
Status: COMPLETED | OUTPATIENT
Start: 2024-05-10 | End: 2024-05-10

## 2024-05-10 RX ORDER — LORAZEPAM 2 MG/ML
0.5 INJECTION INTRAMUSCULAR ONCE
Status: COMPLETED | OUTPATIENT
Start: 2024-05-10 | End: 2024-05-10

## 2024-05-10 RX ORDER — OXYBUTYNIN CHLORIDE 5 MG/1
10 TABLET, EXTENDED RELEASE ORAL DAILY
Status: DISCONTINUED | OUTPATIENT
Start: 2024-05-10 | End: 2024-05-20 | Stop reason: HOSPADM

## 2024-05-10 RX ADMIN — AMPICILLIN SODIUM 2 G: 2 INJECTION, POWDER, FOR SOLUTION INTRAMUSCULAR; INTRAVENOUS at 08:37

## 2024-05-10 RX ADMIN — HEPARIN SODIUM 19 UNITS/KG/HR: 10000 INJECTION, SOLUTION INTRAVENOUS at 10:26

## 2024-05-10 RX ADMIN — CEFTRIAXONE 2000 MG: 2 INJECTION, POWDER, FOR SOLUTION INTRAMUSCULAR; INTRAVENOUS at 03:31

## 2024-05-10 RX ADMIN — LORAZEPAM 0.5 MG: 2 INJECTION INTRAMUSCULAR; INTRAVENOUS at 01:54

## 2024-05-10 RX ADMIN — SENNOSIDES AND DOCUSATE SODIUM 2 TABLET: 8.6; 5 TABLET ORAL at 12:06

## 2024-05-10 RX ADMIN — AMPICILLIN SODIUM 2 G: 2 INJECTION, POWDER, FOR SOLUTION INTRAMUSCULAR; INTRAVENOUS at 20:20

## 2024-05-10 RX ADMIN — EPINEPHRINE 1 MG: 0.1 INJECTION INTRAVENOUS at 19:27

## 2024-05-10 RX ADMIN — Medication 10 ML: at 08:40

## 2024-05-10 RX ADMIN — PANTOPRAZOLE SODIUM 40 MG: 40 INJECTION, POWDER, FOR SOLUTION INTRAVENOUS at 08:40

## 2024-05-10 RX ADMIN — MAGNESIUM SULFATE HEPTAHYDRATE 2 G: 500 INJECTION, SOLUTION INTRAMUSCULAR; INTRAVENOUS at 19:29

## 2024-05-10 RX ADMIN — AMPICILLIN SODIUM 2 G: 2 INJECTION, POWDER, FOR SOLUTION INTRAMUSCULAR; INTRAVENOUS at 16:15

## 2024-05-10 RX ADMIN — FENTANYL CITRATE 50 MCG: 50 INJECTION, SOLUTION INTRAMUSCULAR; INTRAVENOUS at 10:13

## 2024-05-10 RX ADMIN — AMPICILLIN SODIUM 2 G: 2 INJECTION, POWDER, FOR SOLUTION INTRAMUSCULAR; INTRAVENOUS at 03:30

## 2024-05-10 RX ADMIN — HYDROCODONE BITARTRATE AND ACETAMINOPHEN 1 TABLET: 10; 325 TABLET ORAL at 12:06

## 2024-05-10 RX ADMIN — CALCIUM CHLORIDE 1 G: 100 INJECTION INTRAVENOUS; INTRAVENTRICULAR at 19:30

## 2024-05-10 RX ADMIN — WARFARIN SODIUM 3 MG: 3 TABLET ORAL at 17:45

## 2024-05-10 RX ADMIN — HYDROCODONE BITARTRATE AND ACETAMINOPHEN 1 TABLET: 10; 325 TABLET ORAL at 23:03

## 2024-05-10 RX ADMIN — HEPARIN SODIUM 21 UNITS/KG/HR: 10000 INJECTION, SOLUTION INTRAVENOUS at 23:04

## 2024-05-10 RX ADMIN — OXYBUTYNIN CHLORIDE 10 MG: 5 TABLET, EXTENDED RELEASE ORAL at 11:52

## 2024-05-10 RX ADMIN — LEVOTHYROXINE SODIUM 25 MCG: 25 TABLET ORAL at 05:49

## 2024-05-10 RX ADMIN — PANTOPRAZOLE SODIUM 40 MG: 40 INJECTION, POWDER, FOR SOLUTION INTRAVENOUS at 20:21

## 2024-05-10 RX ADMIN — MIDAZOLAM HYDROCHLORIDE 2 MG: 1 INJECTION, SOLUTION INTRAMUSCULAR; INTRAVENOUS at 10:10

## 2024-05-10 RX ADMIN — Medication 10 ML: at 20:21

## 2024-05-10 RX ADMIN — TAMSULOSIN HYDROCHLORIDE 0.4 MG: 0.4 CAPSULE ORAL at 20:21

## 2024-05-10 RX ADMIN — MORPHINE SULFATE 2 MG: 2 INJECTION, SOLUTION INTRAMUSCULAR; INTRAVENOUS at 15:49

## 2024-05-10 RX ADMIN — LIDOCAINE 1 PATCH: 4 PATCH TOPICAL at 08:37

## 2024-05-10 RX ADMIN — CEFTRIAXONE 2000 MG: 2 INJECTION, POWDER, FOR SOLUTION INTRAMUSCULAR; INTRAVENOUS at 15:49

## 2024-05-10 NOTE — PLAN OF CARE
Goal Outcome Evaluation:   - VSS  - Afebrile   - Patient complained of nausea but due to prolonged Qtc could not receive anti-nausea medications already on MAR. Pharmacist recommended ativan  - After receiving ativan, he became confused and restless. He then pulled out his PICC line. Two new peripheral IVs placed   - Morphine x 2 for continued back pain  - UOP: 785ml  - New bright red blood in urine around 0200 that has since stopped and urine is now clear and yellow.

## 2024-05-10 NOTE — PLAN OF CARE
Goal Outcome Evaluation:  Plan of Care Reviewed With: patient, spouse        Progress: no change  Outcome Evaluation: Pt continues to benefit from IPOT services to address deficits in generalized weakness, self-care and balance. WIll continue POC to progress towards PLOF. d/c rec is IPR.      Anticipated Discharge Disposition (OT): inpatient rehabilitation facility

## 2024-05-10 NOTE — THERAPY TREATMENT NOTE
Patient Name: Kaleb Abraham  : 1950    MRN: [id number]                              Today's Date: 5/10/2024       Admit Date: 2024    Visit Dx:     ICD-10-CM ICD-9-CM   1. Acute on chronic anemia  D64.9 285.9   2. S/P mechanical aortic valve  Z95.2 V43.3   3. Supratherapeutic INR  R79.1 790.92   4. History of GI bleed  Z87.19 V12.79   5. Anticoagulated on Coumadin  Z79.01 V58.61   6. Generalized weakness  R53.1 780.79   7. History of CHF (congestive heart failure)  Z86.79 V12.59   8. Weakness  R53.1 780.79     Patient Active Problem List   Diagnosis    Anemia    Aortic stenosis    CAD s/p CABG    T2DM    Hyperlipidemia    Hypertension    Hypothyroidism    Mitral regurgitation    MARTINEZ    Hypersomnia, unspecified    Chronic atrial fibrillation    Supratherapeutic INR    S/P prosthetic aortic valve    S/P mechanical aortic valve    Chronic anticoagulation (warfarin)     Past Medical History:   Diagnosis Date    Anemia     Aortic stenosis     CAD (coronary artery disease)     Diabetes mellitus     Hyperlipidemia     Hypertension     Hypothyroidism     Mitral regurgitation      Past Surgical History:   Procedure Laterality Date    CAPSULE ENDOSCOPY      2023 and 2023 per dr. schrader    COLONOSCOPY      2023 Dr. Schrader    CORONARY ARTERY BYPASS GRAFT      CORONARY STENT PLACEMENT      UPPER GASTROINTESTINAL ENDOSCOPY      2023 Dr. Schrader, 2023 Dr. Liu, 2024 Dr. Girish Lainez      General Information       Row Name 05/10/24 1517          Physical Therapy Time and Intention    Document Type therapy note (daily note)  -ND     Mode of Treatment physical therapy  -ND       Row Name 05/10/24 1517          General Information    Patient Profile Reviewed yes  -ND     Existing Precautions/Restrictions fall;oxygen therapy device and L/min  -ND     Barriers to Rehab medically complex;previous functional deficit  -ND       Row Name 05/10/24 7107          Cognition     Orientation Status (Cognition) oriented x 3  -ND       Row Name 05/10/24 1517          Safety Issues, Functional Mobility    Safety Issues Affecting Function (Mobility) awareness of need for assistance;insight into deficits/self-awareness;judgment;safety precaution awareness;safety precautions follow-through/compliance;sequencing abilities;problem-solving  -ND     Impairments Affecting Function (Mobility) balance;coordination;endurance/activity tolerance;strength;pain  -ND               User Key  (r) = Recorded By, (t) = Taken By, (c) = Cosigned By      Initials Name Provider Type    Helen Jacobson PT Physical Therapist                   Mobility       Row Name 05/10/24 1518          Bed Mobility    Comment, (Bed Mobility) Pt found sitting EOB with OT.  -ND       Row Name 05/10/24 1518          Bed-Chair Transfer    Bed-Chair [place] (Transfers) maximum assist (25% patient effort);2 person assist;verbal cues;nonverbal cues (demo/gesture)  -ND     Assistive Device (Bed-Chair Transfers) other (see comments)  BUE support  -ND     Comment, (Bed-Chair Transfer) SPT from EOB > chair with BUE support. Cues for sequencing provided.  -ND       Row Name 05/10/24 1518          Sit-Stand Transfer    Sit-Stand [place] (Transfers) moderate assist (50% patient effort);2 person assist;verbal cues;nonverbal cues (demo/gesture)  -ND     Assistive Device (Sit-Stand Transfers) other (see comments)  BUE support  -ND     Comment, (Sit-Stand Transfer) x1 from EOB, x2 from chair. Bilateral foot and knee block provided. Pt requires cues for hand placement and sequencing.  -ND       Row Name 05/10/24 1518          Gait/Stairs (Locomotion)    [place] Level (Gait) unable to assess  -ND               User Key  (r) = Recorded By, (t) = Taken By, (c) = Cosigned By      Initials Name Provider Type    Helen Jacobson PT Physical Therapist                   Obj/Interventions       Row Name 05/10/24 1538          Balance     Balance Assessment sitting static balance;sitting dynamic balance;sit to stand dynamic balance;standing static balance;standing dynamic balance  -ND     Static Sitting Balance minimal assist  -ND     Dynamic Sitting Balance moderate assist  -ND     Position, Sitting Balance supported;sitting in chair  -ND     Sit to Stand Dynamic Balance maximum assist;2-person assist;verbal cues;non-verbal cues (demo/gesture)  -ND     Static Standing Balance moderate assist;2-person assist;verbal cues;non-verbal cues (demo/gesture)  -ND     Dynamic Standing Balance maximum assist;2-person assist;verbal cues;non-verbal cues (demo/gesture)  -ND     Position/Device Used, Standing Balance supported;other (see comments)  BUE support  -ND     Balance Interventions sitting;standing;sit to stand;supported;static;dynamic  -ND               User Key  (r) = Recorded By, (t) = Taken By, (c) = Cosigned By      Initials Name Provider Type    ND Helen Simpson, PT Physical Therapist                   Goals/Plan    No documentation.                  Clinical Impression       Row Name 05/10/24 1538          Pain    Additional Documentation Pain Scale: FACES Pre/Post-Treatment (Group)  -ND       Row Name 05/10/24 1538          Pain Scale: FACES Pre/Post-Treatment    Pain: FACES Scale, Pretreatment 2-->hurts little bit  -ND     Posttreatment Pain Rating 4-->hurts little more  -ND     Pain Location generalized  -ND     Pain Location - back;neck  -ND       Row Name 05/10/24 1538          Plan of Care Review    Plan of Care Reviewed With patient;spouse  -ND     Progress no change  -ND     Outcome Evaluation Pt continues to present with significant generalized weakness, impaired balance and coordination, impaired sequencing, and limited activity tolerance and would benefit from continued skilled therapy to improve overall functional strength and mobility. Recommend IRF following d/c.  -ND       Row Name 05/10/24 1538          Vital Signs    Pre  Systolic BP Rehab 135  -ND     Pre Treatment Diastolic BP 81  -ND     Pretreatment Heart Rate (beats/min) 80  -ND     Posttreatment Heart Rate (beats/min) 64  -ND     Pre SpO2 (%) 89  -ND     O2 Delivery Pre Treatment nasal cannula  -ND     O2 Delivery Intra Treatment nasal cannula  -ND     Post SpO2 (%) 94  -ND     O2 Delivery Post Treatment nasal cannula  -ND     Pre Patient Position Sitting  -ND     Intra Patient Position Standing  -ND     Post Patient Position Sitting  -ND       Row Name 05/10/24 1538          Positioning and Restraints    Pre-Treatment Position in bed  -ND     Post Treatment Position chair  -ND     In Chair notified nsg;reclined;legs elevated;call light within reach;encouraged to call for assist;exit alarm on;waffle cushion;on mechanical lift sling;with family/caregiver;RUE elevated;LUE elevated  -ND               User Key  (r) = Recorded By, (t) = Taken By, (c) = Cosigned By      Initials Name Provider Type    Helen Jacobson, PT Physical Therapist                   Outcome Measures       Row Name 05/10/24 1541 05/10/24 0800       How much help from another person do you currently need...    Turning from your back to your side while in flat bed without using bedrails? 2  -ND 2  -EW    Moving from lying on back to sitting on the side of a flat bed without bedrails? 2  -ND 2  -EW    Moving to and from a bed to a chair (including a wheelchair)? 2  -ND 1  -EW    Standing up from a chair using your arms (e.g., wheelchair, bedside chair)? 2  -ND 1  -EW    Climbing 3-5 steps with a railing? 1  -ND 1  -EW    To walk in hospital room? 1  -ND 1  -EW    AM-PAC 6 Clicks Score (PT) 10  -ND 8  -EW    Highest Level of Mobility Goal 4 --> Transfer to chair/commode  -ND 3 --> Sit at edge of bed  -EW      Row Name 05/10/24 1541 05/10/24 1346       Functional Assessment    Outcome Measure Options AM-PAC 6 Clicks Basic Mobility (PT)  -ND AM-PAC 6 Clicks Daily Activity (OT)  -KL              User Key  (r) =  Recorded By, (t) = Taken By, (c) = Cosigned By      Initials Name Provider Type    EW Alexa Nayak, RN Registered Nurse    ND Helen Simpson, PT Physical Therapist    Margaret Flores OT Occupational Therapist                                 Physical Therapy Education       Title: PT OT SLP Therapies (In Progress)       Topic: Physical Therapy (In Progress)       Point: Mobility training (In Progress)       Learning Progress Summary             Patient Acceptance, E, NR by ND at 5/10/2024 1541    Acceptance, E, VU by SG at 5/9/2024 1658    Acceptance, E,TB, NR by ES at 5/8/2024 1405   Significant Other Acceptance, E,TB, NR by ES at 5/8/2024 1405                         Point: Home exercise program (Done)       Learning Progress Summary             Patient Acceptance, E, VU by  at 5/9/2024 1658                         Point: Body mechanics (In Progress)       Learning Progress Summary             Patient Acceptance, E, NR by ND at 5/10/2024 1541    Acceptance, E, VU by SG at 5/9/2024 1658    Acceptance, E,TB, NR by ES at 5/8/2024 1405   Significant Other Acceptance, E,TB, NR by ES at 5/8/2024 1405                         Point: Precautions (In Progress)       Learning Progress Summary             Patient Acceptance, E, NR by ND at 5/10/2024 1541    Acceptance, E, VU by  at 5/9/2024 1658    Acceptance, E,TB, NR by ES at 5/8/2024 1405   Significant Other Acceptance, E,TB, NR by ES at 5/8/2024 1405                                         User Key       Initials Effective Dates Name Provider Type Discipline     09/22/22 -  Roger Crews, RN Registered Nurse Nurse     08/11/22 -  Renee Mendoza, PT Physical Therapist PT    ND 11/16/23 -  Helen Simpson, PT Physical Therapist PT                  PT Recommendation and Plan     Plan of Care Reviewed With: patient, spouse  Progress: no change  Outcome Evaluation: Pt continues to present with significant generalized weakness, impaired balance and  coordination, impaired sequencing, and limited activity tolerance and would benefit from continued skilled therapy to improve overall functional strength and mobility. Recommend IRF following d/c.     Time Calculation:         PT Charges       Row Name 05/10/24 1545             Time Calculation    Start Time 1308  -ND      PT Received On 05/10/24  -ND         Timed Charges    71167 - PT Therapeutic Activity Minutes 14  -ND         Total Minutes    Timed Charges Total Minutes 14  -ND       Total Minutes 14  -ND                User Key  (r) = Recorded By, (t) = Taken By, (c) = Cosigned By      Initials Name Provider Type    ND Helen Simpson, PT Physical Therapist                  Therapy Charges for Today       Code Description Service Date Service Provider Modifiers Qty    03124560143 HC PT THERAPEUTIC ACT EA 15 MIN 5/10/2024 Helen Simpson, PT GP 1            PT G-Codes  Outcome Measure Options: AM-PAC 6 Clicks Basic Mobility (PT)  AM-PAC 6 Clicks Score (PT): 10  AM-PAC 6 Clicks Score (OT): 13  PT Discharge Summary  Anticipated Discharge Disposition (PT): inpatient rehabilitation facility    Helen Simpson PT  5/10/2024

## 2024-05-10 NOTE — PROGRESS NOTES
"Pharmacy Consult  -  Warfarin  Kaleb Abraham is a  73 y.o. male   Height - 180.3 cm (70.98\")  Weight - 109 kg (239 lb 13.8 oz)    Consulting Provider: - Kendra  Indication: - Afib, Mechanical Mitral valve  Goal INR: - 2.5 - 3.5  Home Regimen: Most recent dose of warfarin was 5mg daily although was adjusted to 4mg over the weekend due to elevated INR   - Patient has medication bottle of 4mg tablets and 1mg tablets at home     Bridge Therapy: Yes   and unfractionated heparin    Drug-Drug Interactions with current regimen:   Received 10mg IV Vitamin K on 5/6    Warfarin Dosing During Admission:  Date  5/8 5/9 5/10         INR  1.46 1.58 1.88         Dose  3 mg 3 mg 3 mg            Education Provided: Warfarin education provided on 5/8/2024 verbally.  Discussed effects of warfarin, importance of checking INR, drug-drug and drug-food interactions, and signs/symptoms of bleeding and clotting.  Patient deferred to wife who as in the room and manages his medications. She monitors his INR at home and then faxes results to Dr Maradiaga office.  All pertinent questions were answered.      Discharge Follow up:   Following Provider - Dr Peraza   Follow up time range or appointment - 2-3 days post discharge    Labs:  Results from last 7 days   Lab Units 05/10/24  0608 05/10/24  0010 05/09/24  1838 05/09/24  1254 05/09/24  0820 05/09/24  0628 05/09/24  0016 05/08/24  1626 05/08/24  1142 05/08/24  0709 05/07/24  1611 05/07/24  0959 05/07/24  0051 05/06/24  2223 05/06/24  1447   INR  1.88*  --   --   --   --  1.58*  --   --   --  1.46*  --  1.55*  --  2.33* >10.00*   APTT seconds  --   --   --   --   --   --   --   --   --  38.7*  --   --   --   --   --    HEMOGLOBIN g/dL 9.5*  9.5* 9.0* 9.4* 9.3* 8.8*  --  8.9* 8.5*  8.5*   < > 9.2*  9.2*   < > 8.4*   < >  --   --    HEMATOCRIT % 28.8*  28.8* 27.4* 28.1* 28.1* 26.2*  --  26.5* 25.9*  24.9*   < > 28.2*  28.2*   < > 25.7*   < >  --   --     < > = values in this interval " not displayed.     Results from last 7 days   Lab Units 05/10/24  0608 05/09/24  0628 05/08/24  0709 05/07/24  0959 05/06/24  1206   SODIUM mmol/L 136 135* 132*   < > 131*   POTASSIUM mmol/L 3.9 4.1 3.9   < > 4.0   CHLORIDE mmol/L 97* 100 97*   < > 93*   CO2 mmol/L 27.0 26.0 19.0*   < > 29.0   BUN mg/dL 12 13 14   < > 17   CREATININE mg/dL 0.75* 0.74* 0.80   < > 1.04   CALCIUM mg/dL 8.0* 7.9* 8.2*   < > 8.1*   BILIRUBIN mg/dL  --   --   --   --  0.6   ALK PHOS U/L  --   --   --   --  44   ALT (SGPT) U/L  --   --   --   --  8   AST (SGOT) U/L  --   --   --   --  27   GLUCOSE mg/dL 88 117* 204*   < > 138*    < > = values in this interval not displayed.     Current dietary intake:  50-75% of meals documented  Diet Order   Procedures    NPO Diet NPO Type: Strict NPO     Assessment/Plan:  Pharmacy to dose warfarin for mechanical mitral valve, goal INR 2.5-3.5.   Most recent dose of warfarin was 5mg daily although was adjusted to 4mg over the weekend 5/4 due to elevated INR  Patient received Vitamin K 10mg IV on 5/6. Warfarin restarted on 5/8.  Currently bridging with IV heparin  INR today subtherapeutic at 1.88, but climbing. Will continue with warfarin 3mg this evening to avoid overshooting INR goal.  Pharmacy will continue to monitor daily INR, signs/symptoms of bleeding, drug-drug interactions and dietary intake to adjust dose as necessary.     Thank you  Dawood Campa RPH  5/10/2024  08:50 EDT

## 2024-05-10 NOTE — THERAPY TREATMENT NOTE
Patient Name: Kaleb Abraham  : 1950    MRN: [id number]                              Today's Date: 5/10/2024       Admit Date: 2024    Visit Dx:     ICD-10-CM ICD-9-CM   1. Acute on chronic anemia  D64.9 285.9   2. S/P mechanical aortic valve  Z95.2 V43.3   3. Supratherapeutic INR  R79.1 790.92   4. History of GI bleed  Z87.19 V12.79   5. Anticoagulated on Coumadin  Z79.01 V58.61   6. Generalized weakness  R53.1 780.79   7. History of CHF (congestive heart failure)  Z86.79 V12.59   8. Weakness  R53.1 780.79     Patient Active Problem List   Diagnosis    Anemia    Aortic stenosis    CAD s/p CABG    T2DM    Hyperlipidemia    Hypertension    Hypothyroidism    Mitral regurgitation    MARTINEZ    Hypersomnia, unspecified    Chronic atrial fibrillation    Supratherapeutic INR    S/P prosthetic aortic valve    S/P mechanical aortic valve    Chronic anticoagulation (warfarin)     Past Medical History:   Diagnosis Date    Anemia     Aortic stenosis     CAD (coronary artery disease)     Diabetes mellitus     Hyperlipidemia     Hypertension     Hypothyroidism     Mitral regurgitation      Past Surgical History:   Procedure Laterality Date    CAPSULE ENDOSCOPY      2023 and 2023 per dr. schrader    COLONOSCOPY      2023 Dr. Schrader    CORONARY ARTERY BYPASS GRAFT      CORONARY STENT PLACEMENT      UPPER GASTROINTESTINAL ENDOSCOPY      2023 Dr. Schrader, 2023 Dr. Liu, 2024 Dr. Girish Lainez      General Information       Row Name 05/10/24 1341          OT Time and Intention    Document Type therapy note (daily note)  -     Mode of Treatment occupational therapy  -       Row Name 05/10/24 1348          General Information    Patient Profile Reviewed yes  -KL     Existing Precautions/Restrictions fall;oxygen therapy device and L/min  -     Barriers to Rehab medically complex;previous functional deficit  -       Row Name 05/10/24 134          Cognition    Orientation  Status (Cognition) oriented x 3  -       Row Name 05/10/24 1340          Safety Issues, Functional Mobility    Safety Issues Affecting Function (Mobility) awareness of need for assistance;insight into deficits/self-awareness;judgment;problem-solving;safety precaution awareness;safety precautions follow-through/compliance  -     Impairments Affecting Function (Mobility) balance;coordination;endurance/activity tolerance;pain;strength  -               User Key  (r) = Recorded By, (t) = Taken By, (c) = Cosigned By      Initials Name Provider Type    Margaret Flores OT Occupational Therapist                     Mobility/ADL's       Row Name 05/10/24 1342          Bed Mobility    Supine-Sit [place] (Bed Mobility) maximum assist (25% patient effort);1 person assist  -     Bed Mobility, Safety Issues impaired trunk control for bed mobility  -     Assistive Device (Bed Mobility) bed rails;head of bed elevated;draw sheet  -[facility] Name 05/10/24 1342          Transfers    Transfers bed-chair transfer;sit-stand transfer;stand-sit transfer  -[facility] Name 05/10/24 1342          Bed-Chair Transfer    Bed-Chair [place] (Transfers) maximum assist (25% patient effort);2 person assist;verbal cues  -     Assistive Device (Bed-Chair Transfers) other (see comments)  [facility] support  -       Row Name 05/10/24 1342          Sit-Stand Transfer    Sit-Stand [place] (Transfers) verbal cues;moderate assist (50% patient effort);2 person assist  -     Assistive Device (Sit-Stand Transfers) other (see comments)  E support  -       Row Name 05/10/24 1342          Stand-Sit Transfer    Stand-Sit [place] (Transfers) moderate assist (50% patient effort);2 person assist;verbal cues  -     Assistive Device (Stand-Sit Transfers) other (see comments)  [facility] support  -       Row Name 05/10/24 1342          Activities of Daily Living    BADL Assessment/Intervention grooming  -       Row Name 05/10/24 1342           Lower Body Dressing Assessment/Training    [place] Level (Lower Body Dressing) don;socks;dependent (less than 25% patient effort)  -     Position (Lower Body Dressing) supine  -               User Key  (r) = Recorded By, (t) = Taken By, (c) = Cosigned By      Initials Name Provider Type    Margaret Flores OT Occupational Therapist                   Obj/Interventions       Row Name 05/10/24 1343          Balance    Static Sitting Balance minimal assist  -     Dynamic Sitting Balance moderate assist  -     Position, Sitting Balance supported  -     Static Standing Balance moderate assist;2-person assist  -KL     Dynamic Standing Balance maximum assist;2-person assist  -KL     Position/Device Used, Standing Balance supported  -     Balance Interventions sitting;standing;sit to stand;supported;static;dynamic;occupation based/functional task  -               User Key  (r) = Recorded By, (t) = Taken By, (c) = Cosigned By      Initials Name Provider Type    Margaret Flores OT Occupational Therapist                   Goals/Plan    No documentation.                  Clinical Impression       Row Name 05/10/24 9971          Pain Assessment    Pain Intervention(s) Nursing Notified;Repositioned;Ambulation/increased activity  -       Row Name 05/10/24 6651          Pain Scale: FACES Pre/Post-Treatment    Pain: FACES Scale, Pretreatment 2-->hurts little bit  -     Posttreatment Pain Rating 4-->hurts little more  -     Pain Location generalized  -     Pain Location - back;neck  -       Row Name 05/10/24 7903          Plan of Care Review    Plan of Care Reviewed With patient;spouse  -     Progress no change  -     Outcome Evaluation Pt continues to benefit from IPOT services to address deficits in generalized weakness, self-care and balance. WIll continue POC to progress towards PLOF. d/c rec is IPR.  -       Row Name 05/10/24 6441          Therapy Plan Review/Discharge Plan (OT)     Anticipated Discharge Disposition (OT) inpatient rehabilitation facility  -       Row Name 05/10/24 1344          Vital Signs    Pre Systolic BP Rehab 135  -KL     Pre Treatment Diastolic BP 81  -KL     Pretreatment Heart Rate (beats/min) 74  -KL     Posttreatment Heart Rate (beats/min) 75  -KL     Pre SpO2 (%) 92  -KL     O2 Delivery Pre Treatment nasal cannula  -KL     Post SpO2 (%) 93  -KL     O2 Delivery Post Treatment nasal cannula  -KL     Pre Patient Position Supine  -KL     Intra Patient Position Standing  -KL     Post Patient Position Sitting  -       Row Name 05/10/24 1344          Positioning and Restraints    Pre-Treatment Position in bed  -KL     Post Treatment Position chair  -KL     In Chair notified nsg;reclined;sitting;call light within reach;encouraged to call for assist;exit alarm on;with family/caregiver;waffle cushion;RUE elevated;LUE elevated;legs elevated;on mechanical lift sling  -               User Key  (r) = Recorded By, (t) = Taken By, (c) = Cosigned By      Initials Name Provider Type    Margaret Flores, HUMBERTO Occupational Therapist                   Outcome Measures       Row Name 05/10/24 1346          How much help from another is currently needed...    Putting on and taking off regular lower body clothing? 1  -KL     Bathing (including washing, rinsing, and drying) 2  -KL     Toileting (which includes using toilet bed pan or urinal) 1  -KL     Putting on and taking off regular upper body clothing 2  -KL     Taking care of personal grooming (such as brushing teeth) 3  -KL     Eating meals 4  -KL     AM-PAC 6 Clicks Score (OT) 13  -       Row Name 05/10/24 0800          How much help from another person do you currently need...    Turning from your back to your side while in flat bed without using bedrails? 2  -EW     Moving from lying on back to sitting on the side of a flat bed without bedrails? 2  -EW     Moving to and from a bed to a chair (including a wheelchair)? 1  -EW      Standing up from a chair using your arms (e.g., wheelchair, bedside chair)? 1  -EW     Climbing 3-5 steps with a railing? 1  -EW     To walk in hospital room? 1  -EW     AM-PAC 6 Clicks Score (PT) 8  -EW     Highest Level of Mobility Goal 3 --> Sit at edge of bed  -EW       Row Name 05/10/24 1346          Functional Assessment    Outcome Measure Options AM-PAC 6 Clicks Daily Activity (OT)  -               User Key  (r) = Recorded By, (t) = Taken By, (c) = Cosigned By      Initials Name Provider Type    Alexa Poole RN Registered Nurse    Margaret Flores OT Occupational Therapist                    Occupational Therapy Education       Title: PT OT SLP Therapies (In Progress)       Topic: Occupational Therapy (In Progress)       Point: ADL training (In Progress)       Description:   Instruct learner(s) on proper safety adaptation and remediation techniques during self care or transfers.   Instruct in proper use of assistive devices.                  Learning Progress Summary             Patient Acceptance, E, NR by TORIN at 5/8/2024 1529   Significant Other Acceptance, E, NR by TORIN at 5/8/2024 1529                         Point: Home exercise program (Not Started)       Description:   Instruct learner(s) on appropriate technique for monitoring, assisting and/or progressing therapeutic exercises/activities.                  Learner Progress:  Not documented in this visit.              Point: Precautions (In Progress)       Description:   Instruct learner(s) on prescribed precautions during self-care and functional transfers.                  Learning Progress Summary             Patient Acceptance, E, NR by TORIN at 5/10/2024 1347    Acceptance, E, NR by TORIN at 5/8/2024 1529   Significant Other Acceptance, E, NR by TORIN at 5/10/2024 1347    Acceptance, E, NR by  at 5/8/2024 1529                         Point: Body mechanics (In Progress)       Description:   Instruct learner(s) on proper positioning and spine alignment  during self-care, functional mobility activities and/or exercises.                  Learning Progress Summary             Patient Acceptance, E, NR by  at 5/10/2024 1347    Acceptance, E, NR by  at 5/8/2024 1529   Significant Other Acceptance, E, NR by  at 5/10/2024 1347    Acceptance, E, NR by  at 5/8/2024 1529                                         User Key       Initials Effective Dates Name Provider Type [facility] 02/05/24 -  Margaret Ramírez OT Occupational Therapist OT                  OT Recommendation and Plan  Planned Therapy Interventions (OT): activity tolerance training, adaptive equipment training, functional balance retraining, occupation/activity based interventions, patient/caregiver education/training, ROM/therapeutic exercise, strengthening exercise, transfer/mobility retraining  Therapy Frequency (OT): daily  Plan of Care Review  Plan of Care Reviewed With: patient, spouse  Progress: no change  Outcome Evaluation: Pt continues to benefit from IPOT services to address deficits in generalized weakness, self-care and balance. WIll continue POC to progress towards PLOF. d/c rec is IPR.     Time Calculation:   Evaluation Complexity (OT)  Review Occupational Profile/Medical/Therapy History Complexity: expanded/moderate complexity  Assessment, Occupational Performance/Identification of Deficit Complexity: 3-5 performance deficits  Clinical Decision Making Complexity (OT): detailed assessment/moderate complexity  Overall Complexity of Evaluation (OT): moderate complexity     Time Calculation- OT       Row Name 05/10/24 1347             Time Calculation- OT    OT Start Time 1308  -KL      OT Received On 05/10/24  -KL         Timed Charges    93904 - OT Therapeutic Activity Minutes 15  -KL         Total Minutes    Timed Charges Total Minutes 15  -KL       Total Minutes 15  -KL                User Key  (r) = Recorded By, (t) = Taken By, (c) = Cosigned By      Initials Name Provider Type      Margaret Ramírez OT Occupational Therapist                  Therapy Charges for Today       Code Description Service Date Service Provider Modifiers Qty    [id number]  OT THERAPEUTIC ACT EA 15 MIN 5/10/2024 Margaret Ramírez OT GO 1                 Margaret Ramírez OT  5/10/2024
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
## 2024-05-10 NOTE — PROGRESS NOTES
INFECTIOUS DISEASE f/u     Kaleb Abraham  1950  [id number]    Date of Consult: 5/10/2024    Admission Date: 5/6/2024      Requesting Provider: No ref. provider found  Evaluating Physician: Lino Jacob MD    Reason for Consultation: Weakness    History of present illness:    Patient is a 73 y.o. male with coronary disease history of CABG, type 2 diabetes mellitus, hypertension, hyperlipidemia, hypothyroidism, atrial fibrillation, valvular heart disease with history of mechanical heart valve (prosthetic aortic valve and prosthetic mitral valve/) recently treated [facility] for GI bleed from April 22 patient found to have gastric arteriovenous malformations patient now admitted to [facility] with supratherapeutic INR.    Hospitalist concerned about underlying infection    Patient has low-grade fever 100.3, bradycardia and hypotension    Getting picc line    MVR/AVR performed 9 years ago here at [facility]    5/8/24; awake, has back pain, following commands; no fevers, rash, sore throat    5/9/24; has back pain; afebrile, normotensive,  has COLEEN tomorrow; picc placed    5/10/24; doing well; pain under some control; no fever, rash, sore throat, COLEEN confirmed MV vegetation < 1 cm. Wife in room  Past Medical History:   Diagnosis Date    Anemia     Aortic stenosis     CAD (coronary artery disease)     Diabetes mellitus     Hyperlipidemia     Hypertension     Hypothyroidism     Mitral regurgitation        Past Surgical History:   Procedure Laterality Date    CAPSULE ENDOSCOPY      2/22/2023 and 9/25/2023 per dr. schrader    COLONOSCOPY      2/2023 Dr. Schrader    CORONARY ARTERY BYPASS GRAFT      CORONARY STENT PLACEMENT      UPPER GASTROINTESTINAL ENDOSCOPY      2/7/2023 Dr. Schrader, 7/26/2023 Dr. Liu, 4/2024 Dr. Stout [place]       Family History   Problem Relation Age of Onset    Diabetes Mother     Stroke Mother     Thyroid cancer Mother     Hypertension Father      Stroke Father        Social History     Socioeconomic History    Marital status:    Tobacco Use    Smoking status: Former     Types: Cigarettes     Passive exposure: Past    Smokeless tobacco: Never   Vaping Use    Vaping status: Never Used   Substance and Sexual Activity    Alcohol use: Never    Drug use: Never    Sexual activity: Defer       No Known Allergies      Medication:    Current Facility-Administered Medications:     acetaminophen (TYLENOL) tablet 650 mg, 650 mg, Oral, Q4H PRN, 650 mg at 05/07/24 2209 **OR** [DISCONTINUED] acetaminophen (TYLENOL) 160 MG/5ML oral solution 650 mg, 650 mg, Oral, Q4H PRN **OR** acetaminophen (TYLENOL) suppository 650 mg, 650 mg, Rectal, Q4H PRN, Desiree Garcia MD    allopurinol (ZYLOPRIM) tablet 300 mg, 300 mg, Oral, Daily, Shahid Peter MD    ampicillin 2000 mg IVPB in 100 mL NS (MBP), 2 g, Intravenous, Q6H, Lino Jacob MD, Last Rate: 200 mL/hr at 05/10/24 0837, 2 g at 05/10/24 0837    sennosides-docusate (PERICOLACE) 8.6-50 MG per tablet 2 tablet, 2 tablet, Oral, BID PRN, 2 tablet at 05/10/24 1206 **AND** polyethylene glycol (MIRALAX) packet 17 g, 17 g, Oral, Daily PRN **AND** bisacodyl (DULCOLAX) EC tablet 5 mg, 5 mg, Oral, Daily PRN **AND** bisacodyl (DULCOLAX) suppository 10 mg, 10 mg, Rectal, Daily PRN, Desiree Garcia MD    cefTRIAXone (ROCEPHIN) 2,000 mg in sodium chloride 0.9 % 100 mL MBP, 2,000 mg, Intravenous, Q12H, Lino Jacob MD, Last Rate: 200 mL/hr at 05/10/24 0331, 2,000 mg at 05/10/24 0331    dextrose (D50W) (25 g/50 mL) IV injection 25 g, 25 g, Intravenous, Q15 Min PRN, Desiree Garcia MD    dextrose (GLUTOSE) oral gel 15 g, 15 g, Oral, Q15 Min PRN, Desiree Garcia MD    [Held by provider] gabapentin (NEURONTIN) capsule 600 mg, 600 mg, Oral, Q8H, Florina Higuera, APRN, 600 mg at 05/06/24 2348    glucagon (GLUCAGEN) injection 1 mg, 1 mg, Intramuscular, Q15 Min PRN, Desiree Garcia MD    heparin 30411 units/250 mL (100  units/mL) in 0.45 % NaCl infusion, 20 Units/kg/hr, Intravenous, Titrated, Dawood Campa, [facility], Last Rate: 21.4 mL/hr at 05/10/24 1402, 20 Units/kg/hr at 05/10/24 1402    HYDROcodone-acetaminophen (NORCO)  MG per tablet 1 tablet, 1 tablet, Oral, Q8H PRN, Rowdy Burnett, APRN, 1 tablet at 05/10/24 1206    Insulin Lispro (humaLOG) injection 2-7 Units, 2-7 Units, Subcutaneous, 4x Daily AC & at Bedtime, Desiree Garcia MD, 2 Units at 05/08/24 2010    levothyroxine (SYNTHROID, LEVOTHROID) tablet 25 mcg, 25 mcg, Oral, Q AM, Desiree Garcia MD, 25 mcg at 05/10/24 0549    Lidocaine 4 % 1 patch, 1 patch, Transdermal, Q24H, Desiree Garcia MD, 1 patch at 05/10/24 0837    Magnesium Standard Dose Replacement - Follow Nurse / BPA Driven Protocol, , Does not apply, PRN, Desiree Garcia MD    melatonin tablet 5 mg, 5 mg, Oral, Nightly PRN, Desiree Garcia MD, 5 mg at 05/09/24 2022    midodrine (PROAMATINE) tablet 5 mg, 5 mg, Oral, TID AC, Desiree Garcia MD, 5 mg at 05/09/24 1724    morphine injection 2 mg, 2 mg, Intravenous, Q2H PRN, Shahid Peter MD, 2 mg at 05/09/24 2317    nitroglycerin (NITROSTAT) SL tablet 0.4 mg, 0.4 mg, Sublingual, Q5 Min PRN, Desiree Garcia MD    norepinephrine (LEVOPHED) 8 mg in 250 mL NS infusion (premix), 0.02-0.3 mcg/kg/min, Intravenous, Titrated, Yadira Quesada, APRN, Stopped at 05/09/24 0113    oxybutynin XL (DITROPAN-XL) 24 hr tablet 10 mg, 10 mg, Oral, Daily, Shahid Peter MD, 10 mg at 05/10/24 1152    pantoprazole (PROTONIX) injection 40 mg, 40 mg, Intravenous, Q12H, Desiree Garcia MD, 40 mg at 05/10/24 0840    Pharmacy to Dose Heparin, , Does not apply, Continuous PRN, Lulu Davison PA    Pharmacy to dose warfarin, , Does not apply, Continuous PRN, Lulu Davison PA    Phosphorus Replacement - Follow Nurse / BPA Driven Protocol, , Does not apply, Jose YOUNGER Rabie, MD    Potassium Replacement - Follow Nurse / BPA Driven Protocol, , Does not apply, PRN, [place],  MD Desiree    prochlorperazine (COMPAZINE) injection 5 mg, 5 mg, Intravenous, Q6H PRN, Shahid Peter MD, 5 mg at 05/09/24 1602    promethazine (PHENERGAN) suppository 12.5 mg, 12.5 mg, Rectal, Q6H PRN, Rowdy Burnett APRN    simethicone (MYLICON) chewable tablet 80 mg, 80 mg, Oral, 4x Daily PRN, Nick Tilley APRN, 80 mg at 05/09/24 2323    sodium chloride 0.9 % flush 10 mL, 10 mL, Intravenous, Q12H, Keyur Alexander MD, 10 mL at 05/10/24 0840    sodium chloride 0.9 % flush 10 mL, 10 mL, Intravenous, PRN, Keyur Alexander MD    sodium chloride 0.9 % flush 20 mL, 20 mL, Intravenous, PRN, Keyur Alexander MD    sodium chloride 0.9 % infusion 40 mL, 40 mL, Intravenous, PRN, Desiree Garcia MD    tamsulosin (FLOMAX) 24 hr capsule 0.4 mg, 0.4 mg, Oral, Nightly, Shahid Peter MD, 0.4 mg at 05/06/24 2349    tiZANidine (ZANAFLEX) tablet 2 mg, 2 mg, Oral, PRN, Desiree Garcia MD, 2 mg at 05/08/24 1132    warfarin (COUMADIN) tablet 3 mg, 3 mg, Oral, Daily, Lulu Davison PA    Antibiotics:  Anti-Infectives (From admission, onward)      Ordered     Dose/Rate Route Frequency Start Stop    05/07/24 1443  cefTRIAXone (ROCEPHIN) 2,000 mg in sodium chloride 0.9 % 100 mL MBP        Ordering Provider: Lino Jacob MD    2,000 mg  200 mL/hr over 30 Minutes Intravenous Every 12 Hours 05/07/24 1600 05/14/24 1559    05/07/24 1443  ampicillin 2000 mg IVPB in 100 mL NS (MBP)        Ordering Provider: Lino Jacob MD    2 g  200 mL/hr over 30 Minutes Intravenous Every 6 Hours 05/07/24 1500 05/14/24 1459    05/07/24 1302  vancomycin IVPB 2000 mg in 0.9% Sodium Chloride 500 mL        Ordering Provider: Bernabe Martines, LiatD    2,000 mg  250 mL/hr over 120 Minutes Intravenous Once 05/07/24 1400 05/07/24 1524    05/07/24 1225  piperacillin-tazobactam (ZOSYN) 3.375 g IVPB in 100 mL NS MBP (CD)        Ordering Provider: Desiree Garcia MD    3.375 g  over 30 Minutes Intravenous Once 05/07/24  1315 24 1302              Review of Systems:    See hpi      Physical Exam:   Vital Signs  Temp (24hrs), Av.7 °F (37.1 °C), Min:98.5 °F (36.9 °C), Max:99.2 °F (37.3 °C)    Temp  Min: 98.5 °F (36.9 °C)  Max: 99.2 °F (37.3 °C)  BP  Min: 112/76  Max: 167/82  Pulse  Min: 58  Max: 89  Resp  Min: 16  Max: 20  SpO2  Min: 88 %  Max: 100 %    GENERAL: Awake and alert, in mild distress. Follows commands  HEENT: Normocephalic, atraumatic.  PERRL. EOMI. No conjunctival injection. No icterus.      HEART: RRR; No murmur,  LUNGS: Clear to auscultation bilaterally   ABDOMEN: Soft, nontender,   EXT:  1+ edema  :  Without Wilkinson catheter.  MSK: No joint effusions or erythema  SKIN: no rash      Laboratory Data    Results from last 7 days   Lab Units 05/10/24  1200 05/10/24  0608 05/10/24  0010 24  1254 24  0820 24  0016 24  1626   WBC 10*3/mm3  --  9.50  --   --  9.22  --  8.24   HEMOGLOBIN g/dL 10.2* 9.5*  9.5* 9.0*   < > 8.8*   < > 8.5*  8.5*   HEMATOCRIT % 31.0* 28.8*  28.8* 27.4*   < > 26.2*   < > 25.9*  24.9*   PLATELETS 10*3/mm3  --  275  --   --  272  --  238    < > = values in this interval not displayed.     Results from last 7 days   Lab Units 05/10/24  0608   SODIUM mmol/L 136   POTASSIUM mmol/L 3.9   CHLORIDE mmol/L 97*   CO2 mmol/L 27.0   BUN mg/dL 12   CREATININE mg/dL 0.75*   GLUCOSE mg/dL 88   CALCIUM mg/dL 8.0*     Results from last 7 days   Lab Units 24  1206   ALK PHOS U/L 44   BILIRUBIN mg/dL 0.6   ALT (SGPT) U/L 8   AST (SGOT) U/L 27             Results from last 7 days   Lab Units 24  0959   LACTATE mmol/L 1.2             Estimated Creatinine Clearance: 111.8 mL/min (A) (by C-G formula based on SCr of 0.75 mg/dL (L)).      Microbiology:  Blood Culture   Date Value Ref Range Status   2024 Abnormal Stain (C)  Preliminary     BCID, PCR   Date Value Ref Range Status   2024 (A) Negative by BCID PCR. Culture to Follow. Final    Enterococcus faecalis.  "Arcelia/B (vancomycin resistance gene) not detected. Identification by BCID2 PCR.     No results found for: \"CULTURES\", \"HSVCX\", \"URCX\"  No results found for: \"EYECULTURE\", \"GCCX\", \"HSVCULTURE\", \"LABHSV\"  No results found for: \"LEGIONELLA\", \"MRSACX\", \"MUMPSCX\", \"MYCOPLASCX\"  No results found for: \"NOCARDIACX\", \"STOOLCX\"  No results found for: \"THROATCX\", \"UNSTIMCULT\", \"URINECX\", \"CULTURE\", \"VZVCULTUR\"  No results found for: \"VIRALCULTU\", \"WOUNDCX\"        Radiology:  Imaging Results (Last 72 Hours)       Procedure Component Value Units Date/Time    XR Chest 1 View [[id number]] Collected: 05/07/24 1629     Updated: 05/07/24 1638    Narrative:      XR CHEST 1 VW    Date of Exam: 5/7/2024 3:57 PM EDT    Indication: PICC placement    Comparison: 5/6/2024     Findings:  Right upper extremity PICC line is seen with its tip in the distal SVC. The heart is enlarged. Diffuse interstitial disease pattern presumably represents interstitial edema, increased from yesterday's study. There is minimal if any effusion and no   evidence of pneumothorax.         Impression:      Impression:    1. PICC line tip in the distal SVC.    2. Worsening congestive heart failure      Electronically Signed: Lance Mei MD    5/7/2024 4:35 PM EDT    Workstation ID: [id number]              Impression:   Enterococcus faecalis bacteremia  Hypotension  Bioprosthetic AVR  Mechanical MVR  Supratherapeutic INR  Anemia  Elevated procalcitonin  History of GI bleed with gastric AVM  Thoracic back pain  PLAN/RECOMMENDATIONS:   Thank you for asking us to see Kaleb Abraham, I recommend the following:  High-grade enterococcal bacteremia is concerning in the setting of endovascular hardware.    Estimated Creatinine Clearance: 111.8 mL/min (A) (by C-G formula based on SCr of 0.75 mg/dL (L)).    Patient could have a GI source such as inflammation of colonic viscus (superimposed diverticulitis and diverticulosis) or bacteremia  following endoscopy regarding management " of a GIbleed.    Gallbladder has been removed    Repeat blood cultures x 2 sets     Enterococcus isolate is susceptible to ampicillin and ceftriaxone        cont ampicillin 2 g IV every 4 hours    cont ceftriaxone 2 g IV every 12 hours    Duration is 6 weeks    D/w Dr. Gardner, Dr. Peter;     I recommend [facility] referral.      Cardiology, and I agree patient is not a surgical candidate;    Plan is 6 weeks iv abx followed by chronic oral abx suppression    Lino Jacob MD  5/10/2024  14:52 EDT

## 2024-05-10 NOTE — PROGRESS NOTES
HEPARIN INFUSION  Kaleb Abraham is a  73 y.o. male receiving heparin infusion.   Therapy for [employer], Mechanical Mitral Valve   Patient Weight: 107 kg  Any Bolus (Y/N):   No bolus ever      Signs or Symptoms of Bleeding: Elevated INR on admit    Cardiac or Other (Not VTE)  Initial rate: 12 units/kg/hr (Max 1,000 units/hr)   Anti Xa Rebolus Infusion Hold time Change infusion Dose (Units/kg/hr) Next Anti Xa or aPTT Level Due   < 0.11 0 None Increase by  3 Units/kg/hr 6 hours   0.11- 0.19 0 None Increase by  2 Units/kg/hr 6 hours   0.2 - 0.29 0 None Increase by  1 Units/kg/hr 6 hours   0.3 - 0.5 0 None No Change 6 hours (after 2 consecutive levels in range check qAM)   0.51 - 0.6 0 None Decrease by  1 Units/kg/hr 6 hours   0.61 - 0.8 0 30 Minutes Decrease by  2 Units/kg/hr 6 hours   0.81 - 1 0 60 Minutes Decrease by  3 Units/kg/hr 6 hours   >1 0 Hold  After Anti Xa less than 0.5 decrease previous rate by  4 Units/kg/hr  Every 2 hours until Anti Xa  less than 0.5 then when infusion restarts in 6 hours     Results from last 7 days   Lab Units 05/10/24  0608 05/10/24  0010 05/09/24  1838 05/09/24  1254 05/09/24  0820 05/09/24  0628 05/09/24  0016 05/08/24  1626 05/08/24  1142 05/08/24  0709   INR  1.88*  --   --   --   --  1.58*  --   --   --  1.46*   HEMOGLOBIN g/dL 9.5*  9.5* 9.0* 9.4*   < > 8.8*  --    < > 8.5*  8.5*   < > 9.2*  9.2*   HEMATOCRIT % 28.8*  28.8* 27.4* 28.1*   < > 26.2*  --    < > 25.9*  24.9*   < > 28.2*  28.2*   PLATELETS 10*3/mm3 275  --   --   --  272  --   --  238  --  243    < > = values in this interval not displayed.       Date   Time   Anti-Xa Current Rate (Unit/kg/hr) Bolus   (Units) Rate Change   (Unit/kg/hr) New Rate (Unit/kg/hr) Next   Anti-Xa Comments  Pump Check Daily   5/8 0925 0.1  --  9 1600 [address] RN Milady   5/8 1626 0.1 9 -- +3 12 0000 DW RN   5/9 0016 0.1 12 -- +3 15 0600 Sherry 6398 [facility]   5/9 0628 0.1 15 -- +3 18 1300 DW RN   5/9 1254 0.30 18 -- -- 18 1900  RN   5/9 1838 0.30  18 -- -- 18 0600 VANE CEE   5/10 0608 0.22 18 -- +1 19 1300 VANE CEE       --           --           --                                                                                                                                Dawood Campa RP  5/10/2024  07:10 EDT
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
## 2024-05-10 NOTE — PROGRESS NOTES
Clinical Nutrition     Multidisciplinary Rounds    Patient Name: Kaleb Abraham  Date of Encounter: 05/10/24 15:53 EDT  MRN: [id number]  Admission date: 5/6/2024    Pt sitting up in chair, reports poor appetite, is c/o stomach pain, nausea    Current diet: Diet: Regular/House, Cardiac, Diabetic; Healthy Heart (2-3 Na+); Consistent Carbohydrate; Fluid Consistency: Thin (IDDSI 0)  Boost GC 2x/day    Intervention:  Follow treatment plan  Care plan reviewed  Menu adjusted  Supplement provided    Follow up:   Per protocol      Brooklyn Nevarez RD  15:53 EDT  Time: 10min

## 2024-05-10 NOTE — PLAN OF CARE
Goal Outcome Evaluation:     Held midodrine, SBP 110s-160s.  Tmax: 99.4F  Orientation improved. A&O x4.   mL  RA. Aflutter/AFIB.  -Heparin gtt @ 20 u/kg/hr  PRN norco and morphine administered for back and hip pain  COLEEN completed today. POC discussed with family and patient.  Up to chair w/ PT. Max/ x2 assist

## 2024-05-10 NOTE — PLAN OF CARE
Goal Outcome Evaluation:  Plan of Care Reviewed With: patient, spouse        Progress: no change  Outcome Evaluation: Pt continues to present with significant generalized weakness, impaired balance and coordination, impaired sequencing, and limited activity tolerance and would benefit from continued skilled therapy to improve overall functional strength and mobility. Recommend IRF following d/c.      Anticipated Discharge Disposition (PT): inpatient rehabilitation facility

## 2024-05-10 NOTE — CASE MANAGEMENT/SOCIAL WORK
Continued Stay Note  [facility]     Patient Name: Kaleb Abraham  MRN: [id number]  Today's Date: 5/10/2024    Admit Date: 5/6/2024    Plan: Home with [facility]   Discharge Plan       Row Name 05/10/24 1105       Plan    Plan Home with [facility]    Patient/Family in Agreement with Plan other (see comments)    Plan Comments  spoke with Sudha at [facility]. She states she needs a copy of HH orders and PT/OT notes. CM confirmed fax number and faxed all information today. She confirmed they can accept pt for HH, will need to be contacted on day of discharge.  will continue to follow.    Final Discharge Disposition Code 06 - home with home health care                   Discharge Codes    No documentation.                 Expected Discharge Date and Time       Expected Discharge Date Expected Discharge Time    May 11, 2024               Tigist Combs RN

## 2024-05-10 NOTE — DISCHARGE PLACEMENT REQUEST
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 tricuspid regurgitation is 32 mmHg.     CT Angiogram Abdomen Pelvis    Result Date: 5/7/2024  CT ANGIOGRAM ABDOMEN PELVIS Date of Exam: 5/7/2024 3:59 AM EDT Indication: hypotension, anemia. Comparison: 5/6/2024. Technique: CTA of the abdomen and pelvis was performed after the uneventful intravenous administration of 115 mL Isovue-370. Reconstructed coronal and sagittal images were also obtained. In addition, a 3-D volume rendered image was created for interpretation. Automated exposure control and iterative reconstruction methods were used. Findings: There is mild body wall edema. There is mild multifocal muscular atrophy. No soft tissue hematoma. Findings in the chest discussed in a separate report. There is moderate diffuse lumbar spondylosis. There is severe osteoarthritis of both hips. The liver is homogeneous. Patient is status post cholecystectomy. The stomach, duodenum, spleen and left adrenal gland appear within normal limits. There is a small intermediate density nodule at the right adrenal gland measuring 11  mm, likely adenoma. The kidneys enhance homogeneously. There is no hydronephrosis. Urinary bladder is nondistended. Prostate gland is small in size. The appendix is normal. There is colonic diverticulosis without evidence of acute diverticulitis. Small bowel is nondistended. There is mild mesenteric and retroperitoneal edema. No ascites, pneumoperitoneum or lymphadenopathy. There is moderate aortoiliac atherosclerotic disease. There is an infrarenal abdominal aortic aneurysm measuring 32 mm diameter. There is mild aneurysmal dilatation of the left common iliac artery up to 19 mm.     Impression: 1. No soft tissue hematoma or obvious source of bleeding. 2. No acute intra-abdominal or pelvic abnormality. 3. Colonic diverticulosis without acute diverticulitis. 4. 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 4. Mild body wall edema. 5. Moderate diffuse lumbar spondylosis. Electronically Signed: Al Justin MD  5/7/2024 4:37 AM EDT  Workstation ID: [id number]    CT Angiogram Chest    Result Date: 5/7/2024  CT ANGIOGRAM CHEST Date of Exam: 5/7/2024 3:59 AM EDT Indication: hgb drop in setting of supratheraperutic INR.. Comparison: None available. Technique: CTA of the chest was performed after the uneventful intravenous administration of 115 mL Isovue-370. Reconstructed coronal and sagittal images were also obtained. In addition, a 3-D volume rendered image was created for interpretation. Automated exposure control and iterative reconstruction methods were used. Findings: Thyroid, trachea and esophagus appear within normal limits. There is evidence of prior median sternotomy and CABG. There is heavy native coronary artery calcification. There is evidence of prior mitral valve replacement. There are dense aortic valve calcifications. The heart is mildly enlarged. No pericardial effusion. No mediastinal lymphadenopathy. Mild aortic  atherosclerosis. No aneurysm. There are small bilateral pleural effusions with dependent bibasilar atelectasis. There is mild interstitial opacity in the lung bases that may reflect mild interstitial edema. No pneumothorax or lobar consolidation. Central airways are patent. There are no acute findings in the superficial soft tissues. There is severe DJD of both glenohumeral joints. No acute osseous abnormality or destructive bone lesion. There is mild diffuse thoracic spondylosis. There is ossification along the supraspinous ligament and there are bridging thoracic osteophytes.     Impression: 1. No hematoma or obvious source of bleeding noted. 2. Small bilateral pleural effusions, mild dependent bibasilar atelectasis and mild interstitial pulmonary edema. 3. Coronary artery disease status post CABG. There is a prosthetic mitral valve in place. Heart is mildly enlarged. Electronically Signed: Al Justin MD  5/7/2024 4:33 AM EDT  Workstation ID: [id number]    CT Angiogram Head    Result Date: 5/7/2024  CT ANGIOGRAM HEAD Date of Exam: 5/7/2024 3:59 AM EDT Indication: AMS possible bleed. Comparison: None available. Technique: CTA of the head was performed after the uneventful intravenous administration of 115 mL Isovue-370. Reconstructed coronal and sagittal images were also obtained. In addition, a 3-D volume rendered image was created for interpretation. Automated exposure control and iterative reconstruction methods were used. Findings: Right carotid: Distal cervical ICA is normal. Intracranial ICA segments demonstrate mild nonstenosing calcific plaque. Small patent P-comm. Ophthalmic artery origin is normal. Patent A-Comm. The A1 and M1 segments and distal EFREN and MCA branches appear patent. Left carotid: The distal cervical ICA is normal. Intracranial ICA segments demonstrate mild nonstenosing calcific plaque. The ophthalmic artery origin is normal. Small patent P-comm. A1 and M1 segments and distal EFREN and MCA  branches appear patent. Posterior circulation: V3 and V4 segments appear widely patent with minimal calcific disease. The basilar artery is normal. Superior cerebellar and posterior cerebral arteries appear patent. Nonvascular findings: Thinning of the orbital lenses would suggest prior cataract surgery. No focal hypoattenuating lesions in the brain. No mass effect or midline shift. The calvarium appears intact. Sinuses and mastoids appear clear. Superficial soft tissues are unremarkable.     Impression: No significant vascular abnormality in the head. Electronically Signed: Al Justin MD  5/7/2024 4:26 AM EDT  Workstation ID: [id number]    MRI Lumbar Spine With & Without Contrast    Result Date: 5/6/2024  MRI LUMBAR SPINE W WO CONTRAST Date of Exam: 5/6/2024 10:54 PM EDT Indication: right lower back pain, fevers.  Comparison: None available. Technique:  Routine multiplanar/multisequence sequence images of the lumbar spine were obtained before and after the uneventful administration of 20 mL Multihance.  Findings: Five lumbar type vertebral bodies are identified.  Alignment is anatomic. There is moderate narrowing of the L4-L5 disc. There is a transitional L5 vertebra with sacralization on the left. There is mild narrowing of the other lumbar discs and there is a rudimentary L5-S1 disc. Vertebral bodies maintain normal height.  Distal spinal cord and conus medullaris appear unremarkable terminating at the T12-L1 level.  Cauda equina appears unremarkable. There is mild degenerative bone marrow heterogeneity. There  is mild diffuse paraspinal muscular atrophy. There is aneurysmal dilatation of the infrarenal abdominal aorta up to 33 mm. There is also aneurysmal dilatation of the common iliac arteries measuring up to 20 mm each. L1-L2: There is concentric disc bulge and marginal osteophyte formation asymmetric to the right. There is mild facet and ligamentum flavum hypertrophy. There is mild bilateral neuroforaminal  narrowing and mild narrowing of the spinal canal. L2-L3: There is concentric disc bulge and marginal osteophyte formation. There is a large posterior disc osteophyte complex resulting in severe narrowing of the spinal canal. There is moderate facet and ligamentum flavum hypertrophy. There is moderate to  severe bilateral neuroforaminal narrowing. L3-L4: There is concentric disc bulge and marginal osteophyte formation with significant posterior disc osteophyte complex resulting in moderate narrowing of the spinal canal. There is moderate facet and ligamentum flavum hypertrophy and mild to moderate  bilateral neuroforaminal narrowing. L4-L5: There is concentric disc bulge and mild facet and ligamentum flavum hypertrophy. There is mild to moderate bilateral neuroforaminal narrowing without spinal canal compromise. L5-S1: No focal disc protrusion or extrusion. Facet joints appear unremarkable. No significant neural foraminal or spinal canal stenosis.     Impression: 1. No acute findings. 2. Moderate to severe lumbar spondylosis including severe narrowing of the spinal canal at L2-L3 and varying degrees of neuroforaminal narrowing as discussed above. Electronically Signed: Al Justin MD  5/6/2024 11:40 PM EDT  Workstation ID: [id number]    CT Abdomen Pelvis With Contrast    Result Date: 5/6/2024  CT ABDOMEN PELVIS W CONTRAST Date of Exam: 5/6/2024 2:25 PM EDT Indication: R flank pain. Comparison: None available. Technique: Axial CT images were obtained of the abdomen and pelvis following the uneventful intravenous administration of 85 mL Isovue-300. Reconstructed coronal and sagittal images were also obtained. Automated exposure control and iterative construction methods were used. Findings: Liver: The liver is unremarkable in morphology. No focal liver lesion is seen. No biliary dilation is seen. Gallbladder: Surgically absent. Pancreas: Unremarkable. Spleen: Unremarkable. Adrenal glands: 1.2 cm nodule within the  lateral limb of the right adrenal gland, incompletely characterized on this contrast-enhanced study. Left adrenal gland is unremarkable. Genitourinary tract: There is mild bilateral perinephric stranding, nonspecific. Otherwise, the kidneys, visualized portions of the ureters, and urinary bladder appear unremarkable. Prostate gland is unremarkable. Gastrointestinal tract: Colonic diverticulosis is present. Hollow viscera appear otherwise unremarkable. There is no evidence of bowel obstruction. Appendix: No findings to suggest acute appendicitis. Other findings: No free air or free fluid is identified. No pathologically enlarged lymph nodes are seen. Vascular calcifications are present. There is a 3 cm infrarenal abdominal aortic aneurysm. Mild ectasia of the right common femoral artery. Bones and soft tissues: No acute or suspicious osseous or soft tissue lesion is identified. Bones are demineralized. There are degenerative changes within the spine. Lung bases: Trace bilateral pleural effusions with bibasilar atelectasis. Cardiomegaly. Mitral valvular prosthesis is partially imaged. Calcified granuloma within the right lower lobe.     Impression: 1.No acute abnormality identified within the abdomen or pelvis. 2.Colonic diverticulosis. 3.3 cm infrarenal abdominal aortic aneurysm. 4.Additional findings as detailed above. Electronically Signed: Matthias Dobbs MD  5/6/2024 2:53 PM EDT  Workstation ID: [id number]    CT Head Without Contrast    Result Date: 5/6/2024  CT HEAD WO CONTRAST Date of Exam: 5/6/2024 2:25 PM EDT Indication: ams. Comparison: None available. Technique: Axial CT images were obtained of the head without contrast administration.  Automated exposure control and iterative construction methods were used. Findings: No acute intracranial hemorrhage or extra-axial collection is identified. The ventricles appear normal in caliber, with no evidence of mass effect or midline shift. The basal cisterns appear  patent. The gray-white differentiation appears preserved. The calvarium appear intact. There is mild frothy mucosal disease in the left maxillary sinus. The mastoid air cells are well-aerated. Scattered foci of periventricular and subcortical white matter hypodensities are nonspecific, but likely the sequela of  mild chronic small vessel ischemic disease.     Impression: 1.No acute intracranial process identified. 2.Findings suggestive of mild chronic small vessel ischemic disease. 3.Mild frothy mucosal disease within left maxillary sinus. Correlate for acute sinusitis. Electronically Signed: Donnie Skelton MD  2024 2:53 PM EDT  Workstation ID: [id number]    XR Chest 1 View    Result Date: 2024  XR CHEST 1 VW Date of Exam: 2024 1:58 PM EDT Indication: sob Comparison: Chest radiograph 2024. Findings: Sternotomy. Enlarged cardiac silhouette, unchanged. Pulmonary vascular congestion. No overt pulmonary edema. No focal consolidation. No significant pleural effusion. No pneumothorax. Osseous structures are unchanged.     Impression: No acute cardiopulmonary findings. Electronically Signed: Giovany Tineo MD  2024 2:18 PM EDT  Workstation ID: [id number]    COLEEN with poss cardioversion    Result Date: 2024  This result has an attachment that is not available. COLEEN REPORT  Demographics  Patient Name:           REYES CAMACHO      :            1950  Medical Record Number:  [id number]         Age:            73 year(s)  Corporate ID Number:    [id number]         Gender          Male  Account Number:         [id number]  Sonographer:            Princess Chapman [facility] Height:         73 inches  Referring Physician:    WING DUMONT MD   Weight:         231.99 pounds  Interpreting Physician: WING DUMONT MD   BMI:            30.61 kg/m^2  Date of Service:        2024         Blood Pressure: 114/68 mmHg  Room Number:            334 The procedure was explained in detail to the patient. Risks,  complications and alternative treatments were reviewed. Written consent was obtained. Type of Study:  COLEEN procedure: Echo COLEEN D W or W/O M-mode, Echo Doppler Colour Flow  Velocity, Echo Pulsed + / or Continuous Wave, Echo COLEEN 2 D imaging, Echo -  Contrast Echo, EC Cardioversion, ECHOCARDIOGRAM TRANSESOPHAGEAL W/  POSSIBLE CARDIOV.  HR: 105 bpmRhythm: Atrial fibrillationPatient Status: Routine IP  Study Location: Echo LabTechnical Quality: Good visualization Procedure consent form was obtained Airway assessment was performed. Procedure Medications   - Fentanyl I.V. 75 mcg.   - Versed I.V. 4 mg.  Impression:  ##############################################################################  #################################################  COLEEN / DCC DX : Typical Atrial Flutter i48.3  Normal sized left ventricle.  Normal left ventricular wall thickness.  Visually estimated ejection fraction 55% +/- 5%.  Normal left ventricular systolic function.  Mechanical mitral valve.  Moderate multi jet mitral regurgitation.  Normally functioning bioprosthetic aortic valve.  Successful cardioversion with restoration of sinus rhythm with 100 joules of  biphasic energy X 1 attempt .  ########################################  Left Ventricle  Normal sized left ventricle.  Normal left ventricular wall thickness.  Visually estimated ejection fraction 55% +/- 5%.  Normal left ventricular systolic function.  No left ventricular masses or thrombi.  Right Ventricle  Normal sized right ventricle.  Left Atrium  Normal sized left atrium.  Intact atrial septum.  No atrial mass or thrombus.  Left atrial appendage well visualized, no thrombus.  Right Atrium  Normal sized right atrium.  Intact atrial septum.  No atrial mass or thrombus.  Mitral Valve  Mechanical mitral valve.  Moderate multi jet mitral regurgitation.  No mitral stenosis.  No masses or vegetations seen.  Aortic Valve  Normally functioning bioprosthetic aortic valve.  Trace aortic  regurgitation.  No aortic stenosis.  No masses or vegetations seen.  Tricuspid Valve  Structurally normal tricuspid valve.  Mild tricuspid regurgitation.  No tricuspid stenosis.  No masses or vegetations seen.  Pulmonic Valve  Structurally normal pulmonic valve.  Trace pulmonic regurgitation.  No pulmonic stenosis.  No masses or vegetations seen. Great Vessels Visualized aorta is normal. Normal aortic root. No evidence of dissection.  Pericardium / Pleura No pericardial effusion.  Other  COLEEN probe passed without difficulty.  No signs of immediate complications.  Procedural sedation administered by attending RN.  See medication section for total dose give.  Sedation duration < or = to 15 minutes. See procedure record for start and  stop times.  ----------------------------------------------------------------  Electronically signed by WING DUMONT MD(Interpreting  Physician) on 2024 14:47  ----------------------------------------------------------------    XR chest AP portable    Result Date: 2024  PORTABLE CHEST      2024 8:59 PM HISTORY: Chest pain. Fever. COMPARISON: None. FINDINGS: The patient is status post median sternotomy for CABG. Diffuse interstitial changes are again noted and appear chronic. There is evidence of old calcified granulomatous disease.  The lungs are otherwise clear.  There is no pneumothorax. The osseous structures  are otherwise unremarkable.    Chronic findings but no evidence of an acute cardiopulmonary process. Images reviewed, interpreted, and dictated by Dr. Leon Dow. Transcribed by Rolly Beltrán.    ECHO COMPLETE (DOPPLER / COLOR) W OR WO CONTRAST    Result Date: 2024  TRANSTHORACIC ECHOCARDIOGRAPHY REPORT  Demographics  Patient Name:          REYES CAMACHO       :            1950  Medical Record Number: [id number]          Age:            73 year(s)  Corporate ID Number:   [id number]          Gender          Male  Account Number:        [id number]   Sonographer:           Brooklyn Taylor     Height:         73 inches                         [facility]  Referring Physician:   Ilana Pavon      Weight:         231.99 pounds  Interpreting           SUE CAITE       BMI:            30.61 kg/m^2  Physician:  Date of Service:       2024          Blood Pressure: 136/84 mmHg  Room Number:           334 Type of Study:  TTE procedure: ECHO COMPLETE (DOPPLER / COLOR) W OR WO CONTRAST.  Patient Status: Routine IP  Study Location: [facility] Quality: Adequate visualization  Impression:  ########################################  Indication: syncope R55  Normal sized left ventricle.  Moderate left ventricular hypertrophy.  Visually estimated ejection fraction 55% +/- 5%.  Normal left ventricular systolic function.  Indeterminate diastolic function.  Status post mechanical mitral valve replacement. Peak PmmHG Mean PG:  3mmHG  Status post bioprosthetic aortic valve replacement. Peak PmmHg; Mean  PmmHg.  Elevated central venous pressure (>15mmHg).  ######################################## Measurements Summary:  LVEDd: 3.49 cm         LVESd: 2.71 cm          IVSEd: 1.63 cm  AO Root:3.51 cm        LVPWd: 1.63 cm  Contractility Score  Normal Left Ventricular contractility was noted.  LV regional wall motion: (0-Not visualized 1-Normal 2-Hypokinesis  3-Akinesis 4-Dyskinesis 5-Aneurysm)  Left Ventricle  Volume xyqpintff09.53 ml         LV length: 8.11 cm  Volume tizrywes72.59 ml  LVOT diameter: 2 cm  Normal sized left ventricle.  Moderate left ventricular hypertrophy.  Visually estimated ejection fraction 55% +/- 5%.  Normal left ventricular systolic function.  Unable to obtain bullseye average for strain due to irregular heart  rhythm.  Indeterminate diastolic function.  No left ventricular masses or thrombi.  Right Ventricle  Diastolic dimension: 3.64     RV systolic pressure: 54.41 mmHg  cm  Normal sized right ventricle.  Abnormal TAPSE. Right ventricular  function appears normal.  Left Atrium  LA dimension: 3.4 cm               LA volume:80.53 ml  LA/Aorta: 0.97  Moderately dilated left atrium.  Normal left atrial volume index  Intact atrial septum.  No atrial mass or thrombus.  Right Atrium  Normal sized right atrium.  Intact atrial septum.  No atrial mass or thrombus.  Mitral Valve  Area PHT: 2.57 cm^2            Mean velocity: 0.74 m/s  P1/2t: 85.71 msec              Mean gradient: 3.18 mmHg  Area (continuity): 1.69 cm^2   Peak gradient: 11.59 mmHg  Status post mechanical mitral valve replacement. Peak gradient 11mmHG Mean  gradient 3mmHG  Unable to adequately assess for regurgitation due to valve shadowing  No mitral stenosis.  No masses or vegetations seen.  Aortic Valve  AV VTI: 20.72   Area continuity: 3.52   Peak velocity: 1.44  cm              cm^2                    m/s  LVOT VTI: 23.26 Mean velocity: 0.9 m/s  Peak gradient: 8.27  cm                                      mmHg  Mean gradient: 3.9  mmHg  Status post bioprosthetic aortic valve replacement. Peak PmmHg; Mean  PmmHg.  No aortic regurgitation.  No aortic stenosis.  No masses or vegetations seen.  Tricuspid Valve  TR velocity: 3.14 m/s      TR gradient: 39.09012 mmHg  Estimated RAP: 15 mmHg     RVSP: 54.41 mmHg  Structurally normal tricuspid valve.  Mild (1+) tricuspid regurgitation.  Mild pulmonary hypertension.  RVSP 42 mmHg.  No tricuspid stenosis.  No masses or vegetations seen.  Pulmonic Valve  Acceleration time: 69.2 msec           PASP: 54.41 mmHg  Structurally normal pulmonic valve.  Mild (1+) pulmonic valve regurgitation.  Abnormal pulmonary acceleration time.  No pulmonic stenosis.  No masses or vegetations seen. Great Vessels Aorta  Aortic Root: 3.51 cm  Ascending Aorta: 3.45 cm  LVOT Diameter: 2 cm Visualized aorta is normal. Normal aortic root. No evidence of dissection. Dilated IVC with no inspiratory collapse. Elevated central venous pressure (>15mmHg).  Pericardium / Pleura  "No pericardial effusion.  ----------------------------------------------------------------  Electronically signed by SUE HADDAD DO(Interpreting  Physician) on 04/21/2024 09:38  ----------------------------------------------------------------      No results found for: \"COVID19\"    Blood Culture   Date Value Ref Range Status   05/06/2024 Abnormal Stain (C)  Preliminary     BCID, PCR   Date Value Ref Range Status   05/06/2024 (A) Negative by BCID PCR. Culture to Follow. Final    Enterococcus faecalis. Arcelia/B (vancomycin resistance gene) not detected. Identification by BCID2 PCR.       ASSESSMENTS/PLANS  Kaleb Abraham is a 73 y.o. male with   Chronic Anemia  2. History of AVMs and GAVE  - transfuse per protocol per primary team  - continue PPI  - 2 prior capsule endoscopies without etiology for anemia in 2023  - history of GAVE per Dr. White but not found by Dr. Amaro at time of repeat EGD 2/2023. History of AVMs at [place] treated last month (4/2024)  - suspect hypotension due to bacteremia, anemia possibly due to sepsis  - please notify the GI team if overt GI blood loss, consider repeat EGD with push enteroscopy and possible repeat capsule endoscopy if overt GI blood loss  - consider iron supplement daily  (he has history of elevated ferritin but unknown when ferritin was collected in relation to transfusion when admitted at [place])  - consider evaluation by hematology if not previously done  3. London's esophagus  - continue PPI  - repeat EGD in 3 years due 2/2026  4. History of colon polyps  - due for surveillance colonoscopy 2/2026  5. Abdominal discomfort, decreased intake  - monitor bowel habits, reevaluate appetite and abdominal discomfort/ description of abdomen feeling tight during admission with hopeful improvement in symptoms with overall improvement in blood pressure and treatment of bacteremia     Continue close monitoring in ICU setting as patient is at risk for further decline with " coexisting medical conditions     I discussed the patient's findings and my recommendations with patient and family    Mirela BUTLER Ashli, APREUGENE  24  17:04 EDT        Electronically signed by Carlos Lazcano MD at 24          Physical Therapy Notes (most recent note)        Renee Mendoza, PT at 24 1048  Version 1 of 1         Patient Name: Kaleb Abraham  : 1950    MRN: [id number]                              Today's Date: 2024       Admit Date: 2024    Visit Dx:     ICD-10-CM ICD-9-CM   1. Acute on chronic anemia  D64.9 285.9   2. S/P mechanical aortic valve  Z95.2 V43.3   3. Supratherapeutic INR  R79.1 790.92   4. History of GI bleed  Z87.19 V12.79   5. Anticoagulated on Coumadin  Z79.01 V58.61   6. Generalized weakness  R53.1 780.79   7. History of CHF (congestive heart failure)  Z86.79 V12.59     Patient Active Problem List   Diagnosis    Anemia    Aortic stenosis    CAD s/p CABG    T2DM    Hyperlipidemia    Hypertension    Hypothyroidism    Mitral regurgitation    MARTINEZ    Hypersomnia, unspecified    Chronic atrial fibrillation    Supratherapeutic INR    S/P prosthetic aortic valve    S/P mechanical aortic valve    Chronic anticoagulation (warfarin)     Past Medical History:   Diagnosis Date    Anemia     Aortic stenosis     CAD (coronary artery disease)     Diabetes mellitus     Hyperlipidemia     Hypertension     Hypothyroidism     Mitral regurgitation      Past Surgical History:   Procedure Laterality Date    CAPSULE ENDOSCOPY      2023 and 2023 per dr. schrader    COLONOSCOPY      2023 Dr. Schrader    CORONARY ARTERY BYPASS GRAFT      CORONARY STENT PLACEMENT      UPPER GASTROINTESTINAL ENDOSCOPY      2023 Dr. Schrader, 2023 Dr. Liu, 2024 Dr. Stout [place]      General Information       Row Name 24 1148          Physical Therapy Time and Intention    Document Type evaluation  -ES     Mode of Treatment physical therapy   -ES       Row Name 05/08/24 1148          General Information    Patient Profile Reviewed yes  -ES     Prior Level of Function independent:;all household mobility;transfer;bed mobility;ADL's  Uses SPC/FWW at baseline. Endorses recent falls  -ES     Existing Precautions/Restrictions fall;oxygen therapy device and L/min;other (see comments)  c/o R hip/LBP  -ES     Barriers to Rehab medically complex;previous functional deficit  -ES       Row Name 05/08/24 1148          Living Environment    People in Home spouse  -ES       Row Name 05/08/24 1148          Home Main Entrance    Number of Stairs, Main Entrance other (see comments)  ramp  -ES       Row Name 05/08/24 1148          Stairs Within Home, Primary    Number of Stairs, Within Home, Primary none  -ES       Row Name 05/08/24 1148          Cognition    Orientation Status (Cognition) oriented x 3  -ES       Row Name 05/08/24 1148          Safety Issues, Functional Mobility    Safety Issues Affecting Function (Mobility) awareness of need for assistance;insight into deficits/self-awareness;safety precaution awareness;safety precautions follow-through/compliance;sequencing abilities  -ES     Impairments Affecting Function (Mobility) balance;coordination;endurance/activity tolerance;pain;strength  -ES               User Key  (r) = Recorded By, (t) = Taken By, (c) = Cosigned By      Initials Name Provider Type    ES Renee Mendoza PT Physical Therapist                   Mobility       Row Name 05/08/24 1152          Bed Mobility    Comment, (Bed Mobility) received EOB  -ES       Row Name 05/08/24 1152          Bed-Chair Transfer    Bed-Chair [place] (Transfers) maximum assist (25% patient effort);2 person assist;verbal cues  -ES     Assistive Device (Bed-Chair Transfers) other (see comments)  BUE support  -ES     Comment, (Bed-Chair Transfer) v/c for sequencing. Demo'd forward flexed posture during t/f due to LBP  -ES       Row Name 05/08/24 1152           Sit-Stand Transfer    Sit-Stand [place] (Transfers) maximum assist (25% patient effort);verbal cues  -ES     Assistive Device (Sit-Stand Transfers) walker, front-wheeled  BUE support  -ES     Comment, (Sit-Stand Transfer) STS x2: from EOB, from chair. v/c for hand placement. demo'd forward flexed posture due to c/o LBP  -ES       Row Name 05/08/24 1152          Gait/Stairs (Locomotion)    [place] Level (Gait) unable to assess  -ES     Comment, (Gait/Stairs) unable to assess further mobility due to pain and fatigue  -ES               User Key  (r) = Recorded By, (t) = Taken By, (c) = Cosigned By      Initials Name Provider Type    ES Renee Mendoza PT Physical Therapist                   Obj/Interventions       Row Name 05/08/24 1154          Range of Motion Comprehensive    General Range of Motion bilateral lower extremity ROM WFL  -ES       Row Name 05/08/24 1154          Strength Comprehensive (MMT)    General Manual Muscle Testing (MMT) Assessment lower extremity strength deficits identified  -ES     Comment, General Manual Muscle Testing (MMT) Assessment BLE grossly 4-/5  -ES       Row Name 05/08/24 1154          Balance    Balance Assessment sitting static balance;sitting dynamic balance;sit to stand dynamic balance;standing dynamic balance;standing static balance  -ES     Static Sitting Balance minimal assist  -ES     Dynamic Sitting Balance moderate assist  -ES     Position, Sitting Balance supported;sitting edge of bed;sitting in chair  -ES     Sit to Stand Dynamic Balance maximum assist;verbal cues  -ES     Static Standing Balance maximum assist;verbal cues  -ES     Dynamic Standing Balance maximum assist;2-person assist;verbal cues  -ES     Position/Device Used, Standing Balance supported;walker, front-wheeled;other (see comments)  BUE support  -ES     Balance Interventions standing;sit to stand;sitting;supported;static;dynamic;occupation based/functional task  -ES       Row Name 05/08/24 5010           Sensory Assessment (Somatosensory)    Sensory Assessment (Somatosensory) LE sensation intact  -ES               User Key  (r) = Recorded By, (t) = Taken By, (c) = Cosigned By      Initials Name Provider Type    Renee Erazo PT Physical Therapist                   Goals/Plan       Row Name 05/08/24 1404          Bed Mobility Goal 1 (PT)    Activity/Assistive Device (Bed Mobility Goal 1, PT) sit to supine/supine to sit  -ES     [place] Level/Cues Needed (Bed Mobility Goal 1, PT) contact guard required  -ES     Time Frame (Bed Mobility Goal 1, PT) 10 days  -ES       Row Name 05/08/24 1404          Transfer Goal 1 (PT)    Activity/Assistive Device (Transfer Goal 1, PT) sit-to-stand/stand-to-sit;bed-to-chair/chair-to-bed;walker, rolling  -ES     [place] Level/Cues Needed (Transfer Goal 1, PT) contact guard required  -ES     Time Frame (Transfer Goal 1, PT) long term goal (LTG);10 days  -ES       Row Name 05/08/24 1404          Gait Training Goal 1 (PT)    Activity/Assistive Device (Gait Training Goal 1, PT) gait (walking locomotion);assistive device use;decrease fall risk;increase endurance/gait distance;walker, rolling  -ES     [place] Level (Gait Training Goal 1, PT) contact guard required  -ES     Distance (Gait Training Goal 1, PT) 100  -ES     Time Frame (Gait Training Goal 1, PT) long term goal (LTG);10 days  -ES       Row Name 05/08/24 1404          Therapy Assessment/Plan (PT)    Planned Therapy Interventions (PT) balance training;bed mobility training;gait training;home exercise program;neuromuscular re-education;patient/family education;strengthening;stair training;transfer training;postural re-education  -ES               User Key  (r) = Recorded By, (t) = Taken By, (c) = Cosigned By      Initials Name Provider Type    Renee Erazo PT Physical Therapist                   Clinical Impression       Row Name 05/08/24 1150          Pain    Pain Intervention(s)  Repositioned;Ambulation/increased activity  -ES     Additional Documentation Pain Scale: FACES Pre/Post-Treatment (Group)  -ES       Row Name 05/08/24 1155          Pain Scale: FACES Pre/Post-Treatment    Pain: FACES Scale, Pretreatment 2-->hurts little bit  -ES     Posttreatment Pain Rating 4-->hurts little more  -ES     Pain Location lower  -ES     Pain Location - back  -ES     Pre/Posttreatment Pain Comment RN aware and managing  -ES       Row Name 05/08/24 1155          Plan of Care Review    Plan of Care Reviewed With patient;spouse  -ES     Progress no change  PT IE  -ES     Outcome Evaluation PT eval complete. Pt presents with generalized weakness, decreased activity tolerance, and balance deficits warranting skilled IPPT services. Pt required maxA x2 for mobility with cues for sequencing and was limited by c/o LBP. PT rec IRF at d/c.  -ES       Row Name 05/08/24 1153          Therapy Assessment/Plan (PT)    Rehab Potential (PT) good, to achieve stated therapy goals  -ES     Criteria for Skilled Interventions Met (PT) yes;meets criteria;skilled treatment is necessary  -ES     Therapy Frequency (PT) daily  -ES       Row Name 05/08/24 1155          Vital Signs    Pre Systolic BP Rehab 118  -ES     Pre Treatment Diastolic BP 68  -ES     Post Systolic BP Rehab 118  -ES     Post Treatment Diastolic BP 66  -ES     Pretreatment Heart Rate (beats/min) 49  -ES     Posttreatment Heart Rate (beats/min) 55  -ES     Pre SpO2 (%) 97  -ES     O2 Delivery Pre Treatment nasal cannula  -ES     O2 Delivery Intra Treatment nasal cannula  -ES     Post SpO2 (%) 96  -ES     O2 Delivery Post Treatment nasal cannula  -ES     Pre Patient Position Supine  -ES     Intra Patient Position Standing  -ES     Post Patient Position Sitting  -ES       Row Name 05/08/24 1155          Positioning and Restraints    Pre-Treatment Position in bed  -ES     Post Treatment Position chair  -ES     In Chair notified nsg;reclined;sitting;call light  within reach;encouraged to call for assist;exit alarm on;on mechanical lift sling;legs elevated;heels elevated;with family/caregiver;waffle cushion  -ES               User Key  (r) = Recorded By, (t) = Taken By, (c) = Cosigned By      Initials Name Provider Type    Renee Erazo PT Physical Therapist                   Outcome Measures       Row Name 05/08/24 1405 05/08/24 0800       How much help from another person do you currently need...    Turning from your back to your side while in flat bed without using bedrails? 2  -ES 2  -AS    Moving from lying on back to sitting on the side of a flat bed without bedrails? 2  -ES 2  -AS    Moving to and from a bed to a chair (including a wheelchair)? 2  -ES 2  -AS    Standing up from a chair using your arms (e.g., wheelchair, bedside chair)? 2  -ES 2  -AS    Climbing 3-5 steps with a railing? 1  -ES 1  -AS    To walk in hospital room? 2  -ES 1  -AS    AM-PAC 6 Clicks Score (PT) 11  -ES 10  -AS    Highest Level of Mobility Goal 4 --> Transfer to chair/commode  -ES 4 --> Transfer to chair/commode  -AS      Row Name 05/08/24 1405          Functional Assessment    Outcome Measure Options AM-PAC 6 Clicks Basic Mobility (PT)  -ES               User Key  (r) = Recorded By, (t) = Taken By, (c) = Cosigned By      Initials Name Provider Type    AS Milady Olsen RN Registered Nurse    Renee Erazo PT Physical Therapist                                 Physical Therapy Education       Title: PT OT SLP Therapies (In Progress)       Topic: Physical Therapy (In Progress)       Point: Mobility training (In Progress)       Learning Progress Summary             Patient Acceptance, E,TB, NR by ANDREY at 5/8/2024 1405   Significant Other Acceptance, E,TB, NR by ANDREY at 5/8/2024 1405                         Point: Home exercise program (Not Started)       Learner Progress:  Not documented in this visit.              Point: Body mechanics (In Progress)       Learning Progress Summary              Patient Acceptance, E,TB, NR by ES at 5/8/2024 1405   Significant Other Acceptance, E,TB, NR by ES at 5/8/2024 1405                         Point: Precautions (In Progress)       Learning Progress Summary             Patient Acceptance, E,TB, NR by ES at 5/8/2024 1405   Significant Other Acceptance, E,TB, NR by ES at 5/8/2024 1405                                         User Key       Initials Effective Dates Name Provider Type Discipline     08/11/22 -  Renee Mendoza, PT Physical Therapist PT                  PT Recommendation and Plan  Planned Therapy Interventions (PT): balance training, bed mobility training, gait training, home exercise program, neuromuscular re-education, patient/family education, strengthening, stair training, transfer training, postural re-education  Plan of Care Reviewed With: patient, spouse  Progress: no change (PT IE)  Outcome Evaluation: PT eval complete. Pt presents with generalized weakness, decreased activity tolerance, and balance deficits warranting skilled IPPT services. Pt required maxA x2 for mobility with cues for sequencing and was limited by c/o LBP. PT rec IRF at d/c.     Time Calculation:   PT Evaluation Complexity  History, PT Evaluation Complexity: 1-2 personal factors and/or comorbidities  Examination of Body Systems (PT Eval Complexity): total of 3 or more elements  Clinical Presentation (PT Evaluation Complexity): evolving  Clinical Decision Making (PT Evaluation Complexity): low complexity  Overall Complexity (PT Evaluation Complexity): low complexity     PT Charges       Row Name 05/08/24 1407             Time Calculation    Start Time 1048  -ES      PT Received On 05/08/24  -ES      PT Goal Re-Cert Due Date 05/18/24  -ES         Time Calculation- PT    Total Timed Code Minutes- PT 12 minute(s)  -ES         Timed Charges    09879 - PT Therapeutic Activity Minutes 12  -ES         Untimed Charges    PT Eval/Re-eval Minutes 40  -ES         Total Minutes     Timed Charges Total Minutes 12  -ES      Untimed Charges Total Minutes 40  -ES       Total Minutes 52  -ES                User Key  (r) = Recorded By, (t) = Taken By, (c) = Cosigned By      Initials Name Provider Type    Renee Erazo PT Physical Therapist                  Therapy Charges for Today       Code Description Service Date Service Provider Modifiers Qty    65521209404  PT THERAPEUTIC ACT EA 15 MIN 2024 Renee Mendoza, PT GP 1    99144338853 HC PT EVAL LOW COMPLEXITY 3 2024 Renee Mendoza, PT GP 1            PT G-Codes  Outcome Measure Options: AM-PAC 6 Clicks Basic Mobility (PT)  AM-PAC 6 Clicks Score (PT): 11  PT Discharge Summary  Anticipated Discharge Disposition (PT): inpatient rehabilitation facility    Renee Mendoza PT  2024      Electronically signed by Renee Mendoza PT at 24 1408          Occupational Therapy Notes (most recent note)        Margaret Ramírez, OT at 24 1049          Patient Name: Kaleb Abraham  : 1950    MRN: [id number]                              Today's Date: 2024       Admit Date: 2024    Visit Dx:     ICD-10-CM ICD-9-CM   1. Acute on chronic anemia  D64.9 285.9   2. S/P mechanical aortic valve  Z95.2 V43.3   3. Supratherapeutic INR  R79.1 790.92   4. History of GI bleed  Z87.19 V12.79   5. Anticoagulated on Coumadin  Z79.01 V58.61   6. Generalized weakness  R53.1 780.79   7. History of CHF (congestive heart failure)  Z86.79 V12.59     Patient Active Problem List   Diagnosis    Anemia    Aortic stenosis    CAD s/p CABG    T2DM    Hyperlipidemia    Hypertension    Hypothyroidism    Mitral regurgitation    MARTINEZ    Hypersomnia, unspecified    Chronic atrial fibrillation    Supratherapeutic INR    S/P prosthetic aortic valve    S/P mechanical aortic valve    Chronic anticoagulation (warfarin)     Past Medical History:   Diagnosis Date    Anemia     Aortic stenosis     CAD (coronary artery disease)     Diabetes mellitus      Hyperlipidemia     Hypertension     Hypothyroidism     Mitral regurgitation      Past Surgical History:   Procedure Laterality Date    CAPSULE ENDOSCOPY      2/22/2023 and 9/25/2023 per dr. schrader    COLONOSCOPY      2/2023 Dr. Schrader    CORONARY ARTERY BYPASS GRAFT      CORONARY STENT PLACEMENT      UPPER GASTROINTESTINAL ENDOSCOPY      2/7/2023 Dr. Schrader, 7/26/2023 Dr. Liu, 4/2024 Dr. Girish Lackey Joseph      General Information       Row Name 05/08/24 1513          OT Time and Intention    Document Type evaluation  -     Mode of Treatment occupational therapy  -       Row Name 05/08/24 1513          General Information    Patient Profile Reviewed yes  -     Prior Level of Function independent:;all household mobility;community mobility;gait;transfer;bed mobility;ADL's  Uses SPC/FWW at baseline. Endorses recent falls  -     Existing Precautions/Restrictions fall;oxygen therapy device and L/min;other (see comments)  c/o R hip/LBP  -     Barriers to Rehab medically complex;previous functional deficit  -       Row Name 05/08/24 1513          Living Environment    People in Home spouse  -       Row Name 05/08/24 1513          Home Main Entrance    Number of Stairs, Main Entrance other (see comments)  ramp  -       Row Name 05/08/24 1513          Stairs Within Home, Primary    Number of Stairs, Within Home, Primary none  -       Row Name 05/08/24 1513          Cognition    Orientation Status (Cognition) oriented x 3  -       Row Name 05/08/24 1513          Safety Issues, Functional Mobility    Safety Issues Affecting Function (Mobility) awareness of need for assistance;insight into deficits/self-awareness;judgment;problem-solving;safety precaution awareness;safety precautions follow-through/compliance;sequencing abilities  -     Impairments Affecting Function (Mobility) balance;coordination;endurance/activity tolerance;pain;strength  -               User Key  (r) =  Recorded By, (t) = Taken By, (c) = Cosigned By      Initials Name Provider Type    Margaret Flores OT Occupational Therapist                     Mobility/ADL's       Row Name 05/08/24 1516          Bed Mobility    Bed Mobility supine-sit  -     Supine-Sit Day (Bed Mobility) moderate assist (50% patient effort);1 person assist;verbal cues  -     Bed Mobility, Safety Issues impaired trunk control for bed mobility  -     Assistive Device (Bed Mobility) bed rails;head of bed elevated  -       Row Name 05/08/24 1516          Transfers    Transfers sit-stand transfer;stand-sit transfer;bed-chair transfer  -       Row Name 05/08/24 1516          Bed-Chair Transfer    Bed-Chair Day (Transfers) maximum assist (25% patient effort);2 person assist;verbal cues  -     Assistive Device (Bed-Chair Transfers) other (see comments)  BUE support  -       Row Name 05/08/24 1516          Sit-Stand Transfer    Sit-Stand Day (Transfers) maximum assist (25% patient effort);verbal cues  -     Assistive Device (Sit-Stand Transfers) walker, front-wheeled  -       Row Name 05/08/24 1516          Activities of Daily Living    BADL Assessment/Intervention lower body dressing  -       Row Name 05/08/24 1516          Lower Body Dressing Assessment/Training    Day Level (Lower Body Dressing) don;socks;dependent (less than 25% patient effort)  -     Position (Lower Body Dressing) edge of bed sitting  -               User Key  (r) = Recorded By, (t) = Taken By, (c) = Cosigned By      Initials Name Provider Type    Margaret Flores OT Occupational Therapist                   Obj/Interventions       Row Name 05/08/24 1517          Sensory Assessment (Somatosensory)    Sensory Assessment (Somatosensory) UE sensation intact  -       Row Name 05/08/24 1517          Range of Motion Comprehensive    Comment, General Range of Motion B shldr AROM impaired R>L; remaining BUE WFL  -       Row Name  05/08/24 1517          Strength Comprehensive (MMT)    Comment, General Manual Muscle Testing (MMT) Assessment B shldr 3-/5; remaining 4-/5  -       Row Name 05/08/24 1517          Balance    Static Sitting Balance minimal assist  -     Dynamic Sitting Balance moderate assist  -KL     Position, Sitting Balance supported;sitting edge of bed  -     Static Standing Balance maximum assist;1-person assist  -KL     Dynamic Standing Balance maximum assist;2-person assist  -KL     Position/Device Used, Standing Balance supported;walker, front-wheeled  -     Balance Interventions sitting;standing;sit to stand;supported;static;dynamic;occupation based/functional task  -               User Key  (r) = Recorded By, (t) = Taken By, (c) = Cosigned By      Initials Name Provider Type    Margaret Flores OT Occupational Therapist                   Goals/Plan       Row Name 05/08/24 1527          Transfer Goal 1 (OT)    Activity/Assistive Device (Transfer Goal 1, OT) sit-to-stand/stand-to-sit;toilet  -     [place] Level/Cues Needed (Transfer Goal 1, OT) minimum assist (75% or more patient effort)  -     Time Frame (Transfer Goal 1, OT) long term goal (LTG);10 days  -     Progress/Outcome (Transfer Goal 1, OT) new goal  -       Row Name 05/08/24 1527          Dressing Goal 1 (OT)    Activity/Device (Dressing Goal 1, OT) lower body dressing;reacher;sock-aid;long-handled shoe horn  -     [place]/Cues Needed (Dressing Goal 1, OT) moderate assist (50-74% patient effort)  -     Time Frame (Dressing Goal 1, OT) long term goal (LTG);10 days  -     Progress/Outcome (Dressing Goal 1, OT) new goal  -       Row Name 05/08/24 1527          Toileting Goal 1 (OT)    Activity/Device (Toileting Goal 1, OT) adjust/manage clothing;perform perineal hygiene;commode, bedside without drop arms  -     [place] Level/Cues Needed (Toileting Goal 1, OT) moderate assist (50-74% patient effort)  -     Time Frame  (Toileting Goal 1, OT) long term goal (LTG);10 days  -     Progress/Outcome (Toileting Goal 1, OT) new goal  -       Row Name 05/08/24 1527          Grooming Goal 1 (OT)    Activity/Device (Grooming Goal 1, OT) oral care;wash face, hands  -     [place] (Grooming Goal 1, OT) minimum assist (75% or more patient effort)  -     Time Frame (Grooming Goal 1, OT) long term goal (LTG);10 days  -     Progress/Outcome (Grooming Goal 1, OT) new goal  -       Row Name 05/08/24 1527          Therapy Assessment/Plan (OT)    Planned Therapy Interventions (OT) activity tolerance training;adaptive equipment training;functional balance retraining;occupation/activity based interventions;patient/caregiver education/training;ROM/therapeutic exercise;strengthening exercise;transfer/mobility retraining  -               User Key  (r) = Recorded By, (t) = Taken By, (c) = Cosigned By      Initials Name Provider Type    Margaret Flores, OT Occupational Therapist                   Clinical Impression       [facility] Name 05/08/24 1523          Pain Assessment    Pain Intervention(s) Repositioned;Ambulation/increased activity  -[facility] Name 05/08/24 1523          Pain Scale: FACES Pre/Post-Treatment    Pain: FACES Scale, Pretreatment 2-->hurts little bit  -     Posttreatment Pain Rating 4-->hurts little more  -     Pain Location - back  -[facility] Name 05/08/24 1523          Plan of Care Review    Plan of Care Reviewed With patient;spouse  -     Progress no change  -     Outcome Evaluation Pt presents to OT evaluation w/ deficits in functional balance, activity tolerance, self-care and generalized weakness. Pt would benefit from IPOT services to address deficits in order to progress towards PLOF. d/c rec is IPR.  -       Row Name 05/08/24 1523          Therapy Assessment/Plan (OT)    Patient/Family Therapy Goal Statement (OT) Return to PLOF  -     Rehab Potential (OT) good, to achieve stated therapy goals  -      Criteria for Skilled Therapeutic Interventions Met (OT) yes  -KL     Therapy Frequency (OT) daily  -       Row Name 05/08/24 1523          Therapy Plan Review/Discharge Plan (OT)    Anticipated Discharge Disposition (OT) inpatient rehabilitation facility  -       Row Name 05/08/24 1523          Vital Signs    Pre Systolic BP Rehab 118  -KL     Pre Treatment Diastolic BP 68  -KL     Post Systolic BP Rehab 118  -KL     Post Treatment Diastolic BP 66  -KL     Pretreatment Heart Rate (beats/min) 50  -KL     Posttreatment Heart Rate (beats/min) 53  -KL     Pre SpO2 (%) 98  -KL     O2 Delivery Pre Treatment nasal cannula  -KL     Post SpO2 (%) 96  -KL     O2 Delivery Post Treatment nasal cannula  -KL     Pre Patient Position Supine  -KL     Intra Patient Position Standing  -KL     Post Patient Position Sitting  -       Row Name 05/08/24 1523          Positioning and Restraints    Pre-Treatment Position in bed  -KL     Post Treatment Position chair  -KL     In Chair notified nsg;reclined;sitting;call light within reach;encouraged to call for assist;exit alarm on;with family/caregiver;waffle cushion;legs elevated;on mechanical lift sling  -KL               User Key  (r) = Recorded By, (t) = Taken By, (c) = Cosigned By      Initials Name Provider Type    Margaret Flores, OT Occupational Therapist                   Outcome Measures       Row Name 05/08/24 1528          How much help from another is currently needed...    Putting on and taking off regular lower body clothing? 1  -KL     Bathing (including washing, rinsing, and drying) 2  -KL     Toileting (which includes using toilet bed pan or urinal) 1  -KL     Putting on and taking off regular upper body clothing 2  -KL     Taking care of personal grooming (such as brushing teeth) 3  -KL     Eating meals 3  -KL     AM-PAC 6 Clicks Score (OT) 12  -KL       Row Name 05/08/24 1405 05/08/24 0800       How much help from another person do you currently need...    Turning  from your back to your side while in flat bed without using bedrails? 2  -ES 2  -AS    Moving from lying on back to sitting on the side of a flat bed without bedrails? 2  -ES 2  -AS    Moving to and from a bed to a chair (including a wheelchair)? 2  -ES 2  -AS    Standing up from a chair using your arms (e.g., wheelchair, bedside chair)? 2  -ES 2  -AS    Climbing 3-5 steps with a railing? 1  -ES 1  -AS    To walk in hospital room? 2  -ES 1  -AS    AM-PAC 6 Clicks Score (PT) 11  -ES 10  -AS    Highest Level of Mobility Goal 4 --> Transfer to chair/commode  -ES 4 --> Transfer to chair/commode  -AS      Row Name 05/08/24 1528 05/08/24 1405       Functional Assessment    Outcome Measure Options AM-PAC 6 Clicks Daily Activity (OT)  -KL AM-PAC 6 Clicks Basic Mobility (PT)  -ES              User Key  (r) = Recorded By, (t) = Taken By, (c) = Cosigned By      Initials Name Provider Type    AS Milady Olsen, RN Registered Nurse    Renee Erazo, PT Physical Therapist    Margaret Flores, OT Occupational Therapist                    Occupational Therapy Education       Title: PT OT SLP Therapies (In Progress)       Topic: Occupational Therapy (In Progress)       Point: ADL training (In Progress)       Description:   Instruct learner(s) on proper safety adaptation and remediation techniques during self care or transfers.   Instruct in proper use of assistive devices.                  Learning Progress Summary             Patient Acceptance, E, NR by  at 5/8/2024 1529   Significant Other Acceptance, E, NR by  at 5/8/2024 1529                         Point: Home exercise program (Not Started)       Description:   Instruct learner(s) on appropriate technique for monitoring, assisting and/or progressing therapeutic exercises/activities.                  Learner Progress:  Not documented in this visit.              Point: Precautions (In Progress)       Description:   Instruct learner(s) on prescribed precautions during  self-care and functional transfers.                  Learning Progress Summary             Patient Acceptance, E, NR by  at 5/8/2024 1529   Significant Other Acceptance, E, NR by  at 5/8/2024 1529                         Point: Body mechanics (In Progress)       Description:   Instruct learner(s) on proper positioning and spine alignment during self-care, functional mobility activities and/or exercises.                  Learning Progress Summary             Patient Acceptance, E, NR by  at 5/8/2024 1529   Significant Other Acceptance, E, NR by  at 5/8/2024 1529                                         User Key       Initials Effective Dates Name Provider Type Discipline     02/05/24 -  Margaret Ramírez OT Occupational Therapist OT                  OT Recommendation and Plan  Planned Therapy Interventions (OT): activity tolerance training, adaptive equipment training, functional balance retraining, occupation/activity based interventions, patient/caregiver education/training, ROM/therapeutic exercise, strengthening exercise, transfer/mobility retraining  Therapy Frequency (OT): daily  Plan of Care Review  Plan of Care Reviewed With: patient, spouse  Progress: no change  Outcome Evaluation: Pt presents to OT evaluation w/ deficits in functional balance, activity tolerance, self-care and generalized weakness. Pt would benefit from IPOT services to address deficits in order to progress towards PLOF. d/c rec is IPR.     Time Calculation:   Evaluation Complexity (OT)  Review Occupational Profile/Medical/Therapy History Complexity: expanded/moderate complexity  Assessment, Occupational Performance/Identification of Deficit Complexity: 3-5 performance deficits  Clinical Decision Making Complexity (OT): detailed assessment/moderate complexity  Overall Complexity of Evaluation (OT): moderate complexity     Time Calculation- OT       Row Name 05/08/24 1529             Time Calculation- OT    OT Start Time 1049  -       OT Received On 05/08/24  -      OT Goal Re-Cert Due Date 05/18/24  -         Timed Charges    73277 - OT Therapeutic Activity Minutes 5  -KL      22833 - OT Self Care/Mgmt Minutes 5  -KL         Untimed Charges    OT Eval/Re-eval Minutes 41  -KL         Total Minutes    Timed Charges Total Minutes 10  -KL      Untimed Charges Total Minutes 41  -KL       Total Minutes 51  -KL                User Key  (r) = Recorded By, (t) = Taken By, (c) = Cosigned By      Initials Name Provider Type    Margaret Flores OT Occupational Therapist                  Therapy Charges for Today       Code Description Service Date Service Provider Modifiers Qty    [id number]  OT THERAPEUTIC ACT EA 15 MIN 5/8/2024 Margaret Ramírez OT GO 1    [id number] HC OT EVAL MOD COMPLEXITY 3 5/8/2024 Margaret Ramírez OT GO 1                 Margaret Ramírez OT  5/8/2024    Electronically signed by Margaret Ramírez OT at 05/08/24 7918

## 2024-05-10 NOTE — PROGRESS NOTES
The patient developed a ventricular tachycardia arrest and a code was called.  CPR was started.  The rhythm degenerated to torsades.  He was cardioverted back to sinus rhythm.  He received 1 round of medications.    The plan was to intubate him but with the resumption of sinus tachycardia he became awake, alert, and breathing spontaneously so we have tentatively held off on intubation at this time    Patient is currently receiving magnesium and electrolytes will be checked as well as serial cardiac enzymes    The patient's wife was at the bedside throughout the code and she was updated as to his status.  She does desire full resuscitative measures in the event of a cardiopulmonary arrest.

## 2024-05-11 ENCOUNTER — APPOINTMENT (OUTPATIENT)
Dept: GENERAL RADIOLOGY | Facility: HOSPITAL | Age: 74
DRG: 314 | End: 2024-05-11
Payer: MEDICARE

## 2024-05-11 PROBLEM — B95.2 ENTEROCOCCAL BACTEREMIA: Status: ACTIVE | Noted: 2024-05-11

## 2024-05-11 PROBLEM — I47.20 VENTRICULAR TACHYARRHYTHMIA: Status: ACTIVE | Noted: 2024-05-11

## 2024-05-11 PROBLEM — R78.81 ENTEROCOCCAL BACTEREMIA: Status: ACTIVE | Noted: 2024-05-11

## 2024-05-11 PROBLEM — I33.0 BACTERIAL ENDOCARDITIS: Status: ACTIVE | Noted: 2024-05-11

## 2024-05-11 LAB
ANION GAP SERPL CALCULATED.3IONS-SCNC: 13 MMOL/L (ref 5–15)
BASOPHILS # BLD AUTO: 0.04 10*3/MM3 (ref 0–0.2)
BASOPHILS NFR BLD AUTO: 0.3 % (ref 0–1.5)
BUN SERPL-MCNC: 9 MG/DL (ref 8–23)
BUN/CREAT SERPL: 13.2 (ref 7–25)
CALCIUM SPEC-SCNC: 8.2 MG/DL (ref 8.6–10.5)
CHLORIDE SERPL-SCNC: 99 MMOL/L (ref 98–107)
CO2 SERPL-SCNC: 24 MMOL/L (ref 22–29)
CREAT SERPL-MCNC: 0.68 MG/DL (ref 0.76–1.27)
DEPRECATED RDW RBC AUTO: 50.9 FL (ref 37–54)
EGFRCR SERPLBLD CKD-EPI 2021: 98.8 ML/MIN/1.73
EOSINOPHIL # BLD AUTO: 0 10*3/MM3 (ref 0–0.4)
EOSINOPHIL NFR BLD AUTO: 0 % (ref 0.3–6.2)
ERYTHROCYTE [DISTWIDTH] IN BLOOD BY AUTOMATED COUNT: 15.9 % (ref 12.3–15.4)
GLUCOSE BLDC GLUCOMTR-MCNC: 121 MG/DL (ref 70–130)
GLUCOSE BLDC GLUCOMTR-MCNC: 126 MG/DL (ref 70–130)
GLUCOSE BLDC GLUCOMTR-MCNC: 143 MG/DL (ref 70–130)
GLUCOSE BLDC GLUCOMTR-MCNC: 183 MG/DL (ref 70–130)
GLUCOSE SERPL-MCNC: 113 MG/DL (ref 65–99)
HCT VFR BLD AUTO: 27.6 % (ref 37.5–51)
HCT VFR BLD AUTO: 27.9 % (ref 37.5–51)
HCT VFR BLD AUTO: 28.8 % (ref 37.5–51)
HCT VFR BLD AUTO: 29 % (ref 37.5–51)
HGB BLD-MCNC: 10 G/DL (ref 13–17.7)
HGB BLD-MCNC: 9 G/DL (ref 13–17.7)
HGB BLD-MCNC: 9.5 G/DL (ref 13–17.7)
HGB BLD-MCNC: 9.6 G/DL (ref 13–17.7)
IMM GRANULOCYTES # BLD AUTO: 0.35 10*3/MM3 (ref 0–0.05)
IMM GRANULOCYTES NFR BLD AUTO: 3 % (ref 0–0.5)
INR PPP: 2.69 (ref 0.89–1.12)
LYMPHOCYTES # BLD AUTO: 1.33 10*3/MM3 (ref 0.7–3.1)
LYMPHOCYTES NFR BLD AUTO: 11.3 % (ref 19.6–45.3)
MAGNESIUM SERPL-MCNC: 1.7 MG/DL (ref 1.6–2.4)
MCH RBC QN AUTO: 28.8 PG (ref 26.6–33)
MCHC RBC AUTO-ENTMCNC: 32.6 G/DL (ref 31.5–35.7)
MCV RBC AUTO: 88.2 FL (ref 79–97)
MONOCYTES # BLD AUTO: 0.63 10*3/MM3 (ref 0.1–0.9)
MONOCYTES NFR BLD AUTO: 5.3 % (ref 5–12)
NEUTROPHILS NFR BLD AUTO: 80.1 % (ref 42.7–76)
NEUTROPHILS NFR BLD AUTO: 9.43 10*3/MM3 (ref 1.7–7)
NRBC BLD AUTO-RTO: 0 /100 WBC (ref 0–0.2)
PHOSPHATE SERPL-MCNC: 2.9 MG/DL (ref 2.5–4.5)
PLATELET # BLD AUTO: 251 10*3/MM3 (ref 140–450)
PMV BLD AUTO: 9.2 FL (ref 6–12)
POTASSIUM SERPL-SCNC: 3.8 MMOL/L (ref 3.5–5.2)
PROTHROMBIN TIME: 28.8 SECONDS (ref 12.2–14.5)
QT INTERVAL: 404 MS
QTC INTERVAL: 533 MS
RBC # BLD AUTO: 3.13 10*6/MM3 (ref 4.14–5.8)
SODIUM SERPL-SCNC: 136 MMOL/L (ref 136–145)
UFH PPP CHRO-ACNC: 0.33 IU/ML (ref 0.3–0.7)
UFH PPP CHRO-ACNC: 0.33 IU/ML (ref 0.3–0.7)
UFH PPP CHRO-ACNC: 0.36 IU/ML (ref 0.3–0.7)
WBC NRBC COR # BLD AUTO: 11.78 10*3/MM3 (ref 3.4–10.8)

## 2024-05-11 PROCEDURE — 85520 HEPARIN ASSAY: CPT

## 2024-05-11 PROCEDURE — 25010000002 AMPICILLIN PER 500 MG: Performed by: INTERNAL MEDICINE

## 2024-05-11 PROCEDURE — 93005 ELECTROCARDIOGRAM TRACING: CPT | Performed by: PHYSICIAN ASSISTANT

## 2024-05-11 PROCEDURE — 84100 ASSAY OF PHOSPHORUS: CPT | Performed by: INTERNAL MEDICINE

## 2024-05-11 PROCEDURE — 82948 REAGENT STRIP/BLOOD GLUCOSE: CPT

## 2024-05-11 PROCEDURE — C1751 CATH, INF, PER/CENT/MIDLINE: HCPCS

## 2024-05-11 PROCEDURE — 71045 X-RAY EXAM CHEST 1 VIEW: CPT

## 2024-05-11 PROCEDURE — C1894 INTRO/SHEATH, NON-LASER: HCPCS

## 2024-05-11 PROCEDURE — 25010000002 HEPARIN (PORCINE) 25000-0.45 UT/250ML-% SOLUTION: Performed by: INTERNAL MEDICINE

## 2024-05-11 PROCEDURE — 25010000002 MORPHINE PER 10 MG: Performed by: INTERNAL MEDICINE

## 2024-05-11 PROCEDURE — 80048 BASIC METABOLIC PNL TOTAL CA: CPT | Performed by: HOSPITALIST

## 2024-05-11 PROCEDURE — 25010000002 CEFTRIAXONE PER 250 MG: Performed by: INTERNAL MEDICINE

## 2024-05-11 PROCEDURE — 85025 COMPLETE CBC W/AUTO DIFF WBC: CPT | Performed by: INTERNAL MEDICINE

## 2024-05-11 PROCEDURE — 85610 PROTHROMBIN TIME: CPT | Performed by: HOSPITALIST

## 2024-05-11 PROCEDURE — 85014 HEMATOCRIT: CPT | Performed by: INTERNAL MEDICINE

## 2024-05-11 PROCEDURE — 25010000002 MAGNESIUM SULFATE 4 GM/100ML SOLUTION

## 2024-05-11 PROCEDURE — 85018 HEMOGLOBIN: CPT | Performed by: INTERNAL MEDICINE

## 2024-05-11 PROCEDURE — 99232 SBSQ HOSP IP/OBS MODERATE 35: CPT | Performed by: INTERNAL MEDICINE

## 2024-05-11 PROCEDURE — 63710000001 INSULIN LISPRO (HUMAN) PER 5 UNITS: Performed by: INTERNAL MEDICINE

## 2024-05-11 PROCEDURE — 83735 ASSAY OF MAGNESIUM: CPT | Performed by: INTERNAL MEDICINE

## 2024-05-11 PROCEDURE — 99291 CRITICAL CARE FIRST HOUR: CPT | Performed by: INTERNAL MEDICINE

## 2024-05-11 RX ORDER — MAGNESIUM SULFATE HEPTAHYDRATE 40 MG/ML
4 INJECTION, SOLUTION INTRAVENOUS ONCE
Status: COMPLETED | OUTPATIENT
Start: 2024-05-11 | End: 2024-05-11

## 2024-05-11 RX ORDER — SODIUM CHLORIDE 0.9 % (FLUSH) 0.9 %
10 SYRINGE (ML) INJECTION EVERY 12 HOURS SCHEDULED
Status: DISCONTINUED | OUTPATIENT
Start: 2024-05-11 | End: 2024-05-20 | Stop reason: HOSPADM

## 2024-05-11 RX ORDER — POTASSIUM CHLORIDE 1.5 G/1.58G
40 POWDER, FOR SOLUTION ORAL ONCE
Status: COMPLETED | OUTPATIENT
Start: 2024-05-11 | End: 2024-05-11

## 2024-05-11 RX ORDER — MIDODRINE HYDROCHLORIDE 5 MG/1
2.5 TABLET ORAL
Status: DISCONTINUED | OUTPATIENT
Start: 2024-05-11 | End: 2024-05-12

## 2024-05-11 RX ORDER — WARFARIN SODIUM 3 MG/1
1.5 TABLET ORAL
Status: COMPLETED | OUTPATIENT
Start: 2024-05-11 | End: 2024-05-11

## 2024-05-11 RX ORDER — SODIUM CHLORIDE 0.9 % (FLUSH) 0.9 %
10 SYRINGE (ML) INJECTION AS NEEDED
Status: DISCONTINUED | OUTPATIENT
Start: 2024-05-11 | End: 2024-05-20 | Stop reason: HOSPADM

## 2024-05-11 RX ADMIN — TAMSULOSIN HYDROCHLORIDE 0.4 MG: 0.4 CAPSULE ORAL at 20:38

## 2024-05-11 RX ADMIN — MORPHINE SULFATE 2 MG: 2 INJECTION, SOLUTION INTRAMUSCULAR; INTRAVENOUS at 20:38

## 2024-05-11 RX ADMIN — PANTOPRAZOLE SODIUM 40 MG: 40 INJECTION, POWDER, FOR SOLUTION INTRAVENOUS at 08:56

## 2024-05-11 RX ADMIN — AMPICILLIN SODIUM 2 G: 2 INJECTION, POWDER, FOR SOLUTION INTRAMUSCULAR; INTRAVENOUS at 20:38

## 2024-05-11 RX ADMIN — Medication 10 ML: at 20:39

## 2024-05-11 RX ADMIN — INSULIN LISPRO 2 UNITS: 100 INJECTION, SOLUTION INTRAVENOUS; SUBCUTANEOUS at 20:51

## 2024-05-11 RX ADMIN — MORPHINE SULFATE 2 MG: 2 INJECTION, SOLUTION INTRAMUSCULAR; INTRAVENOUS at 09:27

## 2024-05-11 RX ADMIN — MORPHINE SULFATE 2 MG: 2 INJECTION, SOLUTION INTRAMUSCULAR; INTRAVENOUS at 23:00

## 2024-05-11 RX ADMIN — MORPHINE SULFATE 2 MG: 2 INJECTION, SOLUTION INTRAMUSCULAR; INTRAVENOUS at 05:14

## 2024-05-11 RX ADMIN — PANTOPRAZOLE SODIUM 40 MG: 40 INJECTION, POWDER, FOR SOLUTION INTRAVENOUS at 20:38

## 2024-05-11 RX ADMIN — OXYBUTYNIN CHLORIDE 10 MG: 5 TABLET, EXTENDED RELEASE ORAL at 08:56

## 2024-05-11 RX ADMIN — POTASSIUM CHLORIDE 40 MEQ: 1.5 POWDER, FOR SOLUTION ORAL at 08:56

## 2024-05-11 RX ADMIN — CEFTRIAXONE 2000 MG: 2 INJECTION, POWDER, FOR SOLUTION INTRAMUSCULAR; INTRAVENOUS at 03:06

## 2024-05-11 RX ADMIN — HEPARIN SODIUM 21 UNITS/KG/HR: 10000 INJECTION, SOLUTION INTRAVENOUS at 20:42

## 2024-05-11 RX ADMIN — LEVOTHYROXINE SODIUM 25 MCG: 25 TABLET ORAL at 05:08

## 2024-05-11 RX ADMIN — Medication 10 ML: at 08:57

## 2024-05-11 RX ADMIN — MORPHINE SULFATE 2 MG: 2 INJECTION, SOLUTION INTRAMUSCULAR; INTRAVENOUS at 12:36

## 2024-05-11 RX ADMIN — AMPICILLIN SODIUM 2 G: 2 INJECTION, POWDER, FOR SOLUTION INTRAMUSCULAR; INTRAVENOUS at 03:06

## 2024-05-11 RX ADMIN — HYDROCODONE BITARTRATE AND ACETAMINOPHEN 1 TABLET: 10; 325 TABLET ORAL at 16:07

## 2024-05-11 RX ADMIN — AMPICILLIN SODIUM 2 G: 2 INJECTION, POWDER, FOR SOLUTION INTRAMUSCULAR; INTRAVENOUS at 08:56

## 2024-05-11 RX ADMIN — MAGNESIUM SULFATE IN WATER FOR 4 G: 40 INJECTION INTRAVENOUS at 08:57

## 2024-05-11 RX ADMIN — LIDOCAINE 1 PATCH: 4 PATCH TOPICAL at 08:56

## 2024-05-11 RX ADMIN — ALLOPURINOL 300 MG: 300 TABLET ORAL at 08:56

## 2024-05-11 RX ADMIN — AMPICILLIN SODIUM 2 G: 2 INJECTION, POWDER, FOR SOLUTION INTRAMUSCULAR; INTRAVENOUS at 16:52

## 2024-05-11 RX ADMIN — WARFARIN SODIUM 1.5 MG: 3 TABLET ORAL at 17:56

## 2024-05-11 RX ADMIN — CEFTRIAXONE 2000 MG: 2 INJECTION, POWDER, FOR SOLUTION INTRAMUSCULAR; INTRAVENOUS at 16:52

## 2024-05-11 RX ADMIN — HEPARIN SODIUM 21 UNITS/KG/HR: 10000 INJECTION, SOLUTION INTRAVENOUS at 10:25

## 2024-05-11 RX ADMIN — Medication 10 ML: at 16:54

## 2024-05-11 NOTE — PROGRESS NOTES
"[facility] Progress Note     LOS: 4 days   Patient Care Team:  Manolo Enamorado MD as PCP - General (Internal Medicine)  Lulu Perry APRN as Nurse Practitioner (Nurse Practitioner)  PCP:  Manolo Enamorado MD    Chief Complaint: Follow-up valvular heart disease, endocarditis, status post torsades/VF arrest    Subjective: Around 7:30 PM last night patient had torsades that degenerated into VF requiring defibrillation and CPR.  Postevent he was neurologically intact.  He has had a prolonged QT on EKGs during his hospital stay.  Causative medications have been discontinued.  Has chest soreness today      Review of Systems:   All systems have been reviewed and are negative with the exception of those mentioned above.      Objective:    Vital Sign Min/Max for last 24 hours  Temp  Min: 98.3 °F (36.8 °C)  Max: 99.5 °F (37.5 °C)   BP  Min: 117/76  Max: 175/107   Pulse  Min: 56  Max: 144   Resp  Min: 16  Max: 21   SpO2  Min: 89 %  Max: 100 %   No data recorded   Weight  Min: 107 kg (236 lb 15.9 oz)  Max: 109 kg (240 lb 4.8 oz)     Flowsheet Rows      Flowsheet Row First Filed Value   Admission Height 180.3 cm (71\") Documented at 05/06/2024 1155   Admission Weight 103 kg (227 lb) Documented at 05/06/2024 1155            Telemetry: Atrial fibrillation with controlled rate.  Torsades/VF noted overnight      Intake/Output Summary (Last 24 hours) at 5/11/2024 0923  Last data filed at 5/11/2024 0500  Gross per 24 hour   Intake 1542.4 ml   Output 925 ml   Net 617.4 ml     Intake & Output (last 3 days)         05/08 0701  05/09 0700 05/09 0701  05/10 0700 05/10 0701  05/11 0700 05/11 0701  05/12 0700    P.O.  240 480     I.V. (mL/kg) 1034.5 (9.5) 657.6 (6) 828.9 (7.7)     Blood        IV Piggyback 1300 300 360     Total Intake(mL/kg) 2334.5 (21.4) 1197.6 (11) 1668.9 (15.6)     Urine (mL/kg/hr) 460 (0.2) 1065 (0.4) 1175 (0.5)     Stool 0       Total Output 460 1065 1175     Net " +1874.5 +132.6 +493.9             Urine Unmeasured Occurrence   2 x     Stool Unmeasured Occurrence 1 x                Physical Exam:  Constitutional:       Appearance: Not in distress.   Pulmonary:      Effort: Pulmonary effort is normal.   Cardiovascular:      Irregular rhythm.      Murmurs: There is a grade 2/6 systolic murmur.       click. Mechanical click noted   Edema:     Pretibial: bilateral trace edema of the pretibial area.         LABS/DIAGNOSTIC DATA:  Results from last 7 days   Lab Units 05/11/24  0554 05/10/24  2355 05/10/24  1938 05/10/24  1200 05/10/24  0608   WBC 10*3/mm3 11.78*  --  17.07*  --  9.50   HEMOGLOBIN g/dL 9.0* 9.5* 11.7*   < > 9.5*  9.5*   HEMATOCRIT % 27.6* 28.8* 37.0*   < > 28.8*  28.8*   PLATELETS 10*3/mm3 251  --  316  --  275    < > = values in this interval not displayed.     Lab Results   Lab Value Date/Time    TROPONINT 34 (H) 02/05/2024 1456    TROPONINT 37 (H) 02/05/2024 1222    TROPONINT 35 (H) 05/17/2023 1437     Results from last 7 days   Lab Units 05/11/24  0554 05/10/24  1938 05/10/24  0608 05/09/24  0628 05/08/24  0709   INR  2.69* 2.03* 1.88*   < > 1.46*   APTT seconds  --   --   --   --  38.7*    < > = values in this interval not displayed.     Results from last 7 days   Lab Units 05/11/24  0554 05/10/24  1938 05/10/24  0608 05/07/24  0959 05/06/24  1206   SODIUM mmol/L 136 137 136   < > 131*   POTASSIUM mmol/L 3.8 4.9 3.9   < > 4.0   CHLORIDE mmol/L 99 99 97*   < > 93*   CO2 mmol/L 24.0 27.0 27.0   < > 29.0   BUN mg/dL 9 9 12   < > 17   CREATININE mg/dL 0.68* 0.97 0.75*   < > 1.04   CALCIUM mg/dL 8.2* 11.5* 8.0*   < > 8.1*   BILIRUBIN mg/dL  --  0.4  --   --  0.6   ALK PHOS U/L  --  69  --   --  44   ALT (SGPT) U/L  --  15  --   --  8   AST (SGOT) U/L  --  42*  --   --  27   GLUCOSE mg/dL 113* 134* 88   < > 138*    < > = values in this interval not displayed.     Results from last 7 days   Lab Units 05/07/24  0959   HEMOGLOBIN A1C % 5.30     Results from last 7 days    Lab Units 05/07/24  0959   CHOLESTEROL mg/dL 105   TRIGLYCERIDES mg/dL 140   HDL CHOL mg/dL 23*   LDL CHOL mg/dL 57     Results from last 7 days   Lab Units 05/07/24  0051 05/06/24  1206   TSH uIU/mL 2.250 2.720   FREE T4 ng/dL  --  1.48             COLEEN:    Left ventricular ejection fraction appears to be 56 - 60%.    Left ventricular wall thickness is consistent with mild to moderate concentric hypertrophy.    The left atrial cavity is moderately dilated.    There is a bioprosthetic aortic valve present.    Mild aortic valve stenosis is present.  Mean gradient 11 mmHg..  Mild aortic valve regurgitation.    There is a mechanical mitral valve prosthesis present.  There is mild mitral valve regurgitation.  No stenosis.    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  Measures 0.8 x 0.3 cm    Medication Review:   allopurinol, 300 mg, Oral, Daily  ampicillin, 2 g, Intravenous, Q6H  cefTRIAXone, 2,000 mg, Intravenous, Q12H  insulin lispro, 2-7 Units, Subcutaneous, 4x Daily AC & at Bedtime  levothyroxine, 25 mcg, Oral, Q AM  Lidocaine, 1 patch, Transdermal, Q24H  magnesium sulfate, 4 g, Intravenous, Once  midodrine, 5 mg, Oral, TID AC  oxybutynin XL, 10 mg, Oral, Daily  pantoprazole, 40 mg, Intravenous, Q12H  sodium chloride, 10 mL, Intravenous, Q12H  tamsulosin, 0.4 mg, Oral, Nightly  [START ON 5/12/2024] warfarin (COUMADIN) (dosing per levels), , Does not apply, Daily  warfarin, 1.5 mg, Oral, Once       heparin, 21 Units/kg/hr, Last Rate: 21 Units/kg/hr (05/10/24 2308)  norepinephrine, 0.02-0.3 mcg/kg/min, Last Rate: Stopped (05/09/24 0113)  Pharmacy to Dose Heparin,   Pharmacy to dose warfarin,            Supratherapeutic INR    Anemia    CAD s/p CABG    T2DM    Hyperlipidemia    Hypothyroidism    MARTINEZ    Chronic atrial fibrillation    S/P prosthetic aortic valve    S/P mechanical aortic valve    Chronic anticoagulation (warfarin)    Prosthetic valve endocarditis (Mitral Valve)    S/P VT/TdP  Arrest 5/10/2024    Enterococcal bacteremia      Assessment/Plan:      Valvular heart disease/prosthetic valve endocarditis  Patient with noted vegetation on mechanical mitral valve  Enterococcus bacteremia  On ampicillin and ceftriaxone, ID following.  Not a surgical candidate  Will require oral suppression  Heparin drip to therapeutic INR for mechanical mitral valve  Anticipate discontinuation of heparin tomorrow  Torsades/VF arrest  Required defibrillation and CPR on evening of 5/10/2024  Etiology significantly prolonged Qtc  Discontinue all QTc prolonging medications  Replace potassium to greater than 4 magnesium greater than 2.  Receiving additional replacement this morning  Keep pads in place and keep in ICU  Will monitor QTc  through hospital stay, if remaining prolonged may need to discuss ICD.  Atrial fibrillation  Rates are controlled, continue to monitor  Hypertension  Wean midodrine          Ben Grace MD [facility]  05/11/24

## 2024-05-11 NOTE — PROGRESS NOTES
"Pharmacy Consult  -  Warfarin  Kaleb Abraham is a  73 y.o. male receiving warfarin therapy.  Height - 180.3 cm (70.98\")  Weight - 107 kg (236 lb 15.9 oz)    Consulting Provider: - Kendra  Indication: - Afib, Mechanical Mitral valve  Goal INR: - 2.5 - 3.5  Home Regimen: Most recent dose of warfarin was 5mg daily although was adjusted to 4mg over the weekend due to elevated INR   - Patient has medication bottle of 4mg tablets and 1mg tablets at home     Bridge Therapy: Yes   and unfractionated heparin    Drug-Drug Interactions with current regimen:   Received 10mg IV Vitamin K on 5/6    Warfarin Dosing During Admission:  Date  5/8 5/9 5/10 5/11        INR  1.46 1.58 1.88 2.69        Dose  3 mg 3 mg 3 mg (1.5mg)           Education Provided: Warfarin education provided on 5/8/2024 verbally.  Discussed effects of warfarin, importance of checking INR, drug-drug and drug-food interactions, and signs/symptoms of bleeding and clotting.  Patient deferred to wife who as in the room and manages his medications. She monitors his INR at home and then faxes results to Dr Maradiaga office.  All pertinent questions were answered.      Discharge Follow up:   Following Provider - Dr Peraza   Follow up time range or appointment - 2-3 days post discharge    Labs:  Results from last 7 days   Lab Units 05/11/24  0554 05/10/24  2355 05/10/24  1938 05/10/24  1200 05/10/24  0608 05/10/24  0010 05/09/24  1838 05/09/24  0820 05/09/24  0628 05/08/24  1142 05/08/24  0709 05/07/24  1611 05/07/24  0959 05/07/24  0051 05/06/24  2223   INR  2.69*  --  2.03*  --  1.88*  --   --   --  1.58*  --  1.46*  --  1.55*  --  2.33*   APTT seconds  --   --   --   --   --   --   --   --   --   --  38.7*  --   --   --   --    HEMOGLOBIN g/dL 9.0* 9.5* 11.7* 10.2* 9.5*  9.5* 9.0* 9.4*   < >  --    < > 9.2*  9.2*   < > 8.4*   < >  --    HEMATOCRIT % 27.6* 28.8* 37.0* 31.0* 28.8*  28.8* 27.4* 28.1*   < >  --    < > 28.2*  28.2*   < > 25.7*   < >  --     < " > = values in this interval not displayed.     Results from last 7 days   Lab Units 05/11/24  0554 05/10/24  1938 05/10/24  0608 05/07/24  0959 05/06/24  1206   SODIUM mmol/L 136 137 136   < > 131*   POTASSIUM mmol/L 3.8 4.9 3.9   < > 4.0   CHLORIDE mmol/L 99 99 97*   < > 93*   CO2 mmol/L 24.0 27.0 27.0   < > 29.0   BUN mg/dL 9 9 12   < > 17   CREATININE mg/dL 0.68* 0.97 0.75*   < > 1.04   CALCIUM mg/dL 8.2* 11.5* 8.0*   < > 8.1*   BILIRUBIN mg/dL  --  0.4  --   --  0.6   ALK PHOS U/L  --  69  --   --  44   ALT (SGPT) U/L  --  15  --   --  8   AST (SGOT) U/L  --  42*  --   --  27   GLUCOSE mg/dL 113* 134* 88   < > 138*    < > = values in this interval not displayed.     Current dietary intake:  0-25% of meals documented  Diet Order   Procedures    Diet: Regular/House, Cardiac, Diabetic; Healthy Heart (2-3 Na+); Consistent Carbohydrate; Fluid Consistency: Thin (IDDSI 0)     Assessment/Plan:  Will decrease warfarin dose to 1.5mg tonight and follow INR closely.  Note decrease in oral intake.  Daily PT/INR.  Monitor for S/Sx of bleeding/clotting.  Continue heparin infusion until INR > 2.5 for 24 hours.  Pharmacy will continue to follow and adjust dose based on INR trend and clinical status.    Thanks,  Kishan Verdin, PharmD, BCPS, BCCCP  05/11/24  07:35 EDT

## 2024-05-11 NOTE — PLAN OF CARE
Goal Outcome Evaluation:  Plan of Care Reviewed With: patient, spouse     Progress: no change    VSS today, increase in BP noted, NP aware with no new orders this shift. Afebrile. Patient reports pain in chest related to CPR done last night. , no BM.

## 2024-05-11 NOTE — PLAN OF CARE
Goal Outcome Evaluation:  Plan of Care Reviewed With: patient           Outcome Evaluation:   -Pt went into ventricular tachycardia arrest around 1925. Pt received 1 of epi and 2gm of mag IV. Pt was shocked once with 200 joules. PT regained afib rhythm and alert and oriented x4. Pt on 3LNC- tolerating well. AM EKG shows NSR.   -UOP:100mL plus 2 large occurrences.   -Heparin gtt remains at 21units/kg/hr  -PRN norco and morphine given for chest and back pain. See MAR.   -tele ordered dc'd per MD Aidan
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
## 2024-05-11 NOTE — CONSULTS
Placed by TOMAS Reyes RN - LYRIC triple lumen PICC confirmed with 3cg.  RUE previous picc pulled by pt 2 nights ago.  RUE edema noted.  Still requiring multiple antibx and drips.

## 2024-05-12 LAB
ANION GAP SERPL CALCULATED.3IONS-SCNC: 9 MMOL/L (ref 5–15)
BASOPHILS # BLD AUTO: 0.05 10*3/MM3 (ref 0–0.2)
BASOPHILS NFR BLD AUTO: 0.5 % (ref 0–1.5)
BUN SERPL-MCNC: 10 MG/DL (ref 8–23)
BUN/CREAT SERPL: 14.9 (ref 7–25)
CALCIUM SPEC-SCNC: 8.1 MG/DL (ref 8.6–10.5)
CHLORIDE SERPL-SCNC: 99 MMOL/L (ref 98–107)
CO2 SERPL-SCNC: 25 MMOL/L (ref 22–29)
CREAT SERPL-MCNC: 0.67 MG/DL (ref 0.76–1.27)
DEPRECATED RDW RBC AUTO: 52.9 FL (ref 37–54)
EGFRCR SERPLBLD CKD-EPI 2021: 99.2 ML/MIN/1.73
EOSINOPHIL # BLD AUTO: 0.03 10*3/MM3 (ref 0–0.4)
EOSINOPHIL NFR BLD AUTO: 0.3 % (ref 0.3–6.2)
ERYTHROCYTE [DISTWIDTH] IN BLOOD BY AUTOMATED COUNT: 16 % (ref 12.3–15.4)
GLUCOSE BLDC GLUCOMTR-MCNC: 122 MG/DL (ref 70–130)
GLUCOSE BLDC GLUCOMTR-MCNC: 128 MG/DL (ref 70–130)
GLUCOSE BLDC GLUCOMTR-MCNC: 152 MG/DL (ref 70–130)
GLUCOSE BLDC GLUCOMTR-MCNC: 159 MG/DL (ref 70–130)
GLUCOSE SERPL-MCNC: 127 MG/DL (ref 65–99)
HCT VFR BLD AUTO: 27.9 % (ref 37.5–51)
HCT VFR BLD AUTO: 28 % (ref 37.5–51)
HCT VFR BLD AUTO: 29.2 % (ref 37.5–51)
HCT VFR BLD AUTO: 29.6 % (ref 37.5–51)
HGB BLD-MCNC: 8.9 G/DL (ref 13–17.7)
HGB BLD-MCNC: 9.1 G/DL (ref 13–17.7)
HGB BLD-MCNC: 9.4 G/DL (ref 13–17.7)
HGB BLD-MCNC: 9.5 G/DL (ref 13–17.7)
IMM GRANULOCYTES # BLD AUTO: 0.31 10*3/MM3 (ref 0–0.05)
IMM GRANULOCYTES NFR BLD AUTO: 2.9 % (ref 0–0.5)
INR PPP: 2.44 (ref 0.89–1.12)
LYMPHOCYTES # BLD AUTO: 1.37 10*3/MM3 (ref 0.7–3.1)
LYMPHOCYTES NFR BLD AUTO: 12.9 % (ref 19.6–45.3)
MAGNESIUM SERPL-MCNC: 1.9 MG/DL (ref 1.6–2.4)
MCH RBC QN AUTO: 29.2 PG (ref 26.6–33)
MCHC RBC AUTO-ENTMCNC: 31.9 G/DL (ref 31.5–35.7)
MCV RBC AUTO: 91.5 FL (ref 79–97)
MONOCYTES # BLD AUTO: 0.75 10*3/MM3 (ref 0.1–0.9)
MONOCYTES NFR BLD AUTO: 7.1 % (ref 5–12)
NEUTROPHILS NFR BLD AUTO: 76.3 % (ref 42.7–76)
NEUTROPHILS NFR BLD AUTO: 8.12 10*3/MM3 (ref 1.7–7)
NRBC BLD AUTO-RTO: 0 /100 WBC (ref 0–0.2)
PHOSPHATE SERPL-MCNC: 2.2 MG/DL (ref 2.5–4.5)
PHOSPHATE SERPL-MCNC: 2.9 MG/DL (ref 2.5–4.5)
PLATELET # BLD AUTO: 219 10*3/MM3 (ref 140–450)
PMV BLD AUTO: 9.6 FL (ref 6–12)
POTASSIUM SERPL-SCNC: 4.1 MMOL/L (ref 3.5–5.2)
PROTHROMBIN TIME: 26.7 SECONDS (ref 12.2–14.5)
QT INTERVAL: 452 MS
QTC INTERVAL: 568 MS
RBC # BLD AUTO: 3.05 10*6/MM3 (ref 4.14–5.8)
SODIUM SERPL-SCNC: 133 MMOL/L (ref 136–145)
TSH SERPL DL<=0.05 MIU/L-ACNC: 3.45 UIU/ML (ref 0.27–4.2)
UFH PPP CHRO-ACNC: 0.4 IU/ML (ref 0.3–0.7)
WBC NRBC COR # BLD AUTO: 10.63 10*3/MM3 (ref 3.4–10.8)

## 2024-05-12 PROCEDURE — 85014 HEMATOCRIT: CPT | Performed by: INTERNAL MEDICINE

## 2024-05-12 PROCEDURE — 25010000002 AMPICILLIN PER 500 MG: Performed by: INTERNAL MEDICINE

## 2024-05-12 PROCEDURE — 84100 ASSAY OF PHOSPHORUS: CPT | Performed by: INTERNAL MEDICINE

## 2024-05-12 PROCEDURE — 84443 ASSAY THYROID STIM HORMONE: CPT

## 2024-05-12 PROCEDURE — 80048 BASIC METABOLIC PNL TOTAL CA: CPT | Performed by: INTERNAL MEDICINE

## 2024-05-12 PROCEDURE — 25010000002 CEFTRIAXONE PER 250 MG: Performed by: INTERNAL MEDICINE

## 2024-05-12 PROCEDURE — 63710000001 INSULIN LISPRO (HUMAN) PER 5 UNITS: Performed by: INTERNAL MEDICINE

## 2024-05-12 PROCEDURE — 93005 ELECTROCARDIOGRAM TRACING: CPT | Performed by: INTERNAL MEDICINE

## 2024-05-12 PROCEDURE — 25010000002 MAGNESIUM SULFATE 4 GM/100ML SOLUTION: Performed by: INTERNAL MEDICINE

## 2024-05-12 PROCEDURE — 85014 HEMATOCRIT: CPT

## 2024-05-12 PROCEDURE — 85520 HEPARIN ASSAY: CPT

## 2024-05-12 PROCEDURE — 85018 HEMOGLOBIN: CPT | Performed by: INTERNAL MEDICINE

## 2024-05-12 PROCEDURE — 93010 ELECTROCARDIOGRAM REPORT: CPT | Performed by: INTERNAL MEDICINE

## 2024-05-12 PROCEDURE — 25010000002 MORPHINE PER 10 MG: Performed by: INTERNAL MEDICINE

## 2024-05-12 PROCEDURE — 82948 REAGENT STRIP/BLOOD GLUCOSE: CPT

## 2024-05-12 PROCEDURE — 85610 PROTHROMBIN TIME: CPT | Performed by: INTERNAL MEDICINE

## 2024-05-12 PROCEDURE — 83735 ASSAY OF MAGNESIUM: CPT

## 2024-05-12 PROCEDURE — 99232 SBSQ HOSP IP/OBS MODERATE 35: CPT | Performed by: INTERNAL MEDICINE

## 2024-05-12 PROCEDURE — 85018 HEMOGLOBIN: CPT

## 2024-05-12 PROCEDURE — 85025 COMPLETE CBC W/AUTO DIFF WBC: CPT | Performed by: INTERNAL MEDICINE

## 2024-05-12 PROCEDURE — 99233 SBSQ HOSP IP/OBS HIGH 50: CPT | Performed by: INTERNAL MEDICINE

## 2024-05-12 PROCEDURE — 25010000002 FUROSEMIDE PER 20 MG: Performed by: INTERNAL MEDICINE

## 2024-05-12 RX ORDER — FUROSEMIDE 10 MG/ML
40 INJECTION INTRAMUSCULAR; INTRAVENOUS ONCE
Status: DISCONTINUED | OUTPATIENT
Start: 2024-05-12 | End: 2024-05-12

## 2024-05-12 RX ORDER — FUROSEMIDE 10 MG/ML
60 INJECTION INTRAMUSCULAR; INTRAVENOUS ONCE
Status: COMPLETED | OUTPATIENT
Start: 2024-05-12 | End: 2024-05-12

## 2024-05-12 RX ORDER — WARFARIN SODIUM 3 MG/1
3 TABLET ORAL
Status: DISCONTINUED | OUTPATIENT
Start: 2024-05-12 | End: 2024-05-13

## 2024-05-12 RX ORDER — MAGNESIUM SULFATE HEPTAHYDRATE 40 MG/ML
4 INJECTION, SOLUTION INTRAVENOUS ONCE
Status: COMPLETED | OUTPATIENT
Start: 2024-05-12 | End: 2024-05-12

## 2024-05-12 RX ORDER — PANTOPRAZOLE SODIUM 40 MG/1
40 TABLET, DELAYED RELEASE ORAL
Status: DISCONTINUED | OUTPATIENT
Start: 2024-05-12 | End: 2024-05-20 | Stop reason: HOSPADM

## 2024-05-12 RX ADMIN — CEFTRIAXONE 2000 MG: 2 INJECTION, POWDER, FOR SOLUTION INTRAMUSCULAR; INTRAVENOUS at 03:46

## 2024-05-12 RX ADMIN — WARFARIN SODIUM 3 MG: 3 TABLET ORAL at 17:34

## 2024-05-12 RX ADMIN — MAGNESIUM SULFATE IN WATER FOR 4 G: 40 INJECTION INTRAVENOUS at 04:21

## 2024-05-12 RX ADMIN — INSULIN LISPRO 2 UNITS: 100 INJECTION, SOLUTION INTRAVENOUS; SUBCUTANEOUS at 17:34

## 2024-05-12 RX ADMIN — Medication 1 PATCH: at 04:22

## 2024-05-12 RX ADMIN — METOPROLOL TARTRATE 25 MG: 25 TABLET, FILM COATED ORAL at 10:15

## 2024-05-12 RX ADMIN — AMPICILLIN SODIUM 2 G: 2 INJECTION, POWDER, FOR SOLUTION INTRAMUSCULAR; INTRAVENOUS at 08:52

## 2024-05-12 RX ADMIN — HYDROCODONE BITARTRATE AND ACETAMINOPHEN 1 TABLET: 10; 325 TABLET ORAL at 04:21

## 2024-05-12 RX ADMIN — Medication 10 ML: at 08:46

## 2024-05-12 RX ADMIN — OXYBUTYNIN CHLORIDE 10 MG: 5 TABLET, EXTENDED RELEASE ORAL at 08:46

## 2024-05-12 RX ADMIN — SODIUM PHOSPHATE, MONOBASIC, MONOHYDRATE AND SODIUM PHOSPHATE, DIBASIC, ANHYDROUS 15 MMOL: 142; 276 INJECTION, SOLUTION INTRAVENOUS at 10:15

## 2024-05-12 RX ADMIN — LEVOTHYROXINE SODIUM 25 MCG: 25 TABLET ORAL at 05:32

## 2024-05-12 RX ADMIN — MORPHINE SULFATE 2 MG: 2 INJECTION, SOLUTION INTRAMUSCULAR; INTRAVENOUS at 12:24

## 2024-05-12 RX ADMIN — MORPHINE SULFATE 2 MG: 2 INJECTION, SOLUTION INTRAMUSCULAR; INTRAVENOUS at 20:55

## 2024-05-12 RX ADMIN — METOPROLOL TARTRATE 25 MG: 25 TABLET, FILM COATED ORAL at 20:50

## 2024-05-12 RX ADMIN — AMPICILLIN SODIUM 2 G: 2 INJECTION, POWDER, FOR SOLUTION INTRAMUSCULAR; INTRAVENOUS at 15:26

## 2024-05-12 RX ADMIN — LIDOCAINE 1 PATCH: 4 PATCH TOPICAL at 08:46

## 2024-05-12 RX ADMIN — Medication 1 PATCH: at 11:37

## 2024-05-12 RX ADMIN — MORPHINE SULFATE 2 MG: 2 INJECTION, SOLUTION INTRAMUSCULAR; INTRAVENOUS at 01:19

## 2024-05-12 RX ADMIN — PANTOPRAZOLE SODIUM 40 MG: 40 TABLET, DELAYED RELEASE ORAL at 17:34

## 2024-05-12 RX ADMIN — AMPICILLIN SODIUM 2 G: 2 INJECTION, POWDER, FOR SOLUTION INTRAMUSCULAR; INTRAVENOUS at 20:51

## 2024-05-12 RX ADMIN — FUROSEMIDE 60 MG: 10 INJECTION, SOLUTION INTRAMUSCULAR; INTRAVENOUS at 10:14

## 2024-05-12 RX ADMIN — SIMETHICONE 80 MG: 80 TABLET, CHEWABLE ORAL at 15:26

## 2024-05-12 RX ADMIN — AMPICILLIN SODIUM 2 G: 2 INJECTION, POWDER, FOR SOLUTION INTRAMUSCULAR; INTRAVENOUS at 03:46

## 2024-05-12 RX ADMIN — TAMSULOSIN HYDROCHLORIDE 0.4 MG: 0.4 CAPSULE ORAL at 20:51

## 2024-05-12 RX ADMIN — ALLOPURINOL 300 MG: 300 TABLET ORAL at 08:52

## 2024-05-12 RX ADMIN — HYDROCODONE BITARTRATE AND ACETAMINOPHEN 1 TABLET: 10; 325 TABLET ORAL at 17:37

## 2024-05-12 RX ADMIN — CEFTRIAXONE 2000 MG: 2 INJECTION, POWDER, FOR SOLUTION INTRAMUSCULAR; INTRAVENOUS at 15:26

## 2024-05-12 RX ADMIN — MORPHINE SULFATE 2 MG: 2 INJECTION, SOLUTION INTRAMUSCULAR; INTRAVENOUS at 08:52

## 2024-05-12 RX ADMIN — MORPHINE SULFATE 2 MG: 2 INJECTION, SOLUTION INTRAMUSCULAR; INTRAVENOUS at 03:27

## 2024-05-12 RX ADMIN — Medication 10 ML: at 20:51

## 2024-05-12 RX ADMIN — PANTOPRAZOLE SODIUM 40 MG: 40 INJECTION, POWDER, FOR SOLUTION INTRAVENOUS at 08:46

## 2024-05-12 RX ADMIN — INSULIN LISPRO 2 UNITS: 100 INJECTION, SOLUTION INTRAVENOUS; SUBCUTANEOUS at 12:15

## 2024-05-12 NOTE — PROGRESS NOTES
"Pharmacy Consult  -  Warfarin  Kaleb Abraham is a  73 y.o. male receiving warfarin therapy.  Height - 180.3 cm (70.98\")  Weight - 111 kg (244 lb 0.8 oz)    Consulting Provider: - Kendra  Indication: - Afib, Mechanical Mitral valve  Goal INR: - 2.5 - 3.5  Home Regimen: Most recent dose of warfarin was 5mg daily although was adjusted to 4mg over the weekend due to elevated INR   - Patient has medication bottle of 4mg tablets and 1mg tablets at home     Bridge Therapy: Yes   and unfractionated heparin    Drug-Drug Interactions with current regimen:   Received 10mg IV Vitamin K on 5/6    Warfarin Dosing During Admission:  Date  5/8 5/9 5/10 5/11 5/12       INR  1.46 1.58 1.88 2.69 2.44       Dose  3 mg 3 mg 3 mg 1.5mg (3mg)         Education Provided: Warfarin education provided on 5/8/2024 verbally.  Discussed effects of warfarin, importance of checking INR, drug-drug and drug-food interactions, and signs/symptoms of bleeding and clotting.  Patient deferred to wife who as in the room and manages his medications. She monitors his INR at home and then faxes results to Dr Maradiaga office.  All pertinent questions were answered.      Discharge Follow up:   Following Provider - Dr Peraza   Follow up time range or appointment - 2-3 days post discharge    Labs:  Results from last 7 days   Lab Units 05/12/24  0615 05/12/24  0156 05/11/24  1818 05/11/24  1141 05/11/24  0554 05/10/24  2355 05/10/24  1938 05/10/24  1200 05/10/24  0608 05/09/24  0820 05/09/24  0628 05/08/24  1142 05/08/24  0709 05/07/24  1611 05/07/24  0959   INR  2.44*  --   --   --  2.69*  --  2.03*  --  1.88*  --  1.58*  --  1.46*  --  1.55*   APTT seconds  --   --   --   --   --   --   --   --   --   --   --   --  38.7*  --   --    HEMOGLOBIN g/dL 8.9* 9.1* 9.6* 10.0* 9.0* 9.5* 11.7*   < > 9.5*  9.5*   < >  --    < > 9.2*  9.2*   < > 8.4*   HEMATOCRIT % 27.9* 28.0* 29.0* 27.9* 27.6* 28.8* 37.0*   < > 28.8*  28.8*   < >  --    < > 28.2*  28.2*   < > " 25.7*    < > = values in this interval not displayed.     Results from last 7 days   Lab Units 05/11/24  0554 05/10/24  1938 05/10/24  0608 05/07/24  0959 05/06/24  1206   SODIUM mmol/L 136 137 136   < > 131*   POTASSIUM mmol/L 3.8 4.9 3.9   < > 4.0   CHLORIDE mmol/L 99 99 97*   < > 93*   CO2 mmol/L 24.0 27.0 27.0   < > 29.0   BUN mg/dL 9 9 12   < > 17   CREATININE mg/dL 0.68* 0.97 0.75*   < > 1.04   CALCIUM mg/dL 8.2* 11.5* 8.0*   < > 8.1*   BILIRUBIN mg/dL  --  0.4  --   --  0.6   ALK PHOS U/L  --  69  --   --  44   ALT (SGPT) U/L  --  15  --   --  8   AST (SGOT) U/L  --  42*  --   --  27   GLUCOSE mg/dL 113* 134* 88   < > 138*    < > = values in this interval not displayed.     Current dietary intake:  25-50% of meals documented; supplement intake  Diet Order   Procedures    Diet: Regular/House, Cardiac, Diabetic; Healthy Heart (2-3 Na+); Consistent Carbohydrate; Fluid Consistency: Thin (IDDSI 0)     Assessment/Plan:  INR dropped a little unexpectedly.  Will increase back to 3mg daily for now.  Daily PT/INR.  Monitor for S/Sx of bleeding/clotting.  Heparin drip ideally until INR > 2.5, however I feel comfortable stopping when okay with cardiology.  INR appears to have leveled out a bit.  Pharmacy will continue to follow and adjust dose based on INR trend and clinical status.    Thanks,  Kishan Verdin, PharmD, BCPS, BCCCP  05/12/24  07:15 EDT

## 2024-05-12 NOTE — PROGRESS NOTES
INFECTIOUS DISEASE f/u     Kaleb Abraham  1950  [id number]    Date of Consult: 5/11/2024    Admission Date: 5/6/2024      Requesting Provider: No ref. provider found  Evaluating Physician: Lino Jacob MD    Reason for Consultation: Weakness    History of present illness:    Patient is a 73 y.o. male with coronary disease history of CABG, type 2 diabetes mellitus, hypertension, hyperlipidemia, hypothyroidism, atrial fibrillation, valvular heart disease with history of mechanical heart valve (prosthetic aortic valve and prosthetic mitral valve/) recently treated [facility] for GI bleed from April 22 patient found to have gastric arteriovenous malformations patient now admitted to [facility] with supratherapeutic INR.    Hospitalist concerned about underlying infection    Patient has low-grade fever 100.3, bradycardia and hypotension    Getting picc line    MVR/AVR performed 9 years ago here at [facility]    5/8/24; awake, has back pain, following commands; no fevers, rash, sore throat    5/9/24; has back pain; afebrile, normotensive,  has COLEEN tomorrow; picc placed    5/10/24; doing well; pain under some control; no fever, rash, sore throat, COLEEN confirmed MV vegetation < 1 cm. Wife in room    5/11/24; had vtach yesterday requiring cpr followed by torsades which was cardioverted;  better today has mild elevation of bp.  Awake, alert, family in room  Past Medical History:   Diagnosis Date    Anemia     Aortic stenosis     CAD (coronary artery disease)     Diabetes mellitus     Hyperlipidemia     Hypertension     Hypothyroidism     Mitral regurgitation        Past Surgical History:   Procedure Laterality Date    CAPSULE ENDOSCOPY      2/22/2023 and 9/25/2023 per dr. schrader    COLONOSCOPY      2/2023 Dr. Schrader    CORONARY ARTERY BYPASS GRAFT      CORONARY STENT PLACEMENT      UPPER GASTROINTESTINAL ENDOSCOPY      2/7/2023 Dr. Schrader, 7/26/2023 Dr. Liu, 4/2024  Dr. Girish Lackey [facility]       Family History   Problem Relation Age of Onset    Diabetes Mother     Stroke Mother     Thyroid cancer Mother     Hypertension Father     Stroke Father        Social History     Socioeconomic History    Marital status:    Tobacco Use    Smoking status: Former     Types: Cigarettes     Passive exposure: Past    Smokeless tobacco: Never   Vaping Use    Vaping status: Never Used   Substance and Sexual Activity    Alcohol use: Never    Drug use: Never    Sexual activity: Defer       No Known Allergies      Medication:    Current Facility-Administered Medications:     acetaminophen (TYLENOL) tablet 650 mg, 650 mg, Oral, Q4H PRN, 650 mg at 05/07/24 2209 **OR** [DISCONTINUED] acetaminophen (TYLENOL) 160 MG/5ML oral solution 650 mg, 650 mg, Oral, Q4H PRN **OR** acetaminophen (TYLENOL) suppository 650 mg, 650 mg, Rectal, Q4H PRN, Shahid Peter MD    allopurinol (ZYLOPRIM) tablet 300 mg, 300 mg, Oral, Daily, Shahid Peter MD, 300 mg at 05/11/24 0856    ampicillin 2000 mg IVPB in 100 mL NS (MBP), 2 g, Intravenous, Q6H, Shahid Peter MD, Last Rate: 200 mL/hr at 05/11/24 1652, 2 g at 05/11/24 1652    sennosides-docusate (PERICOLACE) 8.6-50 MG per tablet 2 tablet, 2 tablet, Oral, BID PRN, 2 tablet at 05/10/24 1206 **AND** polyethylene glycol (MIRALAX) packet 17 g, 17 g, Oral, Daily PRN **AND** bisacodyl (DULCOLAX) EC tablet 5 mg, 5 mg, Oral, Daily PRN **AND** bisacodyl (DULCOLAX) suppository 10 mg, 10 mg, Rectal, Daily PRN, Shahid Peter MD    cefTRIAXone (ROCEPHIN) 2,000 mg in sodium chloride 0.9 % 100 mL MBP, 2,000 mg, Intravenous, Q12H, Shahid Peter MD, Last Rate: 200 mL/hr at 05/11/24 1652, 2,000 mg at 05/11/24 1652    dextrose (D50W) (25 g/50 mL) IV injection 25 g, 25 g, Intravenous, Q15 Min PRN, Shahid Peter MD    dextrose (GLUTOSE) oral gel 15 g, 15 g, Oral, Q15 Min PRN, Shahid Peter MD    glucagon (GLUCAGEN) injection 1 mg, 1 mg, Intramuscular, Q15  Min PRN, Shahid Peter MD    heparin 86441 units/250 mL (100 units/mL) in 0.45 % NaCl infusion, 21 Units/kg/hr, Intravenous, Titrated, Shahid Peter MD, Last Rate: 22.4 mL/hr at 05/11/24 1025, 21 Units/kg/hr at 05/11/24 1025    HYDROcodone-acetaminophen (NORCO)  MG per tablet 1 tablet, 1 tablet, Oral, Q8H PRN, Carlos Bermudez MD, 1 tablet at 05/11/24 1607    Insulin Lispro (humaLOG) injection 2-7 Units, 2-7 Units, Subcutaneous, 4x Daily AC & at Bedtime, Shahid Peter MD, 2 Units at 05/08/24 2010    levothyroxine (SYNTHROID, LEVOTHROID) tablet 25 mcg, 25 mcg, Oral, Q AM, Shahid Peter MD, 25 mcg at 05/11/24 0508    Lidocaine 4 % 1 patch, 1 patch, Transdermal, Q24H, Shahid Peter MD, 1 patch at 05/11/24 0856    Magnesium Cardiology Dose Replacement - Follow Nurse / BPA Driven Protocol, , Does not apply, PRN, Nick Tilley C, APRN    melatonin tablet 5 mg, 5 mg, Oral, Nightly PRN, Shahid Peter MD, 5 mg at 05/09/24 2022    midodrine (PROAMATINE) tablet 2.5 mg, 2.5 mg, Oral, TID AC, Ben Grace MD    morphine injection 2 mg, 2 mg, Intravenous, Q2H PRN, Shahid Peter MD, 2 mg at 05/11/24 1236    nitroglycerin (NITROSTAT) SL tablet 0.4 mg, 0.4 mg, Sublingual, Q5 Min PRN, Shahid Peter MD    norepinephrine (LEVOPHED) 8 mg in 250 mL NS infusion (premix), 0.02-0.3 mcg/kg/min, Intravenous, Titrated, Shahid Peter MD, Stopped at 05/09/24 0113    oxybutynin XL (DITROPAN-XL) 24 hr tablet 10 mg, 10 mg, Oral, Daily, Shahid Peter MD, 10 mg at 05/11/24 0856    pantoprazole (PROTONIX) injection 40 mg, 40 mg, Intravenous, Q12H, Shahid Peter MD, 40 mg at 05/11/24 0856    Pharmacy to Dose Heparin, , Does not apply, Continuous PRR[place] KNIGHT Joseph C, MD    Pharmacy to dose warfarin, , Does not apply, Continuous [place] YOUNGER Joseph C, MD    Phosphorus Replacement - Follow Nurse / BPA Driven Protocol, , Does not apply, [place] YOUNGER Joseph C, MD     Potassium Replacement - Follow Nurse / BPA Driven Protocol, , Does not apply, PRN, Shahid Peter MD    simethicone (MYLICON) chewable tablet 80 mg, 80 mg, Oral, 4x Daily PRN, Shahid Peter MD, 80 mg at 05/09/24 2323    sodium chloride 0.9 % flush 10 mL, 10 mL, Intravenous, Q12H, Shahid Peter MD, 10 mL at 05/11/24 0857    sodium chloride 0.9 % flush 10 mL, 10 mL, Intravenous, PRN, Shahid Peter MD    sodium chloride 0.9 % flush 10 mL, 10 mL, Intravenous, Q12H, Jose Maria Dugan MD, 10 mL at 05/11/24 1654    sodium chloride 0.9 % flush 10 mL, 10 mL, Intravenous, PRN, Jose Maria Dugan MD    sodium chloride 0.9 % infusion 40 mL, 40 mL, Intravenous, PRN, Shahid Peter MD    tamsulosin (FLOMAX) 24 hr capsule 0.4 mg, 0.4 mg, Oral, Nightly, Shahid Peter MD, 0.4 mg at 05/10/24 2021    tiZANidine (ZANAFLEX) tablet 2 mg, 2 mg, Oral, PRN, Shahid Peter MD, 2 mg at 05/08/24 1132    [START ON 5/12/2024] warfarin (COUMADIN) (dosing per levels), , Does not apply, Daily, Kishan Verdin, PharmD    Antibiotics:  Anti-Infectives (From admission, onward)      Ordered     Dose/Rate Route Frequency Start Stop    05/07/24 1443  cefTRIAXone (ROCEPHIN) 2,000 mg in sodium chloride 0.9 % 100 mL MBP        Ordering Provider: Shahid Peter MD    2,000 mg  200 mL/hr over 30 Minutes Intravenous Every 12 Hours 05/07/24 1600 05/14/24 1559    05/07/24 1443  ampicillin 2000 mg IVPB in 100 mL NS (MBP)        Ordering Provider: Shahid Peter MD    2 g  200 mL/hr over 30 Minutes Intravenous Every 6 Hours 05/07/24 1500 05/14/24 1459    05/07/24 1302  vancomycin IVPB 2000 mg in 0.9% Sodium Chloride 500 mL        Ordering Provider: Conrad, Bernabe, PharmD    2,000 mg  250 mL/hr over 120 Minutes Intravenous Once 05/07/24 1400 05/07/24 1524    05/07/24 1225  piperacillin-tazobactam (ZOSYN) 3.375 g IVPB in 100 mL NS MBP (CD)        Ordering Provider: Desiree Garcia MD    3.375 g  over 30 Minutes Intravenous Once  24 1315 24 1302              Review of Systems:    See hpi      Physical Exam:   Vital Signs  Temp (24hrs), Av.6 °F (37 °C), Min:98.3 °F (36.8 °C), Max:99.5 °F (37.5 °C)    Temp  Min: 98.3 °F (36.8 °C)  Max: 99.5 °F (37.5 °C)  BP  Min: 126/86  Max: 169/105  Pulse  Min: 81  Max: 112  Resp  Min: 16  Max: 18  SpO2  Min: 87 %  Max: 100 %    GENERAL: Awake and alert, in mild distress.   HEENT: Normocephalic, atraumatic.  PERRL. EOMI. No conjunctival injection. No icterus.      HEART: RRR; No murmur,  LUNGS: Clear to auscultation bilaterally   ABDOMEN: Soft, nontender,   EXT:  1+ edema  :  Without Wilkinson catheter.  MSK: No joint effusions or erythema  SKIN: no rash      Laboratory Data    Results from last 7 days   Lab Units 24  1818 24  1141 24  0554 05/10/24  2355 05/10/24  1938 05/10/24  1200 05/10/24  0608   WBC 10*3/mm3  --   --  11.78*  --  17.07*  --  9.50   HEMOGLOBIN g/dL 9.6* 10.0* 9.0*   < > 11.7*   < > 9.5*  9.5*   HEMATOCRIT % 29.0* 27.9* 27.6*   < > 37.0*   < > 28.8*  28.8*   PLATELETS 10*3/mm3  --   --  251  --  316  --  275    < > = values in this interval not displayed.     Results from last 7 days   Lab Units 24  0554   SODIUM mmol/L 136   POTASSIUM mmol/L 3.8   CHLORIDE mmol/L 99   CO2 mmol/L 24.0   BUN mg/dL 9   CREATININE mg/dL 0.68*   GLUCOSE mg/dL 113*   CALCIUM mg/dL 8.2*     Results from last 7 days   Lab Units 05/10/24  1938   ALK PHOS U/L 69   BILIRUBIN mg/dL 0.4   ALT (SGPT) U/L 15   AST (SGOT) U/L 42*             Results from last 7 days   Lab Units 24  0959   LACTATE mmol/L 1.2             Estimated Creatinine Clearance: 122.8 mL/min (A) (by C-G formula based on SCr of 0.68 mg/dL (L)).      Microbiology:  Blood Culture   Date Value Ref Range Status   2024 Abnormal Stain (C)  Preliminary     BCID, PCR   Date Value Ref Range Status   2024 (A) Negative by BCID PCR. Culture to Follow. Final    Enterococcus faecalis. Arcelia/B (vancomycin  "resistance gene) not detected. Identification by BCID2 PCR.     No results found for: \"CULTURES\", \"HSVCX\", \"URCX\"  No results found for: \"EYECULTURE\", \"GCCX\", \"HSVCULTURE\", \"LABHSV\"  No results found for: \"LEGIONELLA\", \"MRSACX\", \"MUMPSCX\", \"MYCOPLASCX\"  No results found for: \"NOCARDIACX\", \"STOOLCX\"  No results found for: \"THROATCX\", \"UNSTIMCULT\", \"URINECX\", \"CULTURE\", \"VZVCULTUR\"  No results found for: \"VIRALCULTU\", \"WOUNDCX\"        Radiology:  Imaging Results (Last 72 Hours)       Procedure Component Value Units Date/Time    XR Chest 1 View [[id number]] Collected: 05/11/24 1038     Updated: 05/11/24 1042    Narrative:      XR CHEST 1 VW    Date of Exam: 5/11/2024 9:53 AM EDT    Indication: f/u edema    Comparison: Chest x-ray 5/7/2024    Findings:  New defibrillator pad projects over the central chest. Right upper extremity PICC line has been removed. Stable cardiomegaly. There is a new or newly apparent small right-sided pleural effusion. Similar interstitial edema. No pneumothorax.      Impression:      Impression:  New defibrillator pad projects over the central chest. Right upper extremity PICC line has been removed. Stable cardiomegaly. There is a new or newly apparent small right-sided pleural effusion. Similar interstitial edema. No pneumothorax.      Electronically Signed: Stef Vergara MD    5/11/2024 10:39 AM EDT    Workstation ID: [id number]              Impression:   Enterococcus faecalis bacteremia  Hypotension  Bioprosthetic AVR  Mechanical MVR  Supratherapeutic INR  Anemia  Elevated procalcitonin  History of GI bleed with gastric AVM  Thoracic back pain    PLAN/RECOMMENDATIONS:   Thank you for asking us to see Kaleb Abraham, I recommend the following:  High-grade enterococcal bacteremia is concerning in the setting of endovascular hardware.    Estimated Creatinine Clearance: 122.8 mL/min (A) (by C-G formula based on SCr of 0.68 mg/dL (L)).    Patient could have a GI source such as inflammation of " colonic viscus (superimposed diverticulitis and diverticulosis) or bacteremia  following endoscopy regarding management of a GIbleed.    Gallbladder has been removed    Repeat blood cultures x 2 sets     Enterococcus isolate is susceptible to ampicillin and ceftriaxone        cont ampicillin 2 g IV every 4 hours    cont ceftriaxone 2 g IV every 12 hours    Duration is 6 weeks      I recommend [facility] referral.      Cardiology is monitoring qt interval and correcting potassium, magnesium     Plan is 6 weeks iv abx followed by chronic oral abx suppression    Lino Jacob MD  5/11/2024  20:38 EDT

## 2024-05-12 NOTE — PLAN OF CARE
Goal Outcome Evaluation:      No acute events this shift. Patient remains A+O x4. Rhythm has remained in NSR w/ 1st degree block most of shift. PRN morphine and norco continued. Simethicone x1. Mag and phos replaced. Heparin gtt stopped. BP stable w/ midodrine d/c'ed. Lasix given;  ml's with multiple large occurrences. Afebrile. Family updated at bedside.

## 2024-05-12 NOTE — PLAN OF CARE
Goal Outcome Evaluation:  Plan of Care Reviewed With: patient           Outcome Evaluation:   -Pt remains alert and oriented x4.   -remains on 3 NC- tolerating well.  -Chest remains sore post chest compressions- PRN pain meds given. See MAR  -Am labs drawn, awaiting results  -UOP:230mL
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
## 2024-05-12 NOTE — PROGRESS NOTES
"[facility] at [facility] Progress Note     LOS: 5 days   Patient Care Team:  Manolo Enamorado MD as PCP - General (Internal Medicine)  Lulu Perry APRN as Nurse Practitioner (Nurse Practitioner)  PCP:  Manolo Enamorado MD    Chief Complaint:  Follow-up valvular heart disease, endocarditis, status post torsades/VF arrest     Subjective: Patient with some chest soreness after CPR 2 nights ago.  No recurrent ventricular arrhythmias.  QT does remain prolonged.  Electrolytes being corrected      Review of Systems:   All systems have been reviewed and are negative with the exception of those mentioned above.      Objective:    Vital Sign Min/Max for last 24 hours  Temp  Min: 97.8 °F (36.6 °C)  Max: 98.6 °F (37 °C)   BP  Min: 130/90  Max: 159/91   Pulse  Min: 84  Max: 104   Resp  Min: 16  Max: 18   SpO2  Min: 87 %  Max: 100 %   No data recorded   Weight  Min: 111 kg (244 lb 0.8 oz)  Max: 111 kg (244 lb 0.8 oz)     Flowsheet Rows      Flowsheet Row First Filed Value   Admission Height 180.3 cm (71\") Documented at 05/06/2024 1155   Admission Weight 103 kg (227 lb) Documented at 05/06/2024 1155            Telemetry: Currently sinus rhythm with first-degree AV block and prolonged QTc      Intake/Output Summary (Last 24 hours) at 5/12/2024 0911  Last data filed at 5/12/2024 0700  Gross per 24 hour   Intake 1101 ml   Output 780 ml   Net 321 ml     Intake & Output (last 3 days)         05/09 0701  05/10 0700 05/10 0701  05/11 0700 05/11 0701  05/12 0700 05/12 0701  05/13 0700    P.O. 240 480 240     I.V. (mL/kg) 657.6 (6) 828.9 (7.7) 740 (6.7)     IV Piggyback 300 360 306     Total Intake(mL/kg) 1197.6 (11) 1668.9 (15.6) 1286 (11.6)     Urine (mL/kg/hr) 1065 (0.4) 1175 (0.5) 780 (0.3)     Stool        Total Output 1065 1175 780     Net +132.6 +493.9 +506             Urine Unmeasured Occurrence  2 x               Physical Exam:  Constitutional:       Appearance: Not in distress.   Pulmonary: "      Effort: Pulmonary effort is normal.  There are some basilar crackles  Cardiovascular:       Regular rhythm     Murmurs: There is a grade 2/6 systolic murmur.       click. Mechanical click noted   Edema:     Pretibial: bilateral trace edema of the pretibial area.            LABS/DIAGNOSTIC DATA:  Results from last 7 days   Lab Units 05/12/24  0615 05/12/24  0156 05/11/24  1818 05/11/24  1141 05/11/24  0554 05/10/24  2355 05/10/24  1938   WBC 10*3/mm3 10.63  --   --   --  11.78*  --  17.07*   HEMOGLOBIN g/dL 8.9* 9.1* 9.6*   < > 9.0*   < > 11.7*   HEMATOCRIT % 27.9* 28.0* 29.0*   < > 27.6*   < > 37.0*   PLATELETS 10*3/mm3 219  --   --   --  251  --  316    < > = values in this interval not displayed.     Lab Results   Lab Value Date/Time    TROPONINT 34 (H) 02/05/2024 1456    TROPONINT 37 (H) 02/05/2024 1222    TROPONINT 35 (H) 05/17/2023 1437     Results from last 7 days   Lab Units 05/12/24  0615 05/11/24  0554 05/10/24  1938 05/09/24  0628 05/08/24  0709   INR  2.44* 2.69* 2.03*   < > 1.46*   APTT seconds  --   --   --   --  38.7*    < > = values in this interval not displayed.     Results from last 7 days   Lab Units 05/12/24  0615 05/11/24  0554 05/10/24  1938 05/07/24  0959 05/06/24  1206   SODIUM mmol/L 133* 136 137   < > 131*   POTASSIUM mmol/L 4.1 3.8 4.9   < > 4.0   CHLORIDE mmol/L 99 99 99   < > 93*   CO2 mmol/L 25.0 24.0 27.0   < > 29.0   BUN mg/dL 10 9 9   < > 17   CREATININE mg/dL 0.67* 0.68* 0.97   < > 1.04   CALCIUM mg/dL 8.1* 8.2* 11.5*   < > 8.1*   BILIRUBIN mg/dL  --   --  0.4  --  0.6   ALK PHOS U/L  --   --  69  --  44   ALT (SGPT) U/L  --   --  15  --  8   AST (SGOT) U/L  --   --  42*  --  27   GLUCOSE mg/dL 127* 113* 134*   < > 138*    < > = values in this interval not displayed.     Results from last 7 days   Lab Units 05/07/24  0959   HEMOGLOBIN A1C % 5.30     Results from last 7 days   Lab Units 05/07/24  0959   CHOLESTEROL mg/dL 105   TRIGLYCERIDES mg/dL 140   HDL CHOL mg/dL 23*   LDL  CHOL mg/dL 57     Results from last 7 days   Lab Units 05/12/24  0615 05/07/24  0051 05/06/24  1206   TSH uIU/mL 3.450   < > 2.720   FREE T4 ng/dL  --   --  1.48    < > = values in this interval not displayed.           COLEEN:    Left ventricular ejection fraction appears to be 56 - 60%.    Left ventricular wall thickness is consistent with mild to moderate concentric hypertrophy.    The left atrial cavity is moderately dilated.    There is a bioprosthetic aortic valve present.    Mild aortic valve stenosis is present.  Mean gradient 11 mmHg..  Mild aortic valve regurgitation.    There is a mechanical mitral valve prosthesis present.  There is mild mitral valve regurgitation.  No stenosis.    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  Measures 0.8 x 0.3 cm        Medication Review:   allopurinol, 300 mg, Oral, Daily  ampicillin, 2 g, Intravenous, Q6H  cefTRIAXone, 2,000 mg, Intravenous, Q12H  insulin lispro, 2-7 Units, Subcutaneous, 4x Daily AC & at Bedtime  levothyroxine, 25 mcg, Oral, Q AM  Lidocaine, 1 patch, Transdermal, Q24H  metoprolol tartrate, 25 mg, Oral, Q12H  oxybutynin XL, 10 mg, Oral, Daily  pantoprazole, 40 mg, Intravenous, Q12H  sodium chloride, 10 mL, Intravenous, Q12H  sodium chloride, 10 mL, Intravenous, Q12H  sodium phosphate, 15 mmol, Intravenous, Once  tamsulosin, 0.4 mg, Oral, Nightly  warfarin, 3 mg, Oral, Daily       norepinephrine, 0.02-0.3 mcg/kg/min, Last Rate: Stopped (05/09/24 0113)  Pharmacy to dose warfarin,            Supratherapeutic INR    Anemia    CAD s/p CABG    T2DM    Hyperlipidemia    Hypothyroidism    MARTINEZ    Chronic atrial fibrillation    S/P prosthetic aortic valve    S/P mechanical aortic valve    Chronic anticoagulation (warfarin)    Prosthetic valve endocarditis (Mitral Valve)    S/P VT/TdP Arrest 5/10/2024    Enterococcal bacteremia      Assessment/Plan:  Valvular heart disease/prosthetic valve endocarditis  Patient with noted vegetation on  No Vaccines Administered. mechanical mitral valve  Enterococcus bacteremia  On ampicillin and ceftriaxone, ID following.  Not a surgical candidate  Will require oral suppression  Discontinue heparin drip today.  Continue to monitor INR levels with pharmacy to dose warfarin  Torsades/VF arrest  Required defibrillation and CPR on evening of 5/10/2024  Etiology significantly prolonged Qtc  Discontinue all QTc prolonging medications  Replace potassium to greater than 4 magnesium greater than 2.  Receiving additional replacement this morning  Monitor in ICU  Will monitor QTc  through hospital stay, if remaining prolonged may need to discuss ICD.  QTc 568 on this morning's EKG  Start metoprolol 25 mg twice daily and continue to follow QTc  Atrial fibrillation  Rates are controlled, continue to monitor  Hypertension  Discontinue midodrine  Restart metoprolol at a dose of 25 mg twice daily  5.  Heart failure with preserved ejection fraction   -EF 56 to 60%   -Pulmonary edema on chest x-ray   -Lasix 60 mg IV x 1    Dr. Gardner to resume care tomorrow      Ben Grace MD [facility]  05/12/24

## 2024-05-13 ENCOUNTER — APPOINTMENT (OUTPATIENT)
Dept: GENERAL RADIOLOGY | Facility: HOSPITAL | Age: 74
DRG: 314 | End: 2024-05-13
Payer: MEDICARE

## 2024-05-13 LAB
ALBUMIN SERPL-MCNC: 2.6 G/DL (ref 3.5–5.2)
ALBUMIN/GLOB SERPL: 1.1 G/DL
ALP SERPL-CCNC: 56 U/L (ref 39–117)
ALT SERPL W P-5'-P-CCNC: 12 U/L (ref 1–41)
ANION GAP SERPL CALCULATED.3IONS-SCNC: 7 MMOL/L (ref 5–15)
AST SERPL-CCNC: 30 U/L (ref 1–40)
BILIRUB SERPL-MCNC: 0.4 MG/DL (ref 0–1.2)
BUN SERPL-MCNC: 10 MG/DL (ref 8–23)
BUN/CREAT SERPL: 14.1 (ref 7–25)
CA-I SERPL ISE-MCNC: 1.18 MMOL/L (ref 1.12–1.32)
CALCIUM SPEC-SCNC: 7.7 MG/DL (ref 8.6–10.5)
CHLORIDE SERPL-SCNC: 96 MMOL/L (ref 98–107)
CO2 SERPL-SCNC: 30 MMOL/L (ref 22–29)
CREAT SERPL-MCNC: 0.71 MG/DL (ref 0.76–1.27)
DEPRECATED RDW RBC AUTO: 52.9 FL (ref 37–54)
EGFRCR SERPLBLD CKD-EPI 2021: 97.5 ML/MIN/1.73
ERYTHROCYTE [DISTWIDTH] IN BLOOD BY AUTOMATED COUNT: 16 % (ref 12.3–15.4)
GLOBULIN UR ELPH-MCNC: 2.4 GM/DL
GLUCOSE BLDC GLUCOMTR-MCNC: 128 MG/DL (ref 70–130)
GLUCOSE BLDC GLUCOMTR-MCNC: 139 MG/DL (ref 70–130)
GLUCOSE BLDC GLUCOMTR-MCNC: 191 MG/DL (ref 70–130)
GLUCOSE BLDC GLUCOMTR-MCNC: 97 MG/DL (ref 70–130)
GLUCOSE SERPL-MCNC: 115 MG/DL (ref 65–99)
HCT VFR BLD AUTO: 26.6 % (ref 37.5–51)
HGB BLD-MCNC: 8.7 G/DL (ref 13–17.7)
INR PPP: 2.12 (ref 0.89–1.12)
MAGNESIUM SERPL-MCNC: 2 MG/DL (ref 1.6–2.4)
MCH RBC QN AUTO: 29.7 PG (ref 26.6–33)
MCHC RBC AUTO-ENTMCNC: 32.7 G/DL (ref 31.5–35.7)
MCV RBC AUTO: 90.8 FL (ref 79–97)
PHOSPHATE SERPL-MCNC: 2.8 MG/DL (ref 2.5–4.5)
PLATELET # BLD AUTO: 209 10*3/MM3 (ref 140–450)
PMV BLD AUTO: 9.5 FL (ref 6–12)
POTASSIUM SERPL-SCNC: 4 MMOL/L (ref 3.5–5.2)
PROT SERPL-MCNC: 5 G/DL (ref 6–8.5)
PROTHROMBIN TIME: 24 SECONDS (ref 12.2–14.5)
RBC # BLD AUTO: 2.93 10*6/MM3 (ref 4.14–5.8)
SODIUM SERPL-SCNC: 133 MMOL/L (ref 136–145)
WBC NRBC COR # BLD AUTO: 10.65 10*3/MM3 (ref 3.4–10.8)

## 2024-05-13 PROCEDURE — 83735 ASSAY OF MAGNESIUM: CPT | Performed by: INTERNAL MEDICINE

## 2024-05-13 PROCEDURE — 25010000002 AMPICILLIN PER 500 MG: Performed by: INTERNAL MEDICINE

## 2024-05-13 PROCEDURE — 97116 GAIT TRAINING THERAPY: CPT

## 2024-05-13 PROCEDURE — 85610 PROTHROMBIN TIME: CPT | Performed by: INTERNAL MEDICINE

## 2024-05-13 PROCEDURE — 97530 THERAPEUTIC ACTIVITIES: CPT

## 2024-05-13 PROCEDURE — 93010 ELECTROCARDIOGRAM REPORT: CPT | Performed by: INTERNAL MEDICINE

## 2024-05-13 PROCEDURE — 82330 ASSAY OF CALCIUM: CPT | Performed by: INTERNAL MEDICINE

## 2024-05-13 PROCEDURE — 82948 REAGENT STRIP/BLOOD GLUCOSE: CPT

## 2024-05-13 PROCEDURE — 80053 COMPREHEN METABOLIC PANEL: CPT | Performed by: INTERNAL MEDICINE

## 2024-05-13 PROCEDURE — 99291 CRITICAL CARE FIRST HOUR: CPT | Performed by: INTERNAL MEDICINE

## 2024-05-13 PROCEDURE — 93005 ELECTROCARDIOGRAM TRACING: CPT | Performed by: PHYSICIAN ASSISTANT

## 2024-05-13 PROCEDURE — 85027 COMPLETE CBC AUTOMATED: CPT | Performed by: INTERNAL MEDICINE

## 2024-05-13 PROCEDURE — 84100 ASSAY OF PHOSPHORUS: CPT | Performed by: INTERNAL MEDICINE

## 2024-05-13 PROCEDURE — 25010000002 MORPHINE PER 10 MG: Performed by: INTERNAL MEDICINE

## 2024-05-13 PROCEDURE — 71045 X-RAY EXAM CHEST 1 VIEW: CPT

## 2024-05-13 PROCEDURE — 63710000001 INSULIN LISPRO (HUMAN) PER 5 UNITS: Performed by: INTERNAL MEDICINE

## 2024-05-13 PROCEDURE — 25010000002 CEFTRIAXONE PER 250 MG: Performed by: INTERNAL MEDICINE

## 2024-05-13 RX ORDER — WARFARIN SODIUM 4 MG/1
4 TABLET ORAL
Status: DISCONTINUED | OUTPATIENT
Start: 2024-05-13 | End: 2024-05-14

## 2024-05-13 RX ADMIN — MORPHINE SULFATE 2 MG: 2 INJECTION, SOLUTION INTRAMUSCULAR; INTRAVENOUS at 00:10

## 2024-05-13 RX ADMIN — Medication 10 ML: at 20:44

## 2024-05-13 RX ADMIN — MORPHINE SULFATE 2 MG: 2 INJECTION, SOLUTION INTRAMUSCULAR; INTRAVENOUS at 23:18

## 2024-05-13 RX ADMIN — AMPICILLIN SODIUM 2 G: 2 INJECTION, POWDER, FOR SOLUTION INTRAMUSCULAR; INTRAVENOUS at 20:44

## 2024-05-13 RX ADMIN — CEFTRIAXONE 2000 MG: 2 INJECTION, POWDER, FOR SOLUTION INTRAMUSCULAR; INTRAVENOUS at 03:45

## 2024-05-13 RX ADMIN — METOPROLOL TARTRATE 25 MG: 25 TABLET, FILM COATED ORAL at 20:44

## 2024-05-13 RX ADMIN — ALLOPURINOL 300 MG: 300 TABLET ORAL at 08:50

## 2024-05-13 RX ADMIN — AMPICILLIN SODIUM 2 G: 2 INJECTION, POWDER, FOR SOLUTION INTRAMUSCULAR; INTRAVENOUS at 08:50

## 2024-05-13 RX ADMIN — WARFARIN SODIUM 4 MG: 4 TABLET ORAL at 18:03

## 2024-05-13 RX ADMIN — HYDROCODONE BITARTRATE AND ACETAMINOPHEN 1 TABLET: 10; 325 TABLET ORAL at 08:58

## 2024-05-13 RX ADMIN — HYDROCODONE BITARTRATE AND ACETAMINOPHEN 1 TABLET: 10; 325 TABLET ORAL at 20:44

## 2024-05-13 RX ADMIN — LEVOTHYROXINE SODIUM 25 MCG: 25 TABLET ORAL at 05:32

## 2024-05-13 RX ADMIN — Medication 10 ML: at 08:50

## 2024-05-13 RX ADMIN — MORPHINE SULFATE 2 MG: 2 INJECTION, SOLUTION INTRAMUSCULAR; INTRAVENOUS at 16:07

## 2024-05-13 RX ADMIN — CEFTRIAXONE 2000 MG: 2 INJECTION, POWDER, FOR SOLUTION INTRAMUSCULAR; INTRAVENOUS at 16:07

## 2024-05-13 RX ADMIN — AMPICILLIN SODIUM 2 G: 2 INJECTION, POWDER, FOR SOLUTION INTRAMUSCULAR; INTRAVENOUS at 03:45

## 2024-05-13 RX ADMIN — PANTOPRAZOLE SODIUM 40 MG: 40 TABLET, DELAYED RELEASE ORAL at 18:03

## 2024-05-13 RX ADMIN — OXYBUTYNIN CHLORIDE 10 MG: 5 TABLET, EXTENDED RELEASE ORAL at 08:49

## 2024-05-13 RX ADMIN — INSULIN LISPRO 2 UNITS: 100 INJECTION, SOLUTION INTRAVENOUS; SUBCUTANEOUS at 12:25

## 2024-05-13 RX ADMIN — SENNOSIDES AND DOCUSATE SODIUM 2 TABLET: 8.6; 5 TABLET ORAL at 08:49

## 2024-05-13 RX ADMIN — AMPICILLIN SODIUM 2 G: 2 INJECTION, POWDER, FOR SOLUTION INTRAMUSCULAR; INTRAVENOUS at 16:07

## 2024-05-13 RX ADMIN — MORPHINE SULFATE 2 MG: 2 INJECTION, SOLUTION INTRAMUSCULAR; INTRAVENOUS at 03:45

## 2024-05-13 RX ADMIN — TAMSULOSIN HYDROCHLORIDE 0.4 MG: 0.4 CAPSULE ORAL at 20:44

## 2024-05-13 RX ADMIN — PANTOPRAZOLE SODIUM 40 MG: 40 TABLET, DELAYED RELEASE ORAL at 08:50

## 2024-05-13 RX ADMIN — MORPHINE SULFATE 2 MG: 2 INJECTION, SOLUTION INTRAMUSCULAR; INTRAVENOUS at 11:14

## 2024-05-13 RX ADMIN — LIDOCAINE 1 PATCH: 4 PATCH TOPICAL at 08:50

## 2024-05-13 RX ADMIN — METOPROLOL TARTRATE 25 MG: 25 TABLET, FILM COATED ORAL at 08:50
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
## 2024-05-13 NOTE — PLAN OF CARE
Goal Outcome Evaluation:      A+O x4. No changes this shift. VSS.  ml's. No BM this shift. Family updated at bedside.

## 2024-05-13 NOTE — DISCHARGE PLACEMENT REQUEST
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        tamsulosin (FLOMAX) 24 hr capsule 0.4 mg  0.4 mg Oral Nightly Shahid Peter MD   0.4 mg at 24 2051    tiZANidine (ZANAFLEX) tablet 2 mg  2 mg Oral PRN Shahid Peter MD   2 mg at 24 1132    warfarin (COUMADIN) tablet 4 mg  4 mg Oral Daily Dawood Campa, [facility]         Physician Progress Notes (last 24 hours)  Notes from 24 0943 through 24 0943   No notes of this type exist for this encounter.          Physical Therapy Notes (most recent note)        Helen Simpson, PT at 05/10/24 1308  Version 1 of 1         Patient Name: Kaleb Abraham  : 1950    MRN: [id number]                              Today's Date: 5/10/2024       Admit Date: 2024    Visit Dx:     ICD-10-CM ICD-9-CM   1. Acute on chronic anemia  D64.9 285.9   2. S/P mechanical aortic valve  Z95.2 V43.3   3. Supratherapeutic INR  R79.1 790.92   4. History of GI bleed  Z87.19 V12.79   5. Anticoagulated on Coumadin  Z79.01 V58.61   6. Generalized  weakness  R53.1 780.79   7. History of CHF (congestive heart failure)  Z86.79 V12.59   8. Weakness  R53.1 780.79     Patient Active Problem List   Diagnosis    Anemia    Aortic stenosis    CAD s/p CABG    T2DM    Hyperlipidemia    Hypertension    Hypothyroidism    Mitral regurgitation    MARTINEZ    Hypersomnia, unspecified    Chronic atrial fibrillation    Supratherapeutic INR    S/P prosthetic aortic valve    S/P mechanical aortic valve    Chronic anticoagulation (warfarin)     Past Medical History:   Diagnosis Date    Anemia     Aortic stenosis     CAD (coronary artery disease)     Diabetes mellitus     Hyperlipidemia     Hypertension     Hypothyroidism     Mitral regurgitation      Past Surgical History:   Procedure Laterality Date    CAPSULE ENDOSCOPY      2/22/2023 and 9/25/2023 per dr. schrader    COLONOSCOPY      2/2023 Dr. Schrader    CORONARY ARTERY BYPASS GRAFT      CORONARY STENT PLACEMENT      UPPER GASTROINTESTINAL ENDOSCOPY      2/7/2023 Dr. Schrader, 7/26/2023 Dr. Liu, 4/2024 Dr. Girish Lackey Joseph      General Information       Row Name 05/10/24 1517          Physical Therapy Time and Intention    Document Type therapy note (daily note)  -ND     Mode of Treatment physical therapy  -ND       Row Name 05/10/24 1517          General Information    Patient Profile Reviewed yes  -ND     Existing Precautions/Restrictions fall;oxygen therapy device and L/min  -ND     Barriers to Rehab medically complex;previous functional deficit  -ND       Row Name 05/10/24 1517          Cognition    Orientation Status (Cognition) oriented x 3  -ND       Row Name 05/10/24 1517          Safety Issues, Functional Mobility    Safety Issues Affecting Function (Mobility) awareness of need for assistance;insight into deficits/self-awareness;judgment;safety precaution awareness;safety precautions follow-through/compliance;sequencing abilities;problem-solving  -ND     Impairments Affecting Function (Mobility)  balance;coordination;endurance/activity tolerance;strength;pain  -ND               User Key  (r) = Recorded By, (t) = Taken By, (c) = Cosigned By      Initials Name Provider Type    Helen Jacobson PT Physical Therapist                   Mobility       Row Name 05/10/24 1518          Bed Mobility    Comment, (Bed Mobility) Pt found sitting EOB with OT.  -ND       Row Name 05/10/24 1518          Bed-Chair Transfer    Bed-Chair [place] (Transfers) maximum assist (25% patient effort);2 person assist;verbal cues;nonverbal cues (demo/gesture)  -ND     Assistive Device (Bed-Chair Transfers) other (see comments)  BUE support  -ND     Comment, (Bed-Chair Transfer) SPT from EOB > chair with BUE support. Cues for sequencing provided.  -ND       Row Name 05/10/24 1518          Sit-Stand Transfer    Sit-Stand [place] (Transfers) moderate assist (50% patient effort);2 person assist;verbal cues;nonverbal cues (demo/gesture)  -ND     Assistive Device (Sit-Stand Transfers) other (see comments)  BUE support  -ND     Comment, (Sit-Stand Transfer) x1 from EOB, x2 from chair. Bilateral foot and knee block provided. Pt requires cues for hand placement and sequencing.  -ND       Row Name 05/10/24 1518          Gait/Stairs (Locomotion)    [place] Level (Gait) unable to assess  -ND               User Key  (r) = Recorded By, (t) = Taken By, (c) = Cosigned By      Initials Name Provider Type    Helen Jacobson PT Physical Therapist                   Obj/Interventions       Row Name 05/10/24 1538          Balance    Balance Assessment sitting static balance;sitting dynamic balance;sit to stand dynamic balance;standing static balance;standing dynamic balance  -ND     Static Sitting Balance minimal assist  -ND     Dynamic Sitting Balance moderate assist  -ND     Position, Sitting Balance supported;sitting in chair  -ND     Sit to Stand Dynamic Balance maximum assist;2-person assist;verbal cues;non-verbal cues  (demo/gesture)  -ND     Static Standing Balance moderate assist;2-person assist;verbal cues;non-verbal cues (demo/gesture)  -ND     Dynamic Standing Balance maximum assist;2-person assist;verbal cues;non-verbal cues (demo/gesture)  -ND     Position/Device Used, Standing Balance supported;other (see comments)  BUE support  -ND     Balance Interventions sitting;standing;sit to stand;supported;static;dynamic  -ND               User Key  (r) = Recorded By, (t) = Taken By, (c) = Cosigned By      Initials Name Provider Type    ND Helen Simpson, PT Physical Therapist                   Goals/Plan    No documentation.                  Clinical Impression       Row Name 05/10/24 1538          Pain    Additional Documentation Pain Scale: FACES Pre/Post-Treatment (Group)  -ND       Row Name 05/10/24 1538          Pain Scale: FACES Pre/Post-Treatment    Pain: FACES Scale, Pretreatment 2-->hurts little bit  -ND     Posttreatment Pain Rating 4-->hurts little more  -ND     Pain Location generalized  -ND     Pain Location - back;neck  -ND       Row Name 05/10/24 1538          Plan of Care Review    Plan of Care Reviewed With patient;spouse  -ND     Progress no change  -ND     Outcome Evaluation Pt continues to present with significant generalized weakness, impaired balance and coordination, impaired sequencing, and limited activity tolerance and would benefit from continued skilled therapy to improve overall functional strength and mobility. Recommend IRF following d/c.  -ND       Row Name 05/10/24 1538          Vital Signs    Pre Systolic BP Rehab 135  -ND     Pre Treatment Diastolic BP 81  -ND     Pretreatment Heart Rate (beats/min) 80  -ND     Posttreatment Heart Rate (beats/min) 64  -ND     Pre SpO2 (%) 89  -ND     O2 Delivery Pre Treatment nasal cannula  -ND     O2 Delivery Intra Treatment nasal cannula  -ND     Post SpO2 (%) 94  -ND     O2 Delivery Post Treatment nasal cannula  -ND     Pre Patient Position Sitting  -ND      Intra Patient Position Standing  -ND     Post Patient Position Sitting  -ND       Row Name 05/10/24 1538          Positioning and Restraints    Pre-Treatment Position in bed  -ND     Post Treatment Position chair  -ND     In Chair notified nsg;reclined;legs elevated;call light within reach;encouraged to call for assist;exit alarm on;waffle cushion;on mechanical lift sling;with family/caregiver;RUE elevated;LUE elevated  -ND               User Key  (r) = Recorded By, (t) = Taken By, (c) = Cosigned By      Initials Name Provider Type    Helen Jacobson, PT Physical Therapist                   Outcome Measures       Row Name 05/10/24 1541 05/10/24 0800       How much help from another person do you currently need...    Turning from your back to your side while in flat bed without using bedrails? 2  -ND 2  -EW    Moving from lying on back to sitting on the side of a flat bed without bedrails? 2  -ND 2  -EW    Moving to and from a bed to a chair (including a wheelchair)? 2  -ND 1  -EW    Standing up from a chair using your arms (e.g., wheelchair, bedside chair)? 2  -ND 1  -EW    Climbing 3-5 steps with a railing? 1  -ND 1  -EW    To walk in hospital room? 1  -ND 1  -EW    AM-PAC 6 Clicks Score (PT) 10  -ND 8  -EW    Highest Level of Mobility Goal 4 --> Transfer to chair/commode  -ND 3 --> Sit at edge of bed  -EW      Row Name 05/10/24 1541 05/10/24 1346       Functional Assessment    Outcome Measure Options AM-PAC 6 Clicks Basic Mobility (PT)  -ND AM-PAC 6 Clicks Daily Activity (OT)  -KL              User Key  (r) = Recorded By, (t) = Taken By, (c) = Cosigned By      Initials Name Provider Type    Alexa Poole RN Registered Nurse    Helen Jacobson, PT Physical Therapist    Margaret Flores OT Occupational Therapist                                 Physical Therapy Education       Title: PT OT SLP Therapies (In Progress)       Topic: Physical Therapy (In Progress)       Point: Mobility training (In  Progress)       Learning Progress Summary             Patient Acceptance, E, NR by ND at 5/10/2024 1541    Acceptance, E, VU by SG at 5/9/2024 1658    Acceptance, E,TB, NR by ES at 5/8/2024 1405   Significant Other Acceptance, E,TB, NR by ES at 5/8/2024 1405                         Point: Home exercise program (Done)       Learning Progress Summary             Patient Acceptance, E, VU by SG at 5/9/2024 1658                         Point: Body mechanics (In Progress)       Learning Progress Summary             Patient Acceptance, E, NR by ND at 5/10/2024 1541    Acceptance, E, VU by SG at 5/9/2024 1658    Acceptance, E,TB, NR by ES at 5/8/2024 1405   Significant Other Acceptance, E,TB, NR by ES at 5/8/2024 1405                         Point: Precautions (In Progress)       Learning Progress Summary             Patient Acceptance, E, NR by ND at 5/10/2024 1541    Acceptance, E, VU by SG at 5/9/2024 1658    Acceptance, E,TB, NR by ES at 5/8/2024 1405   Significant Other Acceptance, E,TB, NR by ES at 5/8/2024 1405                                         User Key       Initials Effective Dates Name Provider Type Discipline     09/22/22 -  Roger Crews, RN Registered Nurse Nurse     08/11/22 -  Renee Mendoza, JP Physical Therapist PT    ND 11/16/23 -  Helen Simpson PT Physical Therapist PT                  PT Recommendation and Plan     Plan of Care Reviewed With: patient, spouse  Progress: no change  Outcome Evaluation: Pt continues to present with significant generalized weakness, impaired balance and coordination, impaired sequencing, and limited activity tolerance and would benefit from continued skilled therapy to improve overall functional strength and mobility. Recommend IRF following d/c.     Time Calculation:         PT Charges       Row Name 05/10/24 1545             Time Calculation    Start Time 1308  -ND      PT Received On 05/10/24  -ND         Timed Charges    51466 - PT Therapeutic  Activity Minutes 14  -ND         Total Minutes    Timed Charges Total Minutes 14  -ND       Total Minutes 14  -ND                User Key  (r) = Recorded By, (t) = Taken By, (c) = Cosigned By      Initials Name Provider Type    ND Helen Simpson, PT Physical Therapist                  Therapy Charges for Today       Code Description Service Date Service Provider Modifiers Qty    16788040197  PT THERAPEUTIC ACT EA 15 MIN 5/10/2024 Helen Simpson, PT GP 1            PT G-Codes  Outcome Measure Options: AM-PAC 6 Clicks Basic Mobility (PT)  AM-PAC 6 Clicks Score (PT): 10  AM-PAC 6 Clicks Score (OT): 13  PT Discharge Summary  Anticipated Discharge Disposition (PT): inpatient rehabilitation facility    Helen Simpson PT  5/10/2024      Electronically signed by Helen Simpson, PT at 05/10/24 1548          Occupational Therapy Notes (most recent note)        Margaret Ramírez, OT at 05/10/24 1308          Patient Name: Kaleb Abraham  : 1950    MRN: [id number]                              Today's Date: 5/10/2024       Admit Date: 2024    Visit Dx:     ICD-10-CM ICD-9-CM   1. Acute on chronic anemia  D64.9 285.9   2. S/P mechanical aortic valve  Z95.2 V43.3   3. Supratherapeutic INR  R79.1 790.92   4. History of GI bleed  Z87.19 V12.79   5. Anticoagulated on Coumadin  Z79.01 V58.61   6. Generalized weakness  R53.1 780.79   7. History of CHF (congestive heart failure)  Z86.79 V12.59   8. Weakness  R53.1 780.79     Patient Active Problem List   Diagnosis    Anemia    Aortic stenosis    CAD s/p CABG    T2DM    Hyperlipidemia    Hypertension    Hypothyroidism    Mitral regurgitation    MARTINEZ    Hypersomnia, unspecified    Chronic atrial fibrillation    Supratherapeutic INR    S/P prosthetic aortic valve    S/P mechanical aortic valve    Chronic anticoagulation (warfarin)     Past Medical History:   Diagnosis Date    Anemia     Aortic stenosis     CAD (coronary artery disease)     Diabetes mellitus      Hyperlipidemia     Hypertension     Hypothyroidism     Mitral regurgitation      Past Surgical History:   Procedure Laterality Date    CAPSULE ENDOSCOPY      2/22/2023 and 9/25/2023 per dr. schrader    COLONOSCOPY      2/2023 Dr. Schrader    CORONARY ARTERY BYPASS GRAFT      CORONARY STENT PLACEMENT      UPPER GASTROINTESTINAL ENDOSCOPY      2/7/2023 Dr. Schrader, 7/26/2023 Dr. Liu, 4/2024 Dr. Stout [place]      General Information       Row Name 05/10/24 1340          OT Time and Intention    Document Type therapy note (daily note)  -     Mode of Treatment occupational therapy  -       Row Name 05/10/24 1340          General Information    Patient Profile Reviewed yes  -     Existing Precautions/Restrictions fall;oxygen therapy device and L/min  -     Barriers to Rehab medically complex;previous functional deficit  -       Row Name 05/10/24 1340          Cognition    Orientation Status (Cognition) oriented x 3  -       Row Name 05/10/24 1343          Safety Issues, Functional Mobility    Safety Issues Affecting Function (Mobility) awareness of need for assistance;insight into deficits/self-awareness;judgment;problem-solving;safety precaution awareness;safety precautions follow-through/compliance  -     Impairments Affecting Function (Mobility) balance;coordination;endurance/activity tolerance;pain;strength  -               User Key  (r) = Recorded By, (t) = Taken By, (c) = Cosigned By      Initials Name Provider Type     Margaret Ramírez OT Occupational Therapist                     Mobility/ADL's       Row Name 05/10/24 8663          Bed Mobility    Supine-Sit [place] (Bed Mobility) maximum assist (25% patient effort);1 person assist  -     Bed Mobility, Safety Issues impaired trunk control for bed mobility  -     Assistive Device (Bed Mobility) bed rails;head of bed elevated;draw sheet  -       Row Name 05/10/24 134          Transfers    Transfers bed-chair  transfer;sit-stand transfer;stand-sit transfer  -       Row Name 05/10/24 1342          Bed-Chair Transfer    Bed-Chair [place] (Transfers) maximum assist (25% patient effort);2 person assist;verbal cues  -     Assistive Device (Bed-Chair Transfers) other (see comments)  E support  -KL       Row Name 05/10/24 1342          Sit-Stand Transfer    Sit-Stand [place] (Transfers) verbal cues;moderate assist (50% patient effort);2 person assist  -     Assistive Device (Sit-Stand Transfers) other (see comments)  BUE support  -       Row Name 05/10/24 1342          Stand-Sit Transfer    Stand-Sit [place] (Transfers) moderate assist (50% patient effort);2 person assist;verbal cues  -     Assistive Device (Stand-Sit Transfers) other (see comments)  BUE support  -       Row Name 05/10/24 1342          Activities of Daily Living    BADL Assessment/Intervention grooming  -KL       Row Name 05/10/24 1342          Lower Body Dressing Assessment/Training    [place] Level (Lower Body Dressing) don;socks;dependent (less than 25% patient effort)  -     Position (Lower Body Dressing) supine  -               User Key  (r) = Recorded By, (t) = Taken By, (c) = Cosigned By      Initials Name Provider Type    Margaret Flores OT Occupational Therapist                   Obj/Interventions       Row Name 05/10/24 1343          Balance    Static Sitting Balance minimal assist  -     Dynamic Sitting Balance moderate assist  -     Position, Sitting Balance supported  -     Static Standing Balance moderate assist;2-person assist  -     Dynamic Standing Balance maximum assist;2-person assist  -     Position/Device Used, Standing Balance supported  -     Balance Interventions sitting;standing;sit to stand;supported;static;dynamic;occupation based/functional task  -               User Key  (r) = Recorded By, (t) = Taken By, (c) = Cosigned By      Initials Name Provider Type    Margaret Flores OT  Occupational Therapist                   Goals/Plan    No documentation.                  Clinical Impression       Row Name 05/10/24 1344          Pain Assessment    Pain Intervention(s) Nursing Notified;Repositioned;Ambulation/increased activity  -       Row Name 05/10/24 1347          Pain Scale: FACES Pre/Post-Treatment    Pain: FACES Scale, Pretreatment 2-->hurts little bit  -KL     Posttreatment Pain Rating 4-->hurts little more  -KL     Pain Location generalized  -     Pain Location - back;neck  -       Row Name 05/10/24 1340          Plan of Care Review    Plan of Care Reviewed With patient;spouse  -     Progress no change  -     Outcome Evaluation Pt continues to benefit from IPOT services to address deficits in generalized weakness, self-care and balance. WIll continue POC to progress towards PLOF. d/c rec is IPR.  -       Row Name 05/10/24 0227          Therapy Plan Review/Discharge Plan (OT)    Anticipated Discharge Disposition (OT) inpatient rehabilitation facility  -       Row Name 05/10/24 1340          Vital Signs    Pre Systolic BP Rehab 135  -     Pre Treatment Diastolic BP 81  -     Pretreatment Heart Rate (beats/min) 74  -     Posttreatment Heart Rate (beats/min) 75  -KL     Pre SpO2 (%) 92  -KL     O2 Delivery Pre Treatment nasal cannula  -     Post SpO2 (%) 93  -     O2 Delivery Post Treatment nasal cannula  -     Pre Patient Position Supine  -     Intra Patient Position Standing  -     Post Patient Position Sitting  -[facility] Name 05/10/24 8760          Positioning and Restraints    Pre-Treatment Position in bed  -     Post Treatment Position chair  -     In Chair notified nsg;reclined;sitting;call light within reach;encouraged to call for assist;exit alarm on;with family/caregiver;waffle cushion;RUE elevated;LUE elevated;legs elevated;on mechanical lift sling  -               User Key  (r) = Recorded By, (t) = Taken By, (c) = Cosigned By      Initials  Name Provider Type    Margaret Flores OT Occupational Therapist                   Outcome Measures       Row Name 05/10/24 1346          How much help from another is currently needed...    Putting on and taking off regular lower body clothing? 1  -KL     Bathing (including washing, rinsing, and drying) 2  -KL     Toileting (which includes using toilet bed pan or urinal) 1  -KL     Putting on and taking off regular upper body clothing 2  -KL     Taking care of personal grooming (such as brushing teeth) 3  -KL     Eating meals 4  -KL     AM-PAC 6 Clicks Score (OT) 13  -KL       Row Name 05/10/24 0800          How much help from another person do you currently need...    Turning from your back to your side while in flat bed without using bedrails? 2  -EW     Moving from lying on back to sitting on the side of a flat bed without bedrails? 2  -EW     Moving to and from a bed to a chair (including a wheelchair)? 1  -EW     Standing up from a chair using your arms (e.g., wheelchair, bedside chair)? 1  -EW     Climbing 3-5 steps with a railing? 1  -EW     To walk in hospital room? 1  -EW     AM-PAC 6 Clicks Score (PT) 8  -EW     Highest Level of Mobility Goal 3 --> Sit at edge of bed  -EW       Row Name 05/10/24 1346          Functional Assessment    Outcome Measure Options AM-PAC 6 Clicks Daily Activity (OT)  -               User Key  (r) = Recorded By, (t) = Taken By, (c) = Cosigned By      Initials Name Provider Type    Alexa Poole RN Registered Nurse    Margaret Flores OT Occupational Therapist                    Occupational Therapy Education       Title: PT OT SLP Therapies (In Progress)       Topic: Occupational Therapy (In Progress)       Point: ADL training (In Progress)       Description:   Instruct learner(s) on proper safety adaptation and remediation techniques during self care or transfers.   Instruct in proper use of assistive devices.                  Learning Progress Summary             Patient  Acceptance, E, NR by  at 5/8/2024 1529   Significant Other Acceptance, E, NR by  at 5/8/2024 1529                         Point: Home exercise program (Not Started)       Description:   Instruct learner(s) on appropriate technique for monitoring, assisting and/or progressing therapeutic exercises/activities.                  Learner Progress:  Not documented in this visit.              Point: Precautions (In Progress)       Description:   Instruct learner(s) on prescribed precautions during self-care and functional transfers.                  Learning Progress Summary             Patient Acceptance, E, NR by  at 5/10/2024 1347    Acceptance, E, NR by  at 5/8/2024 1529   Significant Other Acceptance, E, NR by  at 5/10/2024 1347    Acceptance, E, NR by  at 5/8/2024 1529                         Point: Body mechanics (In Progress)       Description:   Instruct learner(s) on proper positioning and spine alignment during self-care, functional mobility activities and/or exercises.                  Learning Progress Summary             Patient Acceptance, E, NR by  at 5/10/2024 1347    Acceptance, E, NR by  at 5/8/2024 1529   Significant Other Acceptance, E, NR by  at 5/10/2024 1347    Acceptance, E, NR by  at 5/8/2024 1529                                         User Key       Initials Effective Dates Name Provider Type Discipline     02/05/24 -  Margaret Ramírez OT Occupational Therapist OT                  OT Recommendation and Plan  Planned Therapy Interventions (OT): activity tolerance training, adaptive equipment training, functional balance retraining, occupation/activity based interventions, patient/caregiver education/training, ROM/therapeutic exercise, strengthening exercise, transfer/mobility retraining  Therapy Frequency (OT): daily  Plan of Care Review  Plan of Care Reviewed With: patient, spouse  Progress: no change  Outcome Evaluation: Pt continues to benefit from IPOT services to address  deficits in generalized weakness, self-care and balance. WIll continue POC to progress towards PLOF. d/c rec is IPR.     Time Calculation:   Evaluation Complexity (OT)  Review Occupational Profile/Medical/Therapy History Complexity: expanded/moderate complexity  Assessment, Occupational Performance/Identification of Deficit Complexity: 3-5 performance deficits  Clinical Decision Making Complexity (OT): detailed assessment/moderate complexity  Overall Complexity of Evaluation (OT): moderate complexity     Time Calculation- OT       Row Name 05/10/24 1347             Time Calculation- OT    OT Start Time 1308  -KL      OT Received On 05/10/24  -KL         Timed Charges    55375 - OT Therapeutic Activity Minutes 15  -KL         Total Minutes    Timed Charges Total Minutes 15  -KL       Total Minutes 15  -KL                User Key  (r) = Recorded By, (t) = Taken By, (c) = Cosigned By      Initials Name Provider Type    Margaret Flores OT Occupational Therapist                  Therapy Charges for Today       Code Description Service Date Service Provider Modifiers Qty    25697513258 HC OT THERAPEUTIC ACT EA 15 MIN 5/10/2024 Margaret Ramírez OT GO 1                 Margaret Ramírez OT  5/10/2024    Electronically signed by Margaret Ramírez OT at 05/10/24 1347

## 2024-05-13 NOTE — PLAN OF CARE
Goal Outcome Evaluation:  Plan of Care Reviewed With: patient           Outcome Evaluation:   -Pt alert and oriented x4.   -VSS- Rhythm remains NSR with a 1st degree block.  -UOP:450mL. No BM  -PRN pain medication given for chest pain.   -Awaiting AM labs.  -No acute events over night

## 2024-05-13 NOTE — CASE MANAGEMENT/SOCIAL WORK
Continued Stay Note  [facility]     Patient Name: Kaleb Abraham  MRN: [id number]  Today's Date: 5/13/2024    Admit Date: 5/6/2024    Plan: SNF   Discharge Plan       Row Name 05/13/24 0944       Plan    Plan SNF    Patient/Family in Agreement with Plan yes    Plan Comments Spoke with patient and spouse at bedside. Patient is agreeable to SNF placement today but wants to be closer to home. CM faxed referrals to [facility] and [facility] today. CM will continue to follow.    Final Discharge Disposition Code 03 - skilled nursing facility (SNF)                   Discharge Codes    No documentation.                 Expected Discharge Date and Time       Expected Discharge Date Expected Discharge Time    May 11, 2024               Leon Dow RN

## 2024-05-13 NOTE — PROGRESS NOTES
INFECTIOUS DISEASE f/u     Kaleb Abraham  1950  [id number]    Date of Consult: 5/13/2024    Admission Date: 5/6/2024      Requesting Provider: No ref. provider found  Evaluating Physician: Lino Jacob MD    Reason for Consultation: Weakness    History of present illness:    Patient is a 73 y.o. male with coronary disease history of CABG, type 2 diabetes mellitus, hypertension, hyperlipidemia, hypothyroidism, atrial fibrillation, valvular heart disease with history of mechanical heart valve (prosthetic aortic valve and prosthetic mitral valve/) recently treated [facility] for GI bleed from April 22 patient found to have gastric arteriovenous malformations patient now admitted to [facility] with supratherapeutic INR.    Hospitalist concerned about underlying infection    Patient has low-grade fever 100.3, bradycardia and hypotension    Getting picc line    MVR/AVR performed 9 years ago here at [facility]    5/8/24; awake, has back pain, following commands; no fevers, rash, sore throat    5/9/24; has back pain; afebrile, normotensive,  has COLEEN tomorrow; picc placed    5/10/24; doing well; pain under some control; no fever, rash, sore throat, COLEEN confirmed MV vegetation < 1 cm. Wife in room    5/11/24; had vtach yesterday requiring cpr followed by torsades which was cardioverted;  better today has mild elevation of bp.  Awake, alert, family in room    5/13/24; doing well; no events overnight; no fever, rash, sore throat  Past Medical History:   Diagnosis Date    Anemia     Aortic stenosis     CAD (coronary artery disease)     Diabetes mellitus     Hyperlipidemia     Hypertension     Hypothyroidism     Mitral regurgitation        Past Surgical History:   Procedure Laterality Date    CAPSULE ENDOSCOPY      2/22/2023 and 9/25/2023 per dr. schrader    COLONOSCOPY      2/2023 Dr. Schrader    CORONARY ARTERY BYPASS GRAFT      CORONARY STENT PLACEMENT      UPPER GASTROINTESTINAL  ENDOSCOPY      2/7/2023 Dr. Amaro, 7/26/2023 Dr. Liu, 4/2024 Dr. Girish Lainez       Family History   Problem Relation Age of Onset    Diabetes Mother     Stroke Mother     Thyroid cancer Mother     Hypertension Father     Stroke Father        Social History     Socioeconomic History    Marital status:    Tobacco Use    Smoking status: Former     Types: Cigarettes     Passive exposure: Past    Smokeless tobacco: Never   Vaping Use    Vaping status: Never Used   Substance and Sexual Activity    Alcohol use: Never    Drug use: Never    Sexual activity: Defer       No Known Allergies      Medication:    Current Facility-Administered Medications:     acetaminophen (TYLENOL) tablet 650 mg, 650 mg, Oral, Q4H PRN, 650 mg at 05/07/24 2209 **OR** [DISCONTINUED] acetaminophen (TYLENOL) 160 MG/5ML oral solution 650 mg, 650 mg, Oral, Q4H PRN **OR** acetaminophen (TYLENOL) suppository 650 mg, 650 mg, Rectal, Q4H PRN, Shahid Peter MD    allopurinol (ZYLOPRIM) tablet 300 mg, 300 mg, Oral, Daily, Shahid Peter MD, 300 mg at 05/13/24 0850    ampicillin 2000 mg IVPB in 100 mL NS (MBP), 2 g, Intravenous, Q6H, Carlos Bermudez MD, Last Rate: 200 mL/hr at 05/13/24 0850, 2 g at 05/13/24 0850    sennosides-docusate (PERICOLACE) 8.6-50 MG per tablet 2 tablet, 2 tablet, Oral, BID PRN, 2 tablet at 05/13/24 0849 **AND** polyethylene glycol (MIRALAX) packet 17 g, 17 g, Oral, Daily PRN **AND** bisacodyl (DULCOLAX) EC tablet 5 mg, 5 mg, Oral, Daily PRN **AND** bisacodyl (DULCOLAX) suppository 10 mg, 10 mg, Rectal, Daily PRN, Shahid Peter MD    cefTRIAXone (ROCEPHIN) 2,000 mg in sodium chloride 0.9 % 100 mL MBP, 2,000 mg, Intravenous, Q12H, Carlos Bermudez MD, Last Rate: 200 mL/hr at 05/13/24 0345, 2,000 mg at 05/13/24 0345    dextrose (D50W) (25 g/50 mL) IV injection 25 g, 25 g, Intravenous, Q15 Min PRN, Shahid Peter MD    dextrose (GLUTOSE) oral gel 15 g, 15 g, Oral, Q15 Min PRN,  Shahid Peter MD    glucagon (GLUCAGEN) injection 1 mg, 1 mg, Intramuscular, Q15 Min PRN, Shahid Peter MD    HYDROcodone-acetaminophen (NORCO)  MG per tablet 1 tablet, 1 tablet, Oral, Q8H PRN, Carlos Bermudez MD, 1 tablet at 05/13/24 0858    Insulin Lispro (humaLOG) injection 2-7 Units, 2-7 Units, Subcutaneous, 4x Daily AC & at Bedtime, Shahid Peter MD, 2 Units at 05/13/24 1225    levothyroxine (SYNTHROID, LEVOTHROID) tablet 25 mcg, 25 mcg, Oral, Q AM, Shahid Peter MD, 25 mcg at 05/13/24 0532    Lidocaine 4 % 1 patch, 1 patch, Transdermal, Q24H, Shahid Peter MD, 1 patch at 05/13/24 0850    Magnesium Cardiology Dose Replacement - Follow Nurse / BPA Driven Protocol, , Does not apply, PRN, Nick Tilley C, APRN    melatonin tablet 5 mg, 5 mg, Oral, Nightly PRN, Shahid Peter MD, 5 mg at 05/09/24 2022    metoprolol tartrate (LOPRESSOR) tablet 25 mg, 25 mg, Oral, Q12H, Ben Grace MD, 25 mg at 05/13/24 0850    morphine injection 2 mg, 2 mg, Intravenous, Q2H PRN, Carlos Bermudez MD, 2 mg at 05/13/24 1114    nitroglycerin (NITROSTAT) SL tablet 0.4 mg, 0.4 mg, Sublingual, Q5 Min PRN, Shahid Peter MD    oxybutynin XL (DITROPAN-XL) 24 hr tablet 10 mg, 10 mg, Oral, Daily, Shahid Peter MD, 10 mg at 05/13/24 0849    pantoprazole (PROTONIX) EC tablet 40 mg, 40 mg, Oral, BID AC, Carlos Bermudez MD, 40 mg at 05/13/24 0850    Pharmacy to dose warfarin, , Does not apply, Continuous PRN, Shahid Peter MD    Phosphorus Replacement - Follow Nurse / BPA Driven Protocol, , Does not apply, PRN, Shahid Peter MD    Potassium Replacement - Follow Nurse / BPA Driven Protocol, , Does not apply, PRN, hSahid Peter MD    simethicone (MYLICON) chewable tablet 80 mg, 80 mg, Oral, 4x Daily PRN, Shahid Peter MD, 80 mg at 05/12/24 1526    sodium chloride 0.9 % flush 10 mL, 10 mL, Intravenous, Q12H, Jose Maria Dugan MD, 10 mL at 05/13/24  0850    sodium chloride 0.9 % flush 10 mL, 10 mL, Intravenous, PRN, Jose Maria Dugan MD    sodium chloride 0.9 % infusion 40 mL, 40 mL, Intravenous, PRN, Shahid Peter MD    tamsulosin (FLOMAX) 24 hr capsule 0.4 mg, 0.4 mg, Oral, Nightly, Shahid Peter MD, 0.4 mg at 24    tiZANidine (ZANAFLEX) tablet 2 mg, 2 mg, Oral, PRN, Shahid Peter MD, 2 mg at 24 1132    warfarin (COUMADIN) tablet 4 mg, 4 mg, Oral, Daily, Dawood Campa, [facility]    Antibiotics:  Anti-Infectives (From admission, onward)      Ordered     Dose/Rate Route Frequency Start Stop    24 1443  cefTRIAXone (ROCEPHIN) 2,000 mg in sodium chloride 0.9 % 100 mL MBP        Ordering Provider: Carlos Bermudez MD    2,000 mg  200 mL/hr over 30 Minutes Intravenous Every 12 Hours 24 1600 24 1559    24 1443  ampicillin 2000 mg IVPB in 100 mL NS (MBP)        Ordering Provider: Carlos Bermudez MD    2 g  200 mL/hr over 30 Minutes Intravenous Every 6 Hours 24 1500 24 1459    24 1302  vancomycin IVPB 2000 mg in 0.9% Sodium Chloride 500 mL        Ordering Provider: Bernabe Martines, LiatD    2,000 mg  250 mL/hr over 120 Minutes Intravenous Once 24 1400 24 1524    24 1225  piperacillin-tazobactam (ZOSYN) 3.375 g IVPB in 100 mL NS MBP (CD)        Ordering Provider: Desiree Garcia MD    3.375 g  over 30 Minutes Intravenous Once 24 1315 24 1302              Review of Systems:    See hpi      Physical Exam:   Vital Signs  Temp (24hrs), Av.6 °F (37 °C), Min:98.2 °F (36.8 °C), Max:98.9 °F (37.2 °C)    Temp  Min: 98.2 °F (36.8 °C)  Max: 98.9 °F (37.2 °C)  BP  Min: 130/80  Max: 156/93  Pulse  Min: 71  Max: 102  Resp  Min: 18  Max: 22  SpO2  Min: 92 %  Max: 100 %    GENERAL: Awake and alert, in mild distress.   HEENT: Normocephalic, atraumatic.  PERRL. EOMI. No conjunctival injection. No icterus.      HEART: RRR; No murmur,  LUNGS: Clear to auscultation  "bilaterally   ABDOMEN: Soft, nontender,   EXT:  1+ edema  :  Without Wilkinson catheter.  MSK: No joint effusions or erythema  SKIN: no rash      Laboratory Data    Results from last 7 days   Lab Units 05/13/24  0644 05/12/24  1608 05/12/24  1242 05/12/24  0615 05/11/24  1141 05/11/24  0554   WBC 10*3/mm3 10.65  --   --  10.63  --  11.78*   HEMOGLOBIN g/dL 8.7* 9.4* 9.5* 8.9*   < > 9.0*   HEMATOCRIT % 26.6* 29.2* 29.6* 27.9*   < > 27.6*   PLATELETS 10*3/mm3 209  --   --  219  --  251    < > = values in this interval not displayed.     Results from last 7 days   Lab Units 05/13/24  0644   SODIUM mmol/L 133*   POTASSIUM mmol/L 4.0   CHLORIDE mmol/L 96*   CO2 mmol/L 30.0*   BUN mg/dL 10   CREATININE mg/dL 0.71*   GLUCOSE mg/dL 115*   CALCIUM mg/dL 7.7*     Results from last 7 days   Lab Units 05/13/24  0644   ALK PHOS U/L 56   BILIRUBIN mg/dL 0.4   ALT (SGPT) U/L 12   AST (SGOT) U/L 30             Results from last 7 days   Lab Units 05/07/24  0959   LACTATE mmol/L 1.2             Estimated Creatinine Clearance: 118.7 mL/min (A) (by C-G formula based on SCr of 0.71 mg/dL (L)).      Microbiology:  Blood Culture   Date Value Ref Range Status   05/06/2024 Abnormal Stain (C)  Preliminary     BCID, PCR   Date Value Ref Range Status   05/06/2024 (A) Negative by BCID PCR. Culture to Follow. Final    Enterococcus faecalis. Arcelia/B (vancomycin resistance gene) not detected. Identification by BCID2 PCR.     No results found for: \"CULTURES\", \"HSVCX\", \"URCX\"  No results found for: \"EYECULTURE\", \"GCCX\", \"HSVCULTURE\", \"LABHSV\"  No results found for: \"LEGIONELLA\", \"MRSACX\", \"MUMPSCX\", \"MYCOPLASCX\"  No results found for: \"NOCARDIACX\", \"STOOLCX\"  No results found for: \"THROATCX\", \"UNSTIMCULT\", \"URINECX\", \"CULTURE\", \"VZVCULTUR\"  No results found for: \"VIRALCULTU\", \"WOUNDCX\"        Radiology:  Imaging Results (Last 72 Hours)       Procedure Component Value Units Date/Time    XR Chest 1 View [[id number]] Collected: 05/13/24 0731     Updated: " 05/13/24 0736    Narrative:      XR CHEST 1 VW    Date of Exam: 5/13/2024 1:19 AM EDT    Indication: Resp Distress    Comparison: 5/11/2024    Findings:  Patient is status post median sternotomy. Cardiac valvular prosthesis is seen. Cardiac silhouette is enlarged. Pulmonary vasculature is indistinct. There are small bilateral pleural effusions with bibasilar atelectasis, edema, or infiltrates. No   pneumothorax is seen. No acute osseous lesion is seen. There are senescent changes of the skeletal structures. Left arm PICC terminates overlying the SVC.      Impression:      Impression:  1.Cardiomegaly with interstitial pulmonary edema, small bilateral pleural effusions, and bibasilar atelectasis, edema, or infiltrates.      Electronically Signed: Matthias Dobbs MD    5/13/2024 7:33 AM EDT    Workstation ID: [id number]    XR Chest 1 View [[id number]] Collected: 05/11/24 1038     Updated: 05/11/24 1042    Narrative:      XR CHEST 1 VW    Date of Exam: 5/11/2024 9:53 AM EDT    Indication: f/u edema    Comparison: Chest x-ray 5/7/2024    Findings:  New defibrillator pad projects over the central chest. Right upper extremity PICC line has been removed. Stable cardiomegaly. There is a new or newly apparent small right-sided pleural effusion. Similar interstitial edema. No pneumothorax.      Impression:      Impression:  New defibrillator pad projects over the central chest. Right upper extremity PICC line has been removed. Stable cardiomegaly. There is a new or newly apparent small right-sided pleural effusion. Similar interstitial edema. No pneumothorax.      Electronically Signed: Stef Vergara MD    5/11/2024 10:39 AM EDT    Workstation ID: [id number]              Impression:   Enterococcus faecalis bacteremia  MV endocarditis of mechanical valve  Hypotension  Bioprosthetic AVR  Supratherapeutic INR  Anemia  Elevated procalcitonin  History of GI bleed with gastric AVM  Thoracic back pain    PLAN/RECOMMENDATIONS:   Thank  you for asking us to see Kaleb Abraham, I recommend the following:  High-grade enterococcal bacteremia is concerning in the setting of endovascular hardware.    Estimated Creatinine Clearance: 118.7 mL/min (A) (by C-G formula based on SCr of 0.71 mg/dL (L)).    Patient could have a GI source such as inflammation of colonic viscus (superimposed diverticulitis and diverticulosis) or bacteremia  following endoscopy regarding management of a GIbleed.    Gallbladder has been removed    Repeat blood cultures x 2 sets     Enterococcus isolate is susceptible to ampicillin and ceftriaxone        cont ampicillin 2 g IV every 4 hours    cont ceftriaxone 2 g IV every 12 hours    Duration is 6 weeks      I recommend LTAC/[facility] referral.      Cardiology is monitoring qt interval and correcting potassium, magnesium     Plan is 6 weeks iv abx followed by chronic oral abx suppression    Lino Jacob MD  5/13/2024  15:54 EDT
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
## 2024-05-13 NOTE — CASE MANAGEMENT/SOCIAL WORK
Continued Stay Note  [facility]     Patient Name: Kaleb Abraham  MRN: [id number]  Today's Date: 5/13/2024    Admit Date: 5/6/2024    Plan: SNF   Discharge Plan       Row Name 05/13/24 1118       Plan    Plan SNF    Patient/Family in Agreement with Plan yes    Plan Comments Spoke with Lissett at [facility] and they are considering the patient. Lissett has tuirned the patient over to their nursing team for evaluation. CM will continue to follow.    Final Discharge Disposition Code 03 - skilled nursing facility (SNF)      Row Name 05/13/24 0944       Plan    Plan SNF    Patient/Family in Agreement with Plan yes    Plan Comments Spoke with patient and spouse at bedside. Patient is agreeable to SNF placement today but wants to be closer to home. CM faxed referrals to [facility], [facility] and [facility] today. CM will continue to follow.    Final Discharge Disposition Code 03 - skilled nursing facility (SNF)                   Discharge Codes    No documentation.                 Expected Discharge Date and Time       Expected Discharge Date Expected Discharge Time    May 11, 2024               Leon Dow RN

## 2024-05-13 NOTE — THERAPY TREATMENT NOTE
Patient Name: Kaleb Abraham  : 1950    MRN: [id number]                              Today's Date: 2024       Admit Date: 2024    Visit Dx:     ICD-10-CM ICD-9-CM   1. Acute on chronic anemia  D64.9 285.9   2. S/P mechanical aortic valve  Z95.2 V43.3   3. Supratherapeutic INR  R79.1 790.92   4. History of GI bleed  Z87.19 V12.79   5. Anticoagulated on Coumadin  Z79.01 V58.61   6. Generalized weakness  R53.1 780.79   7. History of CHF (congestive heart failure)  Z86.79 V12.59   8. Weakness  R53.1 780.79     Patient Active Problem List   Diagnosis    Anemia    Aortic stenosis    CAD s/p CABG    T2DM    Hyperlipidemia    Hypertension    Hypothyroidism    Mitral regurgitation    MARTINEZ    Hypersomnia, unspecified    Chronic atrial fibrillation    Supratherapeutic INR    S/P prosthetic aortic valve    S/P mechanical aortic valve    Chronic anticoagulation (warfarin)    Prosthetic valve endocarditis (Mitral Valve)    S/P VT/TdP Arrest 5/10/2024    Enterococcal bacteremia     Past Medical History:   Diagnosis Date    Anemia     Aortic stenosis     CAD (coronary artery disease)     Diabetes mellitus     Hyperlipidemia     Hypertension     Hypothyroidism     Mitral regurgitation      Past Surgical History:   Procedure Laterality Date    CAPSULE ENDOSCOPY      2023 and 2023 per dr. schrader    COLONOSCOPY      2023 Dr. Schrader    CORONARY ARTERY BYPASS GRAFT      CORONARY STENT PLACEMENT      UPPER GASTROINTESTINAL ENDOSCOPY      2023 Dr. Schrader, 2023 Dr. Liu, 2024 Dr. Stout [place]      General Information       Row Name 24 1116          OT Time and Intention    Document Type therapy note (daily note)  -KL     Mode of Treatment occupational therapy  -       Row Name 24 1116          General Information    Patient Profile Reviewed yes  -KL     Existing Precautions/Restrictions fall;oxygen therapy device and L/min;cardiac;other (see comments)  S/p  code blue 05/10  -     Barriers to Rehab medically complex;previous functional deficit  -       Row Name 05/13/24 1116          Cognition    Orientation Status (Cognition) oriented x 3  -[facility] Name 05/13/24 1116          Safety Issues, Functional Mobility    Safety Issues Affecting Function (Mobility) insight into deficits/self-awareness;sequencing abilities;safety precaution awareness;safety precautions follow-through/compliance;judgment  -     Impairments Affecting Function (Mobility) balance;endurance/activity tolerance;strength;pain;coordination  -               User Key  (r) = Recorded By, (t) = Taken By, (c) = Cosigned By      Initials Name Provider Type     Margaret Ramírez OT Occupational Therapist                     Mobility/ADL's       Row Name 05/13/24 1118          Bed Mobility    Supine-Sit Dawson (Bed Mobility) maximum assist (25% patient effort);1 person assist;verbal cues  -     Bed Mobility, Safety Issues impaired trunk control for bed mobility  -     Assistive Device (Bed Mobility) bed rails;head of bed elevated;draw sheet  -[facility] Name 05/13/24 1118          Bed-Chair Transfer    Bed-Chair Dawson (Transfers) moderate assist (50% patient effort);2 person assist;verbal cues;nonverbal cues (demo/gesture)  -     Assistive Device (Bed-Chair Transfers) other (see comments)  [facility] support  -[facility] Name 05/13/24 1118          Sit-Stand Transfer    Sit-Stand Dawson (Transfers) verbal cues;2 person assist;moderate assist (50% patient effort)  -     Assistive Device (Sit-Stand Transfers) other (see comments)  BUE support  -KL     Comment, (Sit-Stand Transfer) STS from bed modAx2; STS from chair minAx2  -[facility] Name 05/13/24 1118          Stand-Sit Transfer    Stand-Sit Dawson (Transfers) minimum assist (75% patient effort);2 person assist;verbal cues;nonverbal cues (demo/gesture)  -     Assistive Device (Stand-Sit Transfers) other (see comments)   BUE support  -       Row Name 05/13/24 1118          Activities of Daily Living    BADL Assessment/Intervention lower body dressing  -       Row Name 05/13/24 1118          Lower Body Dressing Assessment/Training    [place] Level (Lower Body Dressing) don;socks;dependent (less than 25% patient effort)  -     Position (Lower Body Dressing) supine  -               User Key  (r) = Recorded By, (t) = Taken By, (c) = Cosigned By      Initials Name Provider Type    Margaret Flores OT Occupational Therapist                   Obj/Interventions       Row Name 05/13/24 1120          Balance    Static Sitting Balance contact guard  -     Dynamic Sitting Balance minimal assist  -     Static Standing Balance minimal assist;2-person assist  -     Dynamic Standing Balance moderate assist;2-person assist;verbal cues  -     Position/Device Used, Standing Balance other (see comments)  BUE support  -     Balance Interventions standing;sitting;sit to stand;supported;static;dynamic;occupation based/functional task  -               User Key  (r) = Recorded By, (t) = Taken By, (c) = Cosigned By      Initials Name Provider Type    Margaret Flores OT Occupational Therapist                   Goals/Plan    No documentation.                  Clinical Impression       Row Name 05/13/24 1121          Pain Assessment    Pain Intervention(s) Repositioned;Ambulation/increased activity  -[facility] Name 05/13/24 1121          Pain Scale: FACES Pre/Post-Treatment    Pain: FACES Scale, Pretreatment 4-->hurts little more  -KL     Posttreatment Pain Rating 4-->hurts little more  -KL     Pain Location generalized  -     Pain Location - chest;back  -       Row Name 05/13/24 1121          Plan of Care Review    Plan of Care Reviewed With patient;spouse  -     Progress improving  -     Outcome Evaluation Pt demo improvement w/ activity tolerance during tx session. Pt continues to benefit from IPOT services to address  functional mobility, balance, activity tolerance and self-care. Will continue POC to progress toward goals. d/c rec is IPR.  -       Row Name 05/13/24 1121          Therapy Plan Review/Discharge Plan (OT)    Anticipated Discharge Disposition (OT) inpatient rehabilitation facility  -       Row Name 05/13/24 1121          Vital Signs    Pre Systolic BP Rehab 148  -KL     Pre Treatment Diastolic BP 82  -KL     Post Systolic BP Rehab 143  -KL     Post Treatment Diastolic BP 82  -KL     Pretreatment Heart Rate (beats/min) 76  -KL     Posttreatment Heart Rate (beats/min) 80  -KL     Pre SpO2 (%) 94  -KL     O2 Delivery Pre Treatment room air  -KL     Post SpO2 (%) 92  -KL     O2 Delivery Post Treatment room air  -KL     Pre Patient Position Supine  -     Intra Patient Position Standing  -KL     Post Patient Position Sitting  -       Row Name 05/13/24 1121          Positioning and Restraints    Pre-Treatment Position in bed  -KL     Post Treatment Position chair  -     In Chair notified nsg;reclined;sitting;call light within reach;encouraged to call for assist;exit alarm on;with family/caregiver;waffle cushion;legs elevated;on mechanical lift sling  -               User Key  (r) = Recorded By, (t) = Taken By, (c) = Cosigned By      Initials Name Provider Type    Margaret Flores, HUMBERTO Occupational Therapist                   Outcome Measures       Row Name 05/13/24 1125          How much help from another is currently needed...    Putting on and taking off regular lower body clothing? 1  -KL     Bathing (including washing, rinsing, and drying) 2  -KL     Toileting (which includes using toilet bed pan or urinal) 2  -KL     Putting on and taking off regular upper body clothing 2  -KL     Taking care of personal grooming (such as brushing teeth) 3  -KL     Eating meals 4  -KL     AM-PAC 6 Clicks Score (OT) 14  -       Row Name 05/13/24 1125          Functional Assessment    Outcome Measure Options AM-PAC 6 Clicks  Daily Activity (OT)  -KL               User Key  (r) = Recorded By, (t) = Taken By, (c) = Cosigned By      Initials Name Provider Type    Margaret Flores OT Occupational Therapist                    Occupational Therapy Education       Title: PT OT SLP Therapies (In Progress)       Topic: Occupational Therapy (In Progress)       Point: ADL training (In Progress)       Description:   Instruct learner(s) on proper safety adaptation and remediation techniques during self care or transfers.   Instruct in proper use of assistive devices.                  Learning Progress Summary             Patient Acceptance, E, NR by  at 5/13/2024 1125    Acceptance, E, NR by KL at 5/8/2024 1529   Significant Other Acceptance, E, NR by KL at 5/13/2024 1125    Acceptance, E, NR by  at 5/8/2024 1529                         Point: Home exercise program (Not Started)       Description:   Instruct learner(s) on appropriate technique for monitoring, assisting and/or progressing therapeutic exercises/activities.                  Learner Progress:  Not documented in this visit.              Point: Precautions (In Progress)       Description:   Instruct learner(s) on prescribed precautions during self-care and functional transfers.                  Learning Progress Summary             Patient Acceptance, E, NR by KL at 5/13/2024 1125    Acceptance, E, NR by KL at 5/10/2024 1347    Acceptance, E, NR by KL at 5/8/2024 1529   Significant Other Acceptance, E, NR by KL at 5/13/2024 1125    Acceptance, E, NR by KL at 5/10/2024 1347    Acceptance, E, NR by KL at 5/8/2024 1529                         Point: Body mechanics (In Progress)       Description:   Instruct learner(s) on proper positioning and spine alignment during self-care, functional mobility activities and/or exercises.                  Learning Progress Summary             Patient Acceptance, E, NR by KL at 5/13/2024 1125    Acceptance, E, NR by KL at 5/10/2024 1347    Acceptance,  E, NR by  at 5/8/2024 1529   Significant Other Acceptance, E, NR by  at 5/13/2024 1125    Acceptance, E, NR by  at 5/10/2024 1347    Acceptance, E, NR by  at 5/8/2024 1529                                         User Key       Initials Effective Dates Name Provider Type [facility]    TORIN 02/05/24 -  Margaret Ramírez OT Occupational Therapist OT                  OT Recommendation and Plan  Planned Therapy Interventions (OT): activity tolerance training, adaptive equipment training, functional balance retraining, occupation/activity based interventions, patient/caregiver education/training, ROM/therapeutic exercise, strengthening exercise, transfer/mobility retraining  Therapy Frequency (OT): daily  Plan of Care Review  Plan of Care Reviewed With: patient, spouse  Progress: improving  Outcome Evaluation: Pt demo improvement w/ activity tolerance during tx session. Pt continues to benefit from IPOT services to address functional mobility, balance, activity tolerance and self-care. Will continue POC to progress toward goals. d/c rec is IPR.     Time Calculation:   Evaluation Complexity (OT)  Review Occupational Profile/Medical/Therapy History Complexity: expanded/moderate complexity  Assessment, Occupational Performance/Identification of Deficit Complexity: 3-5 performance deficits  Clinical Decision Making Complexity (OT): detailed assessment/moderate complexity  Overall Complexity of Evaluation (OT): moderate complexity     Time Calculation- OT       Row Name 05/13/24 1126             Time Calculation- OT    OT Start Time 1045  -KL      OT Received On 05/13/24  -KL         Timed Charges    06220 - OT Therapeutic Activity Minutes 15  -KL         Total Minutes    Timed Charges Total Minutes 15  -KL       Total Minutes 15  -KL                User Key  (r) = Recorded By, (t) = Taken By, (c) = Cosigned By      Initials Name Provider Type    Margaret Flores OT Occupational Therapist                  Therapy Charges for  Today       Code Description Service Date Service Provider Modifiers Qty    01542633771  OT THERAPEUTIC ACT EA 15 MIN 5/13/2024 Margaret Ramírez OT GO 1                 Margaret Ramírez OT  5/13/2024

## 2024-05-13 NOTE — PROGRESS NOTES
"          Clinical Nutrition Assessment     Patient Name: Kaleb Abraham  YOB: 1950  MRN: [id number]  Date of Encounter: 05/13/24 15:32 EDT  Admission date: 5/6/2024  Reason for Visit: MDR, Follow-up protocol, LOS    Assessment   Nutrition Assessment   Admission Diagnosis:  Supratherapeutic INR [R79.1]  Shock [R57.9]    Problem List:    Supratherapeutic INR    Anemia    CAD s/p CABG    T2DM    Hyperlipidemia    Hypothyroidism    MARTINEZ    Chronic atrial fibrillation    S/P prosthetic aortic valve    S/P mechanical aortic valve    Chronic anticoagulation (warfarin)    Prosthetic valve endocarditis (Mitral Valve)    S/P VT/TdP Arrest 5/10/2024    Enterococcal bacteremia      PMH:   He  has a past medical history of Anemia, Aortic stenosis, CAD (coronary artery disease), Diabetes mellitus, Hyperlipidemia, Hypertension, Hypothyroidism, and Mitral regurgitation.    PSH:  He  has a past surgical history that includes Coronary artery bypass graft; Coronary stent placement; Upper gastrointestinal endoscopy; Colonoscopy; and Capsule Endoscopy.    Substance history: tobacco remote    Interval History:  S/p COLEEN 5-10-24    V-tach arrest 5-10-24  s/p CPR    Anthropometrics     Height: Height: 180.3 cm (70.98\")  Last Filed Weight: Weight: 110 kg (241 lb 13.5 oz) (05/13/24 0400) weight skewed by fluid  Method: Weight Method: Bed scale  BMI: BMI (Calculated): 33.7 bmi skewed by fluid    UBW:  228lb  Weight change:  12lb wt gain over past 2 months, presumed fluid       Weight       Weight (kg) Weight (lbs) Weight Method Visit Report   3/4/2015 100.7 kg  222 lb 0.1 oz      5/16/2023 102.967 kg  227 lb  Standing scale     5/17/2023 102.513 kg  226 lb  Stated     8/22/2023 108.41 kg  239 lb   --    2/5/2024 103.42 kg  228 lb  Stated     2/21/2024 104.055 kg  229 lb 6.4 oz  Standing scale     3/27/2024 104.781 kg  231 lb  Standing scale     5/6/2024 102.967 kg  227 lb  Stated     5/7/2024 103 kg  227 lb 1.2 oz    "   5/8/2024 106.5 kg  234 lb 12.6 oz  Bed scale     5/9/2024 108.8 kg  239 lb 13.8 oz      5/10/2024 109 kg  240 lb 4.8 oz      5/11/2024 107.5 kg  236 lb 15.9 oz  Bed scale     5/12/2024 110.7 kg  244 lb 0.8 oz  Bed scale     5/13/2024 109.7 kg  241 lb 13.5 oz  Bed scale         Nutrition Focused Physical Exam    Date:     Unable to perform due to Defer pending indication     Subjective   Reported/Observed/Food/Nutrition Related History:     Pt sitting up in chair, reports poor appetite, is sore from CPR, able to swallow ok    Wife reports he is drinking boost      Current Nutrition Prescription   PO: Diet: Regular/House, Cardiac, Diabetic; Healthy Heart (2-3 Na+); Consistent Carbohydrate; Fluid Consistency: Thin (IDDSI 0)  Oral Nutrition Supplement: Boost GT 2x/day  Intake:  44% of 8 meals    Assessment & Plan   Nutrition Diagnosis   Date:   5-13-24           Updated:    Problem Inadequate oral intake    Etiology Per Clinical Status   Signs/Symptoms Po intake 44%       Goal / Objectives:   Nutrition to support treatment and Increase intake      Nutrition Intervention      Follow treatment progress, Care plan reviewed, Advise alternate selection, Advised available snacks, Interview for preferences, Menu adjusted, Adjusted supplement, Encourage intake    Increase Boost GC to 3x//day    Monitoring/Evaluation:   Per protocol    Brooklyn Nevarez RD  Time Spent:30min

## 2024-05-13 NOTE — THERAPY TREATMENT NOTE
Patient Name: Kaleb Abraham  : 1950    MRN: [id number]                              Today's Date: 2024       Admit Date: 2024    Visit Dx:     ICD-10-CM ICD-9-CM   1. Acute on chronic anemia  D64.9 285.9   2. S/P mechanical aortic valve  Z95.2 V43.3   3. Supratherapeutic INR  R79.1 790.92   4. History of GI bleed  Z87.19 V12.79   5. Anticoagulated on Coumadin  Z79.01 V58.61   6. Generalized weakness  R53.1 780.79   7. History of CHF (congestive heart failure)  Z86.79 V12.59   8. Weakness  R53.1 780.79     Patient Active Problem List   Diagnosis    Anemia    Aortic stenosis    CAD s/p CABG    T2DM    Hyperlipidemia    Hypertension    Hypothyroidism    Mitral regurgitation    MARTINEZ    Hypersomnia, unspecified    Chronic atrial fibrillation    Supratherapeutic INR    S/P prosthetic aortic valve    S/P mechanical aortic valve    Chronic anticoagulation (warfarin)    Prosthetic valve endocarditis (Mitral Valve)    S/P VT/TdP Arrest 5/10/2024    Enterococcal bacteremia     Past Medical History:   Diagnosis Date    Anemia     Aortic stenosis     CAD (coronary artery disease)     Diabetes mellitus     Hyperlipidemia     Hypertension     Hypothyroidism     Mitral regurgitation      Past Surgical History:   Procedure Laterality Date    CAPSULE ENDOSCOPY      2023 and 2023 per dr. schrader    COLONOSCOPY      2023 Dr. Schrader    CORONARY ARTERY BYPASS GRAFT      CORONARY STENT PLACEMENT      UPPER GASTROINTESTINAL ENDOSCOPY      2023 Dr. Schrader, 2023 Dr. Liu, 2024 Dr. Stout [place]      General Information       Row Name 24 1456          Physical Therapy Time and Intention    Document Type therapy note (daily note)  -ES     Mode of Treatment physical therapy  -ES       Row Name 24 1456          General Information    Patient Profile Reviewed yes  -ES     Existing Precautions/Restrictions fall;oxygen therapy device and L/min;cardiac;other (see  comments)  chest soreness from compressions  -ES     Barriers to Rehab medically complex;previous functional deficit  -ES       Row Name 05/13/24 1456          Cognition    Orientation Status (Cognition) oriented x 3  -ES       Row Name 05/13/24 1456          Safety Issues, Functional Mobility    Safety Issues Affecting Function (Mobility) awareness of need for assistance;insight into deficits/self-awareness;safety precaution awareness;safety precautions follow-through/compliance;sequencing abilities  -ES     Impairments Affecting Function (Mobility) balance;endurance/activity tolerance;strength;pain;coordination  -ES               User Key  (r) = Recorded By, (t) = Taken By, (c) = Cosigned By      Initials Name Provider Type    ES Renee Mendoza, PT Physical Therapist                   Mobility       Row Name 05/13/24 1456          Bed Mobility    Comment, (Bed Mobility) received [facility]  -ES       Row Name 05/13/24 1456          Sit-Stand Transfer    Sit-Stand [place] (Transfers) verbal cues;2 person assist;minimum assist (75% patient effort)  -ES     Assistive Device (Sit-Stand Transfers) walker, front-wheeled  -ES     Comment, (Sit-Stand Transfer) v/c for hand placement  -ES       Row Name 05/13/24 1456          Gait/Stairs (Locomotion)    [place] Level (Gait) minimum assist (75% patient effort);2 person assist;verbal cues  -ES     Assistive Device (Gait) walker, front-wheeled  -ES     Patient was able to Ambulate yes  -ES     Distance in Feet (Gait) 15  -ES     Deviations/Abnormal Patterns (Gait) gait speed decreased;stride length decreased;base of support, narrow;bilateral deviations  -ES     Bilateral Gait Deviations forward flexed posture  -ES     Comment, (Gait/Stairs) Pt amb with Garrison x2, FWW, and close chair follow. Demo'd forward flexed posture with decreased stride length and step-through gait pattern. Further mobility limited by fatigue.  -ES               User Key  (r) = Recorded By, (t) =  Taken By, (c) = Cosigned By      Initials Name Provider Type    ES Renee Mendoza PT Physical Therapist                   Obj/Interventions       Row Name 05/13/24 1458          Balance    Balance Assessment sitting static balance;sitting dynamic balance;sit to stand dynamic balance;standing static balance;standing dynamic balance  -ES     Static Sitting Balance contact guard  -ES     Dynamic Sitting Balance minimal assist  -ES     Position, Sitting Balance supported;sitting in chair  -ES     Sit to Stand Dynamic Balance moderate assist;2-person assist;verbal cues  -ES     Static Standing Balance minimal assist;2-person assist;verbal cues  -ES     Dynamic Standing Balance moderate assist;2-person assist;verbal cues  -ES     Position/Device Used, Standing Balance supported;walker, front-wheeled  -ES     Balance Interventions sitting;standing;sit to stand;supported;static;dynamic;occupation based/functional task  -ES               User Key  (r) = Recorded By, (t) = Taken By, (c) = Cosigned By      Initials Name Provider Type    ES Renee Mendoza, PT Physical Therapist                   Goals/Plan    No documentation.                  Clinical Impression       Row Name 05/13/24 1458          Pain    Pain Intervention(s) Repositioned;Ambulation/increased activity  -ES     Additional Documentation Pain Scale: FACES Pre/Post-Treatment (Group)  -ES       Row Name 05/13/24 1458          Pain Scale: FACES Pre/Post-Treatment    Pain: FACES Scale, Pretreatment 2-->hurts little bit  -ES     Posttreatment Pain Rating 4-->hurts little more  -ES     Pain Location generalized  -ES     Pre/Posttreatment Pain Comment RN aware and managing  -ES       Row Name 05/13/24 1458          Plan of Care Review    Plan of Care Reviewed With patient;spouse  -ES     Progress improving  -ES     Outcome Evaluation Pt demonstrated improved activity tolerance this date, ambulating 15' with Garrison x2 and FWW. Pt continues to be limited by  generalized weakness, pain, and decreased activity tolerance warranting skilled IPPT services. PT rec IRF at d/c.  -ES       Row Name 05/13/24 1458          Therapy Assessment/Plan (PT)    Rehab Potential (PT) good, to achieve stated therapy goals  -ES     Criteria for Skilled Interventions Met (PT) yes;meets criteria;skilled treatment is necessary  -ES     Therapy Frequency (PT) daily  -ES       Row Name 05/13/24 1458          Vital Signs    Pre Systolic BP Rehab 148  -ES     Pre Treatment Diastolic BP 82  -ES     Post Systolic BP Rehab 143  -ES     Post Treatment Diastolic BP 82  -ES     Pretreatment Heart Rate (beats/min) 79  -ES     Posttreatment Heart Rate (beats/min) 77  -ES     Pre SpO2 (%) 94  -ES     O2 Delivery Pre Treatment room air  -ES     O2 Delivery Intra Treatment room air  -ES     Post SpO2 (%) 91  -ES     O2 Delivery Post Treatment room air  -ES     Pre Patient Position Sitting  -ES     Intra Patient Position Standing  -ES     Post Patient Position Sitting  -ES       Row Name 05/13/24 1458          Positioning and Restraints    Pre-Treatment Position sitting in chair/recliner  -ES     Post Treatment Position chair  -ES     In Chair notified nsg;reclined;sitting;call light within reach;encouraged to call for assist;exit alarm on;with family/caregiver;waffle cushion;on mechanical lift sling;legs elevated;heels elevated  -ES               User Key  (r) = Recorded By, (t) = Taken By, (c) = Cosigned By      Initials Name Provider Type    ES Renee Mendoza, PT Physical Therapist                   Outcome Measures       Row Name 05/13/24 1501          How much help from another person do you currently need...    Turning from your back to your side while in flat bed without using bedrails? 2  -ES     Moving from lying on back to sitting on the side of a flat bed without bedrails? 2  -ES     Moving to and from a bed to a chair (including a wheelchair)? 2  -ES     Standing up from a chair using your arms  (e.g., wheelchair, bedside chair)? 2  -ES     Climbing 3-5 steps with a railing? 1  -ES     To walk in hospital room? 2  -ES     AM-PAC 6 Clicks Score (PT) 11  -ES     Highest Level of Mobility Goal 4 --> Transfer to chair/commode  -ES       Row Name 05/13/24 1501 05/13/24 1125       Functional Assessment    Outcome Measure Options AM-PAC 6 Clicks Basic Mobility (PT)  -ES AM-PAC 6 Clicks Daily Activity (OT)  -              User Key  (r) = Recorded By, (t) = Taken By, (c) = Cosigned By      Initials Name Provider Type    ES Renee Mendoza, PT Physical Therapist    Margaret Flores, OT Occupational Therapist                                 Physical Therapy Education       Title: PT OT SLP Therapies (In Progress)       Topic: Physical Therapy (In Progress)       Point: Mobility training (In Progress)       Learning Progress Summary             Patient Acceptance, E, NR by ND at 5/10/2024 1541    Acceptance, E, VU by SG at 5/9/2024 1658    Acceptance, E,TB, NR by ES at 5/8/2024 1405   Significant Other Acceptance, E,TB, NR by ES at 5/8/2024 1405                         Point: Home exercise program (Done)       Learning Progress Summary             Patient Acceptance, E, VU by SG at 5/9/2024 1658                         Point: Body mechanics (In Progress)       Learning Progress Summary             Patient Acceptance, E, NR by ND at 5/10/2024 1541    Acceptance, E, VU by SG at 5/9/2024 1658    Acceptance, E,TB, NR by ES at 5/8/2024 1405   Significant Other Acceptance, E,TB, NR by ES at 5/8/2024 1405                         Point: Precautions (In Progress)       Learning Progress Summary             Patient Acceptance, E, NR by ND at 5/10/2024 1541    Acceptance, E, VU by SG at 5/9/2024 1658    Acceptance, E,TB, NR by ES at 5/8/2024 1405   Significant Other Acceptance, E,TB, NR by ES at 5/8/2024 1405                                         User Key       Initials Effective Dates Name Provider Type Discipline      09/22/22 -  Roger Crews, RN Registered Nurse Nurse    ES 08/11/22 -  Renee Mendoza, PT Physical Therapist PT    ND 11/16/23 -  Helen Simpson PT Physical Therapist PT                  PT Recommendation and Plan  Planned Therapy Interventions (PT): balance training, bed mobility training, gait training, home exercise program, neuromuscular re-education, patient/family education, strengthening, stair training, transfer training, postural re-education  Plan of Care Reviewed With: patient, spouse  Progress: improving  Outcome Evaluation: Pt demonstrated improved activity tolerance this date, ambulating 15' with Garrison x2 and FWW. Pt continues to be limited by generalized weakness, pain, and decreased activity tolerance warranting skilled IPPT services. PT rec IRF at d/c.     Time Calculation:   PT Evaluation Complexity  History, PT Evaluation Complexity: 1-2 personal factors and/or comorbidities  Examination of Body Systems (PT Eval Complexity): total of 3 or more elements  Clinical Presentation (PT Evaluation Complexity): evolving  Clinical Decision Making (PT Evaluation Complexity): low complexity  Overall Complexity (PT Evaluation Complexity): low complexity     PT Charges       Row Name 05/13/24 1501             Time Calculation    Start Time 1100  -ES      PT Received On 05/13/24  -ES      PT Goal Re-Cert Due Date 05/18/24  -ES         Time Calculation- PT    Total Timed Code Minutes- PT 12 minute(s)  -ES         Timed Charges    79106 - Gait Training Minutes  12  -ES         Total Minutes    Timed Charges Total Minutes 12  -ES       Total Minutes 12  -ES                User Key  (r) = Recorded By, (t) = Taken By, (c) = Cosigned By      Initials Name Provider Type    Renee Erazo, PT Physical Therapist                  Therapy Charges for Today       Code Description Service Date Service Provider Modifiers Qty    13338857243 HC GAIT TRAINING EA 15 MIN 5/13/2024 Renee Mendoza, PT GP 1             PT G-Codes  Outcome Measure Options: AM-PAC 6 Clicks Basic Mobility (PT)  AM-PAC 6 Clicks Score (PT): 11  AM-PAC 6 Clicks Score (OT): 14  PT Discharge Summary  Anticipated Discharge Disposition (PT): inpatient rehabilitation facility    Renee Mendoza, PT  5/13/2024

## 2024-05-13 NOTE — PROGRESS NOTES
"Pharmacy Consult  -  Warfarin  Kaleb Abraham is a  73 y.o. male receiving warfarin therapy.  Height - 180.3 cm (70.98\")  Weight - 110 kg (241 lb 13.5 oz)    Consulting Provider: - Kendra  Indication: - Afib, Mechanical Mitral valve  Goal INR: - 2.5 - 3.5  Home Regimen: Most recent dose of warfarin was 5mg daily although was adjusted to 4mg over the weekend due to elevated INR   - Patient has medication bottle of 4mg tablets and 1mg tablets at home     Bridge Therapy: Heparin gtt stopped on 5/12    Drug-Drug Interactions with current regimen:   Received 10mg IV Vitamin K on 5/6    Warfarin Dosing During Admission:  Date  5/8 5/9 5/10 5/11 5/12 5/13      INR  1.46 1.58 1.88 2.69 2.44 2.12      Dose  3 mg 3 mg 3 mg 1.5 mg 3 mg 4 mg        Education Provided: Warfarin education provided on 5/8/2024 verbally.  Discussed effects of warfarin, importance of checking INR, drug-drug and drug-food interactions, and signs/symptoms of bleeding and clotting.  Patient deferred to wife who as in the room and manages his medications. She monitors his INR at home and then faxes results to Dr Maradiaga office.  All pertinent questions were answered.      Discharge Follow up:   Following Provider - Dr Peraza   Follow up time range or appointment - 2-3 days post discharge    Labs:  Results from last 7 days   Lab Units 05/13/24  0644 05/12/24  1608 05/12/24  1242 05/12/24  0615 05/12/24  0156 05/11/24  1818 05/11/24  1141 05/11/24  0554 05/10/24  2355 05/10/24  1938 05/10/24  1200 05/10/24  0608 05/09/24  0820 05/09/24  0628 05/08/24  1142 05/08/24  0709   INR  2.12*  --   --  2.44*  --   --   --  2.69*  --  2.03*  --  1.88*  --  1.58*  --  1.46*   APTT seconds  --   --   --   --   --   --   --   --   --   --   --   --   --   --   --  38.7*   HEMOGLOBIN g/dL 8.7* 9.4* 9.5* 8.9* 9.1* 9.6* 10.0* 9.0*   < > 11.7*   < > 9.5*  9.5*   < >  --    < > 9.2*  9.2*   HEMATOCRIT % 26.6* 29.2* 29.6* 27.9* 28.0* 29.0* 27.9* 27.6*   < > 37.0*   " < > 28.8*  28.8*   < >  --    < > 28.2*  28.2*    < > = values in this interval not displayed.     Results from last 7 days   Lab Units 05/13/24  0644 05/12/24  0615 05/11/24  0554 05/10/24  1938 05/07/24  0959 05/06/24  1206   SODIUM mmol/L 133* 133* 136 137   < > 131*   POTASSIUM mmol/L 4.0 4.1 3.8 4.9   < > 4.0   CHLORIDE mmol/L 96* 99 99 99   < > 93*   CO2 mmol/L 30.0* 25.0 24.0 27.0   < > 29.0   BUN mg/dL 10 10 9 9   < > 17   CREATININE mg/dL 0.71* 0.67* 0.68* 0.97   < > 1.04   CALCIUM mg/dL 7.7* 8.1* 8.2* 11.5*   < > 8.1*   BILIRUBIN mg/dL 0.4  --   --  0.4  --  0.6   ALK PHOS U/L 56  --   --  69  --  44   ALT (SGPT) U/L 12  --   --  15  --  8   AST (SGOT) U/L 30  --   --  42*  --  27   GLUCOSE mg/dL 115* 127* 113* 134*   < > 138*    < > = values in this interval not displayed.     Current dietary intake: 50% of meals last 24h; supplement intake  Diet Order   Procedures    Diet: Regular/House, Cardiac, Diabetic; Healthy Heart (2-3 Na+); Consistent Carbohydrate; Fluid Consistency: Thin (IDDSI 0)     Assessment/Plan:  Pharmacy to dose warfarin for [facility] mitral valve, goal INR 2.5-3.5. Patient takes warfarin 5mg daily outpatient (recent changes in dosing noted above).  INR today subtherapeutic at 2.12, down from 2.44 yesterday (reduced dose of 1.5mg on 5/11). Will give one-time boost of warfarin 4mg and likely resume maintenance dose of warfarin 3mg tomorrow.  Heparin drip stopped on 5/12 as patient was near INR goal, now below goal again.  Pharmacy will continue to monitor daily INR, signs/symptoms of bleeding, drug-drug interactions and dietary intake to adjust dose as necessary.    Thanks,  Dawood Campa [facility]   05/13/24  11:24 EDT

## 2024-05-13 NOTE — PLAN OF CARE
Goal Outcome Evaluation:  Plan of Care Reviewed With: patient, spouse        Progress: improving  Outcome Evaluation: Pt demo improvement w/ activity tolerance during tx session. Pt continues to benefit from IPOT services to address functional mobility, balance, activity tolerance and self-care. Will continue POC to progress toward goals. d/c rec is IPR.      Anticipated Discharge Disposition (OT): inpatient rehabilitation facility

## 2024-05-13 NOTE — PLAN OF CARE
Goal Outcome Evaluation:  Plan of Care Reviewed With: patient, spouse        Progress: improving  Outcome Evaluation: Pt demonstrated improved activity tolerance this date, ambulating 15' with Garrison x2 and FWW. Pt continues to be limited by generalized weakness, pain, and decreased activity tolerance warranting skilled IPPT services. PT rec IRF at d/c.      Anticipated Discharge Disposition (PT): inpatient rehabilitation facility

## 2024-05-14 LAB
ABO GROUP BLD: NORMAL
ANION GAP SERPL CALCULATED.3IONS-SCNC: 8 MMOL/L (ref 5–15)
BLD GP AB SCN SERPL QL: NEGATIVE
BUN SERPL-MCNC: 9 MG/DL (ref 8–23)
BUN/CREAT SERPL: 15.8 (ref 7–25)
CALCIUM SPEC-SCNC: 8.1 MG/DL (ref 8.6–10.5)
CHLORIDE SERPL-SCNC: 98 MMOL/L (ref 98–107)
CO2 SERPL-SCNC: 27 MMOL/L (ref 22–29)
CREAT SERPL-MCNC: 0.57 MG/DL (ref 0.76–1.27)
DEPRECATED RDW RBC AUTO: 53.9 FL (ref 37–54)
EGFRCR SERPLBLD CKD-EPI 2021: 104.2 ML/MIN/1.73
ERYTHROCYTE [DISTWIDTH] IN BLOOD BY AUTOMATED COUNT: 16.2 % (ref 12.3–15.4)
GLUCOSE BLDC GLUCOMTR-MCNC: 109 MG/DL (ref 70–130)
GLUCOSE BLDC GLUCOMTR-MCNC: 127 MG/DL (ref 70–130)
GLUCOSE BLDC GLUCOMTR-MCNC: 141 MG/DL (ref 70–130)
GLUCOSE BLDC GLUCOMTR-MCNC: 145 MG/DL (ref 70–130)
GLUCOSE SERPL-MCNC: 106 MG/DL (ref 65–99)
HCT VFR BLD AUTO: 25 % (ref 37.5–51)
HCT VFR BLD AUTO: 30.3 % (ref 37.5–51)
HGB BLD-MCNC: 7.9 G/DL (ref 13–17.7)
HGB BLD-MCNC: 9.8 G/DL (ref 13–17.7)
INR PPP: 2.43 (ref 0.89–1.12)
MAGNESIUM SERPL-MCNC: 1.6 MG/DL (ref 1.6–2.4)
MCH RBC QN AUTO: 28.6 PG (ref 26.6–33)
MCHC RBC AUTO-ENTMCNC: 31.6 G/DL (ref 31.5–35.7)
MCV RBC AUTO: 90.6 FL (ref 79–97)
PLATELET # BLD AUTO: 218 10*3/MM3 (ref 140–450)
PMV BLD AUTO: 9.5 FL (ref 6–12)
POTASSIUM SERPL-SCNC: 4.3 MMOL/L (ref 3.5–5.2)
PROTHROMBIN TIME: 26.6 SECONDS (ref 12.2–14.5)
RBC # BLD AUTO: 2.76 10*6/MM3 (ref 4.14–5.8)
RH BLD: POSITIVE
SODIUM SERPL-SCNC: 133 MMOL/L (ref 136–145)
T&S EXPIRATION DATE: NORMAL
WBC NRBC COR # BLD AUTO: 8.16 10*3/MM3 (ref 3.4–10.8)

## 2024-05-14 PROCEDURE — 83735 ASSAY OF MAGNESIUM: CPT | Performed by: INTERNAL MEDICINE

## 2024-05-14 PROCEDURE — 25010000002 FUROSEMIDE PER 20 MG: Performed by: INTERNAL MEDICINE

## 2024-05-14 PROCEDURE — 85610 PROTHROMBIN TIME: CPT | Performed by: INTERNAL MEDICINE

## 2024-05-14 PROCEDURE — 85014 HEMATOCRIT: CPT | Performed by: INTERNAL MEDICINE

## 2024-05-14 PROCEDURE — 86923 COMPATIBILITY TEST ELECTRIC: CPT

## 2024-05-14 PROCEDURE — 97110 THERAPEUTIC EXERCISES: CPT

## 2024-05-14 PROCEDURE — 85018 HEMOGLOBIN: CPT | Performed by: INTERNAL MEDICINE

## 2024-05-14 PROCEDURE — 25010000002 MAGNESIUM SULFATE 4 GM/100ML SOLUTION: Performed by: INTERNAL MEDICINE

## 2024-05-14 PROCEDURE — 82948 REAGENT STRIP/BLOOD GLUCOSE: CPT

## 2024-05-14 PROCEDURE — P9016 RBC LEUKOCYTES REDUCED: HCPCS

## 2024-05-14 PROCEDURE — 97530 THERAPEUTIC ACTIVITIES: CPT

## 2024-05-14 PROCEDURE — 93005 ELECTROCARDIOGRAM TRACING: CPT | Performed by: PHYSICIAN ASSISTANT

## 2024-05-14 PROCEDURE — 86900 BLOOD TYPING SEROLOGIC ABO: CPT

## 2024-05-14 PROCEDURE — 97116 GAIT TRAINING THERAPY: CPT

## 2024-05-14 PROCEDURE — 80048 BASIC METABOLIC PNL TOTAL CA: CPT | Performed by: INTERNAL MEDICINE

## 2024-05-14 PROCEDURE — 36430 TRANSFUSION BLD/BLD COMPNT: CPT

## 2024-05-14 PROCEDURE — 25010000002 CEFTRIAXONE PER 250 MG: Performed by: INTERNAL MEDICINE

## 2024-05-14 PROCEDURE — 85027 COMPLETE CBC AUTOMATED: CPT | Performed by: INTERNAL MEDICINE

## 2024-05-14 PROCEDURE — 86900 BLOOD TYPING SEROLOGIC ABO: CPT | Performed by: INTERNAL MEDICINE

## 2024-05-14 PROCEDURE — 99233 SBSQ HOSP IP/OBS HIGH 50: CPT | Performed by: INTERNAL MEDICINE

## 2024-05-14 PROCEDURE — 25010000002 AMPICILLIN PER 500 MG: Performed by: INTERNAL MEDICINE

## 2024-05-14 PROCEDURE — 25010000002 MORPHINE PER 10 MG: Performed by: INTERNAL MEDICINE

## 2024-05-14 PROCEDURE — 86850 RBC ANTIBODY SCREEN: CPT | Performed by: INTERNAL MEDICINE

## 2024-05-14 PROCEDURE — 86901 BLOOD TYPING SEROLOGIC RH(D): CPT | Performed by: INTERNAL MEDICINE

## 2024-05-14 RX ORDER — METOPROLOL TARTRATE 50 MG/1
50 TABLET, FILM COATED ORAL EVERY 12 HOURS SCHEDULED
Status: DISCONTINUED | OUTPATIENT
Start: 2024-05-14 | End: 2024-05-20 | Stop reason: HOSPADM

## 2024-05-14 RX ORDER — FUROSEMIDE 10 MG/ML
40 INJECTION INTRAMUSCULAR; INTRAVENOUS ONCE
Status: DISCONTINUED | OUTPATIENT
Start: 2024-05-14 | End: 2024-05-14

## 2024-05-14 RX ORDER — MAGNESIUM SULFATE HEPTAHYDRATE 40 MG/ML
4 INJECTION, SOLUTION INTRAVENOUS ONCE
Status: COMPLETED | OUTPATIENT
Start: 2024-05-14 | End: 2024-05-14

## 2024-05-14 RX ORDER — WARFARIN SODIUM 3 MG/1
3 TABLET ORAL
Status: DISCONTINUED | OUTPATIENT
Start: 2024-05-14 | End: 2024-05-15

## 2024-05-14 RX ORDER — FUROSEMIDE 10 MG/ML
60 INJECTION INTRAMUSCULAR; INTRAVENOUS ONCE
Status: COMPLETED | OUTPATIENT
Start: 2024-05-14 | End: 2024-05-14

## 2024-05-14 RX ADMIN — METOPROLOL TARTRATE 50 MG: 50 TABLET, FILM COATED ORAL at 20:51

## 2024-05-14 RX ADMIN — AMPICILLIN SODIUM 2 G: 2 INJECTION, POWDER, FOR SOLUTION INTRAMUSCULAR; INTRAVENOUS at 20:51

## 2024-05-14 RX ADMIN — AMPICILLIN SODIUM 2 G: 2 INJECTION, POWDER, FOR SOLUTION INTRAMUSCULAR; INTRAVENOUS at 04:10

## 2024-05-14 RX ADMIN — METOPROLOL TARTRATE 25 MG: 25 TABLET, FILM COATED ORAL at 08:31

## 2024-05-14 RX ADMIN — BISACODYL 10 MG: 10 SUPPOSITORY RECTAL at 11:25

## 2024-05-14 RX ADMIN — MORPHINE SULFATE 2 MG: 2 INJECTION, SOLUTION INTRAMUSCULAR; INTRAVENOUS at 01:32

## 2024-05-14 RX ADMIN — POLYETHYLENE GLYCOL 3350 17 G: 17 POWDER, FOR SOLUTION ORAL at 08:43

## 2024-05-14 RX ADMIN — WARFARIN SODIUM 3 MG: 3 TABLET ORAL at 17:10

## 2024-05-14 RX ADMIN — LEVOTHYROXINE SODIUM 25 MCG: 25 TABLET ORAL at 05:37

## 2024-05-14 RX ADMIN — PANTOPRAZOLE SODIUM 40 MG: 40 TABLET, DELAYED RELEASE ORAL at 17:10

## 2024-05-14 RX ADMIN — TAMSULOSIN HYDROCHLORIDE 0.4 MG: 0.4 CAPSULE ORAL at 20:51

## 2024-05-14 RX ADMIN — ALLOPURINOL 300 MG: 300 TABLET ORAL at 08:32

## 2024-05-14 RX ADMIN — HYDROCODONE BITARTRATE AND ACETAMINOPHEN 1 TABLET: 10; 325 TABLET ORAL at 22:18

## 2024-05-14 RX ADMIN — MORPHINE SULFATE 2 MG: 2 INJECTION, SOLUTION INTRAMUSCULAR; INTRAVENOUS at 14:55

## 2024-05-14 RX ADMIN — LIDOCAINE 1 PATCH: 4 PATCH TOPICAL at 08:32

## 2024-05-14 RX ADMIN — HYDROCODONE BITARTRATE AND ACETAMINOPHEN 1 TABLET: 10; 325 TABLET ORAL at 05:37

## 2024-05-14 RX ADMIN — MORPHINE SULFATE 2 MG: 2 INJECTION, SOLUTION INTRAMUSCULAR; INTRAVENOUS at 22:53

## 2024-05-14 RX ADMIN — AMPICILLIN SODIUM 2 G: 2 INJECTION, POWDER, FOR SOLUTION INTRAMUSCULAR; INTRAVENOUS at 14:55

## 2024-05-14 RX ADMIN — CEFTRIAXONE 2000 MG: 2 INJECTION, POWDER, FOR SOLUTION INTRAMUSCULAR; INTRAVENOUS at 15:08

## 2024-05-14 RX ADMIN — MORPHINE SULFATE 2 MG: 2 INJECTION, SOLUTION INTRAMUSCULAR; INTRAVENOUS at 17:10

## 2024-05-14 RX ADMIN — OXYBUTYNIN CHLORIDE 10 MG: 5 TABLET, EXTENDED RELEASE ORAL at 08:31

## 2024-05-14 RX ADMIN — MAGNESIUM SULFATE IN WATER FOR 4 G: 40 INJECTION INTRAVENOUS at 07:05

## 2024-05-14 RX ADMIN — AMPICILLIN SODIUM 2 G: 2 INJECTION, POWDER, FOR SOLUTION INTRAMUSCULAR; INTRAVENOUS at 08:32

## 2024-05-14 RX ADMIN — Medication 10 ML: at 20:51

## 2024-05-14 RX ADMIN — Medication 10 ML: at 08:32

## 2024-05-14 RX ADMIN — PANTOPRAZOLE SODIUM 40 MG: 40 TABLET, DELAYED RELEASE ORAL at 08:31

## 2024-05-14 RX ADMIN — Medication 5 MG: at 22:17

## 2024-05-14 RX ADMIN — Medication 5 MG: at 01:32

## 2024-05-14 RX ADMIN — MORPHINE SULFATE 2 MG: 2 INJECTION, SOLUTION INTRAMUSCULAR; INTRAVENOUS at 20:51

## 2024-05-14 RX ADMIN — CEFTRIAXONE 2000 MG: 2 INJECTION, POWDER, FOR SOLUTION INTRAMUSCULAR; INTRAVENOUS at 04:10

## 2024-05-14 RX ADMIN — FUROSEMIDE 60 MG: 10 INJECTION, SOLUTION INTRAMUSCULAR; INTRAVENOUS at 15:08
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
## 2024-05-14 NOTE — THERAPY TREATMENT NOTE
Patient Name: Kaleb Abraham  : 1950    MRN: [id number]                              Today's Date: 2024       Admit Date: 2024    Visit Dx:     ICD-10-CM ICD-9-CM   1. Acute on chronic anemia  D64.9 285.9   2. S/P mechanical aortic valve  Z95.2 V43.3   3. Supratherapeutic INR  R79.1 790.92   4. History of GI bleed  Z87.19 V12.79   5. Anticoagulated on Coumadin  Z79.01 V58.61   6. Generalized weakness  R53.1 780.79   7. History of CHF (congestive heart failure)  Z86.79 V12.59   8. Weakness  R53.1 780.79     Patient Active Problem List   Diagnosis    Anemia    Aortic stenosis    CAD s/p CABG    T2DM    Hyperlipidemia    Hypertension    Hypothyroidism    Mitral regurgitation    MARTINEZ    Hypersomnia, unspecified    Chronic atrial fibrillation    Supratherapeutic INR    S/P prosthetic aortic valve    S/P mechanical aortic valve    Chronic anticoagulation (warfarin)    Prosthetic valve endocarditis (Mitral Valve)    S/P VT/TdP Arrest 5/10/2024    Enterococcal bacteremia     Past Medical History:   Diagnosis Date    Anemia     Aortic stenosis     CAD (coronary artery disease)     Diabetes mellitus     Hyperlipidemia     Hypertension     Hypothyroidism     Mitral regurgitation      Past Surgical History:   Procedure Laterality Date    CAPSULE ENDOSCOPY      2023 and 2023 per dr. schrader    COLONOSCOPY      2023 Dr. Schrader    CORONARY ARTERY BYPASS GRAFT      CORONARY STENT PLACEMENT      UPPER GASTROINTESTINAL ENDOSCOPY      2023 Dr. Schrader, 2023 Dr. Liu, 2024 Dr. Stout Cygnet      General Information       Row Name 24 1518          OT Time and Intention    Document Type therapy note (daily note)  -KL     Mode of Treatment occupational therapy  -KL       Row Name 24 1518          General Information    Patient Profile Reviewed yes  -KL     Existing Precautions/Restrictions fall;cardiac  -KL     Barriers to Rehab medically complex;previous  functional deficit  -[facility] Name 05/14/24 1518          Cognition    Orientation Status (Cognition) oriented x 3  -[facility] Name 05/14/24 1518          Safety Issues, Functional Mobility    Safety Issues Affecting Function (Mobility) awareness of need for assistance;insight into deficits/self-awareness;safety precaution awareness;sequencing abilities  -     Impairments Affecting Function (Mobility) balance;endurance/activity tolerance;strength;pain;coordination  -               User Key  (r) = Recorded By, (t) = Taken By, (c) = Cosigned By      Initials Name Provider Type     Margaret Ramírez OT Occupational Therapist                     Mobility/ADL's       [facility] Name 05/14/24 1519          Bed Mobility    Supine-Sit [place] (Bed Mobility) moderate assist (50% patient effort);1 person assist;verbal cues  -     Bed Mobility, Safety Issues impaired trunk control for bed mobility  -     Assistive Device (Bed Mobility) bed rails;head of bed elevated;draw sheet  -KL       Row Name 05/14/24 1519          Transfers    Transfers sit-stand transfer;stand-sit transfer  -KL       Row Name 05/14/24 1519          Sit-Stand Transfer    Sit-Stand [place] (Transfers) minimum assist (75% patient effort);2 person assist;verbal cues  -     Assistive Device (Sit-Stand Transfers) walker, front-wheeled  -KL       Row Name 05/14/24 1519          Stand-Sit Transfer    Stand-Sit [place] (Transfers) 2 person assist;verbal cues;nonverbal cues (demo/gesture);contact guard  -     Assistive Device (Stand-Sit Transfers) walker, front-wheeled  -KL       Row Name 05/14/24 1519          Activities of Daily Living    BADL Assessment/Intervention lower body dressing  -KL       Row Name 05/14/24 1519          Lower Body Dressing Assessment/Training    [place] Level (Lower Body Dressing) don;socks;dependent (less than 25% patient effort)  -     Position (Lower Body Dressing) sitting up in bed  -                User Key  (r) = Recorded By, (t) = Taken By, (c) = Cosigned By      Initials Name Provider Type    Margaret Flores OT Occupational Therapist                   Obj/Interventions       Row Name 05/14/24 1520          Balance    Static Sitting Balance contact guard  -     Dynamic Sitting Balance contact guard  -KL     Position, Sitting Balance unsupported;sitting edge of bed  -     Static Standing Balance minimal assist  -     Dynamic Standing Balance minimal assist;2-person assist  -     Position/Device Used, Standing Balance supported;walker, front-wheeled  -     Balance Interventions sitting;standing;sit to stand;supported;static;dynamic;occupation based/functional task  -               User Key  (r) = Recorded By, (t) = Taken By, (c) = Cosigned By      Initials Name Provider Type    Margaret Flores OT Occupational Therapist                   Goals/Plan    No documentation.                  Clinical Impression       Row Name 05/14/24 1520          Pain Scale: FACES Pre/Post-Treatment    Pain: FACES Scale, Pretreatment 2-->hurts little bit  -KL     Posttreatment Pain Rating 2-->hurts little bit  -KL     Pain Location generalized  -     Pain Location - back;chest  -KL       Row Name 05/14/24 1520          Plan of Care Review    Plan of Care Reviewed With patient  -     Progress improving  -     Outcome Evaluation Pt demo improvement w/ activity tolerance and mobility. Pt continues to demo decreased strength, endurance and functional mobility. Will continue POC to progress towards goals. d/c rec is IPR.  -[facility] Name 05/14/24 1520          Therapy Plan Review/Discharge Plan (OT)    Anticipated Discharge Disposition (OT) inpatient rehabilitation facility  -KL       Row Name 05/14/24 1520          Vital Signs    Pre Systolic BP Rehab 155  -KL     Pre Treatment Diastolic BP 83  -KL     Post Systolic BP Rehab 166  -KL     Post Treatment Diastolic BP 94  -KL     Pretreatment Heart Rate (beats/min) 75   -KL     Posttreatment Heart Rate (beats/min) 82  -KL     Pre SpO2 (%) 95  -KL     O2 Delivery Pre Treatment room air  -KL     Post SpO2 (%) 95  -KL     O2 Delivery Post Treatment room air  -KL     Pre Patient Position Supine  -KL     Intra Patient Position Standing  -KL     Post Patient Position Sitting  -KL       Row Name 05/14/24 1520          Positioning and Restraints    Pre-Treatment Position in bed  -KL     Post Treatment Position chair  -KL     In Chair notified nsg;reclined;sitting;call light within reach;encouraged to call for assist;exit alarm on;waffle cushion;legs elevated;on mechanical lift sling  -KL               User Key  (r) = Recorded By, (t) = Taken By, (c) = Cosigned By      Initials Name Provider Type    Margaret Flores OT Occupational Therapist                   Outcome Measures       Row Name 05/14/24 1522          How much help from another is currently needed...    Putting on and taking off regular lower body clothing? 1  -KL     Bathing (including washing, rinsing, and drying) 2  -KL     Toileting (which includes using toilet bed pan or urinal) 2  -KL     Putting on and taking off regular upper body clothing 3  -KL     Taking care of personal grooming (such as brushing teeth) 3  -KL     Eating meals 4  -KL     AM-PAC 6 Clicks Score (OT) 15  -KL       Row Name 05/14/24 1436          How much help from another person do you currently need...    Turning from your back to your side while in flat bed without using bedrails? 3  -ES     Moving from lying on back to sitting on the side of a flat bed without bedrails? 2  -ES     Moving to and from a bed to a chair (including a wheelchair)? 2  -ES     Standing up from a chair using your arms (e.g., wheelchair, bedside chair)? 2  -ES     Climbing 3-5 steps with a railing? 2  -ES     To walk in hospital room? 2  -ES     AM-PAC 6 Clicks Score (PT) 13  -ES     Highest Level of Mobility Goal 4 --> Transfer to chair/commode  -ES       Row Name 05/14/24  1436          Functional Assessment    Outcome Measure Options AM-PAC 6 Clicks Basic Mobility (PT)  -ES               User Key  (r) = Recorded By, (t) = Taken By, (c) = Cosigned By      Initials Name Provider Type    Renee Erazo, PT Physical Therapist    Margaret Flores OT Occupational Therapist                    Occupational Therapy Education       Title: PT OT SLP Therapies (In Progress)       Topic: Occupational Therapy (In Progress)       Point: ADL training (In Progress)       Description:   Instruct learner(s) on proper safety adaptation and remediation techniques during self care or transfers.   Instruct in proper use of assistive devices.                  Learning Progress Summary             Patient Acceptance, E, VU,NR by TORIN at 5/14/2024 1523    Acceptance, E,TB, VU by DOROTA at 5/14/2024 1307    Acceptance, E, NR by TORIN at 5/13/2024 1125    Acceptance, E, NR by TORIN at 5/8/2024 1529   Family Acceptance, E,TB, VU by DOROTA at 5/14/2024 1307   Significant Other Acceptance, E, NR by TORIN at 5/13/2024 1125    Acceptance, E, NR by TORIN at 5/8/2024 1529                         Point: Home exercise program (Done)       Description:   Instruct learner(s) on appropriate technique for monitoring, assisting and/or progressing therapeutic exercises/activities.                  Learning Progress Summary             Patient Acceptance, E,TB, VU by DOROTA at 5/14/2024 1307   Family Acceptance, E,TB, VU by EB at 5/14/2024 1307                         Point: Precautions (In Progress)       Description:   Instruct learner(s) on prescribed precautions during self-care and functional transfers.                  Learning Progress Summary             Patient Acceptance, E, VU,NR by TORIN at 5/14/2024 1523    Acceptance, E,TB, VU by DOROTA at 5/14/2024 1307    Acceptance, E, NR by TORIN at 5/13/2024 1125    Acceptance, E, NR by TORIN at 5/10/2024 1347    Acceptance, E, NR by TORIN at 5/8/2024 1529   Family Acceptance, E,TB, VU by DOROTA at 5/14/2024 1307    Significant Other Acceptance, E, NR by KL at 5/13/2024 1125    Acceptance, E, NR by KL at 5/10/2024 1347    Acceptance, E, NR by KL at 5/8/2024 1529                         Point: Body mechanics (In Progress)       Description:   Instruct learner(s) on proper positioning and spine alignment during self-care, functional mobility activities and/or exercises.                  Learning Progress Summary             Patient Acceptance, E, VU,NR by  at 5/14/2024 1523    Acceptance, E,TB, VU by EB at 5/14/2024 1307    Acceptance, E, NR by KL at 5/13/2024 1125    Acceptance, E, NR by  at 5/10/2024 1347    Acceptance, E, NR by  at 5/8/2024 1529   Family Acceptance, E,TB, VU by DOROTA at 5/14/2024 1307   Significant Other Acceptance, E, NR by  at 5/13/2024 1125    Acceptance, E, NR by  at 5/10/2024 1347    Acceptance, E, NR by  at 5/8/2024 1529                                         User Key       Initials Effective Dates Name Provider Type Discipline    DOROTA 09/22/22 -  Hever Andersen RN Registered Nurse Nurse     02/05/24 -  Margaret Ramírez OT Occupational Therapist OT                  OT Recommendation and Plan  Planned Therapy Interventions (OT): activity tolerance training, adaptive equipment training, functional balance retraining, occupation/activity based interventions, patient/caregiver education/training, ROM/therapeutic exercise, strengthening exercise, transfer/mobility retraining  Therapy Frequency (OT): daily  Plan of Care Review  Plan of Care Reviewed With: patient  Progress: improving  Outcome Evaluation: Pt demo improvement w/ activity tolerance and mobility. Pt continues to demo decreased strength, endurance and functional mobility. Will continue POC to progress towards goals. d/c rec is IPR.     Time Calculation:   Evaluation Complexity (OT)  Review Occupational Profile/Medical/Therapy History Complexity: expanded/moderate complexity  Assessment, Occupational Performance/Identification of Deficit  Complexity: 3-5 performance deficits  Clinical Decision Making Complexity (OT): detailed assessment/moderate complexity  Overall Complexity of Evaluation (OT): moderate complexity     Time Calculation- OT       Row Name 05/14/24 1523 05/14/24 1436          Time Calculation- OT    OT Start Time 1323  -KL --     OT Received On 05/14/24  -KL --        Timed Charges    85960 - Gait Training Minutes  -- 15  -ES     16527 - OT Therapeutic Activity Minutes 10  -KL --     44929 - OT Self Care/Mgmt Minutes 5  -KL --        Total Minutes    Timed Charges Total Minutes 15  -KL 15  -ES      Total Minutes 15  -KL 15  -ES               User Key  (r) = Recorded By, (t) = Taken By, (c) = Cosigned By      Initials Name Provider Type    Renee Erazo, JP Physical Therapist     Margaret Ramírez OT Occupational Therapist                  Therapy Charges for Today       Code Description Service Date Service Provider Modifiers Qty    55419073058 HC OT THERAPEUTIC ACT EA 15 MIN 5/13/2024 Margaret Ramírez OT GO 1    37326208721 HC OT THERAPEUTIC ACT EA 15 MIN 5/14/2024 Margaret Ramírez OT GO 1                 Margaret Ramírez OT  5/14/2024

## 2024-05-14 NOTE — PLAN OF CARE
Goal Outcome Evaluation:      No acute changes this shift. 1u PRBC's given; patient asymptomatic. A+O x4. Mag replaced. VSS. Lasix given; UOP 3.1 L's this shift. Miralax and suppository given; no BM yet. Morphine continued for pain. Patient ordered to tele. Family updated at bedside.
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
## 2024-05-14 NOTE — PLAN OF CARE
Goal Outcome Evaluation:  Plan of Care Reviewed With: patient        Progress: improving  Outcome Evaluation: Pt continues to demonstrate good effort with therapy, increasing ambulation distance to 25' with [place]arrison x2 and FWW. Pt continues to be limited by generalized weakness, balance deficits, and decreased activity tolerance warranting IPPT services. PT rec IRF at d/c.      Anticipated Discharge Disposition (PT): inpatient rehabilitation facility

## 2024-05-14 NOTE — THERAPY TREATMENT NOTE
Patient Name: Kaleb Abraham  : 1950    MRN: [id number]                              Today's Date: 2024       Admit Date: 2024    Visit Dx:     ICD-10-CM ICD-9-CM   1. Acute on chronic anemia  D64.9 285.9   2. S/P mechanical aortic valve  Z95.2 V43.3   3. Supratherapeutic INR  R79.1 790.92   4. History of GI bleed  Z87.19 V12.79   5. Anticoagulated on Coumadin  Z79.01 V58.61   6. Generalized weakness  R53.1 780.79   7. History of CHF (congestive heart failure)  Z86.79 V12.59   8. Weakness  R53.1 780.79     Patient Active Problem List   Diagnosis    Anemia    Aortic stenosis    CAD s/p CABG    T2DM    Hyperlipidemia    Hypertension    Hypothyroidism    Mitral regurgitation    MARTINEZ    Hypersomnia, unspecified    Chronic atrial fibrillation    Supratherapeutic INR    S/P prosthetic aortic valve    S/P mechanical aortic valve    Chronic anticoagulation (warfarin)    Prosthetic valve endocarditis (Mitral Valve)    S/P VT/TdP Arrest 5/10/2024    Enterococcal bacteremia     Past Medical History:   Diagnosis Date    Anemia     Aortic stenosis     CAD (coronary artery disease)     Diabetes mellitus     Hyperlipidemia     Hypertension     Hypothyroidism     Mitral regurgitation      Past Surgical History:   Procedure Laterality Date    CAPSULE ENDOSCOPY      2023 and 2023 per dr. schrader    COLONOSCOPY      2023 Dr. Schrader    CORONARY ARTERY BYPASS GRAFT      CORONARY STENT PLACEMENT      UPPER GASTROINTESTINAL ENDOSCOPY      2023 Dr. Schrader, 2023 Dr. Liu, 2024 Dr. Stout [place]      General Information       Row Name 24 1422          Physical Therapy Time and Intention    Document Type therapy note (daily note)  -ES     Mode of Treatment physical therapy  -ES       Row Name 24 1422          General Information    Patient Profile Reviewed yes  -ES     Existing Precautions/Restrictions fall;cardiac;other (see comments)  chest soreness from  compressions  -ES     Barriers to Rehab medically complex;previous functional deficit  -ES       Row Name 05/14/24 1422          Cognition    Orientation Status (Cognition) oriented x 3  -ES       Row Name 05/14/24 1422          Safety Issues, Functional Mobility    Safety Issues Affecting Function (Mobility) awareness of need for assistance;insight into deficits/self-awareness;safety precaution awareness;safety precautions follow-through/compliance;sequencing abilities  -ES     Impairments Affecting Function (Mobility) balance;endurance/activity tolerance;strength;pain;coordination  -ES               User Key  (r) = Recorded By, (t) = Taken By, (c) = Cosigned By      Initials Name Provider Type    ES Renee Mendoza, PT Physical Therapist                   Mobility       Row Name 05/14/24 1425          Bed Mobility    Comment, (Bed Mobility) received EOB  -ES       Row Name 05/14/24 1425          Sit-Stand Transfer    Sit-Stand [place] (Transfers) minimum assist (75% patient effort);2 person assist;verbal cues  -ES     Assistive Device (Sit-Stand Transfers) walker, front-wheeled  -ES     Comment, (Sit-Stand Transfer) v/c for hand placement  -ES       Row Name 05/14/24 1423          Gait/Stairs (Locomotion)    [place] Level (Gait) minimum assist (75% patient effort);2 person assist;verbal cues  -ES     Assistive Device (Gait) walker, front-wheeled  -ES     Patient was able to Ambulate yes  -ES     Distance in Feet (Gait) 25  -ES     Deviations/Abnormal Patterns (Gait) gait speed decreased;stride length decreased;base of support, narrow;bilateral deviations  -ES     Bilateral Gait Deviations forward flexed posture  -ES     Comment, (Gait/Stairs) Pt amb with Garrison x2 and FWW. Demo'd forward flexed posture with decreased stride length. Required v/c for increased step length and upright posture. Further mobility limited by fatigue.  -ES               User Key  (r) = Recorded By, (t) = Taken By, (c) = Cosigned  By      Initials Name Provider Type    ES Renee Mendoza PT Physical Therapist                   Obj/Interventions       Row Name 05/14/24 1428          Motor Skills    Therapeutic Exercise hip;knee;ankle  -ES       Row Name 05/14/24 1428          Hip (Therapeutic Exercise)    Hip (Therapeutic Exercise) strengthening exercise  -ES     Hip Strengthening (Therapeutic Exercise) bilateral;heel slides;10 repetitions  -ES       Row Name 05/14/24 1428          Knee (Therapeutic Exercise)    Knee (Therapeutic Exercise) strengthening exercise  -ES     Knee Strengthening (Therapeutic Exercise) bilateral;heel slides;LAQ (long arc quad);10 repetitions  -ES       Row Name 05/14/24 1428          Ankle (Therapeutic Exercise)    Ankle (Therapeutic Exercise) AROM (active range of motion)  -ES     Ankle AROM (Therapeutic Exercise) bilateral;dorsiflexion;plantarflexion;10 repetitions  -ES       Row Name 05/14/24 1428          Balance    Balance Assessment sitting static balance;sitting dynamic balance;sit to stand dynamic balance;standing static balance;standing dynamic balance  -ES     Static Sitting Balance contact guard  -ES     Dynamic Sitting Balance contact guard;verbal cues  -ES     Position, Sitting Balance supported;sitting in chair  -ES     Sit to Stand Dynamic Balance minimal assist;2-person assist;verbal cues  -ES     Static Standing Balance minimal assist  -ES     Dynamic Standing Balance minimal assist;2-person assist;verbal cues  -ES     Position/Device Used, Standing Balance supported;walker, front-wheeled  -ES     Balance Interventions sitting;standing;sit to stand;supported;static;dynamic;occupation based/functional task  -ES               User Key  (r) = Recorded By, (t) = Taken By, (c) = Cosigned By      Initials Name Provider Type    ES Renee Mendoza PT Physical Therapist                   Goals/Plan    No documentation.                  Clinical Impression       Row Name 05/14/24 1429          Pain    Pain  Intervention(s) Repositioned;Ambulation/increased activity  -ES     Additional Documentation Pain Scale: FACES Pre/Post-Treatment (Group)  -ES       Row Name 05/14/24 1429          Pain Scale: FACES Pre/Post-Treatment    Pain: FACES Scale, Pretreatment 2-->hurts little bit  -ES     Posttreatment Pain Rating 2-->hurts little bit  -ES     Pain Location generalized  -ES     Pre/Posttreatment Pain Comment tolerated  -ES       Row Name 05/14/24 1429          Plan of Care Review    Plan of Care Reviewed With patient  -ES     Progress improving  -ES     Outcome Evaluation Pt continues to demonstrate good effort with therapy, increasing ambulation distance to 25' with [place] x2 and FWW. Pt continues to be limited by generalized weakness, balance deficits, and decreased activity tolerance warranting IPPT services. PT rec IRF at d/c.  -ES       Row Name 05/14/24 1429          Therapy Assessment/Plan (PT)    Rehab Potential (PT) good, to achieve stated therapy goals  -ES     Criteria for Skilled Interventions Met (PT) yes;meets criteria;skilled treatment is necessary  -ES     Therapy Frequency (PT) daily  -ES       Row Name 05/14/24 1429          Vital Signs    Pre Systolic BP Rehab 155  -ES     Pre Treatment Diastolic BP 83  -ES     Post Systolic BP Rehab 166  -ES     Post Treatment Diastolic BP 94  -ES     Pretreatment Heart Rate (beats/min) 78  -ES     Posttreatment Heart Rate (beats/min) 80  -ES     Pre SpO2 (%) 95  -ES     O2 Delivery Pre Treatment room air  -ES     O2 Delivery Intra Treatment room air  -ES     Post SpO2 (%) 94  -ES     O2 Delivery Post Treatment room air  -ES     Pre Patient Position Sitting  -ES     Intra Patient Position Standing  -ES     Post Patient Position Sitting  -ES       Row Name 05/14/24 1429          Positioning and Restraints    Pre-Treatment Position in bed  -ES     Post Treatment Position chair  -ES     In Chair notified nsg;reclined;sitting;call light within reach;encouraged to call for  assist;exit alarm on;waffle cushion;legs elevated;heels elevated  -ES               User Key  (r) = Recorded By, (t) = Taken By, (c) = Cosigned By      Initials Name Provider Type    Renee Erazo PT Physical Therapist                   Outcome Measures       Row Name 05/14/24 1436          How much help from another person do you currently need...    Turning from your back to your side while in flat bed without using bedrails? 3  -ES     Moving from lying on back to sitting on the side of a flat bed without bedrails? 2  -ES     Moving to and from a bed to a chair (including a wheelchair)? 2  -ES     Standing up from a chair using your arms (e.g., wheelchair, bedside chair)? 2  -ES     Climbing 3-5 steps with a railing? 2  -ES     To walk in hospital room? 2  -ES     AM-PAC 6 Clicks Score (PT) 13  -ES     Highest Level of Mobility Goal 4 --> Transfer to chair/commode  -ES       Row Name 05/14/24 1436          Functional Assessment    Outcome Measure Options AM-PAC 6 Clicks Basic Mobility (PT)  -ES               User Key  (r) = Recorded By, (t) = Taken By, (c) = Cosigned By      Initials Name Provider Type    Renee Erazo PT Physical Therapist                                 Physical Therapy Education       Title: PT OT SLP Therapies (In Progress)       Topic: Physical Therapy (In Progress)       Point: Mobility training (In Progress)       Learning Progress Summary             Patient Acceptance, E,TB, VU by EB at 5/14/2024 1307    Acceptance, E, NR by ND at 5/10/2024 1541    Acceptance, E, VU by SG at 5/9/2024 1658    Acceptance, E,TB, NR by ES at 5/8/2024 1405   Family Acceptance, E,TB, VU by EB at 5/14/2024 1307   Significant Other Acceptance, E,TB, NR by ES at 5/8/2024 1405                         Point: Home exercise program (Done)       Learning Progress Summary             Patient Acceptance, E,TB, VU by EB at 5/14/2024 1307    Acceptance, E, VU by SG at 5/9/2024 1658   Family Acceptance, E,TB,  VU by EB at 5/14/2024 1307                         Point: Body mechanics (In Progress)       Learning Progress Summary             Patient Acceptance, E,TB, VU by EB at 5/14/2024 1307    Acceptance, E, NR by ND at 5/10/2024 1541    Acceptance, E, VU by SG at 5/9/2024 1658    Acceptance, E,TB, NR by ES at 5/8/2024 1405   Family Acceptance, E,TB, VU by EB at 5/14/2024 1307   Significant Other Acceptance, E,TB, NR by ES at 5/8/2024 1405                         Point: Precautions (In Progress)       Learning Progress Summary             Patient Acceptance, E,TB, VU by EB at 5/14/2024 1307    Acceptance, E, NR by ND at 5/10/2024 1541    Acceptance, E, VU by SG at 5/9/2024 1658    Acceptance, E,TB, NR by ES at 5/8/2024 1405   Family Acceptance, E,TB, VU by EB at 5/14/2024 1307   Significant Other Acceptance, E,TB, NR by ES at 5/8/2024 1405                                         User Key       Initials Effective Dates Name Provider Type Discipline    EB 09/22/22 -  Hever Andersen, RN Registered Nurse Nurse    SG 09/22/22 -  Roger Crews, RN Registered Nurse Nurse    ES 08/11/22 -  Renee Mendoza, JP Physical Therapist PT    ND 11/16/23 -  Helen Simpson PT Physical Therapist PT                  PT Recommendation and Plan  Planned Therapy Interventions (PT): balance training, bed mobility training, gait training, home exercise program, neuromuscular re-education, patient/family education, strengthening, stair training, transfer training, postural re-education  Plan of Care Reviewed With: patient  Progress: improving  Outcome Evaluation: Pt continues to demonstrate good effort with therapy, increasing ambulation distance to 25' with [place] x2 and FWW. Pt continues to be limited by generalized weakness, balance deficits, and decreased activity tolerance warranting IPPT services. PT rec IRF at d/c.     Time Calculation:   PT Evaluation Complexity  History, PT Evaluation Complexity: 1-2 personal factors and/or  comorbidities  Examination of Body Systems (PT Eval Complexity): total of 3 or more elements  Clinical Presentation (PT Evaluation Complexity): evolving  Clinical Decision Making (PT Evaluation Complexity): low complexity  Overall Complexity (PT Evaluation Complexity): low complexity     PT Charges       Row Name 05/14/24 1436             Time Calculation    Start Time 1330  -ES      PT Received On 05/14/24  -ES      PT Goal Re-Cert Due Date 05/18/24  -ES         Time Calculation- PT    Total Timed Code Minutes- PT 23 minute(s)  -ES         Timed Charges    41565 - PT Therapeutic Exercise Minutes 8  -ES      80020 - Gait Training Minutes  15  -ES         Total Minutes    Timed Charges Total Minutes 23  -ES       Total Minutes 23  -ES                User Key  (r) = Recorded By, (t) = Taken By, (c) = Cosigned By      Initials Name Provider Type    Renee Erazo PT Physical Therapist                  Therapy Charges for Today       Code Description Service Date Service Provider Modifiers Qty    36248933922 HC GAIT TRAINING EA 15 MIN 5/13/2024 Renee Mendoza, PT GP 1    05963826983 HC PT THER PROC EA 15 MIN 5/14/2024 Renee Mendoza PT GP 1    53979992008 HC GAIT TRAINING EA 15 MIN 5/14/2024 Renee Mendoza, PT GP 1            PT G-Codes  Outcome Measure Options: AM-PAC 6 Clicks Basic Mobility (PT)  AM-PAC 6 Clicks Score (PT): 13  AM-PAC 6 Clicks Score (OT): 14  PT Discharge Summary  Anticipated Discharge Disposition (PT): inpatient rehabilitation facility    Reene Mendoza PT  5/14/2024

## 2024-05-14 NOTE — CASE MANAGEMENT/SOCIAL WORK
Continued Stay Note   [facility]     Patient Name: Kaleb Abraham  MRN: [id number]  Today's Date: 5/14/2024    Admit Date: 5/6/2024    Plan: update   Discharge Plan       Row Name 05/14/24 1512       Plan    Plan update    Patient/Family in Agreement with Plan yes    Plan Comments Patient has orders to transfer out of ICU.   Spoke with patient in room, up to chair currently and RN present.   CM advised multiple referrals have been sen tto facilities in his area and CM will follow up with him tomorrow and communicate with the hospitalist regarding when he may be discharged.  Patient indicates he was told possibly by Friday.  Patient ambulated with PT 25' min A x2 with a FWW.  CM following.  Patient plan is to discharge to SNF once bed offered and insurance approved.    Final Discharge Disposition Code 03 - skilled nursing facility (SNF)                   Discharge Codes    No documentation.                 Expected Discharge Date and Time       Expected Discharge Date Expected Discharge Time    May 11, 2024               Tigist Mckeon RN  Continued Stay Note   [facility]     Patient Name: Kaleb Abraham  MRN: [id number]  Today's Date: 5/14/2024    Admit Date: 5/6/2024    Plan: update   Discharge Plan       Row Name 05/14/24 1512       Plan    Plan update    Patient/Family in Agreement with Plan yes    Plan Comments Patient has orders to transfer out of ICU.   Spoke with patient in room, up to chair currently and RN present.   CM advised multiple referrals have been sen tto facilities in his area and CM will follow up with him tomorrow and communicate with the hospitalist regarding when he may be discharged.  Patient indicates he was told possibly by Friday.  Patient ambulated with PT 25' min A x2 with a FWW.  CM following.  Patient plan is to discharge to SNF once bed offered and insurance approved.    Final Discharge Disposition Code 03 - skilled nursing facility (SNF)                   Discharge Codes     No documentation.                 Expected Discharge Date and Time       Expected Discharge Date Expected Discharge Time    May 11, 2024               Tigist Mckeon RN  Continued Stay Note  [facility]     Patient Name: Kaleb Abraham  MRN: [id number]  Today's Date: 5/14/2024    Admit Date: 5/6/2024    Plan: update   Discharge Plan       Row Name 05/14/24 1512       Plan    Plan update    Patient/Family in Agreement with Plan yes    Plan Comments Patient has orders to transfer out of ICU.   Spoke with patient in room, up to chair currently and RN present.   CM advised multiple referrals have been sen tto facilities in his area and CM will follow up with him tomorrow and communicate with the hospitalist regarding when he may be discharged.  Patient indicates he was told possibly by Friday.  Patient ambulated with PT 25' min A x2 with a FWW.  CM following.  Patient plan is to discharge to SNF once bed offered and insurance approved.    Final Discharge Disposition Code 03 - skilled nursing facility (SNF)                   Discharge Codes    No documentation.                 Expected Discharge Date and Time       Expected Discharge Date Expected Discharge Time    May 11, 2024               Tigist Mckeon, RN

## 2024-05-14 NOTE — PLAN OF CARE
Goal Outcome Evaluation:  Plan of Care Reviewed With: patient, spouse        Progress: improving  Outcome Evaluation: .             Pt A&Ox4 this shift. VSS, Remains in NSR w/ 1st degreee AV block. O2 on RA-2L NC. 350 mL OUP this shift. Pt c/o soreness in chest, controlled with prn medications. Wife updated and at bedside

## 2024-05-14 NOTE — PROGRESS NOTES
"Pharmacy Consult  -  Warfarin  Kaleb Abraham is a  73 y.o. male receiving warfarin therapy.  Height - 180.3 cm (70.98\")  Weight - 110 kg (241 lb 13.5 oz)    Consulting Provider: - Kendra  Indication: - Afib, Mechanical Mitral valve  Goal INR: - 2.5 - 3.5  Home Regimen: Most recent dose of warfarin was 5mg daily although was adjusted to 4mg over the weekend due to elevated INR   - Patient has medication bottle of 4mg tablets and 1mg tablets at home     Bridge Therapy: Heparin gtt stopped on 5/12    Drug-Drug Interactions with current regimen:   Received 10mg IV Vitamin K on 5/6    Warfarin Dosing During Admission:  Date  5/8 5/9 5/10 5/11 5/12 5/13 5/4     INR  1.46 1.58 1.88 2.69 2.44 2.12 2.43     Dose  3 mg 3 mg 3 mg 1.5 mg 3 mg 4 mg 3 mg       Education Provided: Warfarin education provided on 5/8/2024 verbally.  Discussed effects of warfarin, importance of checking INR, drug-drug and drug-food interactions, and signs/symptoms of bleeding and clotting.  Patient deferred to wife who as in the room and manages his medications. She monitors his INR at home and then faxes results to Dr Maradiaga office.  All pertinent questions were answered.      Discharge Follow up:   Following Provider - Dr Peraza   Follow up time range or appointment - 2-3 days post discharge    Labs:  Results from last 7 days   Lab Units 05/14/24  0430 05/13/24  0644 05/12/24  1608 05/12/24  1242 05/12/24  0615 05/12/24  0156 05/11/24  1818 05/11/24  1141 05/11/24  0554 05/10/24  2355 05/10/24  1938 05/10/24  1200 05/10/24  0608 05/09/24  0820 05/09/24  0628 05/08/24  1142 05/08/24  0709   INR  2.43* 2.12*  --   --  2.44*  --   --   --  2.69*  --  2.03*  --  1.88*  --  1.58*  --  1.46*   APTT seconds  --   --   --   --   --   --   --   --   --   --   --   --   --   --   --   --  38.7*   HEMOGLOBIN g/dL 7.9* 8.7* 9.4* 9.5* 8.9* 9.1* 9.6*   < > 9.0*   < > 11.7*   < > 9.5*  9.5*   < >  --    < > 9.2*  9.2*   HEMATOCRIT % 25.0* 26.6* 29.2* " 29.6* 27.9* 28.0* 29.0*   < > 27.6*   < > 37.0*   < > 28.8*  28.8*   < >  --    < > 28.2*  28.2*    < > = values in this interval not displayed.     Results from last 7 days   Lab Units 05/14/24  0430 05/13/24  0644 05/12/24  0615 05/11/24  0554 05/10/24  1938   SODIUM mmol/L 133* 133* 133*   < > 137   POTASSIUM mmol/L 4.3 4.0 4.1   < > 4.9   CHLORIDE mmol/L 98 96* 99   < > 99   CO2 mmol/L 27.0 30.0* 25.0   < > 27.0   BUN mg/dL 9 10 10   < > 9   CREATININE mg/dL 0.57* 0.71* 0.67*   < > 0.97   CALCIUM mg/dL 8.1* 7.7* 8.1*   < > 11.5*   BILIRUBIN mg/dL  --  0.4  --   --  0.4   ALK PHOS U/L  --  56  --   --  69   ALT (SGPT) U/L  --  12  --   --  15   AST (SGOT) U/L  --  30  --   --  42*   GLUCOSE mg/dL 106* 115* 127*   < > 134*    < > = values in this interval not displayed.     Current dietary intake: 25-50% of meals last 24h; supplement intake  Diet Order   Procedures    Diet: Regular/House, Cardiac, Diabetic; Healthy Heart (2-3 Na+); Consistent Carbohydrate; Fluid Consistency: Thin (IDDSI 0)     Assessment/Plan:  Pharmacy to dose warfarin for [facility] mitral valve, goal INR 2.5-3.5. Patient takes warfarin 5mg daily outpatient (recent changes in dosing noted above).  INR today slightly subtherapeutic at 2.43, but rising from dip at 2.12 yesterday. Resume warfarin 3mg daily.  Pharmacy will continue to monitor daily INR, signs/symptoms of bleeding, drug-drug interactions and dietary intake to adjust dose as necessary.    Thanks,  Dawood Campa, [facility]   05/14/24  10:33 EDT

## 2024-05-14 NOTE — PLAN OF CARE
Goal Outcome Evaluation:  Plan of Care Reviewed With: patient        Progress: improving  Outcome Evaluation: Pt demo improvement w/ activity tolerance and mobility. Pt continues to demo decreased strength, endurance and functional mobility. Will continue POC to progress towards goals. d/c rec is IPR.      Anticipated Discharge Disposition (OT): inpatient rehabilitation facility

## 2024-05-14 NOTE — PROGRESS NOTES
Steroid    INFECTIOUS DISEASE f/u     Kaleb Abraham  1950  [id number]    Date of Consult: 5/14/2024    Admission Date: 5/6/2024      Requesting Provider: No ref. provider found  Evaluating Physician: Lino Jacob MD    Reason for Consultation: Weakness    History of present illness:    Patient is a 73 y.o. male with coronary disease history of CABG, type 2 diabetes mellitus, hypertension, hyperlipidemia, hypothyroidism, atrial fibrillation, valvular heart disease with history of mechanical heart valve (prosthetic aortic valve and prosthetic mitral valve/) recently treated [facility] for GI bleed from April 22 patient found to have gastric arteriovenous malformations patient now admitted to [facility] with supratherapeutic INR.    Hospitalist concerned about underlying infection    Patient has low-grade fever 100.3, bradycardia and hypotension    Getting picc line    MVR/AVR performed 9 years ago here at [facility]    5/8/24; awake, has back pain, following commands; no fevers, rash, sore throat    5/9/24; has back pain; afebrile, normotensive,  has COLEEN tomorrow; picc placed    5/10/24; doing well; pain under some control; no fever, rash, sore throat, COLEEN confirmed MV vegetation < 1 cm. Wife in room    5/11/24; had vtach yesterday requiring cpr followed by torsades which was cardioverted;  better today has mild elevation of bp.  Awake, alert, family in room    5/13/24; doing well; no events overnight; no fever, rash, sore throat    5/14/2024 patient is afebrile normotensive sitting up in chair no complaints no events overnight  Past Medical History:   Diagnosis Date    Anemia     Aortic stenosis     CAD (coronary artery disease)     Diabetes mellitus     Hyperlipidemia     Hypertension     Hypothyroidism     Mitral regurgitation        Past Surgical History:   Procedure Laterality Date    CAPSULE ENDOSCOPY      2/22/2023 and 9/25/2023 per dr. schrader    COLONOSCOPY      2/2023  Dr. Amaro    CORONARY ARTERY BYPASS GRAFT      CORONARY STENT PLACEMENT      UPPER GASTROINTESTINAL ENDOSCOPY      2/7/2023 Dr. Amaro, 7/26/2023 Dr. Liu, 4/2024 Dr. Girish Lainez       Family History   Problem Relation Age of Onset    Diabetes Mother     Stroke Mother     Thyroid cancer Mother     Hypertension Father     Stroke Father        Social History     Socioeconomic History    Marital status:    Tobacco Use    Smoking status: Former     Types: Cigarettes     Passive exposure: Past    Smokeless tobacco: Never   Vaping Use    Vaping status: Never Used   Substance and Sexual Activity    Alcohol use: Never    Drug use: Never    Sexual activity: Defer       No Known Allergies      Medication:    Current Facility-Administered Medications:     acetaminophen (TYLENOL) tablet 650 mg, 650 mg, Oral, Q4H PRN, 650 mg at 05/07/24 2209 **OR** [DISCONTINUED] acetaminophen (TYLENOL) 160 MG/5ML oral solution 650 mg, 650 mg, Oral, Q4H PRN **OR** acetaminophen (TYLENOL) suppository 650 mg, 650 mg, Rectal, Q4H PRN, Shahid Peter MD    allopurinol (ZYLOPRIM) tablet 300 mg, 300 mg, Oral, Daily, Shahid Peter MD, 300 mg at 05/14/24 0832    ampicillin 2000 mg IVPB in 100 mL NS (MBP), 2 g, Intravenous, Q6H, Carlos Bermudez MD, Last Rate: 200 mL/hr at 05/14/24 1455, 2 g at 05/14/24 1455    sennosides-docusate (PERICOLACE) 8.6-50 MG per tablet 2 tablet, 2 tablet, Oral, BID PRN, 2 tablet at 05/13/24 0849 **AND** polyethylene glycol (MIRALAX) packet 17 g, 17 g, Oral, Daily PRN, 17 g at 05/14/24 0843 **AND** bisacodyl (DULCOLAX) EC tablet 5 mg, 5 mg, Oral, Daily PRN **AND** bisacodyl (DULCOLAX) suppository 10 mg, 10 mg, Rectal, Daily PRN, Shahid Peter MD, 10 mg at 05/14/24 1125    cefTRIAXone (ROCEPHIN) 2,000 mg in sodium chloride 0.9 % 100 mL MBP, 2,000 mg, Intravenous, Q12H, Carlos Bermudez MD, Last Rate: 200 mL/hr at 05/14/24 1508, 2,000 mg at 05/14/24 1508    dextrose  (D50W) (25 g/50 mL) IV injection 25 g, 25 g, Intravenous, Q15 Min PRN, Shahid Peter MD    dextrose (GLUTOSE) oral gel 15 g, 15 g, Oral, Q15 Min PRN, Shahid Peter MD    glucagon (GLUCAGEN) injection 1 mg, 1 mg, Intramuscular, Q15 Min PRN, Shahid Peter MD    HYDROcodone-acetaminophen (NORCO)  MG per tablet 1 tablet, 1 tablet, Oral, Q8H PRN, Carlos Bermudez MD, 1 tablet at 05/14/24 0537    Insulin Lispro (humaLOG) injection 2-7 Units, 2-7 Units, Subcutaneous, 4x Daily AC & at Bedtime, Shahid Peter MD, 2 Units at 05/13/24 1225    levothyroxine (SYNTHROID, LEVOTHROID) tablet 25 mcg, 25 mcg, Oral, Q AM, Shahid Peter MD, 25 mcg at 05/14/24 0537    Lidocaine 4 % 1 patch, 1 patch, Transdermal, Q24H, Shahid Peter MD, 1 patch at 05/14/24 0832    Magnesium Cardiology Dose Replacement - Follow Nurse / BPA Driven Protocol, , Does not apply, PRN, Nick Tilley, APRN    melatonin tablet 5 mg, 5 mg, Oral, Nightly PRN, Shahid Peter MD, 5 mg at 05/14/24 0132    metoprolol tartrate (LOPRESSOR) tablet 50 mg, 50 mg, Oral, Q12H, Herbie Love MD    morphine injection 2 mg, 2 mg, Intravenous, Q2H PRN, Carlos Bermudez MD, 2 mg at 05/14/24 1455    nitroglycerin (NITROSTAT) SL tablet 0.4 mg, 0.4 mg, Sublingual, Q5 Min PRN, Shahid Peter MD    oxybutynin XL (DITROPAN-XL) 24 hr tablet 10 mg, 10 mg, Oral, Daily, Shahid Peter MD, 10 mg at 05/14/24 0831    pantoprazole (PROTONIX) EC tablet 40 mg, 40 mg, Oral, BID AC, Carlos Bermudez MD, 40 mg at 05/14/24 0831    Pharmacy to dose warfarin, , Does not apply, Continuous PRN, [place] Shahid C, MD    Phosphorus Replacement - Follow Nurse / BPA Driven Protocol, , Does not apply, [place] YOUNGER Joseph C, MD    Potassium Replacement - Follow Nurse / BPA Driven Protocol, , Does not apply, [place] YOUNGER Joseph C, MD    simethicone (MYLICON) chewable tablet 80 mg, 80 mg, Oral, 4x Daily PRNRome Joseph C, MD, 80 mg  at 24 1526    sodium chloride 0.9 % flush 10 mL, 10 mL, Intravenous, Q12H, Jose Maria Dugan MD, 10 mL at 24 0832    sodium chloride 0.9 % flush 10 mL, 10 mL, Intravenous, PRN, Jose Maria Dugan MD    sodium chloride 0.9 % infusion 40 mL, 40 mL, Intravenous, PRN, Shahid Peter MD    tamsulosin (FLOMAX) 24 hr capsule 0.4 mg, 0.4 mg, Oral, Nightly, Shahid Peter MD, 0.4 mg at 24 2044    tiZANidine (ZANAFLEX) tablet 2 mg, 2 mg, Oral, PRN, Shahid Peter MD, 2 mg at 24 1132    warfarin (COUMADIN) tablet 3 mg, 3 mg, Oral, Daily, Dawood Campa, [facility]    Antibiotics:  Anti-Infectives (From admission, onward)      Ordered     Dose/Rate Route Frequency Start Stop    24 1443  cefTRIAXone (ROCEPHIN) 2,000 mg in sodium chloride 0.9 % 100 mL MBP        Ordering Provider: Carlos Bermudez MD    2,000 mg  200 mL/hr over 30 Minutes Intravenous Every 12 Hours 24 1600 24 1559    24 1443  ampicillin 2000 mg IVPB in 100 mL NS (MBP)        Ordering Provider: Carlos Bermudez MD    2 g  200 mL/hr over 30 Minutes Intravenous Every 6 Hours 24 1500 24 1459    24 1302  vancomycin IVPB 2000 mg in 0.9% Sodium Chloride 500 mL        Ordering Provider: Bernabe Martines, LiatD    2,000 mg  250 mL/hr over 120 Minutes Intravenous Once 24 1400 24 1524    24 1225  piperacillin-tazobactam (ZOSYN) 3.375 g IVPB in 100 mL NS MBP (CD)        Ordering Provider: Desiree Garcia MD    3.375 g  over 30 Minutes Intravenous Once 24 1315 24 1302              Review of Systems:    See hpi      Physical Exam:   Vital Signs  Temp (24hrs), Av.3 °F (36.8 °C), Min:98 °F (36.7 °C), Max:98.8 °F (37.1 °C)    Temp  Min: 98 °F (36.7 °C)  Max: 98.8 °F (37.1 °C)  BP  Min: 130/96  Max: 160/83  Pulse  Min: 70  Max: 87  Resp  Min: 16  Max: 20  SpO2  Min: 90 %  Max: 100 %    GENERAL: Awake and alert, in mild distress.   HEENT: Normocephalic, atraumatic.   "PERRL. EOMI. No conjunctival injection. No icterus.      HEART: RRR; No murmur,  LUNGS: Clear to auscultation bilaterally   ABDOMEN: Soft, nontender,   EXT:  1+ edema  :  Without Wilkinson catheter.  MSK: No joint effusions or erythema  SKIN: no rash      Laboratory Data    Results from last 7 days   Lab Units 05/14/24  0430 05/13/24  0644 05/12/24  1608 05/12/24  1242 05/12/24  0615   WBC 10*3/mm3 8.16 10.65  --   --  10.63   HEMOGLOBIN g/dL 7.9* 8.7* 9.4*   < > 8.9*   HEMATOCRIT % 25.0* 26.6* 29.2*   < > 27.9*   PLATELETS 10*3/mm3 218 209  --   --  219    < > = values in this interval not displayed.     Results from last 7 days   Lab Units 05/14/24  0430   SODIUM mmol/L 133*   POTASSIUM mmol/L 4.3   CHLORIDE mmol/L 98   CO2 mmol/L 27.0   BUN mg/dL 9   CREATININE mg/dL 0.57*   GLUCOSE mg/dL 106*   CALCIUM mg/dL 8.1*     Results from last 7 days   Lab Units 05/13/24  0644   ALK PHOS U/L 56   BILIRUBIN mg/dL 0.4   ALT (SGPT) U/L 12   AST (SGOT) U/L 30                           Estimated Creatinine Clearance: 147.8 mL/min (A) (by C-G formula based on SCr of 0.57 mg/dL (L)).      Microbiology:  Blood Culture   Date Value Ref Range Status   05/06/2024 Abnormal Stain (C)  Preliminary     BCID, PCR   Date Value Ref Range Status   05/06/2024 (A) Negative by BCID PCR. Culture to Follow. Final    Enterococcus faecalis. Arcelia/B (vancomycin resistance gene) not detected. Identification by BCID2 PCR.     No results found for: \"CULTURES\", \"HSVCX\", \"URCX\"  No results found for: \"EYECULTURE\", \"GCCX\", \"HSVCULTURE\", \"LABHSV\"  No results found for: \"LEGIONELLA\", \"MRSACX\", \"MUMPSCX\", \"MYCOPLASCX\"  No results found for: \"NOCARDIACX\", \"STOOLCX\"  No results found for: \"THROATCX\", \"UNSTIMCULT\", \"URINECX\", \"CULTURE\", \"VZVCULTUR\"  No results found for: \"VIRALCULTU\", \"WOUNDCX\"        Radiology:  Imaging Results (Last 72 Hours)       Procedure Component Value Units Date/Time    XR Chest 1 View [[id number]] Collected: 05/13/24 0731     Updated: " 05/13/24 0736    Narrative:      XR CHEST 1 VW    Date of Exam: 5/13/2024 1:19 AM EDT    Indication: Resp Distress    Comparison: 5/11/2024    Findings:  Patient is status post median sternotomy. Cardiac valvular prosthesis is seen. Cardiac silhouette is enlarged. Pulmonary vasculature is indistinct. There are small bilateral pleural effusions with bibasilar atelectasis, edema, or infiltrates. No   pneumothorax is seen. No acute osseous lesion is seen. There are senescent changes of the skeletal structures. Left arm PICC terminates overlying the SVC.      Impression:      Impression:  1.Cardiomegaly with interstitial pulmonary edema, small bilateral pleural effusions, and bibasilar atelectasis, edema, or infiltrates.      Electronically Signed: Matthias Dobbs MD    5/13/2024 7:33 AM EDT    Workstation ID: [id number]              Impression:   Enterococcus faecalis bacteremia  MV endocarditis of mechanical valve  Hypotension  Bioprosthetic AVR  Supratherapeutic INR  Anemia  Elevated procalcitonin  History of GI bleed with gastric AVM  Thoracic back pain    PLAN/RECOMMENDATIONS:   Thank you for asking us to see Kaleb Abraham, I recommend the following:  High-grade enterococcal bacteremia is concerning in the setting of endovascular hardware.    Estimated Creatinine Clearance: 147.8 mL/min (A) (by C-G formula based on SCr of 0.57 mg/dL (L)).    Patient could have a GI source such as inflammation of colonic viscus (superimposed diverticulitis and diverticulosis) or bacteremia  following endoscopy regarding management of a GIbleed.    Gallbladder has been removed    Repeat blood cultures x 2 sets     Enterococcus isolate is susceptible to ampicillin and ceftriaxone        cont ampicillin 2 g IV every 4 hours    cont ceftriaxone 2 g IV every 12 hours    Duration is 6 weeks      Patient agreeable to rehabilitation        Case discussed with cardiology    Plan is 6 weeks iv abx followed by chronic oral abx  suppression    Lino Jacob MD  5/14/2024  15:42 EDT

## 2024-05-15 LAB
ANION GAP SERPL CALCULATED.3IONS-SCNC: 6 MMOL/L (ref 5–15)
BACTERIA SPEC AEROBE CULT: NORMAL
BACTERIA SPEC AEROBE CULT: NORMAL
BH BB BLOOD EXPIRATION DATE: NORMAL
BH BB BLOOD TYPE BARCODE: 5100
BH BB DISPENSE STATUS: NORMAL
BH BB PRODUCT CODE: NORMAL
BH BB UNIT NUMBER: NORMAL
BUN SERPL-MCNC: 8 MG/DL (ref 8–23)
BUN/CREAT SERPL: 11 (ref 7–25)
CALCIUM SPEC-SCNC: 8.3 MG/DL (ref 8.6–10.5)
CHLORIDE SERPL-SCNC: 93 MMOL/L (ref 98–107)
CO2 SERPL-SCNC: 32 MMOL/L (ref 22–29)
CREAT SERPL-MCNC: 0.73 MG/DL (ref 0.76–1.27)
CROSSMATCH INTERPRETATION: NORMAL
DEPRECATED RDW RBC AUTO: 52.8 FL (ref 37–54)
EGFRCR SERPLBLD CKD-EPI 2021: 96.7 ML/MIN/1.73
ERYTHROCYTE [DISTWIDTH] IN BLOOD BY AUTOMATED COUNT: 16.8 % (ref 12.3–15.4)
GLUCOSE BLDC GLUCOMTR-MCNC: 112 MG/DL (ref 70–130)
GLUCOSE BLDC GLUCOMTR-MCNC: 113 MG/DL (ref 70–130)
GLUCOSE BLDC GLUCOMTR-MCNC: 130 MG/DL (ref 70–130)
GLUCOSE BLDC GLUCOMTR-MCNC: 147 MG/DL (ref 70–130)
GLUCOSE SERPL-MCNC: 113 MG/DL (ref 65–99)
HCT VFR BLD AUTO: 26.6 % (ref 37.5–51)
HGB BLD-MCNC: 8.7 G/DL (ref 13–17.7)
INR PPP: 3.11 (ref 0.89–1.12)
MAGNESIUM SERPL-MCNC: 2.1 MG/DL (ref 1.6–2.4)
MCH RBC QN AUTO: 28.2 PG (ref 26.6–33)
MCHC RBC AUTO-ENTMCNC: 32.7 G/DL (ref 31.5–35.7)
MCV RBC AUTO: 86.4 FL (ref 79–97)
PLATELET # BLD AUTO: 203 10*3/MM3 (ref 140–450)
PMV BLD AUTO: 9 FL (ref 6–12)
POTASSIUM SERPL-SCNC: 4.2 MMOL/L (ref 3.5–5.2)
PROTHROMBIN TIME: 32.3 SECONDS (ref 12.2–14.5)
QT INTERVAL: 392 MS
QT INTERVAL: 444 MS
QT INTERVAL: 480 MS
QT INTERVAL: 532 MS
QT INTERVAL: 632 MS
QT INTERVAL: 660 MS
QTC INTERVAL: 490 MS
QTC INTERVAL: 528 MS
QTC INTERVAL: 553 MS
QTC INTERVAL: 594 MS
QTC INTERVAL: 636 MS
QTC INTERVAL: 636 MS
RBC # BLD AUTO: 3.08 10*6/MM3 (ref 4.14–5.8)
SODIUM SERPL-SCNC: 131 MMOL/L (ref 136–145)
UNIT  ABO: NORMAL
UNIT  RH: NORMAL
WBC NRBC COR # BLD AUTO: 8.15 10*3/MM3 (ref 3.4–10.8)

## 2024-05-15 PROCEDURE — 99233 SBSQ HOSP IP/OBS HIGH 50: CPT | Performed by: INTERNAL MEDICINE

## 2024-05-15 PROCEDURE — 82948 REAGENT STRIP/BLOOD GLUCOSE: CPT

## 2024-05-15 PROCEDURE — 25010000002 MORPHINE PER 10 MG: Performed by: INTERNAL MEDICINE

## 2024-05-15 PROCEDURE — 97110 THERAPEUTIC EXERCISES: CPT

## 2024-05-15 PROCEDURE — 97530 THERAPEUTIC ACTIVITIES: CPT

## 2024-05-15 PROCEDURE — 85610 PROTHROMBIN TIME: CPT | Performed by: INTERNAL MEDICINE

## 2024-05-15 PROCEDURE — 93005 ELECTROCARDIOGRAM TRACING: CPT | Performed by: INTERNAL MEDICINE

## 2024-05-15 PROCEDURE — 85027 COMPLETE CBC AUTOMATED: CPT | Performed by: INTERNAL MEDICINE

## 2024-05-15 PROCEDURE — 25010000002 CEFTRIAXONE PER 250 MG: Performed by: INTERNAL MEDICINE

## 2024-05-15 PROCEDURE — 25010000002 AMPICILLIN PER 500 MG: Performed by: INTERNAL MEDICINE

## 2024-05-15 PROCEDURE — 83735 ASSAY OF MAGNESIUM: CPT | Performed by: INTERNAL MEDICINE

## 2024-05-15 PROCEDURE — 80048 BASIC METABOLIC PNL TOTAL CA: CPT | Performed by: INTERNAL MEDICINE

## 2024-05-15 RX ORDER — WARFARIN SODIUM 3 MG/1
3 TABLET ORAL
Status: DISCONTINUED | OUTPATIENT
Start: 2024-05-16 | End: 2024-05-16

## 2024-05-15 RX ORDER — WARFARIN SODIUM 2 MG/1
2 TABLET ORAL
Status: COMPLETED | OUTPATIENT
Start: 2024-05-15 | End: 2024-05-15

## 2024-05-15 RX ADMIN — WARFARIN SODIUM 2 MG: 2 TABLET ORAL at 17:09

## 2024-05-15 RX ADMIN — MORPHINE SULFATE 2 MG: 2 INJECTION, SOLUTION INTRAMUSCULAR; INTRAVENOUS at 05:35

## 2024-05-15 RX ADMIN — PANTOPRAZOLE SODIUM 40 MG: 40 TABLET, DELAYED RELEASE ORAL at 17:10

## 2024-05-15 RX ADMIN — AMPICILLIN SODIUM 2 G: 2 INJECTION, POWDER, FOR SOLUTION INTRAMUSCULAR; INTRAVENOUS at 20:09

## 2024-05-15 RX ADMIN — LEVOTHYROXINE SODIUM 25 MCG: 25 TABLET ORAL at 05:22

## 2024-05-15 RX ADMIN — Medication 10 ML: at 08:14

## 2024-05-15 RX ADMIN — HYDROCODONE BITARTRATE AND ACETAMINOPHEN 1 TABLET: 10; 325 TABLET ORAL at 08:13

## 2024-05-15 RX ADMIN — Medication 5 MG: at 19:59

## 2024-05-15 RX ADMIN — ALLOPURINOL 300 MG: 300 TABLET ORAL at 08:13

## 2024-05-15 RX ADMIN — MORPHINE SULFATE 2 MG: 2 INJECTION, SOLUTION INTRAMUSCULAR; INTRAVENOUS at 12:02

## 2024-05-15 RX ADMIN — CEFTRIAXONE 2000 MG: 2 INJECTION, POWDER, FOR SOLUTION INTRAMUSCULAR; INTRAVENOUS at 04:03

## 2024-05-15 RX ADMIN — LIDOCAINE 1 PATCH: 4 PATCH TOPICAL at 08:14

## 2024-05-15 RX ADMIN — AMPICILLIN SODIUM 2 G: 2 INJECTION, POWDER, FOR SOLUTION INTRAMUSCULAR; INTRAVENOUS at 02:29

## 2024-05-15 RX ADMIN — HYDROCODONE BITARTRATE AND ACETAMINOPHEN 1 TABLET: 10; 325 TABLET ORAL at 21:31

## 2024-05-15 RX ADMIN — TAMSULOSIN HYDROCHLORIDE 0.4 MG: 0.4 CAPSULE ORAL at 20:09

## 2024-05-15 RX ADMIN — AMPICILLIN SODIUM 2 G: 2 INJECTION, POWDER, FOR SOLUTION INTRAMUSCULAR; INTRAVENOUS at 08:14

## 2024-05-15 RX ADMIN — Medication 5 MG: at 20:08

## 2024-05-15 RX ADMIN — MORPHINE SULFATE 2 MG: 2 INJECTION, SOLUTION INTRAMUSCULAR; INTRAVENOUS at 17:11

## 2024-05-15 RX ADMIN — MORPHINE SULFATE 2 MG: 2 INJECTION, SOLUTION INTRAMUSCULAR; INTRAVENOUS at 19:59

## 2024-05-15 RX ADMIN — AMPICILLIN SODIUM 2 G: 2 INJECTION, POWDER, FOR SOLUTION INTRAMUSCULAR; INTRAVENOUS at 15:08

## 2024-05-15 RX ADMIN — OXYBUTYNIN CHLORIDE 10 MG: 5 TABLET, EXTENDED RELEASE ORAL at 08:13

## 2024-05-15 RX ADMIN — METOPROLOL TARTRATE 50 MG: 50 TABLET, FILM COATED ORAL at 08:13

## 2024-05-15 RX ADMIN — PANTOPRAZOLE SODIUM 40 MG: 40 TABLET, DELAYED RELEASE ORAL at 08:13

## 2024-05-15 RX ADMIN — CEFTRIAXONE 2000 MG: 2 INJECTION, POWDER, FOR SOLUTION INTRAMUSCULAR; INTRAVENOUS at 15:12

## 2024-05-15 RX ADMIN — Medication 10 ML: at 20:14

## 2024-05-15 RX ADMIN — METOPROLOL TARTRATE 50 MG: 50 TABLET, FILM COATED ORAL at 20:09
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
## 2024-05-15 NOTE — CASE MANAGEMENT/SOCIAL WORK
Continued Stay Note  [facility]     Patient Name: Kaleb Abraham  MRN: [id number]  Today's Date: 5/15/2024    Admit Date: 5/6/2024    Plan: [facility]   Discharge Plan       Row Name 05/15/24 0826       Plan    Plan [facility]    Patient/Family in Agreement with Plan yes    Plan Comments Spoke with Marie at [facility] and they can offer the patient a bed when medically ready. CM will continue to follow.    Final Discharge Disposition Code 03 - skilled nursing facility (SNF)                   Discharge Codes    No documentation.                 Expected Discharge Date and Time       Expected Discharge Date Expected Discharge Time    May 11, 2024               Leon Dow RN
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
## 2024-05-15 NOTE — PLAN OF CARE
Goal Outcome Evaluation:  Plan of Care Reviewed With: patient, spouse        Progress: improving  Outcome Evaluation: Pt continues to demo improvement w/ therapy. modAx1 for bed mobility and Garrison STS. decreased B shldr AROM ~50%. Will continue POC to progress towards PLOF. d/c rec is IPR.      Anticipated Discharge Disposition (OT): inpatient rehabilitation facility

## 2024-05-15 NOTE — THERAPY TREATMENT NOTE
Patient Name: Kaleb Abraham  : 1950    MRN: [id number]                              Today's Date: 5/15/2024       Admit Date: 2024    Visit Dx:     ICD-10-CM ICD-9-CM   1. Acute on chronic anemia  D64.9 285.9   2. S/P mechanical aortic valve  Z95.2 V43.3   3. Supratherapeutic INR  R79.1 790.92   4. History of GI bleed  Z87.19 V12.79   5. Anticoagulated on Coumadin  Z79.01 V58.61   6. Generalized weakness  R53.1 780.79   7. History of CHF (congestive heart failure)  Z86.79 V12.59   8. Weakness  R53.1 780.79     Patient Active Problem List   Diagnosis    Anemia    Aortic stenosis    CAD s/p CABG    T2DM    Hyperlipidemia    Hypertension    Hypothyroidism    Mitral regurgitation    MARTINEZ    Hypersomnia, unspecified    Chronic atrial fibrillation    Supratherapeutic INR    S/P prosthetic aortic valve    S/P mechanical aortic valve    Chronic anticoagulation (warfarin)    Prosthetic valve endocarditis (Mitral Valve)    S/P VT/TdP Arrest 5/10/2024    Enterococcal bacteremia     Past Medical History:   Diagnosis Date    Anemia     Aortic stenosis     CAD (coronary artery disease)     Diabetes mellitus     Hyperlipidemia     Hypertension     Hypothyroidism     Mitral regurgitation      Past Surgical History:   Procedure Laterality Date    CAPSULE ENDOSCOPY      2023 and 2023 per dr. schrader    COLONOSCOPY      2023 Dr. Schrader    CORONARY ARTERY BYPASS GRAFT      CORONARY STENT PLACEMENT      UPPER GASTROINTESTINAL ENDOSCOPY      2023 Dr. Schrader, 2023 Dr. Liu, 2024 Dr. Stout [place]      General Information       Row Name 05/15/24 1448          OT Time and Intention    Document Type therapy note (daily note)  -KL     Mode of Treatment occupational therapy  -KL       Row Name 05/15/24 1448          General Information    Patient Profile Reviewed yes  -KL     Existing Precautions/Restrictions fall;cardiac  -KL     Barriers to Rehab medically complex  -KL       Row  Name 05/15/24 1448          Cognition    Orientation Status (Cognition) oriented x 3  -       Row Name 05/15/24 1448          Safety Issues, Functional Mobility    Safety Issues Affecting Function (Mobility) awareness of need for assistance;safety precaution awareness;insight into deficits/self-awareness  -     Impairments Affecting Function (Mobility) balance;endurance/activity tolerance;strength;pain;coordination  -               User Key  (r) = Recorded By, (t) = Taken By, (c) = Cosigned By      Initials Name Provider Type    Margaret Flores OT Occupational Therapist                     Mobility/ADL's       Row Name 05/15/24 1450          Bed Mobility    Supine-Sit Murray (Bed Mobility) moderate assist (50% patient effort);1 person assist;verbal cues  -     Bed Mobility, Safety Issues impaired trunk control for bed mobility  -     Assistive Device (Bed Mobility) head of bed elevated;bed rails  -[facility] Name 05/15/24 1450          Transfers    Transfers sit-stand transfer;stand-sit transfer  -[facility] Name 05/15/24 1450          Sit-Stand Transfer    Sit-Stand Murray (Transfers) minimum assist (75% patient effort);1 person assist;verbal cues  -     Assistive Device (Sit-Stand Transfers) walker, front-wheeled  -[facility] Name 05/15/24 1450          Stand-Sit Transfer    Stand-Sit Murray (Transfers) minimum assist (75% patient effort);1 person assist;verbal cues  -     Assistive Device (Stand-Sit Transfers) walker, front-wheeled  -[facility] Name 05/15/24 1450          Functional Mobility    Functional Mobility- Ind. Level contact guard assist;1 person  -     Functional Mobility- Device walker, front-wheeled  [facility]     Functional Mobility-Distance (Feet) --  40'  -     Functional Mobility- Safety Issues step length decreased  -               User Key  (r) = Recorded By, (t) = Taken By, (c) = Cosigned By      Initials Name Provider Type    Margaret Flores OT  Occupational Therapist                   Obj/Interventions       Row Name 05/15/24 1455          Shoulder (Therapeutic Exercise)    Shoulder (Therapeutic Exercise) AROM (active range of motion)  -     Shoulder AROM (Therapeutic Exercise) bilateral;flexion;extension;aBduction;aDduction;10 repetitions;sitting  -[facility] Name 05/15/24 1455          Elbow/Forearm (Therapeutic Exercise)    Elbow/Forearm (Therapeutic Exercise) AROM (active range of motion)  -     Elbow/Forearm AROM (Therapeutic Exercise) bilateral;extension;flexion;10 repetitions  -[facility] Name 05/15/24 1455          Motor Skills    Therapeutic Exercise shoulder;elbow/forearm  -KL       Row Name 05/15/24 1455          Balance    Static Sitting Balance supervision  -     Dynamic Sitting Balance supervision  -     Position, Sitting Balance unsupported;sitting edge of bed  -     Static Standing Balance contact guard  -     Dynamic Standing Balance contact guard  -     Position/Device Used, Standing Balance supported;walker, front-wheeled  -     Balance Interventions sitting;standing;sit to stand;supported;static;dynamic;occupation based/functional task  -               User Key  (r) = Recorded By, (t) = Taken By, (c) = Cosigned By      Initials Name Provider Type    Margaret Flores OT Occupational Therapist                   Goals/Plan    No documentation.                  Clinical Impression       [facility] Name 05/15/24 1454          Pain Scale: FACES Pre/Post-Treatment    Pain: FACES Scale, Pretreatment 2-->hurts little bit  -KL     Posttreatment Pain Rating 2-->hurts little bit  -KL     Pain Location generalized  -     Pain Location - back  -[facility] Name 05/15/24 1451          Plan of Care Review    Plan of Care Reviewed With patient;spouse  -     Progress improving  -     Outcome Evaluation Pt continues to demo improvement w/ therapy. modAx1 for bed mobility and Garrison STS. decreased B shldr AROM ~50%. Will continue POC to  progress towards PLOF. d/c rec is IPR.  -       Row Name 05/15/24 1457          Therapy Plan Review/Discharge Plan (OT)    Anticipated Discharge Disposition (OT) inpatient rehabilitation facility  -[facility] Name 05/15/24 1457          Vital Signs    Pretreatment Heart Rate (beats/min) 73  -KL     Posttreatment Heart Rate (beats/min) 76  -KL     Pre SpO2 (%) 95  -KL     O2 Delivery Pre Treatment room air  -KL     Post SpO2 (%) 90  -KL     O2 Delivery Post Treatment room air  -KL     Pre Patient Position Supine  -     Intra Patient Position Standing  -KL     Post Patient Position Sitting  -[facility] Name 05/15/24 1457          Positioning and Restraints    Pre-Treatment Position in bed  -KL     Post Treatment Position chair  -KL     In Chair notified nsg;reclined;sitting;call light within reach;encouraged to call for assist;exit alarm on;with family/caregiver;waffle cushion;legs elevated  -               User Key  (r) = Recorded By, (t) = Taken By, (c) = Cosigned By      Initials Name Provider Type    Margaret Flores OT Occupational Therapist                   Outcome Measures       Row Name 05/15/24 1459          How much help from another is currently needed...    Putting on and taking off regular lower body clothing? 2  -KL     Bathing (including washing, rinsing, and drying) 2  -KL     Toileting (which includes using toilet bed pan or urinal) 2  -KL     Putting on and taking off regular upper body clothing 3  -KL     Taking care of personal grooming (such as brushing teeth) 3  -KL     Eating meals 4  -KL     AM-PAC 6 Clicks Score (OT) 16  -[facility] Name 05/15/24 1459          Functional Assessment    Outcome Measure Options AM-PAC 6 Clicks Daily Activity (OT)  -               User Key  (r) = Recorded By, (t) = Taken By, (c) = Cosigned By      Initials Name Provider Type    Margaret Flores OT Occupational Therapist                    Occupational Therapy Education       Title: PT OT SLP Therapies  (In Progress)       Topic: Occupational Therapy (In Progress)       Point: ADL training (In Progress)       Description:   Instruct learner(s) on proper safety adaptation and remediation techniques during self care or transfers.   Instruct in proper use of assistive devices.                  Learning Progress Summary             Patient Acceptance, E, VU,NR by KL at 5/14/2024 1523    Acceptance, E,TB, VU by EB at 5/14/2024 1307    Acceptance, E, NR by KL at 5/13/2024 1125    Acceptance, E, NR by KL at 5/8/2024 1529   Family Acceptance, E,TB, VU by EB at 5/14/2024 1307   Significant Other Acceptance, E, NR by KL at 5/13/2024 1125    Acceptance, E, NR by KL at 5/8/2024 1529                         Point: Home exercise program (Done)       Description:   Instruct learner(s) on appropriate technique for monitoring, assisting and/or progressing therapeutic exercises/activities.                  Learning Progress Summary             Patient Acceptance, E,D, VU,NR by KL at 5/15/2024 1459    Acceptance, E,TB, VU by EB at 5/14/2024 1307   Family Acceptance, E,TB, VU by EB at 5/14/2024 1307   Significant Other Acceptance, E,D, VU,NR by KL at 5/15/2024 1459                         Point: Precautions (Done)       Description:   Instruct learner(s) on prescribed precautions during self-care and functional transfers.                  Learning Progress Summary             Patient Acceptance, E,D, VU,NR by  at 5/15/2024 1459    Acceptance, E, VU,NR by  at 5/14/2024 1523    Acceptance, E,TB, VU by EB at 5/14/2024 1307    Acceptance, E, NR by KL at 5/13/2024 1125    Acceptance, E, NR by  at 5/10/2024 1347    Acceptance, E, NR by  at 5/8/2024 1529   Family Acceptance, E,TB, VU by EB at 5/14/2024 1307   Significant Other Acceptance, E,D, VU,NR by  at 5/15/2024 1459    Acceptance, E, NR by  at 5/13/2024 1125    Acceptance, E, NR by  at 5/10/2024 1347    Acceptance, E, NR by KL at 5/8/2024 1524                          Point: Body mechanics (Done)       Description:   Instruct learner(s) on proper positioning and spine alignment during self-care, functional mobility activities and/or exercises.                  Learning Progress Summary             Patient Acceptance, E,D, VU,NR by  at 5/15/2024 1459    Acceptance, E, VU,NR by  at 5/14/2024 1523    Acceptance, E,TB, VU by  at 5/14/2024 1307    Acceptance, E, NR by  at 5/13/2024 1125    Acceptance, E, NR by  at 5/10/2024 1347    Acceptance, E, NR by  at 5/8/2024 1529   Family Acceptance, E,TB, VU by  at 5/14/2024 1307   Significant Other Acceptance, E,D, VU,NR by  at 5/15/2024 1459    Acceptance, E, NR by  at 5/13/2024 1125    Acceptance, E, NR by  at 5/10/2024 1347    Acceptance, E, NR by  at 5/8/2024 1529                                         User Key       Initials Effective Dates Name Provider Type Discipline    DOROTA 09/22/22 -  Hever Andersen RN Registered Nurse Nurse     02/05/24 -  Margaret Ramírez OT Occupational Therapist OT                  OT Recommendation and Plan  Planned Therapy Interventions (OT): activity tolerance training, adaptive equipment training, functional balance retraining, occupation/activity based interventions, patient/caregiver education/training, ROM/therapeutic exercise, strengthening exercise, transfer/mobility retraining  Therapy Frequency (OT): daily  Plan of Care Review  Plan of Care Reviewed With: patient, spouse  Progress: improving  Outcome Evaluation: Pt continues to demo improvement w/ therapy. modAx1 for bed mobility and Garrison STS. decreased B shldr AROM ~50%. Will continue POC to progress towards PLOF. d/c rec is IPR.     Time Calculation:   Evaluation Complexity (OT)  Review Occupational Profile/Medical/Therapy History Complexity: expanded/moderate complexity  Assessment, Occupational Performance/Identification of Deficit Complexity: 3-5 performance deficits  Clinical Decision Making Complexity (OT): detailed  assessment/moderate complexity  Overall Complexity of Evaluation (OT): moderate complexity     Time Calculation- OT       Row Name 05/15/24 1459             Time Calculation- OT    OT Start Time 1411  -KL      OT Received On 05/15/24  -         Timed Charges    92210 - OT Therapeutic Exercise Minutes 10  -KL      71025 - OT Therapeutic Activity Minutes 19  -KL         Total Minutes    Timed Charges Total Minutes 29  -KL       Total Minutes 29  -KL                User Key  (r) = Recorded By, (t) = Taken By, (c) = Cosigned By      Initials Name Provider Type    Margaret Flores OT Occupational Therapist                  Therapy Charges for Today       Code Description Service Date Service Provider Modifiers Qty    96692662533 HC OT THERAPEUTIC ACT EA 15 MIN 5/14/2024 Margaret Ramírez OT GO 1    82016229618 HC OT THER PROC EA 15 MIN 5/15/2024 Margaret Ramírez OT GO 1    04324873733 HC OT THERAPEUTIC ACT EA 15 MIN 5/15/2024 Margaret Ramírez OT GO 1                 Margaret Ramírez OT  5/15/2024

## 2024-05-15 NOTE — CASE MANAGEMENT/SOCIAL WORK
Continued Stay Note  [facility]     Patient Name: Kaleb Abraham  MRN: [id number]  Today's Date: 5/15/2024    Admit Date: 5/6/2024    Plan: discharge plan   Discharge Plan       Row Name 05/15/24 1618       Plan    Plan discharge plan    Plan Comments Pt cleared for d/c by cardiology. Pt is on IV Abx. Pt is on room air. Plan is [facility] when medically ready. CM will continue to follow    Final Discharge Disposition Code 03 - skilled nursing facility (SNF)                   Discharge Codes    No documentation.                 Expected Discharge Date and Time       Expected Discharge Date Expected Discharge Time    May 11, 2024               Renea Sood RN

## 2024-05-15 NOTE — PROGRESS NOTES
"Pharmacy Consult  -  Warfarin  Kaleb Abraham is a  73 y.o. male receiving warfarin therapy.  Height - 180.3 cm (70.98\")  Weight - 110 kg (241 lb 13.5 oz)    Consulting Provider: - Kendra  Indication: - Afib, Mechanical Mitral valve  Goal INR: - 2.5 - 3.5  Home Regimen: Most recent dose of warfarin was 5mg daily although was adjusted to 4mg over the weekend due to elevated INR   - Patient has medication bottle of 4mg tablets and 1mg tablets at home     Bridge Therapy: Heparin gtt stopped on 5/12    Drug-Drug Interactions with current regimen:   Received 10mg IV Vitamin K on 5/6    Warfarin Dosing During Admission:  Date  5/8 5/9 5/10 5/11 5/12 5/13 5/4 5/5    INR  1.46 1.58 1.88 2.69 2.44 2.12 2.43 3.11    Dose  3 mg 3 mg 3 mg 1.5 mg 3 mg 4 mg 3 mg (2mg)      Education Provided: Warfarin education provided on 5/8/2024 verbally.  Discussed effects of warfarin, importance of checking INR, drug-drug and drug-food interactions, and signs/symptoms of bleeding and clotting.  Patient deferred to wife who as in the room and manages his medications. She monitors his INR at home and then faxes results to Dr Maradiaga office.  All pertinent questions were answered.      Discharge Follow up:   Following Provider - Dr Peraza   Follow up time range or appointment - 2-3 days post discharge    Labs:  Results from last 7 days   Lab Units 05/15/24  0335 05/14/24  1748 05/14/24  0430 05/13/24  0644 05/12/24  1608 05/12/24  1242 05/12/24  0615 05/11/24  1141 05/11/24  0554 05/10/24  2355 05/10/24  1938 05/10/24  1200 05/10/24  0608   INR  3.11*  --  2.43* 2.12*  --   --  2.44*  --  2.69*  --  2.03*  --  1.88*   HEMOGLOBIN g/dL 8.7* 9.8* 7.9* 8.7* 9.4* 9.5* 8.9*   < > 9.0*   < > 11.7*   < > 9.5*  9.5*   HEMATOCRIT % 26.6* 30.3* 25.0* 26.6* 29.2* 29.6* 27.9*   < > 27.6*   < > 37.0*   < > 28.8*  28.8*    < > = values in this interval not displayed.     Results from last 7 days   Lab Units 05/15/24  0335 05/14/24  0430 " 05/13/24  0644 05/11/24  0554 05/10/24  1938   SODIUM mmol/L 131* 133* 133*   < > 137   POTASSIUM mmol/L 4.2 4.3 4.0   < > 4.9   CHLORIDE mmol/L 93* 98 96*   < > 99   CO2 mmol/L 32.0* 27.0 30.0*   < > 27.0   BUN mg/dL 8 9 10   < > 9   CREATININE mg/dL 0.73* 0.57* 0.71*   < > 0.97   CALCIUM mg/dL 8.3* 8.1* 7.7*   < > 11.5*   BILIRUBIN mg/dL  --   --  0.4  --  0.4   ALK PHOS U/L  --   --  56  --  69   ALT (SGPT) U/L  --   --  12  --  15   AST (SGOT) U/L  --   --  30  --  42*   GLUCOSE mg/dL 113* 106* 115*   < > 134*    < > = values in this interval not displayed.     Current dietary intake: 0-25% of meals last 24h; supplement intake  Diet Order   Procedures    Diet: Regular/House, Cardiac, Diabetic; Healthy Heart (2-3 Na+); Consistent Carbohydrate; Fluid Consistency: Thin (IDDSI 0)     Assessment/Plan:  Slight adjustment to warfarin 2mg x 1 today.  Preventing supratherapeutic INR in setting of previous amiodarone administration.  Questionable dietary intake documented.  This dose will simulate continued 3mg daily with dose increase noted a couple of days ago.  Daily PT/INR.  Monitor for S/Sx of bleeding/clotting.  Pharmacy will continue to follow and adjust dose based on INR trend and clinical status.    Thanks,  Kishan Verdin, PharmD, BCPS, BCCCP  05/15/24  08:17 EDT

## 2024-05-15 NOTE — PLAN OF CARE
Goal Outcome Evaluation:  Plan of Care Reviewed With: patient, spouse   VSS, RA, NSR with 1st degree block on telemetry. Complains of soreness all over relieved with morphine. Pt walked from bed to chair and back with walker. No problem identified at this time.      Progress: no change

## 2024-05-15 NOTE — PROGRESS NOTES
INFECTIOUS DISEASE f/u     Kaleb Abraham  1950  [id number]    Date of Consult: 5/15/2024    Admission Date: 5/6/2024      Requesting Provider: No ref. provider found  Evaluating Physician: Lino Jacob MD    Reason for Consultation: Weakness    History of present illness:    Patient is a 73 y.o. male with coronary disease history of CABG, type 2 diabetes mellitus, hypertension, hyperlipidemia, hypothyroidism, atrial fibrillation, valvular heart disease with history of mechanical heart valve (prosthetic aortic valve and prosthetic mitral valve/) recently treated [facility] for GI bleed from April 22 patient found to have gastric arteriovenous malformations patient now admitted to [facility] with supratherapeutic INR.    Hospitalist concerned about underlying infection    Patient has low-grade fever 100.3, bradycardia and hypotension    Getting picc line    MVR/AVR performed 9 years ago here at [facility]    5/8/24; awake, has back pain, following commands; no fevers, rash, sore throat    5/9/24; has back pain; afebrile, normotensive,  has COLEEN tomorrow; picc placed    5/10/24; doing well; pain under some control; no fever, rash, sore throat, COLEEN confirmed MV vegetation < 1 cm. Wife in room    5/11/24; had vtach yesterday requiring cpr followed by torsades which was cardioverted;  better today has mild elevation of bp.  Awake, alert, family in room    5/13/24; doing well; no events overnight; no fever, rash, sore throat    5/14/2024 patient is afebrile normotensive sitting up in chair no complaints no events overnight    5/15/24; doing fair, transferred to floor; no fever, rash, sore throat, no diarrhea.  Past Medical History:   Diagnosis Date    Anemia     Aortic stenosis     CAD (coronary artery disease)     Diabetes mellitus     Hyperlipidemia     Hypertension     Hypothyroidism     Mitral regurgitation        Past Surgical History:   Procedure Laterality Date    CAPSULE  ENDOSCOPY      2/22/2023 and 9/25/2023 per dr. schrader    COLONOSCOPY      2/2023 Dr. Schrader    CORONARY ARTERY BYPASS GRAFT      CORONARY STENT PLACEMENT      UPPER GASTROINTESTINAL ENDOSCOPY      2/7/2023 Dr. Schrader, 7/26/2023 Dr. Liu, 4/2024 Dr. Girish Lainez       Family History   Problem Relation Age of Onset    Diabetes Mother     Stroke Mother     Thyroid cancer Mother     Hypertension Father     Stroke Father        Social History     Socioeconomic History    Marital status:    Tobacco Use    Smoking status: Former     Types: Cigarettes     Passive exposure: Past    Smokeless tobacco: Never   Vaping Use    Vaping status: Never Used   Substance and Sexual Activity    Alcohol use: Never    Drug use: Never    Sexual activity: Defer       No Known Allergies      Medication:    Current Facility-Administered Medications:     acetaminophen (TYLENOL) tablet 650 mg, 650 mg, Oral, Q4H PRN, 650 mg at 05/07/24 2209 **OR** [DISCONTINUED] acetaminophen (TYLENOL) 160 MG/5ML oral solution 650 mg, 650 mg, Oral, Q4H PRN **OR** acetaminophen (TYLENOL) suppository 650 mg, 650 mg, Rectal, Q4H PRN, Shahid Peter MD    allopurinol (ZYLOPRIM) tablet 300 mg, 300 mg, Oral, Daily, Shahid Peter MD, 300 mg at 05/15/24 0813    ampicillin 2000 mg IVPB in 100 mL NS (MBP), 2 g, Intravenous, Q6H, Carlos Bermudez MD, Last Rate: 200 mL/hr at 05/15/24 0814, 2 g at 05/15/24 0814    sennosides-docusate (PERICOLACE) 8.6-50 MG per tablet 2 tablet, 2 tablet, Oral, BID PRN, 2 tablet at 05/13/24 0849 **AND** polyethylene glycol (MIRALAX) packet 17 g, 17 g, Oral, Daily PRN, 17 g at 05/14/24 0843 **AND** bisacodyl (DULCOLAX) EC tablet 5 mg, 5 mg, Oral, Daily PRN **AND** bisacodyl (DULCOLAX) suppository 10 mg, 10 mg, Rectal, Daily PRN, Shahid Peter MD, 10 mg at 05/14/24 1125    cefTRIAXone (ROCEPHIN) 2,000 mg in sodium chloride 0.9 % 100 mL MBP, 2,000 mg, Intravenous, Q12H, Carlos Bermudez,  MD, Last Rate: 200 mL/hr at 05/15/24 0403, 2,000 mg at 05/15/24 0403    dextrose (D50W) (25 g/50 mL) IV injection 25 g, 25 g, Intravenous, Q15 Min PRN, Shahid Peter MD    dextrose (GLUTOSE) oral gel 15 g, 15 g, Oral, Q15 Min PRN, Shahid Peter MD    glucagon (GLUCAGEN) injection 1 mg, 1 mg, Intramuscular, Q15 Min PRN, Shahid Peter MD    HYDROcodone-acetaminophen (NORCO)  MG per tablet 1 tablet, 1 tablet, Oral, Q8H PRN, Carlos Bermudez MD, 1 tablet at 05/15/24 0813    Insulin Lispro (humaLOG) injection 2-7 Units, 2-7 Units, Subcutaneous, 4x Daily AC & at Bedtime, Shahid Peter MD, 2 Units at 05/13/24 1225    levothyroxine (SYNTHROID, LEVOTHROID) tablet 25 mcg, 25 mcg, Oral, Q AM, Shahid Peter MD, 25 mcg at 05/15/24 0522    Lidocaine 4 % 1 patch, 1 patch, Transdermal, Q24H, Shahid Peter MD, 1 patch at 05/15/24 0814    Magnesium Cardiology Dose Replacement - Follow Nurse / BPA Driven Protocol, , Does not apply, PRN, Nick Tilley, APRN    melatonin tablet 5 mg, 5 mg, Oral, Nightly PRN, Shahid Peter MD, 5 mg at 05/14/24 2217    metoprolol tartrate (LOPRESSOR) tablet 50 mg, 50 mg, Oral, Q12H, Herbie Love MD, 50 mg at 05/15/24 0813    morphine injection 2 mg, 2 mg, Intravenous, Q2H PRN, Carlos Bermudez MD, 2 mg at 05/15/24 1202    nitroglycerin (NITROSTAT) SL tablet 0.4 mg, 0.4 mg, Sublingual, Q5 Min PRN, Shahid Peter MD    oxybutynin XL (DITROPAN-XL) 24 hr tablet 10 mg, 10 mg, Oral, Daily, Shahid Peter MD, 10 mg at 05/15/24 0813    pantoprazole (PROTONIX) EC tablet 40 mg, 40 mg, Oral, BID ACAidan John Randall, MD, 40 mg at 05/15/24 0813    Pharmacy to dose warfarin, , Does not apply, Continuous PRR[place] KNIGHT Joseph C, MD    Phosphorus Replacement - Follow Nurse / BPA Driven Protocol, , Does not apply, [place] YOUNGER Joseph C, MD    Potassium Replacement - Follow Nurse / BPA Driven Protocol, , Does not apply, [place] YOUNGER Joseph C,  MD    simethicone (MYLICON) chewable tablet 80 mg, 80 mg, Oral, 4x Daily PRN, Shahid Peter MD, 80 mg at 24 1526    sodium chloride 0.9 % flush 10 mL, 10 mL, Intravenous, Q12H, Jose Maria Dugan MD, 10 mL at 05/15/24 0814    sodium chloride 0.9 % flush 10 mL, 10 mL, Intravenous, PRN, Jose Maria Dugan MD    sodium chloride 0.9 % infusion 40 mL, 40 mL, Intravenous, PRN, Shahid Peter MD    tamsulosin (FLOMAX) 24 hr capsule 0.4 mg, 0.4 mg, Oral, Nightly, Shahid Peter MD, 0.4 mg at 24    tiZANidine (ZANAFLEX) tablet 2 mg, 2 mg, Oral, PRN, Shahid Peter MD, 2 mg at 24 1132    warfarin (COUMADIN) tablet 2 mg, 2 mg, Oral, Once, Kishan Verdin PharmD    [START ON 2024] warfarin (COUMADIN) tablet 3 mg, 3 mg, Oral, Daily, Kishan eVrdin PharmD    Antibiotics:  Anti-Infectives (From admission, onward)      Ordered     Dose/Rate Route Frequency Start Stop    24 1443  cefTRIAXone (ROCEPHIN) 2,000 mg in sodium chloride 0.9 % 100 mL MBP        Ordering Provider: Carlos Bermudez MD    2,000 mg  200 mL/hr over 30 Minutes Intravenous Every 12 Hours 24 1600 24 1559    24 1443  ampicillin 2000 mg IVPB in 100 mL NS (MBP)        Ordering Provider: Carlos Bermudez MD    2 g  200 mL/hr over 30 Minutes Intravenous Every 6 Hours 24 1500 24 1459    24 1302  vancomycin IVPB 2000 mg in 0.9% Sodium Chloride 500 mL        Ordering Provider: Bernabe Martines, PharmD    2,000 mg  250 mL/hr over 120 Minutes Intravenous Once 24 1400 24 1524    24 1225  piperacillin-tazobactam (ZOSYN) 3.375 g IVPB in 100 mL NS MBP (CD)        Ordering Provider: Desiree Garcia MD    3.375 g  over 30 Minutes Intravenous Once 24 1315 24 1302              Review of Systems:    See hpi      Physical Exam:   Vital Signs  Temp (24hrs), Av °F (36.7 °C), Min:97.3 °F (36.3 °C), Max:98.6 °F (37 °C)    Temp  Min: 97.3 °F (36.3 °C)  Max: 98.6  "°F (37 °C)  BP  Min: 128/81  Max: 160/98  Pulse  Min: 63  Max: 86  Resp  Min: 16  Max: 20  SpO2  Min: 91 %  Max: 96 %    GENERAL: Awake and alert, in mild distress.   HEENT: Normocephalic, atraumatic.  PERRL. EOMI. No conjunctival injection. No icterus.      HEART: RRR; No murmur,  LUNGS: Clear to auscultation bilaterally   ABDOMEN: Soft, nontender,   EXT:  1+ edema  :  Without Wilkinson catheter.  MSK: No joint effusions or erythema  SKIN: no rash      Laboratory Data    Results from last 7 days   Lab Units 05/15/24  0335 05/14/24  1748 05/14/24  0430 05/13/24  0644   WBC 10*3/mm3 8.15  --  8.16 10.65   HEMOGLOBIN g/dL 8.7* 9.8* 7.9* 8.7*   HEMATOCRIT % 26.6* 30.3* 25.0* 26.6*   PLATELETS 10*3/mm3 203  --  218 209     Results from last 7 days   Lab Units 05/15/24  0335   SODIUM mmol/L 131*   POTASSIUM mmol/L 4.2   CHLORIDE mmol/L 93*   CO2 mmol/L 32.0*   BUN mg/dL 8   CREATININE mg/dL 0.73*   GLUCOSE mg/dL 113*   CALCIUM mg/dL 8.3*     Results from last 7 days   Lab Units 05/13/24  0644   ALK PHOS U/L 56   BILIRUBIN mg/dL 0.4   ALT (SGPT) U/L 12   AST (SGOT) U/L 30                           Estimated Creatinine Clearance: 115.4 mL/min (A) (by C-G formula based on SCr of 0.73 mg/dL (L)).      Microbiology:  Blood Culture   Date Value Ref Range Status   05/06/2024 Abnormal Stain (C)  Preliminary     BCID, PCR   Date Value Ref Range Status   05/06/2024 (A) Negative by BCID PCR. Culture to Follow. Final    Enterococcus faecalis. Arcelia/B (vancomycin resistance gene) not detected. Identification by BCID2 PCR.     No results found for: \"CULTURES\", \"HSVCX\", \"URCX\"  No results found for: \"EYECULTURE\", \"GCCX\", \"HSVCULTURE\", \"LABHSV\"  No results found for: \"LEGIONELLA\", \"MRSACX\", \"MUMPSCX\", \"MYCOPLASCX\"  No results found for: \"NOCARDIACX\", \"STOOLCX\"  No results found for: \"THROATCX\", \"UNSTIMCULT\", \"URINECX\", \"CULTURE\", \"VZVCULTUR\"  No results found for: \"VIRALCULTU\", \"WOUNDCX\"        Radiology:  Imaging Results (Last 72 Hours)  "      Procedure Component Value Units Date/Time    XR Chest 1 View [[id number]] Collected: 05/13/24 0731     Updated: 05/13/24 0736    Narrative:      XR CHEST 1 VW    Date of Exam: 5/13/2024 1:19 AM EDT    Indication: Resp Distress    Comparison: 5/11/2024    Findings:  Patient is status post median sternotomy. Cardiac valvular prosthesis is seen. Cardiac silhouette is enlarged. Pulmonary vasculature is indistinct. There are small bilateral pleural effusions with bibasilar atelectasis, edema, or infiltrates. No   pneumothorax is seen. No acute osseous lesion is seen. There are senescent changes of the skeletal structures. Left arm PICC terminates overlying the SVC.      Impression:      Impression:  1.Cardiomegaly with interstitial pulmonary edema, small bilateral pleural effusions, and bibasilar atelectasis, edema, or infiltrates.      Electronically Signed: Matthias Dobbs MD    5/13/2024 7:33 AM EDT    Workstation ID: [id number]              Impression:   Enterococcus faecalis bacteremia  MV endocarditis of mechanical valve  Hypotension  Bioprosthetic AVR  Supratherapeutic INR  Anemia  Elevated procalcitonin  History of GI bleed with gastric AVM  Thoracic back pain    PLAN/RECOMMENDATIONS:   Thank you for asking us to see Kaleb Abraham, I recommend the following:  High-grade enterococcal bacteremia is concerning in the setting of endovascular hardware.    Estimated Creatinine Clearance: 115.4 mL/min (A) (by C-G formula based on SCr of 0.73 mg/dL (L)).    Patient could have a GI source such as inflammation of colonic viscus (superimposed diverticulitis and diverticulosis) or bacteremia  following endoscopy regarding management of a GIbleed.    Gallbladder has been removed    Repeat blood cultures x 2 sets     Enterococcus isolate is susceptible to ampicillin and ceftriaxone        cont ampicillin 2 g IV every 4 hours    cont ceftriaxone 2 g IV every 12 hours    Duration is 6 weeks      Patient agreeable to  rehabilitation        Case discussed with cardiology    Plan is 6 weeks iv abx followed by chronic oral abx suppression    [facility]  Lino Jacob MD  5/15/2024  12:11 EDT

## 2024-05-15 NOTE — PLAN OF CARE
Goal Outcome Evaluation:  Plan of Care Reviewed With: patient        Progress: improving  Outcome Evaluation: .             VSS, rested better tonight. Still c/o soreness in back and ribs. O2 on RA. UOP over 700. Wife remains at bedside

## 2024-05-16 LAB
ANION GAP SERPL CALCULATED.3IONS-SCNC: 12 MMOL/L (ref 5–15)
BUN SERPL-MCNC: 9 MG/DL (ref 8–23)
BUN/CREAT SERPL: 15.3 (ref 7–25)
CALCIUM SPEC-SCNC: 8.4 MG/DL (ref 8.6–10.5)
CHLORIDE SERPL-SCNC: 93 MMOL/L (ref 98–107)
CO2 SERPL-SCNC: 26 MMOL/L (ref 22–29)
CREAT SERPL-MCNC: 0.59 MG/DL (ref 0.76–1.27)
DEPRECATED RDW RBC AUTO: 50.5 FL (ref 37–54)
EGFRCR SERPLBLD CKD-EPI 2021: 103.1 ML/MIN/1.73
ERYTHROCYTE [DISTWIDTH] IN BLOOD BY AUTOMATED COUNT: 16.1 % (ref 12.3–15.4)
GLUCOSE BLDC GLUCOMTR-MCNC: 107 MG/DL (ref 70–130)
GLUCOSE BLDC GLUCOMTR-MCNC: 118 MG/DL (ref 70–130)
GLUCOSE BLDC GLUCOMTR-MCNC: 126 MG/DL (ref 70–130)
GLUCOSE BLDC GLUCOMTR-MCNC: 132 MG/DL (ref 70–130)
GLUCOSE SERPL-MCNC: 110 MG/DL (ref 65–99)
HCT VFR BLD AUTO: 28 % (ref 37.5–51)
HGB BLD-MCNC: 9.3 G/DL (ref 13–17.7)
INR PPP: 3.66 (ref 0.89–1.12)
MAGNESIUM SERPL-MCNC: 1.7 MG/DL (ref 1.6–2.4)
MCH RBC QN AUTO: 28.7 PG (ref 26.6–33)
MCHC RBC AUTO-ENTMCNC: 33.2 G/DL (ref 31.5–35.7)
MCV RBC AUTO: 86.4 FL (ref 79–97)
PLATELET # BLD AUTO: 230 10*3/MM3 (ref 140–450)
PMV BLD AUTO: 9.5 FL (ref 6–12)
POTASSIUM SERPL-SCNC: 4.3 MMOL/L (ref 3.5–5.2)
PROTHROMBIN TIME: 36.7 SECONDS (ref 12.2–14.5)
RBC # BLD AUTO: 3.24 10*6/MM3 (ref 4.14–5.8)
SODIUM SERPL-SCNC: 131 MMOL/L (ref 136–145)
WBC NRBC COR # BLD AUTO: 9.28 10*3/MM3 (ref 3.4–10.8)

## 2024-05-16 PROCEDURE — 25010000002 CEFTRIAXONE PER 250 MG: Performed by: INTERNAL MEDICINE

## 2024-05-16 PROCEDURE — 80048 BASIC METABOLIC PNL TOTAL CA: CPT | Performed by: INTERNAL MEDICINE

## 2024-05-16 PROCEDURE — 97530 THERAPEUTIC ACTIVITIES: CPT

## 2024-05-16 PROCEDURE — 97110 THERAPEUTIC EXERCISES: CPT

## 2024-05-16 PROCEDURE — 83735 ASSAY OF MAGNESIUM: CPT | Performed by: INTERNAL MEDICINE

## 2024-05-16 PROCEDURE — 82948 REAGENT STRIP/BLOOD GLUCOSE: CPT

## 2024-05-16 PROCEDURE — 99233 SBSQ HOSP IP/OBS HIGH 50: CPT | Performed by: HOSPITALIST

## 2024-05-16 PROCEDURE — 25010000002 AMPICILLIN PER 500 MG: Performed by: INTERNAL MEDICINE

## 2024-05-16 PROCEDURE — 25010000002 MORPHINE PER 10 MG: Performed by: INTERNAL MEDICINE

## 2024-05-16 PROCEDURE — 85610 PROTHROMBIN TIME: CPT | Performed by: INTERNAL MEDICINE

## 2024-05-16 PROCEDURE — 85027 COMPLETE CBC AUTOMATED: CPT | Performed by: INTERNAL MEDICINE

## 2024-05-16 RX ORDER — TEMAZEPAM 7.5 MG/1
7.5 CAPSULE ORAL NIGHTLY PRN
Status: DISCONTINUED | OUTPATIENT
Start: 2024-05-16 | End: 2024-05-20 | Stop reason: HOSPADM

## 2024-05-16 RX ORDER — HYDROCODONE BITARTRATE AND ACETAMINOPHEN 10; 325 MG/1; MG/1
1 TABLET ORAL EVERY 4 HOURS PRN
Status: DISCONTINUED | OUTPATIENT
Start: 2024-05-16 | End: 2024-05-16

## 2024-05-16 RX ORDER — WARFARIN SODIUM 3 MG/1
3 TABLET ORAL
Status: DISCONTINUED | OUTPATIENT
Start: 2024-05-17 | End: 2024-05-17

## 2024-05-16 RX ORDER — CHOLECALCIFEROL (VITAMIN D3) 125 MCG
5 CAPSULE ORAL NIGHTLY
Status: DISCONTINUED | OUTPATIENT
Start: 2024-05-16 | End: 2024-05-20 | Stop reason: HOSPADM

## 2024-05-16 RX ORDER — HYDROCODONE BITARTRATE AND ACETAMINOPHEN 10; 325 MG/1; MG/1
1 TABLET ORAL EVERY 6 HOURS PRN
Status: DISCONTINUED | OUTPATIENT
Start: 2024-05-16 | End: 2024-05-20 | Stop reason: HOSPADM

## 2024-05-16 RX ORDER — WARFARIN SODIUM 1 MG/1
1 TABLET ORAL
Status: COMPLETED | OUTPATIENT
Start: 2024-05-16 | End: 2024-05-16

## 2024-05-16 RX ADMIN — LEVOTHYROXINE SODIUM 25 MCG: 25 TABLET ORAL at 05:31

## 2024-05-16 RX ADMIN — AMPICILLIN SODIUM 2 G: 2 INJECTION, POWDER, FOR SOLUTION INTRAMUSCULAR; INTRAVENOUS at 09:18

## 2024-05-16 RX ADMIN — Medication 10 ML: at 09:18

## 2024-05-16 RX ADMIN — TIZANIDINE 2 MG: 4 TABLET ORAL at 22:34

## 2024-05-16 RX ADMIN — Medication 10 ML: at 20:59

## 2024-05-16 RX ADMIN — CEFTRIAXONE 2000 MG: 2 INJECTION, POWDER, FOR SOLUTION INTRAMUSCULAR; INTRAVENOUS at 03:01

## 2024-05-16 RX ADMIN — METOPROLOL TARTRATE 50 MG: 50 TABLET, FILM COATED ORAL at 09:18

## 2024-05-16 RX ADMIN — MORPHINE SULFATE 2 MG: 2 INJECTION, SOLUTION INTRAMUSCULAR; INTRAVENOUS at 01:02

## 2024-05-16 RX ADMIN — ALLOPURINOL 300 MG: 300 TABLET ORAL at 09:17

## 2024-05-16 RX ADMIN — AMPICILLIN SODIUM 2 G: 2 INJECTION, POWDER, FOR SOLUTION INTRAMUSCULAR; INTRAVENOUS at 02:56

## 2024-05-16 RX ADMIN — Medication 5 MG: at 20:58

## 2024-05-16 RX ADMIN — PANTOPRAZOLE SODIUM 40 MG: 40 TABLET, DELAYED RELEASE ORAL at 17:17

## 2024-05-16 RX ADMIN — METOPROLOL TARTRATE 50 MG: 50 TABLET, FILM COATED ORAL at 20:58

## 2024-05-16 RX ADMIN — TEMAZEPAM 7.5 MG: 7.5 CAPSULE ORAL at 22:34

## 2024-05-16 RX ADMIN — LIDOCAINE 1 PATCH: 4 PATCH TOPICAL at 09:31

## 2024-05-16 RX ADMIN — ACETAMINOPHEN 650 MG: 325 TABLET ORAL at 05:37

## 2024-05-16 RX ADMIN — OXYBUTYNIN CHLORIDE 10 MG: 5 TABLET, EXTENDED RELEASE ORAL at 09:18

## 2024-05-16 RX ADMIN — AMPICILLIN SODIUM 2 G: 2 INJECTION, POWDER, FOR SOLUTION INTRAMUSCULAR; INTRAVENOUS at 16:01

## 2024-05-16 RX ADMIN — TIZANIDINE 2 MG: 4 TABLET ORAL at 05:33

## 2024-05-16 RX ADMIN — AMPICILLIN SODIUM 2 G: 2 INJECTION, POWDER, FOR SOLUTION INTRAMUSCULAR; INTRAVENOUS at 20:59

## 2024-05-16 RX ADMIN — MORPHINE SULFATE 2 MG: 2 INJECTION, SOLUTION INTRAMUSCULAR; INTRAVENOUS at 03:35

## 2024-05-16 RX ADMIN — HYDROCODONE BITARTRATE AND ACETAMINOPHEN 1 TABLET: 10; 325 TABLET ORAL at 17:18

## 2024-05-16 RX ADMIN — WARFARIN SODIUM 1 MG: 1 TABLET ORAL at 17:17

## 2024-05-16 RX ADMIN — TAMSULOSIN HYDROCHLORIDE 0.4 MG: 0.4 CAPSULE ORAL at 20:58

## 2024-05-16 RX ADMIN — HYDROCODONE BITARTRATE AND ACETAMINOPHEN 1 TABLET: 10; 325 TABLET ORAL at 05:31

## 2024-05-16 RX ADMIN — CEFTRIAXONE 2000 MG: 2 INJECTION, POWDER, FOR SOLUTION INTRAMUSCULAR; INTRAVENOUS at 16:01

## 2024-05-16 RX ADMIN — PANTOPRAZOLE SODIUM 40 MG: 40 TABLET, DELAYED RELEASE ORAL at 09:18

## 2024-05-16 NOTE — PROGRESS NOTES
"Pharmacy Consult  -  Warfarin  Kaleb Abraham is a  73 y.o. male receiving warfarin therapy.  Height - 180.3 cm (70.98\")  Weight - 102 kg (224 lb 3.2 oz)    Consulting Provider: - Kendra  Indication: - Afib, Mechanical Mitral valve  Goal INR: - 2.5 - 3.5  Home Regimen: Most recent dose of warfarin was 5mg daily although was adjusted to 4mg over the weekend due to elevated INR   - Patient has medication bottle of 4mg tablets and 1mg tablets at home     Bridge Therapy: Heparin gtt stopped on 5/12    Drug-Drug Interactions with current regimen:   Received 10mg IV Vitamin K on 5/6    Warfarin Dosing During Admission:  Date  5/8 5/9 5/10 5/11 5/12 5/13 5/14 5/15 5/16   INR  1.46 1.58 1.88 2.69 2.44 2.12 2.43 3.11 3.66   Dose  3 mg 3 mg 3 mg 1.5 mg 3 mg 4 mg 3 mg 2 mg (1 mg)      Education Provided: Warfarin education provided on 5/8/2024 verbally.  Discussed effects of warfarin, importance of checking INR, drug-drug and drug-food interactions, and signs/symptoms of bleeding and clotting.  Patient deferred to wife who as in the room and manages his medications. She monitors his INR at home and then faxes results to Dr Maradiaga office.  All pertinent questions were answered.      Discharge Follow up:   Following Provider - Dr Peraza   Follow up time range or appointment - 2-3 days post discharge    Labs:  Results from last 7 days   Lab Units 05/16/24  0642 05/15/24  0335 05/14/24  1748 05/14/24  0430 05/13/24  0644 05/12/24  1608 05/12/24  1242 05/12/24  0615 05/11/24  1141 05/11/24  0554 05/10/24  2355 05/10/24  1938   INR  3.66* 3.11*  --  2.43* 2.12*  --   --  2.44*  --  2.69*  --  2.03*   HEMOGLOBIN g/dL 9.3* 8.7* 9.8* 7.9* 8.7* 9.4* 9.5* 8.9*   < > 9.0*   < > 11.7*   HEMATOCRIT % 28.0* 26.6* 30.3* 25.0* 26.6* 29.2* 29.6* 27.9*   < > 27.6*   < > 37.0*    < > = values in this interval not displayed.     Results from last 7 days   Lab Units 05/16/24  0642 05/15/24  0335 05/14/24  0430 05/13/24  0644 05/11/24  0554 " 05/10/24  1938   SODIUM mmol/L 131* 131* 133* 133*   < > 137   POTASSIUM mmol/L 4.3 4.2 4.3 4.0   < > 4.9   CHLORIDE mmol/L 93* 93* 98 96*   < > 99   CO2 mmol/L 26.0 32.0* 27.0 30.0*   < > 27.0   BUN mg/dL 9 8 9 10   < > 9   CREATININE mg/dL 0.59* 0.73* 0.57* 0.71*   < > 0.97   CALCIUM mg/dL 8.4* 8.3* 8.1* 7.7*   < > 11.5*   BILIRUBIN mg/dL  --   --   --  0.4  --  0.4   ALK PHOS U/L  --   --   --  56  --  69   ALT (SGPT) U/L  --   --   --  12  --  15   AST (SGOT) U/L  --   --   --  30  --  42*   GLUCOSE mg/dL 110* 113* 106* 115*   < > 134*    < > = values in this interval not displayed.     Current dietary intake: 0-25% of meals last 24h; supplement intake  Diet Order   Procedures    Diet: Regular/House, Cardiac, Diabetic; Healthy Heart (2-3 Na+); Consistent Carbohydrate; Fluid Consistency: Thin (IDDSI 0)     Assessment/Plan:    Patient's INR is slightly supratherapeutic at 3.66 today, increased from 3.11 yesterday.   Plan to give reduced dose of warfarin 1 mg today, preventing further supratherapeutic INR. Anticipate reduction in INR tomorrow reflecting x2 reduced doses. Once INR therapeutic will attempt to find appropriate dosing regimen.    Daily PT/INR ordered.  Monitor signs/symptoms of bleeding, dietary intake, and drug-drug interactions. Make dose adjustments as necessary.  Pharmacy will continue to follow.      Thanks,  Annette Carlson, PharmD  Pharmacy Resident  5/16/2024  11:20 EDT

## 2024-05-16 NOTE — DISCHARGE PLACEMENT REQUEST
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 (LOPRESSOR) tablet 50 mg, 50 mg, Oral, Q12H, Herbie Love MD, 50 mg at 05/15/24 0813    morphine injection 2 mg, 2 mg, Intravenous, Q2H PRN, Carlos Bermudez MD, 2 mg at 05/15/24 1202    nitroglycerin (NITROSTAT) SL tablet 0.4 mg, 0.4 mg, Sublingual, Q5 Min PRN, Shahid Peter MD    oxybutynin XL (DITROPAN-XL) 24 hr tablet 10 mg, 10 mg, Oral, Daily, Shahid Peter MD, 10 mg at 05/15/24 0813    pantoprazole (PROTONIX) EC tablet 40 mg, 40 mg, Oral, BID AC, Carlos Bermudez MD, 40 mg at 05/15/24 0813    Pharmacy to dose warfarin, , Does not apply, Continuous PRN, Shahid Peter MD    Phosphorus Replacement - Follow Nurse / BPA Driven Protocol, , Does not apply, PRN, [place] Shahid C, MD    Potassium Replacement - Follow Nurse / BPA Driven Protocol, , Does not apply, [place] YOUNGER Joseph C, MD    simethicone (MYLICON) chewable tablet 80 mg, 80 mg, Oral, 4x Daily PRNRome Joseph C, MD, 80 mg at 05/12/24 1526    sodium chloride 0.9 % flush 10 mL, 10 mL, Intravenous, Q12H,  Jose Maria Dugan MD, 10 mL at 05/15/24 0814    sodium chloride 0.9 % flush 10 mL, 10 mL, Intravenous, PRN, Jose Maria Dugan MD    sodium chloride 0.9 % infusion 40 mL, 40 mL, Intravenous, PRN, Shahid Peter MD    tamsulosin (FLOMAX) 24 hr capsule 0.4 mg, 0.4 mg, Oral, Nightly, Shahid Peter MD, 0.4 mg at 24    tiZANidine (ZANAFLEX) tablet 2 mg, 2 mg, Oral, PRN, Shahid Peter MD, 2 mg at 24 1132    warfarin (COUMADIN) tablet 2 mg, 2 mg, Oral, Once, Kishan Verdin PharmD    [START ON 2024] warfarin (COUMADIN) tablet 3 mg, 3 mg, Oral, Daily, Kishan Verdin PharmD    Antibiotics:  Anti-Infectives (From admission, onward)      Ordered     Dose/Rate Route Frequency Start Stop    24 1443  cefTRIAXone (ROCEPHIN) 2,000 mg in sodium chloride 0.9 % 100 mL MBP        Ordering Provider: Carlos Bermudez MD    2,000 mg  200 mL/hr over 30 Minutes Intravenous Every 12 Hours 24 1600 24 1559    24 1443  ampicillin 2000 mg IVPB in 100 mL NS (MBP)        Ordering Provider: Carlos Bermudez MD    2 g  200 mL/hr over 30 Minutes Intravenous Every 6 Hours 24 1500 24 1459    24 1302  vancomycin IVPB 2000 mg in 0.9% Sodium Chloride 500 mL        Ordering Provider: Bernabe Martines, PharmD    2,000 mg  250 mL/hr over 120 Minutes Intravenous Once 24 1400 24 1524    24 1225  piperacillin-tazobactam (ZOSYN) 3.375 g IVPB in 100 mL NS MBP (CD)        Ordering Provider: Desiree Garcia MD    3.375 g  over 30 Minutes Intravenous Once 24 1315 24 1302              Review of Systems:    See hpi      Physical Exam:   Vital Signs  Temp (24hrs), Av °F (36.7 °C), Min:97.3 °F (36.3 °C), Max:98.6 °F (37 °C)    Temp  Min: 97.3 °F (36.3 °C)  Max: 98.6 °F (37 °C)  BP  Min: 128/81  Max: 160/98  Pulse  Min: 63  Max: 86  Resp  Min: 16  Max: 20  SpO2  Min: 91 %  Max: 96 %    GENERAL: Awake and alert, in mild distress.   HEENT:  "Normocephalic, atraumatic.  PERRL. EOMI. No conjunctival injection. No icterus.      HEART: RRR; No murmur,  LUNGS: Clear to auscultation bilaterally   ABDOMEN: Soft, nontender,   EXT:  1+ edema  :  Without Wilkinson catheter.  MSK: No joint effusions or erythema  SKIN: no rash      Laboratory Data    Results from last 7 days   Lab Units 05/15/24  0335 05/14/24  1748 05/14/24  0430 05/13/24  0644   WBC 10*3/mm3 8.15  --  8.16 10.65   HEMOGLOBIN g/dL 8.7* 9.8* 7.9* 8.7*   HEMATOCRIT % 26.6* 30.3* 25.0* 26.6*   PLATELETS 10*3/mm3 203  --  218 209     Results from last 7 days   Lab Units 05/15/24  0335   SODIUM mmol/L 131*   POTASSIUM mmol/L 4.2   CHLORIDE mmol/L 93*   CO2 mmol/L 32.0*   BUN mg/dL 8   CREATININE mg/dL 0.73*   GLUCOSE mg/dL 113*   CALCIUM mg/dL 8.3*     Results from last 7 days   Lab Units 05/13/24  0644   ALK PHOS U/L 56   BILIRUBIN mg/dL 0.4   ALT (SGPT) U/L 12   AST (SGOT) U/L 30                           Estimated Creatinine Clearance: 115.4 mL/min (A) (by C-G formula based on SCr of 0.73 mg/dL (L)).      Microbiology:  Blood Culture   Date Value Ref Range Status   05/06/2024 Abnormal Stain (C)  Preliminary     BCID, PCR   Date Value Ref Range Status   05/06/2024 (A) Negative by BCID PCR. Culture to Follow. Final    Enterococcus faecalis. Arcelia/B (vancomycin resistance gene) not detected. Identification by BCID2 PCR.     No results found for: \"CULTURES\", \"HSVCX\", \"URCX\"  No results found for: \"EYECULTURE\", \"GCCX\", \"HSVCULTURE\", \"LABHSV\"  No results found for: \"LEGIONELLA\", \"MRSACX\", \"MUMPSCX\", \"MYCOPLASCX\"  No results found for: \"NOCARDIACX\", \"STOOLCX\"  No results found for: \"THROATCX\", \"UNSTIMCULT\", \"URINECX\", \"CULTURE\", \"VZVCULTUR\"  No results found for: \"VIRALCULTU\", \"WOUNDCX\"        Radiology:  Imaging Results (Last 72 Hours)       Procedure Component Value Units Date/Time    XR Chest 1 View [[id number]] Collected: 05/13/24 0731     Updated: 05/13/24 0736    Narrative:      XR CHEST 1 VW    Date of " Exam: 5/13/2024 1:19 AM EDT    Indication: Resp Distress    Comparison: 5/11/2024    Findings:  Patient is status post median sternotomy. Cardiac valvular prosthesis is seen. Cardiac silhouette is enlarged. Pulmonary vasculature is indistinct. There are small bilateral pleural effusions with bibasilar atelectasis, edema, or infiltrates. No   pneumothorax is seen. No acute osseous lesion is seen. There are senescent changes of the skeletal structures. Left arm PICC terminates overlying the SVC.      Impression:      Impression:  1.Cardiomegaly with interstitial pulmonary edema, small bilateral pleural effusions, and bibasilar atelectasis, edema, or infiltrates.      Electronically Signed: Matthias Dobbs MD    5/13/2024 7:33 AM EDT    Workstation ID: [id number]              Impression:   Enterococcus faecalis bacteremia  MV endocarditis of mechanical valve  Hypotension  Bioprosthetic AVR  Supratherapeutic INR  Anemia  Elevated procalcitonin  History of GI bleed with gastric AVM  Thoracic back pain    PLAN/RECOMMENDATIONS:   Thank you for asking us to see Kaleb Abraham, I recommend the following:  High-grade enterococcal bacteremia is concerning in the setting of endovascular hardware.    Estimated Creatinine Clearance: 115.4 mL/min (A) (by C-G formula based on SCr of 0.73 mg/dL (L)).    Patient could have a GI source such as inflammation of colonic viscus (superimposed diverticulitis and diverticulosis) or bacteremia  following endoscopy regarding management of a GIbleed.    Gallbladder has been removed    Repeat blood cultures x 2 sets     Enterococcus isolate is susceptible to ampicillin and ceftriaxone        cont ampicillin 2 g IV every 4 hours    cont ceftriaxone 2 g IV every 12 hours    Duration is 6 weeks      Patient agreeable to rehabilitation        Case discussed with cardiology    Plan is 6 weeks iv abx followed by chronic oral abx suppression    [facility]  Lino Jacob,  MD  5/15/2024  12:11 EDT                    Electronically signed by Lino Jacob MD at 05/15/24 1212          Physical Therapy Notes (most recent note)        Brianna Velarde at 24 0915  Version 1 of 1         Patient Name: Kaleb Abraham  : 1950    MRN: [id number]                              Today's Date: 2024       Admit Date: 2024    Visit Dx:     ICD-10-CM ICD-9-CM   1. Acute on chronic anemia  D64.9 285.9   2. S/P mechanical aortic valve  Z95.2 V43.3   3. Supratherapeutic INR  R79.1 790.92   4. History of GI bleed  Z87.19 V12.79   5. Anticoagulated on Coumadin  Z79.01 V58.61   6. Generalized weakness  R53.1 780.79   7. History of CHF (congestive heart failure)  Z86.79 V12.59   8. Weakness  R53.1 780.79     Patient Active Problem List   Diagnosis    Anemia    Aortic stenosis    CAD s/p CABG    T2DM    Hyperlipidemia    Hypertension    Hypothyroidism    Mitral regurgitation    MARTINEZ    Hypersomnia, unspecified    Chronic atrial fibrillation    Supratherapeutic INR    S/P prosthetic aortic valve    S/P mechanical aortic valve    Chronic anticoagulation (warfarin)    Prosthetic valve endocarditis (Mitral Valve)    S/P VT/TdP Arrest 5/10/2024    Enterococcal bacteremia     Past Medical History:   Diagnosis Date    Anemia     Aortic stenosis     CAD (coronary artery disease)     Diabetes mellitus     Hyperlipidemia     Hypertension     Hypothyroidism     Mitral regurgitation      Past Surgical History:   Procedure Laterality Date    CAPSULE ENDOSCOPY      2023 and 2023 per dr. schrader    COLONOSCOPY      2023 Dr. Schrader    CORONARY ARTERY BYPASS GRAFT      CORONARY STENT PLACEMENT      UPPER GASTROINTESTINAL ENDOSCOPY      2023 Dr. Schrader, 2023 Dr. Liu, 2024 Dr. Stout Hepler      General Information       Row Name 24 0952          Physical Therapy Time and Intention    Document Type therapy note (daily note)  -ML     Mode of  Treatment physical therapy  -       Row Name 05/16/24 0952          General Information    Patient Profile Reviewed yes  -     Existing Precautions/Restrictions fall;cardiac  -     Barriers to Rehab medically complex;cognitive status  -[facility] Name 05/16/24 0952          Cognition    Orientation Status (Cognition) disoriented to;person;place;situation;time;other (see comments)   patient initially disorientated, after standing/mobility patient oriented to self and identifies wife, disoriented to place, cues for month and year  -[facility] Name 05/16/24 0952          Safety Issues, Functional Mobility    Safety Issues Affecting Function (Mobility) ability to follow commands;awareness of need for assistance;insight into deficits/self-awareness;safety precaution awareness;safety precautions follow-through/compliance;sequencing abilities  -     Impairments Affecting Function (Mobility) balance;endurance/activity tolerance;strength;coordination;postural/trunk control;motor planning  -               User Key  (r) = Recorded By, (t) = Taken By, (c) = Cosigned By      Initials Name Provider Type     Brianna Velarde Physical Therapist                   Mobility       [facility] Name 05/16/24 0954          Bed Mobility    Comment, (Bed Mobility) patient found and left seated in bedside chair  -[facility] Name 05/16/24 0954          Sit-Stand Transfer    Sit-Stand [place] (Transfers) verbal cues;nonverbal cues (demo/gesture);moderate assist (50% patient effort);2 person assist;other (see comments)  patient required minAx1 on second sit to stand transfer  -     Assistive Device (Sit-Stand Transfers) walker, front-wheeled  -     Comment, (Sit-Stand Transfer) verbal cues for hand placement and forward weight shift  -[facility] Name 05/16/24 0954          Gait/Stairs (Locomotion)    [place] Level (Gait) verbal cues;minimum assist (75% patient effort);1 person assist  -     Assistive Device (Gait)  walker, front-wheeled  -ML     Distance in Feet (Gait) 3  forwards/backwards  -ML     Deviations/Abnormal Patterns (Gait) gait speed decreased;stride length decreased;bilateral deviations;carlos decreased;festinating/shuffling  -ML     Bilateral Gait Deviations forward flexed posture;heel strike decreased  -ML               User Key  (r) = Recorded By, (t) = Taken By, (c) = Cosigned By      Initials Name Provider Type    ML Brianna Velarde Physical Therapist                   Obj/Interventions       Row Name 05/16/24 0956          Hip (Therapeutic Exercise)    Hip (Therapeutic Exercise) strengthening exercise  -ML     Hip Strengthening (Therapeutic Exercise) bilateral;aDduction;aBduction;marching while seated;10 repetitions  -ML       Row Name 05/16/24 0956          Knee (Therapeutic Exercise)    Knee (Therapeutic Exercise) strengthening exercise  -ML     Knee Strengthening (Therapeutic Exercise) bilateral;LAQ (long arc quad);10 repetitions  -ML       Row Name 05/16/24 0956          Balance    Balance Assessment sitting static balance;sitting dynamic balance;sit to stand dynamic balance;standing static balance;standing dynamic balance  -ML     Static Sitting Balance standby assist  -ML     Dynamic Sitting Balance contact guard  -ML     Position, Sitting Balance unsupported;sitting edge of bed  -ML     Sit to Stand Dynamic Balance verbal cues;minimal assist;1-person assist;moderate assist;2-person assist;other (see comments)  varying levels of assist  -ML     Static Standing Balance minimal assist;moderate assist;1-person assist;other (see comments)  varying levels of assist  -ML     Dynamic Standing Balance minimal assist;1-person assist;moderate assist;2-person assist;other (see comments)  varying levels of assist  -ML     Position/Device Used, Standing Balance supported;walker, front-wheeled  -ML     Balance Interventions sitting;standing;sit to stand;supported;occupation based/functional task  -ML     Comment,  Balance patient initially with posterior lean on first sit to stand transfer, on second transfer patient with improvement in forward weight shift  -ML               User Key  (r) = Recorded By, (t) = Taken By, (c) = Cosigned By      Initials Name Provider Type    Brianna Smith Physical Therapist                   Goals/Plan    No documentation.                  Clinical Impression       Row Name 05/16/24 0959          Pain    Pretreatment Pain Rating 0/10 - no pain  -ML     Posttreatment Pain Rating 0/10 - no pain  -ML       Row Name 05/16/24 0959          Plan of Care Review    Plan of Care Reviewed With patient;spouse  -ML     Progress declining  -ML     Outcome Evaluation Physical therapy teratment complete. The patient initially disorientated x4, after mobility patient oriented to person, cues for time. Patient required freuqent verbal cues to stay on task. Patient limited in ambulation distance compared to previous treatment session. Patient continues to present below baseline for mobility and would continue to benefit from skilled PT to address strength, balance and activity tolerance deficits. Continue current PT POC.  -ML       [facility] Name 05/16/24 0959          Vital Signs    Pre Systolic BP Rehab 88  -ML     Pre Treatment Diastolic BP 52  -ML     Intra Systolic BP Rehab 103  -ML     Intra Treatment Diastolic BP 66  -ML     Posttreatment Heart Rate (beats/min) 73  -ML     Pre SpO2 (%) 93  -ML     O2 Delivery Pre Treatment room air  -ML     Post SpO2 (%) 93  -ML     O2 Delivery Post Treatment room air  -ML     Pre Patient Position Sitting  -ML     Intra Patient Position Standing  -ML     Post Patient Position Sitting  -ML       Row Name 05/16/24 0959          Positioning and Restraints    Pre-Treatment Position sitting in chair/recliner  -ML     Post Treatment Position chair  -ML     In Chair notified nsg;reclined;call light within reach;encouraged to call for assist;exit alarm on;with family/caregiver;waffle  cushion;legs elevated;with nsg  -ML               User Key  (r) = Recorded By, (t) = Taken By, (c) = Cosigned By      Initials Name Provider Type    Brianna Smith Physical Therapist                   Outcome Measures       Row Name 05/16/24 1002          How much help from another person do you currently need...    Turning from your back to your side while in flat bed without using bedrails? 2  -ML     Moving from lying on back to sitting on the side of a flat bed without bedrails? 2  -ML     Moving to and from a bed to a chair (including a wheelchair)? 2  -ML     Standing up from a chair using your arms (e.g., wheelchair, bedside chair)? 3  -ML     Climbing 3-5 steps with a railing? 1  -ML     To walk in hospital room? 2  -ML     AM-PAC 6 Clicks Score (PT) 12  -ML     Highest Level of Mobility Goal 4 --> Transfer to chair/commode  -ML       Row Name 05/16/24 1002          Functional Assessment    Outcome Measure Options AM-PAC 6 Clicks Basic Mobility (PT)  -ML               User Key  (r) = Recorded By, (t) = Taken By, (c) = Cosigned By      Initials Name Provider Type    Brianna Smith Physical Therapist                                 Physical Therapy Education       Title: PT OT SLP Therapies (In Progress)       Topic: Physical Therapy (In Progress)       Point: Mobility training (In Progress)       Learning Progress Summary             Patient Acceptance, E, NR by ML at 5/16/2024 1003    Acceptance, E,TB, VU by EB at 5/14/2024 1307    Acceptance, E, NR by ND at 5/10/2024 1541    Acceptance, E, VU by SG at 5/9/2024 1658    Acceptance, E,TB, NR by ES at 5/8/2024 1405   Family Acceptance, E, NR by ML at 5/16/2024 1003    Acceptance, E,TB, VU by EB at 5/14/2024 1307   Significant Other Acceptance, E,TB, NR by ES at 5/8/2024 1405                         Point: Home exercise program (In Progress)       Learning Progress Summary             Patient Acceptance, E, NR by ML at 5/16/2024 1003    Acceptance, E,TB, VU by  EB at 5/14/2024 1307    Acceptance, E, VU by SG at 5/9/2024 1658   Family Acceptance, E, NR by ML at 5/16/2024 1003    Acceptance, E,TB, VU by EB at 5/14/2024 1307                         Point: Body mechanics (In Progress)       Learning Progress Summary             Patient Acceptance, E, NR by ML at 5/16/2024 1003    Acceptance, E,TB, VU by EB at 5/14/2024 1307    Acceptance, E, NR by ND at 5/10/2024 1541    Acceptance, E, VU by SG at 5/9/2024 1658    Acceptance, E,TB, NR by ES at 5/8/2024 1405   Family Acceptance, E, NR by ML at 5/16/2024 1003    Acceptance, E,TB, VU by EB at 5/14/2024 1307   Significant Other Acceptance, E,TB, NR by ES at 5/8/2024 1405                         Point: Precautions (In Progress)       Learning Progress Summary             Patient Acceptance, E, NR by ML at 5/16/2024 1003    Acceptance, E,TB, VU by EB at 5/14/2024 1307    Acceptance, E, NR by ND at 5/10/2024 1541    Acceptance, E, VU by SG at 5/9/2024 1658    Acceptance, E,TB, NR by ES at 5/8/2024 1405   Family Acceptance, E, NR by ML at 5/16/2024 1003    Acceptance, E,TB, VU by EB at 5/14/2024 1307   Significant Other Acceptance, E,TB, NR by ES at 5/8/2024 1405                                         User Key       Initials Effective Dates Name Provider Type Discipline    EB 09/22/22 -  Hever Andersen, COLEMAN Registered Nurse Nurse    ML 04/22/21 -  Brianna Velarde Physical Therapist PT    SG 09/22/22 -  Roger Crews, RN Registered Nurse Nurse    ES 08/11/22 -  Renee Mendoza, JP Physical Therapist PT    ND 11/16/23 -  Helen Simpson, JP Physical Therapist PT                  PT Recommendation and Plan     Plan of Care Reviewed With: patient, spouse  Progress: declining  Outcome Evaluation: Physical therapy teratment complete. The patient initially disorientated x4, after mobility patient oriented to person, cues for time. Patient required freuqent verbal cues to stay on task. Patient limited in ambulation distance compared to  previous treatment session. Patient continues to present below baseline for mobility and would continue to benefit from skilled PT to address strength, balance and activity tolerance deficits. Continue current PT POC.     Time Calculation:         PT Charges       Row Name 24 1004             Time Calculation    Start Time 0915  -ML      PT Received On 24  -ML         Timed Charges    88705 - PT Therapeutic Exercise Minutes 8  -ML      76776 - PT Therapeutic Activity Minutes 20  -ML         Total Minutes    Timed Charges Total Minutes 28  -ML       Total Minutes 28  -ML                User Key  (r) = Recorded By, (t) = Taken By, (c) = Cosigned By      Initials Name Provider Type    Brianna Smith Physical Therapist                  Therapy Charges for Today       Code Description Service Date Service Provider Modifiers Qty    41990478689 HC PT THER PROC EA 15 MIN 2024 Brianna Velarde GP 1    83728567101 HC PT THERAPEUTIC ACT EA 15 MIN 2024 Brianna Velarde GP 1            PT G-Codes  Outcome Measure Options: AM-PAC 6 Clicks Basic Mobility (PT)  AM-PAC 6 Clicks Score (PT): 12  AM-PAC 6 Clicks Score (OT): 16  PT Discharge Summary  Anticipated Discharge Disposition (PT): inpatient rehabilitation facility    Brianna Velarde  2024      Electronically signed by Brianna Velarde at 24 1005          Occupational Therapy Notes (most recent note)        Margaret Ramírez, HUMBERTO at 05/15/24 1411          Patient Name: Kaleb Abraham  : 1950    MRN: [id number]                              Today's Date: 5/15/2024       Admit Date: 2024    Visit Dx:     ICD-10-CM ICD-9-CM   1. Acute on chronic anemia  D64.9 285.9   2. S/P mechanical aortic valve  Z95.2 V43.3   3. Supratherapeutic INR  R79.1 790.92   4. History of GI bleed  Z87.19 V12.79   5. Anticoagulated on Coumadin  Z79.01 V58.61   6. Generalized weakness  R53.1 780.79   7. History of CHF (congestive heart failure)  Z86.79 V12.59   8. Weakness  R53.1  780.79     Patient Active Problem List   Diagnosis    Anemia    Aortic stenosis    CAD s/p CABG    T2DM    Hyperlipidemia    Hypertension    Hypothyroidism    Mitral regurgitation    MARTINEZ    Hypersomnia, unspecified    Chronic atrial fibrillation    Supratherapeutic INR    S/P prosthetic aortic valve    S/P mechanical aortic valve    Chronic anticoagulation (warfarin)    Prosthetic valve endocarditis (Mitral Valve)    S/P VT/TdP Arrest 5/10/2024    Enterococcal bacteremia     Past Medical History:   Diagnosis Date    Anemia     Aortic stenosis     CAD (coronary artery disease)     Diabetes mellitus     Hyperlipidemia     Hypertension     Hypothyroidism     Mitral regurgitation      Past Surgical History:   Procedure Laterality Date    CAPSULE ENDOSCOPY      2/22/2023 and 9/25/2023 per dr. schrader    COLONOSCOPY      2/2023 Dr. Schrader    CORONARY ARTERY BYPASS GRAFT      CORONARY STENT PLACEMENT      UPPER GASTROINTESTINAL ENDOSCOPY      2/7/2023 Dr. Schrader, 7/26/2023 Dr. Liu, 4/2024 Dr. Stout Motley      General Information       Row Name 05/15/24 1448          OT Time and Intention    Document Type therapy note (daily note)  -     Mode of Treatment occupational therapy  -       Row Name 05/15/24 1448          General Information    Patient Profile Reviewed yes  -     Existing Precautions/Restrictions fall;cardiac  -     Barriers to Rehab medically complex  -       Row Name 05/15/24 1448          Cognition    Orientation Status (Cognition) oriented x 3  -       Row Name 05/15/24 1448          Safety Issues, Functional Mobility    Safety Issues Affecting Function (Mobility) awareness of need for assistance;safety precaution awareness;insight into deficits/self-awareness  -     Impairments Affecting Function (Mobility) balance;endurance/activity tolerance;strength;pain;coordination  -               User Key  (r) = Recorded By, (t) = Taken By, (c) = Cosigned By      Initials  Name Provider Type    Margaret Flores OT Occupational Therapist                     Mobility/ADL's       Row Name 05/15/24 1450          Bed Mobility    Supine-Sit [place] (Bed Mobility) moderate assist (50% patient effort);1 person assist;verbal cues  -     Bed Mobility, Safety Issues impaired trunk control for bed mobility  -     Assistive Device (Bed Mobility) head of bed elevated;bed rails  -       Row Name 05/15/24 1450          Transfers    Transfers sit-stand transfer;stand-sit transfer  -       Row Name 05/15/24 1450          Sit-Stand Transfer    Sit-Stand [place] (Transfers) minimum assist (75% patient effort);1 person assist;verbal cues  -     Assistive Device (Sit-Stand Transfers) walker, front-wheeled  -       Row Name 05/15/24 1450          Stand-Sit Transfer    Stand-Sit [place] (Transfers) minimum assist (75% patient effort);1 person assist;verbal cues  -     Assistive Device (Stand-Sit Transfers) walker, front-wheeled  -KL       Row Name 05/15/24 1450          Functional Mobility    Functional Mobility- Ind. Level contact guard assist;1 person  -     Functional Mobility- Device walker, front-wheeled  -     Functional Mobility-Distance (Feet) --  40'  -     Functional Mobility- Safety Issues step length decreased  -               User Key  (r) = Recorded By, (t) = Taken By, (c) = Cosigned By      Initials Name Provider Type    Margaret Flores OT Occupational Therapist                   Obj/Interventions       Row Name 05/15/24 1455          Shoulder (Therapeutic Exercise)    Shoulder (Therapeutic Exercise) AROM (active range of motion)  -     Shoulder AROM (Therapeutic Exercise) bilateral;flexion;extension;aBduction;aDduction;10 repetitions;sitting  -       Row Name 05/15/24 1455          Elbow/Forearm (Therapeutic Exercise)    Elbow/Forearm (Therapeutic Exercise) AROM (active range of motion)  -     Elbow/Forearm AROM (Therapeutic Exercise)  bilateral;extension;flexion;10 repetitions  -       Row Name 05/15/24 1455          Motor Skills    Therapeutic Exercise shoulder;elbow/forearm  -[facility] Name 05/15/24 1455          Balance    Static Sitting Balance supervision  -     Dynamic Sitting Balance supervision  -     Position, Sitting Balance unsupported;sitting edge of bed  -     Static Standing Balance contact guard  -     Dynamic Standing Balance contact guard  -     Position/Device Used, Standing Balance supported;walker, front-wheeled  -     Balance Interventions sitting;standing;sit to stand;supported;static;dynamic;occupation based/functional task  -               User Key  (r) = Recorded By, (t) = Taken By, (c) = Cosigned By      Initials Name Provider Type    Margaret Flores OT Occupational Therapist                   Goals/Plan    No documentation.                  Clinical Impression       [facility] Name 05/15/24 1457          Pain Scale: FACES Pre/Post-Treatment    Pain: FACES Scale, Pretreatment 2-->hurts little bit  -KL     Posttreatment Pain Rating 2-->hurts little bit  -KL     Pain Location generalized  -     Pain Location - back  -[facility] Name 05/15/24 1457          Plan of Care Review    Plan of Care Reviewed With patient;spouse  -     Progress improving  -     Outcome Evaluation Pt continues to demo improvement w/ therapy. modAx1 for bed mobility and Garrison STS. decreased B shldr AROM ~50%. Will continue POC to progress towards PLOF. d/c rec is IPR.  -[facility] Name 05/15/24 1457          Therapy Plan Review/Discharge Plan (OT)    Anticipated Discharge Disposition (OT) inpatient rehabilitation facility  -[facility] Name 05/15/24 1457          Vital Signs    Pretreatment Heart Rate (beats/min) 73  -KL     Posttreatment Heart Rate (beats/min) 76  -     Pre SpO2 (%) 95  -KL     O2 Delivery Pre Treatment room air  -     Post SpO2 (%) 90  -KL     O2 Delivery Post Treatment room air  -     Pre Patient  Position Supine  -KL     Intra Patient Position Standing  -KL     Post Patient Position Sitting  -KL       Row Name 05/15/24 1457          Positioning and Restraints    Pre-Treatment Position in bed  -KL     Post Treatment Position chair  -KL     In Chair notified nsg;reclined;sitting;call light within reach;encouraged to call for assist;exit alarm on;with family/caregiver;waffle cushion;legs elevated  -               User Key  (r) = Recorded By, (t) = Taken By, (c) = Cosigned By      Initials Name Provider Type    Margaret Flores OT Occupational Therapist                   Outcome Measures       Row Name 05/15/24 1459          How much help from another is currently needed...    Putting on and taking off regular lower body clothing? 2  -KL     Bathing (including washing, rinsing, and drying) 2  -KL     Toileting (which includes using toilet bed pan or urinal) 2  -KL     Putting on and taking off regular upper body clothing 3  -KL     Taking care of personal grooming (such as brushing teeth) 3  -KL     Eating meals 4  -KL     AM-PAC 6 Clicks Score (OT) 16  -KL       Row Name 05/15/24 1459          Functional Assessment    Outcome Measure Options AM-PAC 6 Clicks Daily Activity (OT)  -               User Key  (r) = Recorded By, (t) = Taken By, (c) = Cosigned By      Initials Name Provider Type    Margaret Flores OT Occupational Therapist                    Occupational Therapy Education       Title: PT OT SLP Therapies (In Progress)       Topic: Occupational Therapy (In Progress)       Point: ADL training (In Progress)       Description:   Instruct learner(s) on proper safety adaptation and remediation techniques during self care or transfers.   Instruct in proper use of assistive devices.                  Learning Progress Summary             Patient Acceptance, E, VU,NR by TORIN at 5/14/2024 1523    Acceptance, E,TB, VU by DOROTA at 5/14/2024 1307    Acceptance, E, NR by TORIN at 5/13/2024 1125    Acceptance, E, NR by TORIN  at 5/8/2024 1529   Family Acceptance, E,TB, VU by EB at 5/14/2024 1307   Significant Other Acceptance, E, NR by KL at 5/13/2024 1125    Acceptance, E, NR by KL at 5/8/2024 1529                         Point: Home exercise program (Done)       Description:   Instruct learner(s) on appropriate technique for monitoring, assisting and/or progressing therapeutic exercises/activities.                  Learning Progress Summary             Patient Acceptance, E,D, VU,NR by KL at 5/15/2024 1459    Acceptance, E,TB, VU by EB at 5/14/2024 1307   Family Acceptance, E,TB, VU by EB at 5/14/2024 1307   Significant Other Acceptance, E,D, VU,NR by KL at 5/15/2024 1459                         Point: Precautions (Done)       Description:   Instruct learner(s) on prescribed precautions during self-care and functional transfers.                  Learning Progress Summary             Patient Acceptance, E,D, VU,NR by KL at 5/15/2024 1459    Acceptance, E, VU,NR by KL at 5/14/2024 1523    Acceptance, E,TB, VU by EB at 5/14/2024 1307    Acceptance, E, NR by KL at 5/13/2024 1125    Acceptance, E, NR by KL at 5/10/2024 1347    Acceptance, E, NR by KL at 5/8/2024 1529   Family Acceptance, E,TB, VU by EB at 5/14/2024 1307   Significant Other Acceptance, E,D, VU,NR by  at 5/15/2024 1459    Acceptance, E, NR by  at 5/13/2024 1125    Acceptance, E, NR by KL at 5/10/2024 1347    Acceptance, E, NR by KL at 5/8/2024 1529                         Point: Body mechanics (Done)       Description:   Instruct learner(s) on proper positioning and spine alignment during self-care, functional mobility activities and/or exercises.                  Learning Progress Summary             Patient Acceptance, E,D, VU,NR by KL at 5/15/2024 1459    Acceptance, E, VU,NR by KL at 5/14/2024 1523    Acceptance, E,TB, VU by EB at 5/14/2024 1307    Acceptance, E, NR by KL at 5/13/2024 1125    Acceptance, E, NR by KL at 5/10/2024 1347    Acceptance, E, NR by KL at  5/8/2024 1529   Family Acceptance, E,TB, VU by  at 5/14/2024 1307   Significant Other Acceptance, E,D, VU,NR by  at 5/15/2024 1459    Acceptance, E, NR by  at 5/13/2024 1125    Acceptance, E, NR by  at 5/10/2024 1347    Acceptance, E, NR by  at 5/8/2024 1529                                         User Key       Initials Effective Dates Name Provider Type Discipline    DOROTA 09/22/22 -  Hever Andersen RN Registered Nurse Nurse     02/05/24 -  Margaret Ramírez OT Occupational Therapist OT                  OT Recommendation and Plan  Planned Therapy Interventions (OT): activity tolerance training, adaptive equipment training, functional balance retraining, occupation/activity based interventions, patient/caregiver education/training, ROM/therapeutic exercise, strengthening exercise, transfer/mobility retraining  Therapy Frequency (OT): daily  Plan of Care Review  Plan of Care Reviewed With: patient, spouse  Progress: improving  Outcome Evaluation: Pt continues to demo improvement w/ therapy. modAx1 for bed mobility and Garrison STS. decreased B shldr AROM ~50%. Will continue POC to progress towards PLOF. d/c rec is IPR.     Time Calculation:   Evaluation Complexity (OT)  Review Occupational Profile/Medical/Therapy History Complexity: expanded/moderate complexity  Assessment, Occupational Performance/Identification of Deficit Complexity: 3-5 performance deficits  Clinical Decision Making Complexity (OT): detailed assessment/moderate complexity  Overall Complexity of Evaluation (OT): moderate complexity     Time Calculation- OT       Row Name 05/15/24 1459             Time Calculation- OT    OT Start Time 1411  -KL      OT Received On 05/15/24  -         Timed Charges    29509 - OT Therapeutic Exercise Minutes 10  -KL      60828 - OT Therapeutic Activity Minutes 19  -KL         Total Minutes    Timed Charges Total Minutes 29  -KL       Total Minutes 29  -KL                User Key  (r) = Recorded By, (t) = Taken By,  (c) = Cosigned By      Initials Name Provider Type    Margaret Flores OT Occupational Therapist                  Therapy Charges for Today       Code Description Service Date Service Provider Modifiers Qty    84647850697 HC OT THERAPEUTIC ACT EA 15 MIN 5/14/2024 Margaret Ramírez OT GO 1    89710223200 HC OT THER PROC EA 15 MIN 5/15/2024 Margaret Ramírez OT GO 1    13993689898 HC OT THERAPEUTIC ACT EA 15 MIN 5/15/2024 Margaret Ramírez OT GO 1                 Margaret Ramírez OT  5/15/2024    Electronically signed by Margaret Ramírez OT at 05/15/24 1500

## 2024-05-16 NOTE — THERAPY TREATMENT NOTE
Patient Name: Kaleb Abraham  : 1950    MRN: [id number]                              Today's Date: 2024       Admit Date: 2024    Visit Dx:     ICD-10-CM ICD-9-CM   1. Acute on chronic anemia  D64.9 285.9   2. S/P mechanical aortic valve  Z95.2 V43.3   3. Supratherapeutic INR  R79.1 790.92   4. History of GI bleed  Z87.19 V12.79   5. Anticoagulated on Coumadin  Z79.01 V58.61   6. Generalized weakness  R53.1 780.79   7. History of CHF (congestive heart failure)  Z86.79 V12.59   8. Weakness  R53.1 780.79     Patient Active Problem List   Diagnosis    Anemia    Aortic stenosis    CAD s/p CABG    T2DM    Hyperlipidemia    Hypertension    Hypothyroidism    Mitral regurgitation    MARTINEZ    Hypersomnia, unspecified    Chronic atrial fibrillation    Supratherapeutic INR    S/P prosthetic aortic valve    S/P mechanical aortic valve    Chronic anticoagulation (warfarin)    Prosthetic valve endocarditis (Mitral Valve)    S/P VT/TdP Arrest 5/10/2024    Enterococcal bacteremia     Past Medical History:   Diagnosis Date    Anemia     Aortic stenosis     CAD (coronary artery disease)     Diabetes mellitus     Hyperlipidemia     Hypertension     Hypothyroidism     Mitral regurgitation      Past Surgical History:   Procedure Laterality Date    CAPSULE ENDOSCOPY      2023 and 2023 per dr. schrader    COLONOSCOPY      2023 Dr. Schrader    CORONARY ARTERY BYPASS GRAFT      CORONARY STENT PLACEMENT      UPPER GASTROINTESTINAL ENDOSCOPY      2023 Dr. Schrader, 2023 Dr. Liu, 2024 Dr. Stout Halstad      General Information       Row Name 24 0952          Physical Therapy Time and Intention    Document Type therapy note (daily note)  -ML     Mode of Treatment physical therapy  -ML       Row Name 24 0952          General Information    Patient Profile Reviewed yes  -ML     Existing Precautions/Restrictions fall;cardiac  -ML     Barriers to Rehab medically  complex;cognitive status  -       Row Name 05/16/24 0952          Cognition    Orientation Status (Cognition) disoriented to;person;place;situation;time;other (see comments)   patient initially disorientated, after standing/mobility patient oriented to self and identifies wife, disoriented to place, cues for month and year  -       Row Name 05/16/24 0952          Safety Issues, Functional Mobility    Safety Issues Affecting Function (Mobility) ability to follow commands;awareness of need for assistance;insight into deficits/self-awareness;safety precaution awareness;safety precautions follow-through/compliance;sequencing abilities  -     Impairments Affecting Function (Mobility) balance;endurance/activity tolerance;strength;coordination;postural/trunk control;motor planning  -               User Key  (r) = Recorded By, (t) = Taken By, (c) = Cosigned By      Initials Name Provider Type     Brianna Velarde Physical Therapist                   Mobility       Row Name 05/16/24 0954          Bed Mobility    Comment, (Bed Mobility) patient found and left seated in bedside chair  -       Row Name 05/16/24 0954          Sit-Stand Transfer    Sit-Stand Luna (Transfers) verbal cues;nonverbal cues (demo/gesture);moderate assist (50% patient effort);2 person assist;other (see comments)  patient required minAx1 on second sit to stand transfer  -ML     Assistive Device (Sit-Stand Transfers) walker, front-wheeled  -ML     Comment, (Sit-Stand Transfer) verbal cues for hand placement and forward weight shift  -       Row Name 05/16/24 0954          Gait/Stairs (Locomotion)    Luna Level (Gait) verbal cues;minimum assist (75% patient effort);1 person assist  -ML     Assistive Device (Gait) walker, front-wheeled  -ML     Distance in Feet (Gait) 3  forwards/backwards  -ML     Deviations/Abnormal Patterns (Gait) gait speed decreased;stride length decreased;bilateral deviations;carlos  decreased;festinating/shuffling  -ML     Bilateral Gait Deviations forward flexed posture;heel strike decreased  -ML               User Key  (r) = Recorded By, (t) = Taken By, (c) = Cosigned By      Initials Name Provider Type    ML Brianna Velarde Physical Therapist                   Obj/Interventions       Row Name 05/16/24 0956          Hip (Therapeutic Exercise)    Hip (Therapeutic Exercise) strengthening exercise  -ML     Hip Strengthening (Therapeutic Exercise) bilateral;aDduction;aBduction;marching while seated;10 repetitions  -ML       Row Name 05/16/24 0956          Knee (Therapeutic Exercise)    Knee (Therapeutic Exercise) strengthening exercise  -ML     Knee Strengthening (Therapeutic Exercise) bilateral;LAQ (long arc quad);10 repetitions  -ML       Row Name 05/16/24 0956          Balance    Balance Assessment sitting static balance;sitting dynamic balance;sit to stand dynamic balance;standing static balance;standing dynamic balance  -ML     Static Sitting Balance standby assist  -ML     Dynamic Sitting Balance contact guard  -ML     Position, Sitting Balance unsupported;sitting edge of bed  -ML     Sit to Stand Dynamic Balance verbal cues;minimal assist;1-person assist;moderate assist;2-person assist;other (see comments)  varying levels of assist  -ML     Static Standing Balance minimal assist;moderate assist;1-person assist;other (see comments)  varying levels of assist  -ML     Dynamic Standing Balance minimal assist;1-person assist;moderate assist;2-person assist;other (see comments)  varying levels of assist  -ML     Position/Device Used, Standing Balance supported;walker, front-wheeled  -ML     Balance Interventions sitting;standing;sit to stand;supported;occupation based/functional task  -ML     Comment, Balance patient initially with posterior lean on first sit to stand transfer, on second transfer patient with improvement in forward weight shift  -ML               User Key  (r) = Recorded By, (t) =  Taken By, (c) = Cosigned By      Initials Name Provider Type    ML Brianna Velarde Physical Therapist                   Goals/Plan    No documentation.                  Clinical Impression       Row Name 05/16/24 0959          Pain    Pretreatment Pain Rating 0/10 - no pain  -ML     Posttreatment Pain Rating 0/10 - no pain  -ML       Row Name 05/16/24 0959          Plan of Care Review    Plan of Care Reviewed With patient;spouse  -ML     Progress declining  -ML     Outcome Evaluation Physical therapy teratment complete. The patient initially disorientated x4, after mobility patient oriented to person, cues for time. Patient required freuqent verbal cues to stay on task. Patient limited in ambulation distance compared to previous treatment session. Patient continues to present below baseline for mobility and would continue to benefit from skilled PT to address strength, balance and activity tolerance deficits. Continue current PT POC.  -ML       Row Name 05/16/24 0959          Vital Signs    Pre Systolic BP Rehab 88  -ML     Pre Treatment Diastolic BP 52  -ML     Intra Systolic BP Rehab 103  -ML     Intra Treatment Diastolic BP 66  -ML     Posttreatment Heart Rate (beats/min) 73  -ML     Pre SpO2 (%) 93  -ML     O2 Delivery Pre Treatment room air  -ML     Post SpO2 (%) 93  -ML     O2 Delivery Post Treatment room air  -ML     Pre Patient Position Sitting  -ML     Intra Patient Position Standing  -ML     Post Patient Position Sitting  -ML       Row Name 05/16/24 0959          Positioning and Restraints    Pre-Treatment Position sitting in chair/recliner  -ML     Post Treatment Position chair  -ML     In Chair notified nsg;reclined;call light within reach;encouraged to call for assist;exit alarm on;with family/caregiver;waffle cushion;legs elevated;with nsg  -ML               User Key  (r) = Recorded By, (t) = Taken By, (c) = Cosigned By      Initials Name Provider Type    Brianna Smith Physical Therapist                    Outcome Measures       Row Name 05/16/24 1002          How much help from another person do you currently need...    Turning from your back to your side while in flat bed without using bedrails? 2  -ML     Moving from lying on back to sitting on the side of a flat bed without bedrails? 2  -ML     Moving to and from a bed to a chair (including a wheelchair)? 2  -ML     Standing up from a chair using your arms (e.g., wheelchair, bedside chair)? 3  -ML     Climbing 3-5 steps with a railing? 1  -ML     To walk in hospital room? 2  -ML     AM-PAC 6 Clicks Score (PT) 12  -ML     Highest Level of Mobility Goal 4 --> Transfer to chair/commode  -ML       Row Name 05/16/24 1002          Functional Assessment    Outcome Measure Options AM-PAC 6 Clicks Basic Mobility (PT)  -ML               User Key  (r) = Recorded By, (t) = Taken By, (c) = Cosigned By      Initials Name Provider Type    Brianna Smith Physical Therapist                                 Physical Therapy Education       Title: PT OT SLP Therapies (In Progress)       Topic: Physical Therapy (In Progress)       Point: Mobility training (In Progress)       Learning Progress Summary             Patient Acceptance, E, NR by ML at 5/16/2024 1003    Acceptance, E,TB, VU by EB at 5/14/2024 1307    Acceptance, E, NR by ND at 5/10/2024 1541    Acceptance, E, VU by SG at 5/9/2024 1658    Acceptance, E,TB, NR by ES at 5/8/2024 1405   Family Acceptance, E, NR by ML at 5/16/2024 1003    Acceptance, E,TB, VU by EB at 5/14/2024 1307   Significant Other Acceptance, E,TB, NR by ES at 5/8/2024 1405                         Point: Home exercise program (In Progress)       Learning Progress Summary             Patient Acceptance, E, NR by ML at 5/16/2024 1003    Acceptance, E,TB, VU by EB at 5/14/2024 1307    Acceptance, E, VU by SG at 5/9/2024 1658   Family Acceptance, E, NR by ML at 5/16/2024 1003    Acceptance, E,TB, VU by EB at 5/14/2024 1307                         Point:  Body mechanics (In Progress)       Learning Progress Summary             Patient Acceptance, E, NR by ML at 5/16/2024 1003    Acceptance, E,TB, VU by EB at 5/14/2024 1307    Acceptance, E, NR by ND at 5/10/2024 1541    Acceptance, E, VU by SG at 5/9/2024 1658    Acceptance, E,TB, NR by ES at 5/8/2024 1405   Family Acceptance, E, NR by ML at 5/16/2024 1003    Acceptance, E,TB, VU by EB at 5/14/2024 1307   Significant Other Acceptance, E,TB, NR by ES at 5/8/2024 1405                         Point: Precautions (In Progress)       Learning Progress Summary             Patient Acceptance, E, NR by ML at 5/16/2024 1003    Acceptance, E,TB, VU by EB at 5/14/2024 1307    Acceptance, E, NR by ND at 5/10/2024 1541    Acceptance, E, VU by SG at 5/9/2024 1658    Acceptance, E,TB, NR by ES at 5/8/2024 1405   Family Acceptance, E, NR by ML at 5/16/2024 1003    Acceptance, E,TB, VU by EB at 5/14/2024 1307   Significant Other Acceptance, E,TB, NR by ES at 5/8/2024 1405                                         User Key       Initials Effective Dates Name Provider Type Discipline    EB 09/22/22 -  Hever Andersen, RN Registered Nurse Nurse    ML 04/22/21 -  Brianna Velarde Physical Therapist PT    SG 09/22/22 -  Roger Crews, RN Registered Nurse Nurse    ES 08/11/22 -  Renee Mendoza, JP Physical Therapist PT    ND 11/16/23 -  Helen Simpson, JP Physical Therapist PT                  PT Recommendation and Plan     Plan of Care Reviewed With: patient, spouse  Progress: declining  Outcome Evaluation: Physical therapy teratment complete. The patient initially disorientated x4, after mobility patient oriented to person, cues for time. Patient required freuqent verbal cues to stay on task. Patient limited in ambulation distance compared to previous treatment session. Patient continues to present below baseline for mobility and would continue to benefit from skilled PT to address strength, balance and activity tolerance deficits.  Continue current PT POC.     Time Calculation:         PT Charges       Row Name 05/16/24 1004             Time Calculation    Start Time 0915  -ML      PT Received On 05/16/24  -ML         Timed Charges    64020 - PT Therapeutic Exercise Minutes 8  -ML      04883 - PT Therapeutic Activity Minutes 20  -ML         Total Minutes    Timed Charges Total Minutes 28  -ML       Total Minutes 28  -ML                User Key  (r) = Recorded By, (t) = Taken By, (c) = Cosigned By      Initials Name Provider Type    Brianna Smith Physical Therapist                  Therapy Charges for Today       Code Description Service Date Service Provider Modifiers Qty    97601237073 HC PT THER PROC EA 15 MIN 5/16/2024 Brianna Velarde GP 1    32106999922 HC PT THERAPEUTIC ACT EA 15 MIN 5/16/2024 Brianna Velarde GP 1            PT G-Codes  Outcome Measure Options: AM-PAC 6 Clicks Basic Mobility (PT)  AM-PAC 6 Clicks Score (PT): 12  AM-PAC 6 Clicks Score (OT): 16  PT Discharge Summary  Anticipated Discharge Disposition (PT): inpatient rehabilitation facility    Brianna Velarde  5/16/2024

## 2024-05-16 NOTE — CASE MANAGEMENT/SOCIAL WORK
Continued Stay Note  [facility]     Patient Name: Kaleb Abraham  MRN: [id number]  Today's Date: 5/16/2024    Admit Date: 5/6/2024    Plan: discharge plan   Discharge Plan       Row Name 05/16/24 1619       Plan    Plan discharge plan    Plan Comments Prior authorization approved for SNF @ [facility] - MD and Marie facility liaison, informed. Possible d/c tomorrow if confusion improves. Left message for spouse, Yahaira, to return my call; I need to see if she would be willing and available to transport Pt to facility. CM will continue to follow.    Final Discharge Disposition Code 03 - skilled nursing facility (SNF)                   Discharge Codes    No documentation.                 Expected Discharge Date and Time       Expected Discharge Date Expected Discharge Time    May 17, 2024               Renea Sood RN

## 2024-05-16 NOTE — PLAN OF CARE
Goal Outcome Evaluation:  Plan of Care Reviewed With: patient, spouse        Progress: declining  Outcome Evaluation: Physical therapy terat ment complete. The patient initially disoriented x4, after mobility patient oriented to person, cues for time. Patient required frequent verbal cues to stay on task. Patient limited in ambulation distance compared to previous treatment session. Patient continues to present below baseline for mobility and would continue to benefit from skilled PT to address strength, balance and activity tolerance deficits. Continue current PT POC.      Anticipated Discharge Disposition (PT): inpatient rehabilitation facility
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
## 2024-05-16 NOTE — PROGRESS NOTES
INFECTIOUS DISEASE f/u     Kaleb Abraham  1950  [id number]    Date of Consult: 5/16/2024    Admission Date: 5/6/2024      Requesting Provider: No ref. provider found  Evaluating Physician: Lino Jacob MD    Reason for Consultation: Weakness    History of present illness:    Patient is a 73 y.o. male with coronary disease history of CABG, type 2 diabetes mellitus, hypertension, hyperlipidemia, hypothyroidism, atrial fibrillation, valvular heart disease with history of mechanical heart valve (prosthetic aortic valve and prosthetic mitral valve/) recently treated [facility] for GI bleed from April 22 patient found to have gastric arteriovenous malformations patient now admitted to [facility] with supratherapeutic INR.    Hospitalist concerned about underlying infection    Patient has low-grade fever 100.3, bradycardia and hypotension    Getting picc line    MVR/AVR performed 9 years ago here at [facility]    5/8/24; awake, has back pain, following commands; no fevers, rash, sore throat    5/9/24; has back pain; afebrile, normotensive,  has COLEEN tomorrow; picc placed    5/10/24; doing well; pain under some control; no fever, rash, sore throat, COLEEN confirmed MV vegetation < 1 cm. Wife in room    5/11/24; had vtach yesterday requiring cpr followed by torsades which was cardioverted;  better today has mild elevation of bp.  Awake, alert, family in room    5/13/24; doing well; no events overnight; no fever, rash, sore throat    5/14/2024 patient is afebrile normotensive sitting up in chair no complaints no events overnight    5/15/24; doing fair, transferred to floor; no fever, rash, sore throat, no diarrhea.    5/16/24; somewhat confused, not recognizing wife; doesn't know where he is; denies fever, rash, sore throat  Past Medical History:   Diagnosis Date    Anemia     Aortic stenosis     CAD (coronary artery disease)     Diabetes mellitus     Hyperlipidemia     Hypertension      Hypothyroidism     Mitral regurgitation        Past Surgical History:   Procedure Laterality Date    CAPSULE ENDOSCOPY      2/22/2023 and 9/25/2023 per dr. schrader    COLONOSCOPY      2/2023 Dr. Schrader    CORONARY ARTERY BYPASS GRAFT      CORONARY STENT PLACEMENT      UPPER GASTROINTESTINAL ENDOSCOPY      2/7/2023 Dr. Schrader, 7/26/2023 Dr. Liu, 4/2024 Dr. Girish Lainez       Family History   Problem Relation Age of Onset    Diabetes Mother     Stroke Mother     Thyroid cancer Mother     Hypertension Father     Stroke Father        Social History     Socioeconomic History    Marital status:    Tobacco Use    Smoking status: Former     Types: Cigarettes     Passive exposure: Past    Smokeless tobacco: Never   Vaping Use    Vaping status: Never Used   Substance and Sexual Activity    Alcohol use: Never    Drug use: Never    Sexual activity: Defer       No Known Allergies      Medication:    Current Facility-Administered Medications:     acetaminophen (TYLENOL) tablet 650 mg, 650 mg, Oral, Q4H PRN, 650 mg at 05/16/24 0537 **OR** [DISCONTINUED] acetaminophen (TYLENOL) 160 MG/5ML oral solution 650 mg, 650 mg, Oral, Q4H PRN **OR** acetaminophen (TYLENOL) suppository 650 mg, 650 mg, Rectal, Q4H PRN, Shahid Peter MD    allopurinol (ZYLOPRIM) tablet 300 mg, 300 mg, Oral, Daily, Shahid Peter MD, 300 mg at 05/16/24 0917    ampicillin 2000 mg IVPB in 100 mL NS (MBP), 2 g, Intravenous, Q6H, Carlos Bermudez MD, Last Rate: 200 mL/hr at 05/16/24 0918, 2 g at 05/16/24 0918    sennosides-docusate (PERICOLACE) 8.6-50 MG per tablet 2 tablet, 2 tablet, Oral, BID PRN, 2 tablet at 05/13/24 0849 **AND** polyethylene glycol (MIRALAX) packet 17 g, 17 g, Oral, Daily PRN, 17 g at 05/14/24 0843 **AND** bisacodyl (DULCOLAX) EC tablet 5 mg, 5 mg, Oral, Daily PRN **AND** bisacodyl (DULCOLAX) suppository 10 mg, 10 mg, Rectal, Daily PRN, Shahid Peter MD, 10 mg at 05/14/24 1125    cefTRIAXone  (ROCEPHIN) 2,000 mg in sodium chloride 0.9 % 100 mL MBP, 2,000 mg, Intravenous, Q12H, Lino Jacob MD, Last Rate: 200 mL/hr at 05/16/24 0301, 2,000 mg at 05/16/24 0301    dextrose (D50W) (25 g/50 mL) IV injection 25 g, 25 g, Intravenous, Q15 Min PRN, Shahid Peter MD    dextrose (GLUTOSE) oral gel 15 g, 15 g, Oral, Q15 Min PRN, Shahid Peter MD    glucagon (GLUCAGEN) injection 1 mg, 1 mg, Intramuscular, Q15 Min PRN, Shahid Peter MD    HYDROcodone-acetaminophen (NORCO)  MG per tablet 1 tablet, 1 tablet, Oral, Q6H PRN, Radha Ramos MD    Insulin Lispro (humaLOG) injection 2-7 Units, 2-7 Units, Subcutaneous, 4x Daily AC & at Bedtime, Shahid Peter MD, 2 Units at 05/13/24 1225    levothyroxine (SYNTHROID, LEVOTHROID) tablet 25 mcg, 25 mcg, Oral, Q AM, Shahid Peter MD, 25 mcg at 05/16/24 0531    Lidocaine 4 % 1 patch, 1 patch, Transdermal, Q24H, Shahid Peter MD, 1 patch at 05/16/24 0931    Magnesium Cardiology Dose Replacement - Follow Nurse / BPA Driven Protocol, , Does not apply, PRN, Nick Tilley, APRN    melatonin tablet 5 mg, 5 mg, Oral, Nightly, Radha Ramos MD    metoprolol tartrate (LOPRESSOR) tablet 50 mg, 50 mg, Oral, Q12H, Herbie Love MD, 50 mg at 05/16/24 0918    nitroglycerin (NITROSTAT) SL tablet 0.4 mg, 0.4 mg, Sublingual, Q5 Min PRN, Shahid Peter MD    oxybutynin XL (DITROPAN-XL) 24 hr tablet 10 mg, 10 mg, Oral, Daily, Shahid Peter MD, 10 mg at 05/16/24 0918    pantoprazole (PROTONIX) EC tablet 40 mg, 40 mg, Oral, BID AC, Carlos Bermudez MD, 40 mg at 05/16/24 0918    Pharmacy to dose warfarin, , Does not apply, Continuous PRRome KNIGHT Joseph C, MD    Phosphorus Replacement - Follow Nurse / BPA Driven Protocol, , Does not apply, [place] YOUNGER Joseph C, MD    Potassium Replacement - Follow Nurse / BPA Driven Protocol, , Does not apply, [place] YOUNGER Joseph C, MD    simethicone (MYLICON) chewable tablet 80 mg, 80 mg,  Oral, 4x Daily PRN, Shahid Peter MD, 80 mg at 24 1526    sodium chloride 0.9 % flush 10 mL, 10 mL, Intravenous, Q12H, Jose Maria Dugan MD, 10 mL at 24 0918    sodium chloride 0.9 % flush 10 mL, 10 mL, Intravenous, PRN, Jose Maria Dugan MD    sodium chloride 0.9 % infusion 40 mL, 40 mL, Intravenous, PRN, Shahid Peter MD    tamsulosin (FLOMAX) 24 hr capsule 0.4 mg, 0.4 mg, Oral, Nightly, Shahid Peter MD, 0.4 mg at 05/15/24 2009    temazepam (RESTORIL) capsule 7.5 mg, 7.5 mg, Oral, Nightly PRN, Radha Ramos MD    tiZANidine (ZANAFLEX) tablet 2 mg, 2 mg, Oral, PRN, Shahid Peter MD, 2 mg at 24 0533    warfarin (COUMADIN) tablet 1 mg, 1 mg, Oral, Once, Annette Carlson, [facility]    [START ON 2024] warfarin (COUMADIN) tablet 3 mg, 3 mg, Oral, Daily, Annette Carlson, [facility]    Antibiotics:  Anti-Infectives (From admission, onward)      Ordered     Dose/Rate Route Frequency Start Stop    05/15/24 1511  cefTRIAXone (ROCEPHIN) 2,000 mg in sodium chloride 0.9 % 100 mL MBP        Ordering Provider: Lino Jacob MD    2,000 mg  200 mL/hr over 30 Minutes Intravenous Every 12 Hours 05/15/24 1515 24 1459    24 1443  ampicillin 2000 mg IVPB in 100 mL NS (MBP)        Ordering Provider: Carlos Bermudez MD    2 g  200 mL/hr over 30 Minutes Intravenous Every 6 Hours 24 1500 24 1459    24 1302  vancomycin IVPB 2000 mg in 0.9% Sodium Chloride 500 mL        Ordering Provider: Bernabe Martines, Trung    2,000 mg  250 mL/hr over 120 Minutes Intravenous Once 24 1400 24 1524    24 1225  piperacillin-tazobactam (ZOSYN) 3.375 g IVPB in 100 mL NS MBP (CD)        Ordering Provider: Desiree Garcia MD    3.375 g  over 30 Minutes Intravenous Once 24 1315 24 1302              Review of Systems:    See hpi      Physical Exam:   Vital Signs  Temp (24hrs), Av.1 °F (36.7 °C), Min:98 °F (36.7 °C), Max:98.3 °F (36.8 °C)    Temp  Min: 98  "°F (36.7 °C)  Max: 98.3 °F (36.8 °C)  BP  Min: 90/57  Max: 169/103  Pulse  Min: 60  Max: 87  Resp  Min: 18  Max: 18  SpO2  Min: 90 %  Max: 96 %    GENERAL: Awake and alert, in mild distress.   HEENT: Normocephalic, atraumatic.  PERRL. EOMI. No conjunctival injection. No icterus.      HEART: RRR; No murmur,  LUNGS: Clear to auscultation bilaterally   ABDOMEN: Soft, nontender,   EXT:  1+ edema  :  Without Wilkinson catheter.  MSK: No joint effusions or erythema  SKIN: no rash      Laboratory Data    Results from last 7 days   Lab Units 05/16/24  0642 05/15/24  0335 05/14/24  1748 05/14/24  0430   WBC 10*3/mm3 9.28 8.15  --  8.16   HEMOGLOBIN g/dL 9.3* 8.7* 9.8* 7.9*   HEMATOCRIT % 28.0* 26.6* 30.3* 25.0*   PLATELETS 10*3/mm3 230 203  --  218     Results from last 7 days   Lab Units 05/16/24  0642   SODIUM mmol/L 131*   POTASSIUM mmol/L 4.3   CHLORIDE mmol/L 93*   CO2 mmol/L 26.0   BUN mg/dL 9   CREATININE mg/dL 0.59*   GLUCOSE mg/dL 110*   CALCIUM mg/dL 8.4*     Results from last 7 days   Lab Units 05/13/24  0644   ALK PHOS U/L 56   BILIRUBIN mg/dL 0.4   ALT (SGPT) U/L 12   AST (SGOT) U/L 30                           Estimated Creatinine Clearance: 137.7 mL/min (A) (by C-G formula based on SCr of 0.59 mg/dL (L)).      Microbiology:  Blood Culture   Date Value Ref Range Status   05/06/2024 Abnormal Stain (C)  Preliminary     BCID, PCR   Date Value Ref Range Status   05/06/2024 (A) Negative by BCID PCR. Culture to Follow. Final    Enterococcus faecalis. Arcelia/B (vancomycin resistance gene) not detected. Identification by BCID2 PCR.     No results found for: \"CULTURES\", \"HSVCX\", \"URCX\"  No results found for: \"EYECULTURE\", \"GCCX\", \"HSVCULTURE\", \"LABHSV\"  No results found for: \"LEGIONELLA\", \"MRSACX\", \"MUMPSCX\", \"MYCOPLASCX\"  No results found for: \"NOCARDIACX\", \"STOOLCX\"  No results found for: \"THROATCX\", \"UNSTIMCULT\", \"URINECX\", \"CULTURE\", \"VZVCULTUR\"  No results found for: \"VIRALCULTU\", \"WOUNDCX\"        Radiology:  Imaging " Results (Last 72 Hours)       ** No results found for the last 72 hours. **              Impression:   Enterococcus faecalis bacteremia  MV endocarditis of mechanical valve  Hypotension  Bioprosthetic AVR  Supratherapeutic INR  Anemia  Elevated procalcitonin  History of GI bleed with gastric AVM  Thoracic back pain  Hospital acquired delirium    PLAN/RECOMMENDATIONS:   Thank you for asking us to see Kaleb Abraham, I recommend the following:  High-grade enterococcal bacteremia is concerning in the setting of endovascular hardware.    Estimated Creatinine Clearance: 137.7 mL/min (A) (by C-G formula based on SCr of 0.59 mg/dL (L)).    Patient could have a GI source such as inflammation of colonic viscus (superimposed diverticulitis and diverticulosis) or bacteremia  following endoscopy regarding management of a GIbleed.    Gallbladder has been removed    Repeat blood cultures x 2 sets     Enterococcus isolate is susceptible to ampicillin and ceftriaxone        cont ampicillin 2 g IV every 4 hours    cont ceftriaxone 2 g IV every 12 hours    Duration is 6 weeks from 5/6/24 (6/17/24)        Plan is 6 weeks iv abx followed by chronic oral abx suppression    [facility]    D[facility]w [facility]  Lino Jacob MD  5/16/2024  16:00 EDT

## 2024-05-17 ENCOUNTER — APPOINTMENT (OUTPATIENT)
Dept: GENERAL RADIOLOGY | Facility: HOSPITAL | Age: 74
DRG: 314 | End: 2024-05-17
Payer: MEDICARE

## 2024-05-17 ENCOUNTER — APPOINTMENT (OUTPATIENT)
Dept: CT IMAGING | Facility: HOSPITAL | Age: 74
DRG: 314 | End: 2024-05-17
Payer: MEDICARE

## 2024-05-17 LAB
ANION GAP SERPL CALCULATED.3IONS-SCNC: 10 MMOL/L (ref 5–15)
BILIRUB UR QL STRIP: NEGATIVE
BUN SERPL-MCNC: 8 MG/DL (ref 8–23)
BUN/CREAT SERPL: 14.3 (ref 7–25)
CALCIUM SPEC-SCNC: 8.5 MG/DL (ref 8.6–10.5)
CHLORIDE SERPL-SCNC: 92 MMOL/L (ref 98–107)
CLARITY UR: CLEAR
CO2 SERPL-SCNC: 26 MMOL/L (ref 22–29)
COLOR UR: YELLOW
CREAT SERPL-MCNC: 0.56 MG/DL (ref 0.76–1.27)
DEPRECATED RDW RBC AUTO: 51.8 FL (ref 37–54)
EGFRCR SERPLBLD CKD-EPI 2021: 104.7 ML/MIN/1.73
ERYTHROCYTE [DISTWIDTH] IN BLOOD BY AUTOMATED COUNT: 16.3 % (ref 12.3–15.4)
GLUCOSE BLDC GLUCOMTR-MCNC: 112 MG/DL (ref 70–130)
GLUCOSE BLDC GLUCOMTR-MCNC: 117 MG/DL (ref 70–130)
GLUCOSE BLDC GLUCOMTR-MCNC: 118 MG/DL (ref 70–130)
GLUCOSE BLDC GLUCOMTR-MCNC: 126 MG/DL (ref 70–130)
GLUCOSE SERPL-MCNC: 113 MG/DL (ref 65–99)
GLUCOSE UR STRIP-MCNC: NEGATIVE MG/DL
HCT VFR BLD AUTO: 29.5 % (ref 37.5–51)
HGB BLD-MCNC: 9.7 G/DL (ref 13–17.7)
HGB UR QL STRIP.AUTO: NEGATIVE
INR PPP: 4 (ref 0.89–1.12)
KETONES UR QL STRIP: NEGATIVE
LEUKOCYTE ESTERASE UR QL STRIP.AUTO: NEGATIVE
MCH RBC QN AUTO: 28.8 PG (ref 26.6–33)
MCHC RBC AUTO-ENTMCNC: 32.9 G/DL (ref 31.5–35.7)
MCV RBC AUTO: 87.5 FL (ref 79–97)
NITRITE UR QL STRIP: NEGATIVE
PH UR STRIP.AUTO: 8 [PH] (ref 5–8)
PLATELET # BLD AUTO: 250 10*3/MM3 (ref 140–450)
PMV BLD AUTO: 9.6 FL (ref 6–12)
POTASSIUM SERPL-SCNC: 4.9 MMOL/L (ref 3.5–5.2)
PROT UR QL STRIP: NEGATIVE
PROTHROMBIN TIME: 39.3 SECONDS (ref 12.2–14.5)
RBC # BLD AUTO: 3.37 10*6/MM3 (ref 4.14–5.8)
SODIUM SERPL-SCNC: 128 MMOL/L (ref 136–145)
SP GR UR STRIP: 1.01 (ref 1–1.03)
UROBILINOGEN UR QL STRIP: NORMAL
WBC NRBC COR # BLD AUTO: 9.98 10*3/MM3 (ref 3.4–10.8)

## 2024-05-17 PROCEDURE — 81003 URINALYSIS AUTO W/O SCOPE: CPT | Performed by: INTERNAL MEDICINE

## 2024-05-17 PROCEDURE — 25010000002 AMPICILLIN PER 500 MG: Performed by: INTERNAL MEDICINE

## 2024-05-17 PROCEDURE — 99233 SBSQ HOSP IP/OBS HIGH 50: CPT | Performed by: INTERNAL MEDICINE

## 2024-05-17 PROCEDURE — 80048 BASIC METABOLIC PNL TOTAL CA: CPT | Performed by: HOSPITALIST

## 2024-05-17 PROCEDURE — 97530 THERAPEUTIC ACTIVITIES: CPT

## 2024-05-17 PROCEDURE — 85027 COMPLETE CBC AUTOMATED: CPT | Performed by: HOSPITALIST

## 2024-05-17 PROCEDURE — 85610 PROTHROMBIN TIME: CPT | Performed by: INTERNAL MEDICINE

## 2024-05-17 PROCEDURE — 71045 X-RAY EXAM CHEST 1 VIEW: CPT

## 2024-05-17 PROCEDURE — 82948 REAGENT STRIP/BLOOD GLUCOSE: CPT

## 2024-05-17 PROCEDURE — 70450 CT HEAD/BRAIN W/O DYE: CPT

## 2024-05-17 PROCEDURE — 97535 SELF CARE MNGMENT TRAINING: CPT

## 2024-05-17 PROCEDURE — 25010000002 CEFTRIAXONE PER 250 MG: Performed by: INTERNAL MEDICINE

## 2024-05-17 RX ADMIN — ACETAMINOPHEN 650 MG: 325 TABLET ORAL at 08:52

## 2024-05-17 RX ADMIN — AMPICILLIN SODIUM 2 G: 2 INJECTION, POWDER, FOR SOLUTION INTRAMUSCULAR; INTRAVENOUS at 08:52

## 2024-05-17 RX ADMIN — Medication 5 MG: at 20:31

## 2024-05-17 RX ADMIN — Medication 10 ML: at 08:55

## 2024-05-17 RX ADMIN — OXYBUTYNIN CHLORIDE 10 MG: 5 TABLET, EXTENDED RELEASE ORAL at 08:52

## 2024-05-17 RX ADMIN — PANTOPRAZOLE SODIUM 40 MG: 40 TABLET, DELAYED RELEASE ORAL at 18:23

## 2024-05-17 RX ADMIN — ALLOPURINOL 300 MG: 300 TABLET ORAL at 08:52

## 2024-05-17 RX ADMIN — METOPROLOL TARTRATE 50 MG: 50 TABLET, FILM COATED ORAL at 20:31

## 2024-05-17 RX ADMIN — Medication 10 ML: at 20:31

## 2024-05-17 RX ADMIN — AMPICILLIN SODIUM 2 G: 2 INJECTION, POWDER, FOR SOLUTION INTRAMUSCULAR; INTRAVENOUS at 15:08

## 2024-05-17 RX ADMIN — AMPICILLIN SODIUM 2 G: 2 INJECTION, POWDER, FOR SOLUTION INTRAMUSCULAR; INTRAVENOUS at 04:15

## 2024-05-17 RX ADMIN — HYDROCODONE BITARTRATE AND ACETAMINOPHEN 1 TABLET: 10; 325 TABLET ORAL at 21:30

## 2024-05-17 RX ADMIN — AMPICILLIN SODIUM 2 G: 2 INJECTION, POWDER, FOR SOLUTION INTRAMUSCULAR; INTRAVENOUS at 20:33

## 2024-05-17 RX ADMIN — METOPROLOL TARTRATE 50 MG: 50 TABLET, FILM COATED ORAL at 08:52

## 2024-05-17 RX ADMIN — HYDROCODONE BITARTRATE AND ACETAMINOPHEN 1 TABLET: 10; 325 TABLET ORAL at 11:50

## 2024-05-17 RX ADMIN — LIDOCAINE 1 PATCH: 4 PATCH TOPICAL at 08:52

## 2024-05-17 RX ADMIN — CEFTRIAXONE 2000 MG: 2 INJECTION, POWDER, FOR SOLUTION INTRAMUSCULAR; INTRAVENOUS at 15:08

## 2024-05-17 RX ADMIN — TAMSULOSIN HYDROCHLORIDE 0.4 MG: 0.4 CAPSULE ORAL at 20:31

## 2024-05-17 RX ADMIN — CEFTRIAXONE 2000 MG: 2 INJECTION, POWDER, FOR SOLUTION INTRAMUSCULAR; INTRAVENOUS at 04:15

## 2024-05-17 NOTE — CASE MANAGEMENT/SOCIAL WORK
Continued Stay Note  [facility]     Patient Name: Kaleb Abraham  MRN: [id number]  Today's Date: 5/17/2024    Admit Date: 5/6/2024    Plan: discharge plan   Discharge Plan       Row Name 05/17/24 1050       Plan    Plan discharge plan    Plan Comments Per discussion in MDR, Pt had temp of 102.8 overnight and is confused. Pt is on room air. Update provided to wife, Yahaira, at bedside. Will need stretcher to transport to [facility] when medically ready. Provided update to Marie @ [facility]. CM will continue to follow.    Final Discharge Disposition Code 03 - skilled nursing facility (SNF)                   Discharge Codes    No documentation.                 Expected Discharge Date and Time       Expected Discharge Date Expected Discharge Time    May 21, 2024               Renea Sood RN

## 2024-05-17 NOTE — THERAPY TREATMENT NOTE
Patient Name: Kaleb Abraham  : 1950    MRN: [id number]                              Today's Date: 2024       Admit Date: 2024    Visit Dx:     ICD-10-CM ICD-9-CM   1. Acute on chronic anemia  D64.9 285.9   2. S/P mechanical aortic valve  Z95.2 V43.3   3. Supratherapeutic INR  R79.1 790.92   4. History of GI bleed  Z87.19 V12.79   5. Anticoagulated on Coumadin  Z79.01 V58.61   6. Generalized weakness  R53.1 780.79   7. History of CHF (congestive heart failure)  Z86.79 V12.59   8. Weakness  R53.1 780.79     Patient Active Problem List   Diagnosis    Anemia    Aortic stenosis    CAD s/p CABG    T2DM    Hyperlipidemia    Hypertension    Hypothyroidism    Mitral regurgitation    MARTINEZ    Hypersomnia, unspecified    Chronic atrial fibrillation    Supratherapeutic INR    S/P prosthetic aortic valve    S/P mechanical aortic valve    Chronic anticoagulation (warfarin)    Prosthetic valve endocarditis (Mitral Valve)    S/P VT/TdP Arrest 5/10/2024    Enterococcal bacteremia     Past Medical History:   Diagnosis Date    Anemia     Aortic stenosis     CAD (coronary artery disease)     Diabetes mellitus     Hyperlipidemia     Hypertension     Hypothyroidism     Mitral regurgitation      Past Surgical History:   Procedure Laterality Date    CAPSULE ENDOSCOPY      2023 and 2023 per dr. schrader    COLONOSCOPY      2023 Dr. Schrader    CORONARY ARTERY BYPASS GRAFT      CORONARY STENT PLACEMENT      UPPER GASTROINTESTINAL ENDOSCOPY      2023 Dr. Schrader, 2023 Dr. Liu, 2024 Dr. Stout [place]      General Information       Row Name 24 0936          OT Time and Intention    Document Type therapy note (daily note)  -KF     Mode of Treatment occupational therapy;individual therapy  -KF       Row Name 24 0936          General Information    Patient Profile Reviewed yes  -KF     Existing Precautions/Restrictions fall;cardiac;other (see comments)  confusion  -KF      Barriers to Rehab medically complex;previous functional deficit;cognitive status  -       Row Name 05/17/24 0936          Cognition    Orientation Status (Cognition) disoriented to;person;place;situation;time;other (see comments)  garbled speech at times; found with no clothes, restless, and asking for help  -       Row Name 05/17/24 0936          Safety Issues, Functional Mobility    Safety Issues Affecting Function (Mobility) ability to follow commands;awareness of need for assistance;insight into deficits/self-awareness;safety precaution awareness;safety precautions follow-through/compliance;sequencing abilities  -KF     Impairments Affecting Function (Mobility) balance;endurance/activity tolerance;strength;coordination;postural/trunk control;motor planning;pain;cognition  -KF     Cognitive Impairments, Mobility Safety/Performance attention;awareness, need for assistance;insight into deficits/self-awareness;judgment;problem-solving/reasoning;safety precaution awareness;safety precaution follow-through;sequencing abilities  -KF               User Key  (r) = Recorded By, (t) = Taken By, (c) = Cosigned By      Initials Name Provider Type    KF Jodie Rojas OT Occupational Therapist                     Mobility/ADL's       Row Name 05/17/24 0939          Bed Mobility    Bed Mobility supine-sit  -KF     Supine-Sit Pender (Bed Mobility) maximum assist (25% patient effort);1 person assist;verbal cues;nonverbal cues (demo/gesture)  -KF     Bed Mobility, Safety Issues cognitive deficits limit understanding  -KF     Assistive Device (Bed Mobility) draw sheet;head of bed elevated  -KF     Comment, (Bed Mobility) Increased time and effort with max cueing needed. Pt able to sit EOB with close SBA >8minutes  -       Row Name 05/17/24 0939          Transfers    Transfers bed-chair transfer;sit-stand transfer;stand-sit transfer  -KF     Comment, (Transfers) Max cueing needed throughout session for task initiation  and safety  -       Row Name 05/17/24 0939          Bed-Chair Transfer    Bed-Chair [place] (Transfers) dependent (less than 25% patient effort);2 person assist;verbal cues;nonverbal cues (demo/gesture)  -KF     Assistive Device (Bed-Chair Transfers) other (see comments)  BUE support  -KF     Comment, (Bed-Chair Transfer) stand pivot; pt with very little task initiation during transfer to chair  -       Row Name 05/17/24 0939          Sit-Stand Transfer    Sit-Stand [place] (Transfers) maximum assist (25% patient effort);2 person assist;verbal cues;nonverbal cues (demo/gesture)  -KF     Assistive Device (Sit-Stand Transfers) other (see comments)  BUE support  -     Comment, (Sit-Stand Transfer) STS completed from the chair with maxA x2, BUE support for alexey care and hygiene. Attempted use of a RWx, however pt unable to maintain grasp of RWx.  -       Row Name 05/17/24 0939          Stand-Sit Transfer    Stand-Sit [place] (Transfers) maximum assist (25% patient effort);2 person assist;verbal cues;nonverbal cues (demo/gesture)  -KF     Assistive Device (Stand-Sit Transfers) other (see comments)  BUE support  -[facility] Name 05/17/24 0939          Functional Mobility    Functional Mobility- Ind. Level unable to perform  -[facility] Name 05/17/24 0939          Activities of Daily Living    BADL Assessment/Intervention upper body dressing;lower body dressing;toileting  -[facility] Name 05/17/24 09          Lower Body Dressing Assessment/Training    [place] Level (Lower Body Dressing) don;socks;dependent (less than 25% patient effort)  -KF     Position (Lower Body Dressing) supine  -[facility] Name 05/17/24 0939          Upper Body Dressing Assessment/Training    [place] Level (Upper Body Dressing) don;pajama/robe;dependent (less than 25% patient effort)  -KF     Position (Upper Body Dressing) supine  -[facility] Name 05/17/24 0939          Toileting Assessment/Training     [place] Level (Toileting) perform perineal hygiene;dependent (less than 25% patient effort)  -KF     Position (Toileting) supported standing  -KF               User Key  (r) = Recorded By, (t) = Taken By, (c) = Cosigned By      Initials Name Provider Type    Jodie Pelayo OT Occupational Therapist                   Obj/Interventions       Row Name 05/17/24 0944          Balance    Balance Assessment sitting static balance;sitting dynamic balance;sit to stand dynamic balance;standing static balance;standing dynamic balance  -KF     Static Sitting Balance standby assist  -KF     Dynamic Sitting Balance contact guard  -KF     Position, Sitting Balance unsupported;sitting edge of bed;sitting in chair  -KF     Sit to Stand Dynamic Balance maximum assist;2-person assist  -KF     Static Standing Balance maximum assist;2-person assist  -KF     Dynamic Standing Balance dependent;2-person assist  -KF     Position/Device Used, Standing Balance supported  -KF     Balance Interventions sitting;standing;sit to stand;supported;static;dynamic  -KF               User Key  (r) = Recorded By, (t) = Taken By, (c) = Cosigned By      Initials Name Provider Type    Jodie Pelayo OT Occupational Therapist                   Goals/Plan    No documentation.                  Clinical Impression       Row Name 05/17/24 0945          Pain Assessment    Pain Intervention(s) Repositioned;Ambulation/increased activity  -KF     Additional Documentation Pain Scale: FACES Pre/Post-Treatment (Group)  -KF       Row Name 05/17/24 0945          Pain Scale: FACES Pre/Post-Treatment    Pain: FACES Scale, Pretreatment 2-->hurts little bit  -KF     Posttreatment Pain Rating 2-->hurts little bit  -KF     Pain Location - other (see comments)  Pt unable to specify a pain location.  -KF       Row Name 05/17/24 0945          Plan of Care Review    Plan of Care Reviewed With patient;spouse  -KF     Progress declining  -KF     Outcome Evaluation OT  treatment completed. The pt required a signficant amount more assistance for all mobility compared to previous session. The pt was disoriented throughout session with garbled speech at times and inconsistent responses to therapist. Pt performed supine to sit with maxA x1, able to maintain static sitting balance with close SBA. Pt required totalA x2 for SPT to the chair. Second attempt to STS was performed from the chair with maxA x2, BUE support. Pt unable to stand fully upright or take steps this date. The pt remains below his functional baseline with generalized weakness, decreased activity tolerance, and balance deficits warranting continued IP OT services. Continue to recommend a d/c to IRF.  -       Row Name 05/17/24 0945          Therapy Assessment/Plan (OT)    Rehab Potential (OT) fair, will monitor progress closely  -     Criteria for Skilled Therapeutic Interventions Met (OT) yes;skilled treatment is necessary  -     Therapy Frequency (OT) daily  -       Row Name 05/17/24 0945          Therapy Plan Review/Discharge Plan (OT)    Anticipated Discharge Disposition (OT) inpatient rehabilitation facility  -       Row Name 05/17/24 0945          Vital Signs    Pre Patient Position Supine  -KF     Intra Patient Position Standing  -KF     Post Patient Position Sitting  -       Row Name 05/17/24 0945          Positioning and Restraints    Pre-Treatment Position in bed  -KF     Post Treatment Position chair  -KF     In Chair notified nsg;reclined;call light within reach;encouraged to call for assist;exit alarm on;with family/caregiver;waffle cushion;on mechanical lift sling;legs elevated;with nsg  -KF               User Key  (r) = Recorded By, (t) = Taken By, (c) = Cosigned By      Initials Name Provider Type    Jodie Pelayo, HUMBERTO Occupational Therapist                   Outcome Measures       Row Name 05/17/24 4848          How much help from another is currently needed...    Putting on and taking  off regular lower body clothing? 1  -KF     Bathing (including washing, rinsing, and drying) 2  -KF     Toileting (which includes using toilet bed pan or urinal) 1  -KF     Putting on and taking off regular upper body clothing 1  -KF     Taking care of personal grooming (such as brushing teeth) 2  -KF     Eating meals 2  -KF     AM-PAC 6 Clicks Score (OT) 9  -KF       Row Name 05/17/24 0950          Functional Assessment    Outcome Measure Options AM-PAC 6 Clicks Daily Activity (OT)  -KF               User Key  (r) = Recorded By, (t) = Taken By, (c) = Cosigned By      Initials Name Provider Type    Jodie Pelayo OT Occupational Therapist                    Occupational Therapy Education       Title: PT OT SLP Therapies (In Progress)       Topic: Occupational Therapy (In Progress)       Point: ADL training (In Progress)       Description:   Instruct learner(s) on proper safety adaptation and remediation techniques during self care or transfers.   Instruct in proper use of assistive devices.                  Learning Progress Summary             Patient Acceptance, E,TB, NR by  at 5/17/2024 0825    Acceptance, E, VU,NR by  at 5/14/2024 1523    Acceptance, E,TB, VU by DOROTA at 5/14/2024 1307    Acceptance, E, NR by  at 5/13/2024 1125    Acceptance, E, NR by  at 5/8/2024 1529   Family Acceptance, E,TB, VU by EB at 5/14/2024 1307   Significant Other Acceptance, E, NR by  at 5/13/2024 1125    Acceptance, E, NR by  at 5/8/2024 1529                         Point: Home exercise program (Done)       Description:   Instruct learner(s) on appropriate technique for monitoring, assisting and/or progressing therapeutic exercises/activities.                  Learning Progress Summary             Patient Acceptance, E,D, VU,NR by TORIN at 5/15/2024 1459    Acceptance, E,TB, VU by EB at 5/14/2024 1307   Family Acceptance, E,TB, VU by EB at 5/14/2024 1307   Significant Other Acceptance, E,D, VU,NR by  at 5/15/2024 145                          Point: Precautions (In Progress)       Description:   Instruct learner(s) on prescribed precautions during self-care and functional transfers.                  Learning Progress Summary             Patient Acceptance, E,TB, NR by KF at 5/17/2024 0825    Acceptance, E,D, VU,NR by KL at 5/15/2024 1459    Acceptance, E, VU,NR by KL at 5/14/2024 1523    Acceptance, E,TB, VU by EB at 5/14/2024 1307    Acceptance, E, NR by KL at 5/13/2024 1125    Acceptance, E, NR by KL at 5/10/2024 1347    Acceptance, E, NR by KL at 5/8/2024 1529   Family Acceptance, E,TB, VU by EB at 5/14/2024 1307   Significant Other Acceptance, E,D, VU,NR by KL at 5/15/2024 1459    Acceptance, E, NR by KL at 5/13/2024 1125    Acceptance, E, NR by KL at 5/10/2024 1347    Acceptance, E, NR by KL at 5/8/2024 1529                         Point: Body mechanics (In Progress)       Description:   Instruct learner(s) on proper positioning and spine alignment during self-care, functional mobility activities and/or exercises.                  Learning Progress Summary             Patient Acceptance, E,TB, NR by KF at 5/17/2024 0825    Acceptance, E,D, VU,NR by KL at 5/15/2024 1459    Acceptance, E, VU,NR by KL at 5/14/2024 1523    Acceptance, E,TB, VU by EB at 5/14/2024 1307    Acceptance, E, NR by KL at 5/13/2024 1125    Acceptance, E, NR by KL at 5/10/2024 1347    Acceptance, E, NR by KL at 5/8/2024 1529   Family Acceptance, E,TB, VU by EB at 5/14/2024 1307   Significant Other Acceptance, E,D, VU,NR by KL at 5/15/2024 1459    Acceptance, E, NR by KL at 5/13/2024 1125    Acceptance, E, NR by KL at 5/10/2024 1347    Acceptance, E, NR by KL at 5/8/2024 1529                                         User Key       Initials Effective Dates Name Provider Type Discipline    DOROTA 09/22/22 -  Hever Andersen, RN Registered Nurse Nurse    KF 08/09/23 -  Jodie Rojas OT Occupational Therapist OT    KL 02/05/24 -  Margaret Ramírez OT Occupational  Therapist OT                  OT Recommendation and Plan  Therapy Frequency (OT): daily  Plan of Care Review  Plan of Care Reviewed With: patient, spouse  Progress: declining  Outcome Evaluation: OT treatment completed. The pt required a signficant amount more assistance for all mobility compared to previous session. The pt was disoriented throughout session with garbled speech at times and inconsistent responses to therapist. Pt performed supine to sit with maxA x1, able to maintain static sitting balance with close SBA. Pt required totalA x2 for SPT to the chair. Second attempt to STS was performed from the chair with maxA x2, BUE support. Pt unable to stand fully upright or take steps this date. The pt remains below his functional baseline with generalized weakness, decreased activity tolerance, and balance deficits warranting continued IP OT services. Continue to recommend a d/c to IRF.     Time Calculation:         Time Calculation- OT       Row Name 05/17/24 0951             Time Calculation- OT    OT Start Time 0825  -KF      OT Received On 05/17/24  -KF      OT Goal Re-Cert Due Date 05/18/24  -KF         Timed Charges    89900 - OT Therapeutic Activity Minutes 18  -KF      44067 - OT Self Care/Mgmt Minutes 15  -KF         Total Minutes    Timed Charges Total Minutes 33  -KF       Total Minutes 33  -KF                User Key  (r) = Recorded By, (t) = Taken By, (c) = Cosigned By      Initials Name Provider Type    KF Jodie Rojas OT Occupational Therapist                  Therapy Charges for Today       Code Description Service Date Service Provider Modifiers Qty    43925250219  OT THERAPEUTIC ACT EA 15 MIN 5/17/2024 Jodie Rojas OT GO 1    62878069433 HC OT SELF CARE/MGMT/TRAIN EA 15 MIN 5/17/2024 Jodie Rojas OT GO 1                 Jodie Rojas OT  5/17/2024

## 2024-05-17 NOTE — PROGRESS NOTES
"Pharmacy Consult  -  Warfarin  Kaleb Abraham is a  73 y.o. male receiving warfarin therapy.  Height - 180.3 cm (70.98\")  Weight - 102 kg (224 lb 3.2 oz)    Consulting Provider: - Kendra  Indication: - Afib, Mechanical Mitral valve  Goal INR: - 2.5 - 3.5  Home Regimen: Most recent dose of warfarin was 5mg daily although was adjusted to 4mg over the weekend due to elevated INR   - Patient has medication bottle of 4mg tablets and 1mg tablets at home     Bridge Therapy: Heparin gtt stopped on 5/12    Drug-Drug Interactions with current regimen:   Received 10mg IV Vitamin K on 5/6  Ampicillin-  Penicillins may enhance the anticoagulant effect of Vitamin K Antagonists.  Ceftriaxone- Cephalosporins may enhance the anticoagulant effect of Vitamin K Antagonists.    Warfarin Dosing During Admission:  Date  5/8 5/9 5/10 5/11 5/12 5/13 5/14 5/15 5/16   INR  1.46 1.58 1.88 2.69 2.44 2.12 2.43 3.11 3.66   Dose  3 mg 3 mg 3 mg 1.5 mg 3 mg 4 mg 3 mg 2 mg 1 mg      Date  5/17           INR  4.0           Dose  HOLD             Education Provided: Warfarin education provided on 5/8/2024 verbally.  Discussed effects of warfarin, importance of checking INR, drug-drug and drug-food interactions, and signs/symptoms of bleeding and clotting.  Patient deferred to wife who as in the room and manages his medications. She monitors his INR at home and then faxes results to Dr Maradiaga office.  All pertinent questions were answered.      Discharge Follow up:   Following Provider - Dr Peraza   Follow up time range or appointment - 2-3 days post discharge    Labs:  Results from last 7 days   Lab Units 05/17/24  0504 05/16/24  0642 05/15/24  0335 05/14/24  1748 05/14/24  0430 05/13/24  0644 05/12/24  1608 05/12/24  1242 05/12/24  0615 05/11/24  1141 05/11/24  0554   INR  4.00* 3.66* 3.11*  --  2.43* 2.12*  --   --  2.44*  --  2.69*   HEMOGLOBIN g/dL 9.7* 9.3* 8.7* 9.8* 7.9* 8.7* 9.4*   < > 8.9*   < > 9.0*   HEMATOCRIT % 29.5* 28.0* 26.6* " 30.3* 25.0* 26.6* 29.2*   < > 27.9*   < > 27.6*    < > = values in this interval not displayed.     Results from last 7 days   Lab Units 05/17/24  0504 05/16/24  0642 05/15/24  0335 05/14/24  0430 05/13/24  0644 05/11/24  0554 05/10/24  1938   SODIUM mmol/L 128* 131* 131*   < > 133*   < > 137   POTASSIUM mmol/L 4.9 4.3 4.2   < > 4.0   < > 4.9   CHLORIDE mmol/L 92* 93* 93*   < > 96*   < > 99   CO2 mmol/L 26.0 26.0 32.0*   < > 30.0*   < > 27.0   BUN mg/dL 8 9 8   < > 10   < > 9   CREATININE mg/dL 0.56* 0.59* 0.73*   < > 0.71*   < > 0.97   CALCIUM mg/dL 8.5* 8.4* 8.3*   < > 7.7*   < > 11.5*   BILIRUBIN mg/dL  --   --   --   --  0.4  --  0.4   ALK PHOS U/L  --   --   --   --  56  --  69   ALT (SGPT) U/L  --   --   --   --  12  --  15   AST (SGOT) U/L  --   --   --   --  30  --  42*   GLUCOSE mg/dL 113* 110* 113*   < > 115*   < > 134*    < > = values in this interval not displayed.     Current dietary intake: 0-25% of meals last 24h; supplement intake  Diet Order   Procedures    Diet: Regular/House, Cardiac, Diabetic; Healthy Heart (2-3 Na+); Consistent Carbohydrate; Fluid Consistency: Thin (IDDSI 0)     Assessment/Plan:    Patient's INR is supratherapeutic at 4.0 today despite decreased dose x2 days. This is increased from 3.66 yesterday.   Plan to HOLD warfarin dose today due to INR increase despite reduced doses given. Once INR therapeutic will attempt to find appropriate dosing regimen.    Daily PT/INR ordered.  Monitor signs/symptoms of bleeding, dietary intake, and drug-drug interactions. Make dose adjustments as necessary.  Pharmacy will continue to follow.      Thanks,  Annette Carlson, PharmD  Pharmacy Resident  5/17/2024  11:02 EDT

## 2024-05-17 NOTE — PROGRESS NOTES
[facility] Medicine Services  PROGRESS NOTE    Patient Name: Kaleb Abraham  : 1950  MRN: [id number]    Date of Admission: 2024  Primary Care Physician: Manolo Enamorado MD    Subjective   Subjective     CC:  Sepsis    HPI:  Patient is still very confused and agitated when awake.  Up all night.  Discussed with wife at the bedside.      Objective   Objective     Vital Signs:   Temp:  [97.7 °F (36.5 °C)-102.8 °F (39.3 °C)] 99 °F (37.2 °C)  Heart Rate:  [60-89] 89  Resp:  [16-18] 16  BP: ()/(57-99) 143/84     Physical Exam:  Constitutional: Sleeping in bedside chair  Respiratory: Clear to auscultation bilaterally, respiratory effort normal   Cardiovascular: RRR  Gastrointestinal: Positive bowel sounds, soft, nontender, nondistended  Musculoskeletal: No bilateral ankle edema  Neurologic: Drowsy  Skin: No rashes      Results Reviewed:  LAB RESULTS:      Lab 24  0504 24  0642 05/15/24  0335 24  1748 24  0430 24  0644 24  1242 24  0615 24  1818 24  1141 24  0554 24  0158 05/10/24  2355 05/10/24  1938   WBC 9.98 9.28 8.15  --  8.16 10.65  --  10.63  --   --  11.78*  --   --  17.07*   HEMOGLOBIN 9.7* 9.3* 8.7* 9.8* 7.9* 8.7*   < > 8.9*   < > 10.0* 9.0*  --    < > 11.7*   HEMATOCRIT 29.5* 28.0* 26.6* 30.3* 25.0* 26.6*   < > 27.9*   < > 27.9* 27.6*  --    < > 37.0*   PLATELETS 250 230 203  --  218 209  --  219  --   --  251  --   --  316   NEUTROS ABS  --   --   --   --   --   --   --  8.12*  --   --  9.43*  --   --  11.10*   IMMATURE GRANS (ABS)  --   --   --   --   --   --   --  0.31*  --   --  0.35*  --   --   --    LYMPHS ABS  --   --   --   --   --   --   --  1.37  --   --  1.33  --   --   --    MONOS ABS  --   --   --   --   --   --   --  0.75  --   --  0.63  --   --   --    EOS ABS  --   --   --   --   --   --   --  0.03  --   --  0.00  --   --  0.34   MCV 87.5 86.4 86.4  --  90.6 90.8  --  91.5  --   --   88.2  --   --  91.6   PROTIME 39.3* 36.7* 32.3*  --  26.6* 24.0*  --  26.7*  --   --  28.8*  --   --  23.1*   HEPARIN ANTI-XA  --   --   --   --   --   --   --  0.40  --  0.33 0.36 0.33  --  0.20*    < > = values in this interval not displayed.         Lab 05/17/24  0504 05/16/24  0642 05/15/24  0335 05/15/24  0019 05/14/24  0430 05/13/24  0644 05/12/24  1608 05/12/24  0615 05/12/24  0156 05/11/24  0554 05/10/24  1938   SODIUM 128* 131* 131*  --  133* 133*  --  133*  --  136 137   POTASSIUM 4.9 4.3 4.2  --  4.3 4.0  --  4.1  --  3.8 4.9   CHLORIDE 92* 93* 93*  --  98 96*  --  99  --  99 99   CO2 26.0 26.0 32.0*  --  27.0 30.0*  --  25.0  --  24.0 27.0   ANION GAP 10.0 12.0 6.0  --  8.0 7.0  --  9.0  --  13.0 11.0   BUN 8 9 8  --  9 10  --  10  --  9 9   CREATININE 0.56* 0.59* 0.73*  --  0.57* 0.71*  --  0.67*  --  0.68* 0.97   EGFR 104.7 103.1 96.7  --  104.2 97.5  --  99.2  --  98.8 82.9   GLUCOSE 113* 110* 113*  --  106* 115*  --  127*  --  113* 134*   CALCIUM 8.5* 8.4* 8.3*  --  8.1* 7.7*  --  8.1*  --  8.2* 11.5*   IONIZED CALCIUM  --   --   --   --   --  1.18  --   --   --   --   --    MAGNESIUM  --  1.7  --  2.1 1.6 2.0  --   --  1.9 1.7 3.5*   PHOSPHORUS  --   --   --   --   --  2.8 2.9 2.2*  --  2.9 2.8   TSH  --   --   --   --   --   --   --  3.450  --   --   --          Lab 05/13/24  0644 05/10/24  1938   TOTAL PROTEIN 5.0* 6.6   ALBUMIN 2.6* 3.0*   GLOBULIN 2.4 3.6   ALT (SGPT) 12 15   AST (SGOT) 30 42*   BILIRUBIN 0.4 0.4   ALK PHOS 56 69         Lab 05/17/24  0504 05/16/24  0642 05/15/24  0335 05/14/24  0430 05/13/24  0644   PROTIME 39.3* 36.7* 32.3* 26.6* 24.0*   INR 4.00* 3.66* 3.11* 2.43* 2.12*             Lab 05/14/24  1003   ABO TYPING O   RH TYPING Positive   ANTIBODY SCREEN Negative         Brief Urine Lab Results  (Last result in the past 365 days)        Color   Clarity   Blood   Leuk Est   Nitrite   Protein   CREAT   Urine HCG        05/06/24 1630 Dark Yellow   Cloudy   Negative   Negative    Negative   30 mg/dL (1+)                   Microbiology Results Abnormal       Procedure Component Value - Date/Time    Blood Culture - Blood, Hand, Right [[id number]]  (Normal) Collected: 05/10/24 0625    Lab Status: Final result Specimen: Blood from Hand, Right Updated: 05/15/24 0701     Blood Culture No growth at 5 days    Narrative:      Less than seven (7) mL's of blood was collected.  Insufficient quantity may yield false negative results.    Blood Culture - Blood, Arm, Right [[id number]]  (Normal) Collected: 05/10/24 0610    Lab Status: Final result Specimen: Blood from Arm, Right Updated: 05/15/24 0701     Blood Culture No growth at 5 days    Narrative:      Less than seven (7) mL's of blood was collected.  Insufficient quantity may yield false negative results.            No radiology results from the last 24 hrs    Results for orders placed during the hospital encounter of 05/06/24    Adult Transesophageal Echo (COLEEN) W/ Cont if Necessary Per Protocol    Interpretation Summary    Left ventricular ejection fraction appears to be 56 - 60%.    Left ventricular wall thickness is consistent with mild to moderate concentric hypertrophy.    The left atrial cavity is moderately dilated.    There is a bioprosthetic aortic valve present.    Mild aortic valve stenosis is present.  Mean gradient 11 mmHg..  Mild aortic valve regurgitation.    There is a mechanical mitral valve prosthesis present.  There is mild mitral valve regurgitation.  No stenosis.    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  Measures 0.8 x 0.3 cm    I supervised and directed an independent trained observer with the assistance of monitoring the patient's level of consciousness and physiological status throughout the procedure.  Intraoperative service time of 20 minutes      Current medications:  Scheduled Meds:allopurinol, 300 mg, Oral, Daily  ampicillin, 2 g, Intravenous, Q6H  cefTRIAXone, 2,000 mg, Intravenous,  Q12H  insulin lispro, 2-7 Units, Subcutaneous, 4x Daily AC & at Bedtime  levothyroxine, 25 mcg, Oral, Q AM  Lidocaine, 1 patch, Transdermal, Q24H  melatonin, 5 mg, Oral, Nightly  metoprolol tartrate, 50 mg, Oral, Q12H  oxybutynin XL, 10 mg, Oral, Daily  pantoprazole, 40 mg, Oral, BID AC  sodium chloride, 10 mL, Intravenous, Q12H  tamsulosin, 0.4 mg, Oral, Nightly  warfarin, 3 mg, Oral, Daily      Continuous Infusions:Pharmacy to dose warfarin,       PRN Meds:.  acetaminophen **OR** [DISCONTINUED] acetaminophen **OR** acetaminophen    senna-docusate sodium **AND** polyethylene glycol **AND** bisacodyl **AND** bisacodyl    dextrose    dextrose    glucagon (human recombinant)    HYDROcodone-acetaminophen    Magnesium Cardiology Dose Replacement - Follow Nurse / BPA Driven Protocol    nitroglycerin    Pharmacy to dose warfarin    Phosphorus Replacement - Follow Nurse / BPA Driven Protocol    Potassium Replacement - Follow Nurse / BPA Driven Protocol    simethicone    sodium chloride    sodium chloride    temazepam    tiZANidine    Assessment & Plan   Assessment & Plan     Active Hospital Problems    Diagnosis  POA    **Prosthetic valve endocarditis (Mitral Valve) [I33.0]  Yes    S/P VT/TdP Arrest 5/10/2024 [I47.20]  Yes    Enterococcal bacteremia [R78.81, B95.2]  Yes    S/P prosthetic aortic valve [Z95.2]  Not Applicable    S/P mechanical aortic valve [Z95.2]  Not Applicable    Chronic anticoagulation (warfarin) [Z79.01]  Not Applicable    Supratherapeutic INR [R79.1]  Yes    MARTINEZ [G47.33]  Yes    Chronic atrial fibrillation [I48.20]  Yes    Anemia [D64.9]  Yes    CAD s/p CABG [I25.10]  Yes    T2DM [E11.9]  Yes    Hyperlipidemia [E78.5]  Yes    Hypothyroidism [E03.9]  Yes      Resolved Hospital Problems   No resolved problems to display.        Brief Hospital Course to date:  Kaleb Abraham is a 73 y.o. male with a history of CAD s/p CABG, T2DM, HTN, HLD, hypothyroidism, Afib, valvular heart disease s/p mechanical  mitral valve and bioprosthetic aortic valve on Coumadin, MARTINEZ, tachybradycardia syndrome, CHF, and recent GI bleed s/p EGD on 4/22/24 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 s/p APC at [facility] (also had Afib with RVR requiring cardioversion during that admission), who presented to the ED on 5/6/24 with complaints of generalized weakness, lower extremity edema, right lower back pain, fevers, and nausea. CT head, CT A/P, and CXR were negative on admission. Hb was 6.6. INR noted to be >10. Received Vitamin K. Initially admitted to hospitalist service. Despite blood transfusion and IV fluids, he remained hypotensive and bradycardic requiring ICU transfer 5/7/24. He required pressors. Further evaluation revealed bacteremia with Enterococcus faecalis. Infectious Disease was consulted. TTE did not show any vegetations but subsequent COLEEN did show a mitral valve vegetation. Cardiology followed for decompensated heart failure and bradycardia. GI also evaluated the patient in setting of anemia and recent GI bleed, they felt there was no need for repeat endoscopic evaluation at this time. On 5/10/24 patient developed V-tach arrest and a code was called, developed torsades and was ultimately cardioverted to sinus rhythm. Stabilized in ICU and now transferred to telemetry, hospitalist service resumed care on 5/16/24.     Fevers  - patient with fevers of 102. 8 overnight  - Discussed with Dr. Jacob. Recommended a CT head as bleeding could cause a fever and AMS  -Check blood culture, UA and CXR    Septic shock, resolved  Enterococcus faecalis bacteremia  Mitral valve endocarditis of mechanical valve  --s/p ICU, vasopressor support  --Blood cultures 5/6/24 with Enterococcus faecalis, repeat blood cultures 5/10/24 negative; urine culture also grew same organism   --Initial TTE 5/7/24 with EF 56-60%, no evidence of vegetation  --Subsequent COLEEN 5/10/24 with mitral valve vegetation noted measuring 0.8 x 0.3 cm with mild mitral  regurgitation   --ID following, suspects source may have been GI such as colonic inflammation vs related to recent endoscopic management of GI bleed  --Continue IV Ampicillin and Rocephin x 6 weeks total, followed by chronic oral antibiotic suppression  --PICC line placed 5/11/24     Supratherapeutic INR, POA  --INR >10 on admission  --Improved s/p Vitamin K  --Pharmacy to dose warfarin     Acute on chronic anemia  Recent GI bleed  --Recent GI bleed s/p EGD on 4/22/24 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 s/p APC at [facility]  --Hb 6.6 on admission, transfused 1 unit PRBC with improvement at that time, then Hb trended back down to 7.9 on 5/14/24 so was transfused another 1 unit PRBC  --GI has seen and signed off, continue BID Protonix, no indication for repeat EGD  --Currently Hb 9.3, monitor closely     Acute on chronic HFpEF  Valvular heart disease s/p mechanical mitral valve and bioprosthetic aortic valve  --proBNP 22K on admission  --Echo as above  --Received IV Lasix on admission  --Cardiology has followed, continue Metoprolol, ACE inhibitor on hold due to hypotension     Afib  --Recent admission to [facility] where he had Afib with RVR and was cardioverted  --Cardiology following, have discontinued Amiodarone as may have led to supratherapeutic INR  --Continue Metoprolol and anticoagulation with warfarin     Hospital delirium  --Patient more confused since yesterday, has not been sleeping  --Schedule melatonin, add PRN Restoril  --Reorient as needed  --Cannot use Seroquel or other medications due to QT prolongation  --AVOID Ativan as wife states it made him worse (received a dose on 5/10/24)    Expected Discharge Location and Transportation: rehab  Expected Discharge   Expected Discharge Date: 5/17/2024; Expected Discharge Time:      DVT prophylaxis:  Medical and mechanical DVT prophylaxis orders are present.         AM-PAC 6 Clicks Score (PT): 11 (05/16/24 2110)    CODE STATUS:   Code Status and Medical  Interventions:   Ordered at: 05/06/24 6247     Level Of Support Discussed With:    Patient     Code Status (Patient has no pulse and is not breathing):    CPR (Attempt to Resuscitate)     Medical Interventions (Patient has pulse or is breathing):    Full Support     This patient's problems and plans were partially entered by my partner and updated as appropriate by me 05/17/24. Today is my first day evaluating this patient's active medical problems. I Personally reviewed chart and adjusted note to reflect daily changes in management/clinical condition. Copied text in this note has been reviewed and is accurate as of  05/17/24      Brianna Lopez MD  05/17/24

## 2024-05-17 NOTE — PROGRESS NOTES
"          Clinical Nutrition Assessment     Patient Name: Kaleb Abraham  YOB: 1950  MRN: [id number]  Date of Encounter: 05/17/24 17:01 EDT  Admission date: 5/6/2024  Reason for Visit: MDR, Follow-up protocol, LOS    Assessment   Nutrition Assessment   Admission Diagnosis:  Supratherapeutic INR [R79.1]  Shock [R57.9]    Problem List:    Prosthetic valve endocarditis (Mitral Valve)    Anemia    CAD s/p CABG    T2DM    Hyperlipidemia    Hypothyroidism    MARTINEZ    Chronic atrial fibrillation    Supratherapeutic INR    S/P prosthetic aortic valve    S/P mechanical aortic valve    Chronic anticoagulation (warfarin)    S/P VT/TdP Arrest 5/10/2024    Enterococcal bacteremia      PMH:   He  has a past medical history of Anemia, Aortic stenosis, CAD (coronary artery disease), Diabetes mellitus, Hyperlipidemia, Hypertension, Hypothyroidism, and Mitral regurgitation.    PSH:  He  has a past surgical history that includes Coronary artery bypass graft; Coronary stent placement; Upper gastrointestinal endoscopy; Colonoscopy; and Capsule Endoscopy.    Substance history: tobacco remote    Interval History:  S/p COLEEN 5-10-24    V-tach arrest 5-10-24  s/p CPR    Anthropometrics     Height: Height: 180.3 cm (70.98\")  Last Filed Weight: Weight: 102 kg (224 lb 3.2 oz) (05/16/24 0411) weight skewed by fluid  Method: Weight Method: Bed scale  BMI: BMI (Calculated): 31.3 bmi skewed by fluid    UBW:  228lb  Weight change:  12lb wt gain over past 2 months, presumed fluid       Weight       Weight (kg) Weight (lbs) Weight Method Visit Report   3/4/2015 100.7 kg  222 lb 0.1 oz      5/16/2023 102.967 kg  227 lb  Standing scale     5/17/2023 102.513 kg  226 lb  Stated     8/22/2023 108.41 kg  239 lb   --    2/5/2024 103.42 kg  228 lb  Stated     2/21/2024 104.055 kg  229 lb 6.4 oz  Standing scale     3/27/2024 104.781 kg  231 lb  Standing scale     5/6/2024 102.967 kg  227 lb  Stated     5/7/2024 103 kg  227 lb 1.2 oz    "   5/8/2024 106.5 kg  234 lb 12.6 oz  Bed scale     5/9/2024 108.8 kg  239 lb 13.8 oz      5/10/2024 109 kg  240 lb 4.8 oz      5/11/2024 107.5 kg  236 lb 15.9 oz  Bed scale     5/12/2024 110.7 kg  244 lb 0.8 oz  Bed scale     5/13/2024 109.7 kg  241 lb 13.5 oz  Bed scale         Nutrition Focused Physical Exam    Date:     Unable to perform due to Defer pending indication     Subjective   Reported/Observed/Food/Nutrition Related History:     5/17  Provider in with patient at time of visit.    5/13  Pt sitting up in chair, reports poor appetite, is sore from CPR, able to swallow ok    Wife reports he is drinking boost      Current Nutrition Prescription   PO: Diet: Regular/House, Cardiac, Diabetic; Healthy Heart (2-3 Na+); Consistent Carbohydrate; Fluid Consistency: Thin (IDDSI 0)  Oral Nutrition Supplement: Boost GT 2x/day  Intake:  0-25% PO intake documented since previous visit    Assessment & Plan   Nutrition Diagnosis   Date:   5-13-24           Updated:    Problem Inadequate oral intake    Etiology Per Clinical Status   Signs/Symptoms Po intake 44%       Goal / Objectives:   Nutrition to support treatment and Increase intake      Nutrition Intervention      Follow treatment progress, Care plan reviewed, Encourage intake, Supplement provided    Continue current regimen  Encourage adequate PO/ONS intake    Monitoring/Evaluation:   Per protocol    Rose Luong MS,RD,LD  Time Spent: 20min

## 2024-05-17 NOTE — PLAN OF CARE
Goal Outcome Evaluation:  Plan of Care Reviewed With: patient, spouse        Progress: declining  Outcome Evaluation: OT treatment completed. The pt required a signficant amount more assistance for all mobility compared to previous session. The pt was disoriented throughout session with garbled speech at times and inconsistent responses to therapist. Pt performed supine to sit with maxA x1, able to maintain static sitting balance with close SBA. Pt required totalA x2 for SPT to the chair. Second attempt to STS was performed from the chair with maxA x2, BUE support. Pt unable to stand fully upright or take steps this date. The pt remains below his functional baseline with generalized weakness, decreased activity tolerance, and balance deficits warranting continued IP OT services. Continue to recommend a d/c to IRF.      Anticipated Discharge Disposition (OT): inpatient rehabilitation facility

## 2024-05-17 NOTE — PROGRESS NOTES
INFECTIOUS DISEASE f/u     Kaleb Abraham  1950  [id number]    Date of Consult: 5/17/2024    Admission Date: 5/6/2024      Requesting Provider: No ref. provider found  Evaluating Physician: Lino Jacob MD    Reason for Consultation: Weakness    History of present illness:    Patient is a 73 y.o. male with coronary disease history of CABG, type 2 diabetes mellitus, hypertension, hyperlipidemia, hypothyroidism, atrial fibrillation, valvular heart disease with history of mechanical heart valve (prosthetic aortic valve and prosthetic mitral valve/) recently treated [facility] for GI bleed from April 22 patient found to have gastric arteriovenous malformations patient now admitted to [facility] with supratherapeutic INR.    Hospitalist concerned about underlying infection    Patient has low-grade fever 100.3, bradycardia and hypotension    Getting picc line    MVR/AVR performed 9 years ago here at [facility]        5/8/24; awake, has back pain, following commands; no fevers, rash, sore throat    5/9/24; has back pain; afebrile, normotensive,  has COLEEN tomorrow; picc placed    5/10/24; doing well; pain under some control; no fever, rash, sore throat, COLEEN confirmed MV vegetation < 1 cm. Wife in room    5/11/24; had vtach yesterday requiring cpr followed by torsades which was cardioverted;  better today has mild elevation of bp.  Awake, alert, family in room    5/13/24; doing well; no events overnight; no fever, rash, sore throat    5/14/2024 patient is afebrile normotensive sitting up in chair no complaints no events overnight    5/15/24; doing fair, transferred to floor; no fever, rash, sore throat, no diarrhea.    5/16/24; somewhat confused, not recognizing wife; doesn't know where he is; denies fever, rash, sore throat    5/17/2024 has had high fever overnight patient has had disorientation is currently resting quietly and nonverbal  Past Medical History:   Diagnosis Date    Anemia      Aortic stenosis     CAD (coronary artery disease)     Diabetes mellitus     Hyperlipidemia     Hypertension     Hypothyroidism     Mitral regurgitation        Past Surgical History:   Procedure Laterality Date    CAPSULE ENDOSCOPY      2/22/2023 and 9/25/2023 per dr. schrader    COLONOSCOPY      2/2023 Dr. Schrader    CORONARY ARTERY BYPASS GRAFT      CORONARY STENT PLACEMENT      UPPER GASTROINTESTINAL ENDOSCOPY      2/7/2023 Dr. Schrader, 7/26/2023 Dr. Liu, 4/2024 Dr. Girish Lainez       Family History   Problem Relation Age of Onset    Diabetes Mother     Stroke Mother     Thyroid cancer Mother     Hypertension Father     Stroke Father        Social History     Socioeconomic History    Marital status:    Tobacco Use    Smoking status: Former     Types: Cigarettes     Passive exposure: Past    Smokeless tobacco: Never   Vaping Use    Vaping status: Never Used   Substance and Sexual Activity    Alcohol use: Never    Drug use: Never    Sexual activity: Defer       No Known Allergies      Medication:    Current Facility-Administered Medications:     acetaminophen (TYLENOL) tablet 650 mg, 650 mg, Oral, Q4H PRN, 650 mg at 05/17/24 0852 **OR** [DISCONTINUED] acetaminophen (TYLENOL) 160 MG/5ML oral solution 650 mg, 650 mg, Oral, Q4H PRN **OR** acetaminophen (TYLENOL) suppository 650 mg, 650 mg, Rectal, Q4H PRN, Shahid Peter MD    allopurinol (ZYLOPRIM) tablet 300 mg, 300 mg, Oral, Daily, Shahid Peter MD, 300 mg at 05/17/24 0852    ampicillin 2000 mg IVPB in 100 mL NS (MBP), 2 g, Intravenous, Q6H, Carlos Bermudez MD, Last Rate: 200 mL/hr at 05/17/24 0852, 2 g at 05/17/24 0852    sennosides-docusate (PERICOLACE) 8.6-50 MG per tablet 2 tablet, 2 tablet, Oral, BID PRN, 2 tablet at 05/13/24 0849 **AND** polyethylene glycol (MIRALAX) packet 17 g, 17 g, Oral, Daily PRN, 17 g at 05/14/24 0843 **AND** bisacodyl (DULCOLAX) EC tablet 5 mg, 5 mg, Oral, Daily PRN **AND** bisacodyl  (DULCOLAX) suppository 10 mg, 10 mg, Rectal, Daily PRN, Shahid Peter MD, 10 mg at 05/14/24 1125    cefTRIAXone (ROCEPHIN) 2,000 mg in sodium chloride 0.9 % 100 mL MBP, 2,000 mg, Intravenous, Q12H, Lino Jacob MD, Last Rate: 200 mL/hr at 05/17/24 0415, 2,000 mg at 05/17/24 0415    dextrose (D50W) (25 g/50 mL) IV injection 25 g, 25 g, Intravenous, Q15 Min PRN, Shahid Peter MD    dextrose (GLUTOSE) oral gel 15 g, 15 g, Oral, Q15 Min PRN, Shahid Peter MD    glucagon (GLUCAGEN) injection 1 mg, 1 mg, Intramuscular, Q15 Min PRN, Shahid Peter MD    HYDROcodone-acetaminophen (NORCO)  MG per tablet 1 tablet, 1 tablet, Oral, Q6H PRN, Radha Ramos MD, 1 tablet at 05/17/24 1150    Insulin Lispro (humaLOG) injection 2-7 Units, 2-7 Units, Subcutaneous, 4x Daily AC & at Bedtime, Shahid Peter MD, 2 Units at 05/13/24 1225    levothyroxine (SYNTHROID, LEVOTHROID) tablet 25 mcg, 25 mcg, Oral, Q AM, Shahid Peter MD, 25 mcg at 05/16/24 0531    Lidocaine 4 % 1 patch, 1 patch, Transdermal, Q24H, Shahid Peter MD, 1 patch at 05/17/24 0852    Magnesium Cardiology Dose Replacement - Follow Nurse / BPA Driven Protocol, , Does not apply, PRN, Nick Tilley C, APRN    melatonin tablet 5 mg, 5 mg, Oral, Nightly, Radha Ramos MD, 5 mg at 05/16/24 2058    metoprolol tartrate (LOPRESSOR) tablet 50 mg, 50 mg, Oral, Q12H, Herbie Love MD, 50 mg at 05/17/24 0852    nitroglycerin (NITROSTAT) SL tablet 0.4 mg, 0.4 mg, Sublingual, Q5 Min PRN, Shahid Peter MD    oxybutynin XL (DITROPAN-XL) 24 hr tablet 10 mg, 10 mg, Oral, Daily, Shahid Peter MD, 10 mg at 05/17/24 0852    pantoprazole (PROTONIX) EC tablet 40 mg, 40 mg, Oral, BID AC, Carlos Bermudez MD, 40 mg at 05/16/24 1717    Pharmacy to dose warfarin, , Does not apply, Continuous PREUGENE, Shahid Peter MD    Phosphorus Replacement - Follow Nurse / BPA Driven Protocol, , Does not apply, [place] YOUNGER Joseph C  MD    Potassium Replacement - Follow Nurse / BPA Driven Protocol, , Does not apply, PRN, Shahid Peter MD    simethicone (MYLICON) chewable tablet 80 mg, 80 mg, Oral, 4x Daily PRN, Shahid Peter MD, 80 mg at 05/12/24 1526    sodium chloride 0.9 % flush 10 mL, 10 mL, Intravenous, Q12H, Jose Maria Dugan MD, 10 mL at 05/17/24 0855    sodium chloride 0.9 % flush 10 mL, 10 mL, Intravenous, PRN, Jose Maria Dugan MD    sodium chloride 0.9 % infusion 40 mL, 40 mL, Intravenous, PRN, Shahid Peter MD    tamsulosin (FLOMAX) 24 hr capsule 0.4 mg, 0.4 mg, Oral, Nightly, Shahid Peter MD, 0.4 mg at 05/16/24 2058    temazepam (RESTORIL) capsule 7.5 mg, 7.5 mg, Oral, Nightly PRN, Radha Ramos MD, 7.5 mg at 05/16/24 2234    tiZANidine (ZANAFLEX) tablet 2 mg, 2 mg, Oral, PRN, Shahid Peter MD, 2 mg at 05/16/24 2234    warfarin (COUMADIN) (dosing per levels), , Does not apply, Daily, Annette Carlson, [facility]    Antibiotics:  Anti-Infectives (From admission, onward)      Ordered     Dose/Rate Route Frequency Start Stop    05/15/24 1511  cefTRIAXone (ROCEPHIN) 2,000 mg in sodium chloride 0.9 % 100 mL MBP        Ordering Provider: Lino Jacob MD    2,000 mg  200 mL/hr over 30 Minutes Intravenous Every 12 Hours 05/15/24 1515 05/21/24 1459    05/07/24 1443  ampicillin 2000 mg IVPB in 100 mL NS (MBP)        Ordering Provider: Carlos Bermudez MD    2 g  200 mL/hr over 30 Minutes Intravenous Every 6 Hours 05/07/24 1500 05/21/24 1459    05/07/24 1302  vancomycin IVPB 2000 mg in 0.9% Sodium Chloride 500 mL        Ordering Provider: Bernabe Martines, Trung    2,000 mg  250 mL/hr over 120 Minutes Intravenous Once 05/07/24 1400 05/07/24 1524    05/07/24 1225  piperacillin-tazobactam (ZOSYN) 3.375 g IVPB in 100 mL NS MBP (CD)        Ordering Provider: Desiree Garcia MD    3.375 g  over 30 Minutes Intravenous Once 05/07/24 1315 05/07/24 1302              Review of Systems:    See hpi      Physical Exam:  "  Vital Signs  Temp (24hrs), Av.7 °F (37.6 °C), Min:97.7 °F (36.5 °C), Max:102.8 °F (39.3 °C)    Temp  Min: 97.7 °F (36.5 °C)  Max: 102.8 °F (39.3 °C)  BP  Min: 138/76  Max: 180/99  Pulse  Min: 68  Max: 89  Resp  Min: 16  Max: 18  SpO2  Min: 85 %  Max: 96 %    GENERAL: Laying in chair somnolent no distress  HEENT: Normocephalic, atraumatic.  PERRL. EOMI. No conjunctival injection. No icterus.      HEART: RRR; No murmur,  LUNGS: Clear to auscultation bilaterally   ABDOMEN: Soft, nontender,   EXT:  1+ edema  :  Without Wilkinson catheter.  MSK: No joint effusions or erythema  SKIN: no rash      Laboratory Data    Results from last 7 days   Lab Units 24  0504 24  0642 05/15/24  0335   WBC 10*3/mm3 9.98 9.28 8.15   HEMOGLOBIN g/dL 9.7* 9.3* 8.7*   HEMATOCRIT % 29.5* 28.0* 26.6*   PLATELETS 10*3/mm3 250 230 203     Results from last 7 days   Lab Units 24  0504   SODIUM mmol/L 128*   POTASSIUM mmol/L 4.9   CHLORIDE mmol/L 92*   CO2 mmol/L 26.0   BUN mg/dL 8   CREATININE mg/dL 0.56*   GLUCOSE mg/dL 113*   CALCIUM mg/dL 8.5*     Results from last 7 days   Lab Units 24  0644   ALK PHOS U/L 56   BILIRUBIN mg/dL 0.4   ALT (SGPT) U/L 12   AST (SGOT) U/L 30                           Estimated Creatinine Clearance: 145 mL/min (A) (by C-G formula based on SCr of 0.56 mg/dL (L)).      Microbiology:  Blood Culture   Date Value Ref Range Status   2024 Abnormal Stain (C)  Preliminary     BCID, PCR   Date Value Ref Range Status   2024 (A) Negative by BCID PCR. Culture to Follow. Final    Enterococcus faecalis. Arcelia/B (vancomycin resistance gene) not detected. Identification by BCID2 PCR.     No results found for: \"CULTURES\", \"HSVCX\", \"URCX\"  No results found for: \"EYECULTURE\", \"GCCX\", \"HSVCULTURE\", \"LABHSV\"  No results found for: \"LEGIONELLA\", \"MRSACX\", \"MUMPSCX\", \"MYCOPLASCX\"  No results found for: \"NOCARDIACX\", \"STOOLCX\"  No results found for: \"THROATCX\", \"UNSTIMCULT\", \"URINECX\", \"CULTURE\", " "\"VZVCULTUR\"  No results found for: \"VIRALCULTU\", \"WOUNDCX\"        Radiology:  Imaging Results (Last 72 Hours)       ** No results found for the last 72 hours. **              Impression:   Enterococcus faecalis bacteremia  MV endocarditis of mechanical valve  Hypotension  Bioprosthetic AVR  Supratherapeutic INR  Anemia  Elevated procalcitonin  History of GI bleed with gastric AVM  Thoracic back pain  Hospital acquired delirium  Fever  Supratherapeutic INR    PLAN/RECOMMENDATIONS:   Thank you for asking us to see Kaleb Abraham, I recommend the following:  High-grade enterococcal bacteremia is concerning in the setting of endovascular hardware.    Estimated Creatinine Clearance: 145 mL/min (A) (by C-G formula based on SCr of 0.56 mg/dL (L)).    Patient could have a GI source such as inflammation of colonic viscus (superimposed diverticulitis and diverticulosis) or bacteremia  following endoscopy regarding management of a GIbleed.    Gallbladder has been removed    Repeat blood cultures x 2 sets     Enterococcus isolate is susceptible to ampicillin and ceftriaxone    Fevers are concerning patient could have new infection or could have complications from mitral valve endocarditis which include stroke or a mycotic aneurysm with bleeding.    Consider CT and/or MRI brain    Agree with blood cultures x 2 sets      cont ampicillin 2 g IV every 4 hours    cont ceftriaxone 2 g IV every 12 hours    Duration is 6 weeks from 5/6/24 (6/17/24)        Plan is 6 weeks iv abx followed by chronic oral abx suppression    [facility]    Case discussed with hospitalist    D/w family Lino Jacob MD  5/17/2024  13:40 EDT                  "

## 2024-05-18 LAB
ANION GAP SERPL CALCULATED.3IONS-SCNC: 10 MMOL/L (ref 5–15)
BASOPHILS # BLD AUTO: 0.06 10*3/MM3 (ref 0–0.2)
BASOPHILS NFR BLD AUTO: 0.6 % (ref 0–1.5)
BUN SERPL-MCNC: 9 MG/DL (ref 8–23)
BUN/CREAT SERPL: 16.1 (ref 7–25)
CALCIUM SPEC-SCNC: 8.6 MG/DL (ref 8.6–10.5)
CHLORIDE SERPL-SCNC: 92 MMOL/L (ref 98–107)
CO2 SERPL-SCNC: 26 MMOL/L (ref 22–29)
CREAT SERPL-MCNC: 0.56 MG/DL (ref 0.76–1.27)
DEPRECATED RDW RBC AUTO: 50.9 FL (ref 37–54)
EGFRCR SERPLBLD CKD-EPI 2021: 104.7 ML/MIN/1.73
EOSINOPHIL # BLD AUTO: 0.02 10*3/MM3 (ref 0–0.4)
EOSINOPHIL NFR BLD AUTO: 0.2 % (ref 0.3–6.2)
ERYTHROCYTE [DISTWIDTH] IN BLOOD BY AUTOMATED COUNT: 16 % (ref 12.3–15.4)
GLUCOSE BLDC GLUCOMTR-MCNC: 103 MG/DL (ref 70–130)
GLUCOSE BLDC GLUCOMTR-MCNC: 111 MG/DL (ref 70–130)
GLUCOSE BLDC GLUCOMTR-MCNC: 132 MG/DL (ref 70–130)
GLUCOSE BLDC GLUCOMTR-MCNC: 140 MG/DL (ref 70–130)
GLUCOSE SERPL-MCNC: 107 MG/DL (ref 65–99)
HCT VFR BLD AUTO: 27.9 % (ref 37.5–51)
HGB BLD-MCNC: 9.1 G/DL (ref 13–17.7)
IMM GRANULOCYTES # BLD AUTO: 0.06 10*3/MM3 (ref 0–0.05)
IMM GRANULOCYTES NFR BLD AUTO: 0.6 % (ref 0–0.5)
INR PPP: 3.65 (ref 0.89–1.12)
LYMPHOCYTES # BLD AUTO: 1.16 10*3/MM3 (ref 0.7–3.1)
LYMPHOCYTES NFR BLD AUTO: 12.2 % (ref 19.6–45.3)
MCH RBC QN AUTO: 28.5 PG (ref 26.6–33)
MCHC RBC AUTO-ENTMCNC: 32.6 G/DL (ref 31.5–35.7)
MCV RBC AUTO: 87.5 FL (ref 79–97)
MONOCYTES # BLD AUTO: 0.87 10*3/MM3 (ref 0.1–0.9)
MONOCYTES NFR BLD AUTO: 9.2 % (ref 5–12)
NEUTROPHILS NFR BLD AUTO: 7.3 10*3/MM3 (ref 1.7–7)
NEUTROPHILS NFR BLD AUTO: 77.2 % (ref 42.7–76)
NRBC BLD AUTO-RTO: 0 /100 WBC (ref 0–0.2)
PLATELET # BLD AUTO: 244 10*3/MM3 (ref 140–450)
PMV BLD AUTO: 9.5 FL (ref 6–12)
POTASSIUM SERPL-SCNC: 4.3 MMOL/L (ref 3.5–5.2)
PROTHROMBIN TIME: 36.5 SECONDS (ref 12.2–14.5)
RBC # BLD AUTO: 3.19 10*6/MM3 (ref 4.14–5.8)
SODIUM SERPL-SCNC: 128 MMOL/L (ref 136–145)
WBC NRBC COR # BLD AUTO: 9.47 10*3/MM3 (ref 3.4–10.8)

## 2024-05-18 PROCEDURE — 85025 COMPLETE CBC W/AUTO DIFF WBC: CPT | Performed by: INTERNAL MEDICINE

## 2024-05-18 PROCEDURE — 25010000002 CEFTRIAXONE PER 250 MG: Performed by: INTERNAL MEDICINE

## 2024-05-18 PROCEDURE — 25010000002 AMPICILLIN PER 500 MG: Performed by: INTERNAL MEDICINE

## 2024-05-18 PROCEDURE — 87040 BLOOD CULTURE FOR BACTERIA: CPT | Performed by: INTERNAL MEDICINE

## 2024-05-18 PROCEDURE — 25810000003 LACTATED RINGERS PER 1000 ML: Performed by: INTERNAL MEDICINE

## 2024-05-18 PROCEDURE — 85610 PROTHROMBIN TIME: CPT | Performed by: INTERNAL MEDICINE

## 2024-05-18 PROCEDURE — 82948 REAGENT STRIP/BLOOD GLUCOSE: CPT

## 2024-05-18 PROCEDURE — 99232 SBSQ HOSP IP/OBS MODERATE 35: CPT | Performed by: INTERNAL MEDICINE

## 2024-05-18 PROCEDURE — 80048 BASIC METABOLIC PNL TOTAL CA: CPT | Performed by: INTERNAL MEDICINE

## 2024-05-18 RX ORDER — SODIUM CHLORIDE, SODIUM LACTATE, POTASSIUM CHLORIDE, CALCIUM CHLORIDE 600; 310; 30; 20 MG/100ML; MG/100ML; MG/100ML; MG/100ML
75 INJECTION, SOLUTION INTRAVENOUS CONTINUOUS
Status: ACTIVE | OUTPATIENT
Start: 2024-05-18 | End: 2024-05-19

## 2024-05-18 RX ORDER — WARFARIN SODIUM 2 MG/1
2 TABLET ORAL
Status: DISCONTINUED | OUTPATIENT
Start: 2024-05-18 | End: 2024-05-19

## 2024-05-18 RX ADMIN — OXYBUTYNIN CHLORIDE 10 MG: 5 TABLET, EXTENDED RELEASE ORAL at 10:13

## 2024-05-18 RX ADMIN — CEFTRIAXONE 2000 MG: 2 INJECTION, POWDER, FOR SOLUTION INTRAMUSCULAR; INTRAVENOUS at 15:53

## 2024-05-18 RX ADMIN — Medication 5 MG: at 20:17

## 2024-05-18 RX ADMIN — CEFTRIAXONE 2000 MG: 2 INJECTION, POWDER, FOR SOLUTION INTRAMUSCULAR; INTRAVENOUS at 04:34

## 2024-05-18 RX ADMIN — PANTOPRAZOLE SODIUM 40 MG: 40 TABLET, DELAYED RELEASE ORAL at 17:50

## 2024-05-18 RX ADMIN — WARFARIN SODIUM 2 MG: 2 TABLET ORAL at 17:50

## 2024-05-18 RX ADMIN — PANTOPRAZOLE SODIUM 40 MG: 40 TABLET, DELAYED RELEASE ORAL at 06:19

## 2024-05-18 RX ADMIN — AMPICILLIN SODIUM 2 G: 2 INJECTION, POWDER, FOR SOLUTION INTRAMUSCULAR; INTRAVENOUS at 04:33

## 2024-05-18 RX ADMIN — SODIUM CHLORIDE, POTASSIUM CHLORIDE, SODIUM LACTATE AND CALCIUM CHLORIDE 75 ML/HR: 600; 310; 30; 20 INJECTION, SOLUTION INTRAVENOUS at 15:55

## 2024-05-18 RX ADMIN — METOPROLOL TARTRATE 50 MG: 50 TABLET, FILM COATED ORAL at 10:13

## 2024-05-18 RX ADMIN — LEVOTHYROXINE SODIUM 25 MCG: 25 TABLET ORAL at 05:43

## 2024-05-18 RX ADMIN — AMPICILLIN SODIUM 2 G: 2 INJECTION, POWDER, FOR SOLUTION INTRAMUSCULAR; INTRAVENOUS at 20:17

## 2024-05-18 RX ADMIN — AMPICILLIN SODIUM 2 G: 2 INJECTION, POWDER, FOR SOLUTION INTRAMUSCULAR; INTRAVENOUS at 15:55

## 2024-05-18 RX ADMIN — ALLOPURINOL 300 MG: 300 TABLET ORAL at 10:13

## 2024-05-18 RX ADMIN — METOPROLOL TARTRATE 50 MG: 50 TABLET, FILM COATED ORAL at 20:17

## 2024-05-18 RX ADMIN — TEMAZEPAM 7.5 MG: 7.5 CAPSULE ORAL at 23:47

## 2024-05-18 RX ADMIN — HYDROCODONE BITARTRATE AND ACETAMINOPHEN 1 TABLET: 10; 325 TABLET ORAL at 20:23

## 2024-05-18 RX ADMIN — Medication 10 ML: at 10:15

## 2024-05-18 RX ADMIN — AMPICILLIN SODIUM 2 G: 2 INJECTION, POWDER, FOR SOLUTION INTRAMUSCULAR; INTRAVENOUS at 10:14

## 2024-05-18 RX ADMIN — LIDOCAINE 1 PATCH: 4 PATCH TOPICAL at 10:13

## 2024-05-18 RX ADMIN — HYDROCODONE BITARTRATE AND ACETAMINOPHEN 1 TABLET: 10; 325 TABLET ORAL at 10:20

## 2024-05-18 RX ADMIN — TIZANIDINE 2 MG: 4 TABLET ORAL at 01:02

## 2024-05-18 RX ADMIN — TAMSULOSIN HYDROCHLORIDE 0.4 MG: 0.4 CAPSULE ORAL at 20:17

## 2024-05-18 RX ADMIN — Medication 10 ML: at 20:20

## 2024-05-18 NOTE — PLAN OF CARE
Goal Outcome Evaluation:  Plan of Care Reviewed With: patient           Outcome Evaluation: Pt alert with confusion, VSS, RA, sat. 95%, c/o pain addressed with PRN meds. Continue monitoring.

## 2024-05-18 NOTE — PROGRESS NOTES
[facility] Medicine Services  PROGRESS NOTE    Patient Name: Kaleb Abraham  : 1950  MRN: [id number]    Date of Admission: 2024  Primary Care Physician: Manolo Enamorado MD    Subjective   Subjective     CC:  Ams, sepsis    HPI:  Patient was confused last night.  No fevers.  Eating breakfast this morning.      Objective   Objective     Vital Signs:   Temp:  [98.1 °F (36.7 °C)-101.3 °F (38.5 °C)] 98.3 °F (36.8 °C)  Heart Rate:  [71-89] 71  Resp:  [18] 18  BP: (116-162)/(76-89) 142/79     Physical Exam:  Constitutional: No acute distress, awake, alert, eating breakfast  Respiratory: Clear to auscultation bilaterally, respiratory effort normal   Cardiovascular: RRR  Gastrointestinal: Positive bowel sounds, soft, nontender, nondistended  Musculoskeletal: No bilateral ankle edema  Psychiatric: cooperative and more appropriate today  Neurologic: oriented to person and place      Results Reviewed:  LAB RESULTS:      Lab 24  0504 24  0642 05/15/24  0335 24  1748 24  0430 24  0644 24  1242 24  0615 24  1818 24  1141   WBC 9.98 9.28 8.15  --  8.16 10.65  --  10.63   < >  --    HEMOGLOBIN 9.7* 9.3* 8.7* 9.8* 7.9* 8.7*   < > 8.9*   < > 10.0*   HEMATOCRIT 29.5* 28.0* 26.6* 30.3* 25.0* 26.6*   < > 27.9*   < > 27.9*   PLATELETS 250 230 203  --  218 209  --  219   < >  --    NEUTROS ABS  --   --   --   --   --   --   --  8.12*  --   --    IMMATURE GRANS (ABS)  --   --   --   --   --   --   --  0.31*  --   --    LYMPHS ABS  --   --   --   --   --   --   --  1.37  --   --    MONOS ABS  --   --   --   --   --   --   --  0.75  --   --    EOS ABS  --   --   --   --   --   --   --  0.03  --   --    MCV 87.5 86.4 86.4  --  90.6 90.8  --  91.5   < >  --    PROTIME 39.3* 36.7* 32.3*  --  26.6* 24.0*  --  26.7*   < >  --    HEPARIN ANTI-XA  --   --   --   --   --   --   --  0.40  --  0.33    < > = values in this interval not displayed.         Lab  05/17/24  0504 05/16/24  0642 05/15/24  0335 05/15/24  0019 05/14/24 0430 05/13/24 0644 05/12/24  1608 05/12/24 0615 05/12/24 0615 05/12/24  0156   SODIUM 128* 131* 131*  --  133* 133*  --    < > 133*  --    POTASSIUM 4.9 4.3 4.2  --  4.3 4.0  --    < > 4.1  --    CHLORIDE 92* 93* 93*  --  98 96*  --    < > 99  --    CO2 26.0 26.0 32.0*  --  27.0 30.0*  --    < > 25.0  --    ANION GAP 10.0 12.0 6.0  --  8.0 7.0  --    < > 9.0  --    BUN 8 9 8  --  9 10  --    < > 10  --    CREATININE 0.56* 0.59* 0.73*  --  0.57* 0.71*  --    < > 0.67*  --    EGFR 104.7 103.1 96.7  --  104.2 97.5  --    < > 99.2  --    GLUCOSE 113* 110* 113*  --  106* 115*  --    < > 127*  --    CALCIUM 8.5* 8.4* 8.3*  --  8.1* 7.7*  --    < > 8.1*  --    IONIZED CALCIUM  --   --   --   --   --  1.18  --   --   --   --    MAGNESIUM  --  1.7  --  2.1 1.6 2.0  --   --   --  1.9   PHOSPHORUS  --   --   --   --   --  2.8 2.9  --  2.2*  --    TSH  --   --   --   --   --   --   --   --  3.450  --     < > = values in this interval not displayed.         Lab 05/13/24 0644   TOTAL PROTEIN 5.0*   ALBUMIN 2.6*   GLOBULIN 2.4   ALT (SGPT) 12   AST (SGOT) 30   BILIRUBIN 0.4   ALK PHOS 56         Lab 05/17/24  0504 05/16/24  0642 05/15/24  0335 05/14/24  0430 05/13/24  0644   PROTIME 39.3* 36.7* 32.3* 26.6* 24.0*   INR 4.00* 3.66* 3.11* 2.43* 2.12*             Lab 05/14/24  1003   ABO TYPING O   RH TYPING Positive   ANTIBODY SCREEN Negative         Brief Urine Lab Results  (Last result in the past 365 days)        Color   Clarity   Blood   Leuk Est   Nitrite   Protein   CREAT   Urine HCG        05/17/24 1605 Yellow   Clear   Negative   Negative   Negative   Negative                   Microbiology Results Abnormal       Procedure Component Value - Date/Time    Blood Culture - Blood, Hand, Right [[id number]]  (Normal) Collected: 05/10/24 0625    Lab Status: Final result Specimen: Blood from Hand, Right Updated: 05/15/24 0701     Blood Culture No growth at 5 days     Narrative:      Less than seven (7) mL's of blood was collected.  Insufficient quantity may yield false negative results.    Blood Culture - Blood, Arm, Right [[id number]]  (Normal) Collected: 05/10/24 0610    Lab Status: Final result Specimen: Blood from Arm, Right Updated: 05/15/24 0701     Blood Culture No growth at 5 days    Narrative:      Less than seven (7) mL's of blood was collected.  Insufficient quantity may yield false negative results.            CT Head Without Contrast    Result Date: 5/17/2024  CT HEAD WO CONTRAST Date of Exam: 5/17/2024 3:35 PM EDT Indication: evalaute for bleeding, having AMS and fevers. Comparison: CT brain dated 5/6/2024 Technique: Axial CT images were obtained of the head without contrast administration.  Automated exposure control and iterative construction methods were used. Findings: There is no evidence of hemorrhage. There is no mass effect or midline shift. There is diffuse brain atrophy. Periventricular hypodense areas compatible with chronic small vessel ischemia. Tiny round hypodensity left bubba likely a chronic lacunar infarct. Also suspected small chronic right cerebellar lacunar infarct. There is no extracerebral collection. Ventricles are normal in size and configuration for patient's stated age. Posterior fossa is within normal limits. Calvarium and skull base appear intact.  Visualized sinuses show no air fluid levels. Visualized orbits are unremarkable.     Impression: Impression: Brain atrophy with chronic microvascular ischemic changes No acute intracranial abnormality Electronically Signed: Dereck Jackson MD  5/17/2024 3:49 PM EDT  Workstation ID: [id number]    XR Chest 1 View    Result Date: 5/17/2024  XR CHEST 1 VW Date of Exam: 5/17/2024 2:12 PM EDT Indication: fevers, sepsis Comparison: 5/13/2024 Findings: Cardiomediastinal silhouette is enlarged, similar prior examination with persistent pulmonary vascular congestion. There is persistent basilar airspace  disease and small to moderate right and likely trace left effusion. No superimposed airspace disease no pneumothorax. No acute osseous abnormality identified.     Impression: Impression: No significant interval change identified. Electronically Signed: Alen Soto MD  5/17/2024 3:16 PM EDT  Workstation ID: [id number]     Results for orders placed during the hospital encounter of 05/06/24    Adult Transesophageal Echo (COLEEN) W/ Cont if Necessary Per Protocol    Interpretation Summary    Left ventricular ejection fraction appears to be 56 - 60%.    Left ventricular wall thickness is consistent with mild to moderate concentric hypertrophy.    The left atrial cavity is moderately dilated.    There is a bioprosthetic aortic valve present.    Mild aortic valve stenosis is present.  Mean gradient 11 mmHg..  Mild aortic valve regurgitation.    There is a mechanical mitral valve prosthesis present.  There is mild mitral valve regurgitation.  No stenosis.    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  Measures 0.8 x 0.3 cm    I supervised and directed an independent trained observer with the assistance of monitoring the patient's level of consciousness and physiological status throughout the procedure.  Intraoperative service time of 20 minutes      Current medications:  Scheduled Meds:allopurinol, 300 mg, Oral, Daily  ampicillin, 2 g, Intravenous, Q6H  cefTRIAXone, 2,000 mg, Intravenous, Q12H  insulin lispro, 2-7 Units, Subcutaneous, 4x Daily AC & at Bedtime  levothyroxine, 25 mcg, Oral, Q AM  Lidocaine, 1 patch, Transdermal, Q24H  melatonin, 5 mg, Oral, Nightly  metoprolol tartrate, 50 mg, Oral, Q12H  oxybutynin XL, 10 mg, Oral, Daily  pantoprazole, 40 mg, Oral, BID AC  sodium chloride, 10 mL, Intravenous, Q12H  tamsulosin, 0.4 mg, Oral, Nightly  warfarin (COUMADIN) (dosing per levels), , Does not apply, Daily      Continuous Infusions:Pharmacy to dose warfarin,       PRN Meds:.  acetaminophen **OR**  [DISCONTINUED] acetaminophen **OR** acetaminophen    senna-docusate sodium **AND** polyethylene glycol **AND** bisacodyl **AND** bisacodyl    dextrose    dextrose    glucagon (human recombinant)    HYDROcodone-acetaminophen    Magnesium Cardiology Dose Replacement - Follow Nurse / BPA Driven Protocol    nitroglycerin    Pharmacy to dose warfarin    Phosphorus Replacement - Follow Nurse / BPA Driven Protocol    Potassium Replacement - Follow Nurse / BPA Driven Protocol    simethicone    sodium chloride    sodium chloride    temazepam    tiZANidine    Assessment & Plan   Assessment & Plan     Active Hospital Problems    Diagnosis  POA    **Prosthetic valve endocarditis (Mitral Valve) [I33.0]  Yes    S/P VT/TdP Arrest 5/10/2024 [I47.20]  Yes    Enterococcal bacteremia [R78.81, B95.2]  Yes    S/P prosthetic aortic valve [Z95.2]  Not Applicable    S/P mechanical aortic valve [Z95.2]  Not Applicable    Chronic anticoagulation (warfarin) [Z79.01]  Not Applicable    Supratherapeutic INR [R79.1]  Yes    MARTINEZ [G47.33]  Yes    Chronic atrial fibrillation [I48.20]  Yes    Anemia [D64.9]  Yes    CAD s/p CABG [I25.10]  Yes    T2DM [E11.9]  Yes    Hyperlipidemia [E78.5]  Yes    Hypothyroidism [E03.9]  Yes      Resolved Hospital Problems   No resolved problems to display.        Brief Hospital Course to date:  Kaleb Abraham is a 73 y.o. male with a history of CAD s/p CABG, T2DM, HTN, HLD, hypothyroidism, Afib, valvular heart disease s/p mechanical mitral valve and bioprosthetic aortic valve on Coumadin, MARTINEZ, tachybradycardia syndrome, CHF, and recent GI bleed s/p EGD on 4/22/24 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 s/p APC at [facility] (also had Afib with RVR requiring cardioversion during that admission), who presented to the ED on 5/6/24 with complaints of generalized weakness, lower extremity edema, right lower back pain, fevers, and nausea. CT head, CT A/P, and CXR were negative on admission. Hb was 6.6. INR noted to be >10.  Received Vitamin K. Initially admitted to hospitalist service. Despite blood transfusion and IV fluids, he remained hypotensive and bradycardic requiring ICU transfer 5/7/24. He required pressors. Further evaluation revealed bacteremia with Enterococcus faecalis. Infectious Disease was consulted. TTE did not show any vegetations but subsequent COLEEN did show a mitral valve vegetation. Cardiology followed for decompensated heart failure and bradycardia. GI also evaluated the patient in setting of anemia and recent GI bleed, they felt there was no need for repeat endoscopic evaluation at this time. On 5/10/24 patient developed V-tach arrest and a code was called, developed torsades and was ultimately cardioverted to sinus rhythm. Stabilized in ICU and now transferred to telemetry, hospitalist service resumed care on 5/16/24.     Fevers, resolved  - patient with fevers of 102.8 the morning of 5/17; no further fevers overnight.   -CT head negative for bleeding  -Blood cultures are negative     Septic shock, resolved  Enterococcus faecalis bacteremia  Mitral valve endocarditis of mechanical valve  --s/p ICU, vasopressor support  --Blood cultures 5/6/24 with Enterococcus faecalis, repeat blood cultures 5/10/24 negative; urine culture also grew same organism   --Initial TTE 5/7/24 with EF 56-60%, no evidence of vegetation  --Subsequent COLEEN 5/10/24 with mitral valve vegetation noted measuring 0.8 x 0.3 cm with mild mitral regurgitation   --ID following, suspects source may have been GI such as colonic inflammation vs related to recent endoscopic management of GI bleed  --Continue IV Ampicillin and Rocephin x 6 weeks total, followed by chronic oral antibiotic suppression  --PICC line placed 5/11/24     Supratherapeutic INR, POA  --INR >10 on admission  --Improved s/p Vitamin K  --Pharmacy to dose warfarin     Acute on chronic anemia  Recent GI bleed  --Recent GI bleed s/p EGD on 4/22/24 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 s/p APC at  [facility]  --Hb 6.6 on admission, transfused 1 unit PRBC with improvement at that time, then Hb trended back down to 7.9 on 5/14/24 so was transfused another 1 unit PRBC  --GI has seen and signed off, continue BID Protonix, no indication for repeat EGD  -awaiting CBC this morning     Acute on chronic HFpEF  Valvular heart disease s/p mechanical mitral valve and bioprosthetic aortic valve  --proBNP 22K on admission  --Echo as above  --Received IV Lasix on admission  --Cardiology has followed, continue Metoprolol, ACE inhibitor on hold due to hypotension     Afib  --Recent admission to [facility] where he had Afib with RVR and was cardioverted  --Cardiology following, have discontinued Amiodarone as may have led to supratherapeutic INR  --Continue Metoprolol and anticoagulation with warfarin     Hospital delirium  --Continue Scheduled melatonin, add PRN Restoril  --Reorient as needed  --Cannot use Seroquel or other medications due to QT prolongation  --AVOID Ativan as wife states it made him worse (received a dose on 5/10/24)     Expected Discharge Location and Transportation: rehab    Expected Discharge   Expected Discharge Date: 5/21/2024; Expected Discharge Time:      DVT prophylaxis:  Medical and mechanical DVT prophylaxis orders are present.         AM-PAC 6 Clicks Score (PT): 9 (05/17/24 2130)    CODE STATUS:   Code Status and Medical Interventions:   Ordered at: 05/06/24 2236     Level Of Support Discussed With:    Patient     Code Status (Patient has no pulse and is not breathing):    CPR (Attempt to Resuscitate)     Medical Interventions (Patient has pulse or is breathing):    Full Support     I have prepared this progress note with copied portions of the prior day's progress note of my own authorship to preserve accuracy and maintain consistency of documentation. I have reviewed these portions and edited them for correctness. I verify that the above documentation accurately and truly represents the  evaluation and management performed on today's date.       Brianna Lopez MD  05/18/24

## 2024-05-18 NOTE — PROGRESS NOTES
INFECTIOUS DISEASE f/u     Kaleb Abraham  1950  [id number]    Date of Consult: 5/18/2024    Admission Date: 5/6/2024      Requesting Provider: No ref. provider found  Evaluating Physician: FRANCHESCA Small    Reason for Consultation: Weakness    History of present illness:    Patient is a 73 y.o. male with coronary disease history of CABG, type 2 diabetes mellitus, hypertension, hyperlipidemia, hypothyroidism, atrial fibrillation, valvular heart disease with history of mechanical heart valve (prosthetic aortic valve and prosthetic mitral valve/) recently treated [facility] for GI bleed from April 22 patient found to have gastric arteriovenous malformations patient now admitted to [facility] with supratherapeutic INR.    Hospitalist concerned about underlying infection    Patient has low-grade fever 100.3, bradycardia and hypotension    Getting picc line    MVR/AVR performed 9 years ago here at [facility]        5/8/24; awake, has back pain, following commands; no fevers, rash, sore throat    5/9/24; has back pain; afebrile, normotensive,  has COLEEN tomorrow; picc placed    5/10/24; doing well; pain under some control; no fever, rash, sore throat, COLEEN confirmed MV vegetation < 1 cm. Wife in room    5/11/24; had vtach yesterday requiring cpr followed by torsades which was cardioverted;  better today has mild elevation of bp.  Awake, alert, family in room    5/13/24; doing well; no events overnight; no fever, rash, sore throat    5/14/2024 patient is afebrile normotensive sitting up in chair no complaints no events overnight    5/15/24; doing fair, transferred to floor; no fever, rash, sore throat, no diarrhea.    5/16/24; somewhat confused, not recognizing wife; doesn't know where he is; denies fever, rash, sore throat    5/17/2024 has had high fever overnight patient has had disorientation is currently resting quietly and nonverbal    5/18/24:  Tmax of 101.3 yesterday at 0800,  afebrile since.   WBC 9.47  repeat blood cultures pending. UA unremarkable. HCT  without acute abnormality.  Less confused today.  No increased sputum, n/v/d. Complains of chest soreness   Past Medical History:   Diagnosis Date    Anemia     Aortic stenosis     CAD (coronary artery disease)     Diabetes mellitus     Hyperlipidemia     Hypertension     Hypothyroidism     Mitral regurgitation        Past Surgical History:   Procedure Laterality Date    CAPSULE ENDOSCOPY      2/22/2023 and 9/25/2023 per dr. schrader    COLONOSCOPY      2/2023 Dr. Schrader    CORONARY ARTERY BYPASS GRAFT      CORONARY STENT PLACEMENT      UPPER GASTROINTESTINAL ENDOSCOPY      2/7/2023 Dr. Schrader, 7/26/2023 Dr. Liu, 4/2024 Dr. Girish Lainez       Family History   Problem Relation Age of Onset    Diabetes Mother     Stroke Mother     Thyroid cancer Mother     Hypertension Father     Stroke Father        Social History     Socioeconomic History    Marital status:    Tobacco Use    Smoking status: Former     Types: Cigarettes     Passive exposure: Past    Smokeless tobacco: Never   Vaping Use    Vaping status: Never Used   Substance and Sexual Activity    Alcohol use: Never    Drug use: Never    Sexual activity: Defer       No Known Allergies      Medication:    Current Facility-Administered Medications:     acetaminophen (TYLENOL) tablet 650 mg, 650 mg, Oral, Q4H PRN, 650 mg at 05/17/24 0852 **OR** [DISCONTINUED] acetaminophen (TYLENOL) 160 MG/5ML oral solution 650 mg, 650 mg, Oral, Q4H PRN **OR** acetaminophen (TYLENOL) suppository 650 mg, 650 mg, Rectal, Q4H PRN, Shahid Peter MD    allopurinol (ZYLOPRIM) tablet 300 mg, 300 mg, Oral, Daily, Shahid Peter MD, 300 mg at 05/18/24 1013    ampicillin 2000 mg IVPB in 100 mL NS (MBP), 2 g, Intravenous, Q6H, Carlos Bermudez MD, Last Rate: 200 mL/hr at 05/18/24 1014, 2 g at 05/18/24 1014    sennosides-docusate (PERICOLACE) 8.6-50 MG per tablet 2  tablet, 2 tablet, Oral, BID PRN, 2 tablet at 05/13/24 0849 **AND** polyethylene glycol (MIRALAX) packet 17 g, 17 g, Oral, Daily PRN, 17 g at 05/14/24 0843 **AND** bisacodyl (DULCOLAX) EC tablet 5 mg, 5 mg, Oral, Daily PRN **AND** bisacodyl (DULCOLAX) suppository 10 mg, 10 mg, Rectal, Daily PRN, Shahid Peter MD, 10 mg at 05/14/24 1125    cefTRIAXone (ROCEPHIN) 2,000 mg in sodium chloride 0.9 % 100 mL MBP, 2,000 mg, Intravenous, Q12H, Lino Jacob MD, Last Rate: 200 mL/hr at 05/18/24 0434, 2,000 mg at 05/18/24 0434    dextrose (D50W) (25 g/50 mL) IV injection 25 g, 25 g, Intravenous, Q15 Min PRN, Shahid Peter MD    dextrose (GLUTOSE) oral gel 15 g, 15 g, Oral, Q15 Min PRN, Shahid Peter MD    glucagon (GLUCAGEN) injection 1 mg, 1 mg, Intramuscular, Q15 Min PRN, Shahid Peter MD    HYDROcodone-acetaminophen (NORCO)  MG per tablet 1 tablet, 1 tablet, Oral, Q6H PRN, Radha Ramos MD, 1 tablet at 05/18/24 1020    Insulin Lispro (humaLOG) injection 2-7 Units, 2-7 Units, Subcutaneous, 4x Daily AC & at Bedtime, Shahid Peter MD, 2 Units at 05/13/24 1225    levothyroxine (SYNTHROID, LEVOTHROID) tablet 25 mcg, 25 mcg, Oral, Q AM, Shahid Peter MD, 25 mcg at 05/18/24 0543    Lidocaine 4 % 1 patch, 1 patch, Transdermal, Q24H, Shahid Peter MD, 1 patch at 05/18/24 1013    Magnesium Cardiology Dose Replacement - Follow Nurse / BPA Driven Protocol, , Does not apply, PRN, Nick Tilley APRN    melatonin tablet 5 mg, 5 mg, Oral, Nightly, Radha Ramos MD, 5 mg at 05/17/24 2031    metoprolol tartrate (LOPRESSOR) tablet 50 mg, 50 mg, Oral, Q12H, Herbie Love MD, 50 mg at 05/18/24 1013    nitroglycerin (NITROSTAT) SL tablet 0.4 mg, 0.4 mg, Sublingual, Q5 Min PRN, Shahid Peter MD    oxybutynin XL (DITROPAN-XL) 24 hr tablet 10 mg, 10 mg, Oral, Daily, Shahid Peter MD, 10 mg at 05/18/24 1013    pantoprazole (PROTONIX) EC tablet 40 mg, 40 mg, Oral, BID AC,  Carlos Bermudez MD, 40 mg at 05/18/24 0619    Pharmacy to dose warfarin, , Does not apply, Continuous PRR[place] KNIGHT Joseph C, MD    Phosphorus Replacement - Follow Nurse / BPA Driven Protocol, , Does not apply, [place] YOUNGER Joseph C, MD    Potassium Replacement - Follow Nurse / BPA Driven Protocol, , Does not apply, [place] YOUNGER Joseph C, MD    simethicone (MYLICON) chewable tablet 80 mg, 80 mg, Oral, 4x Daily PRN, Shahid Peter MD, 80 mg at 05/12/24 1526    sodium chloride 0.9 % flush 10 mL, 10 mL, Intravenous, Q12H, Jose Maria uDgan MD, 10 mL at 05/18/24 1015    sodium chloride 0.9 % flush 10 mL, 10 mL, Intravenous, PRN, Jose Maria Dugan MD    sodium chloride 0.9 % infusion 40 mL, 40 mL, Intravenous, PRN, Shahid Peter MD    tamsulosin (FLOMAX) 24 hr capsule 0.4 mg, 0.4 mg, Oral, Nightly, Shahid Peter MD, 0.4 mg at 05/17/24 2031    temazepam (RESTORIL) capsule 7.5 mg, 7.5 mg, Oral, Nightly PRN, Radha Ramos MD, 7.5 mg at 05/16/24 2234    tiZANidine (ZANAFLEX) tablet 2 mg, 2 mg, Oral, PRN, Shahid Peter MD, 2 mg at 05/18/24 0102    warfarin (COUMADIN) tablet 2 mg, 2 mg, Oral, Daily, Biju Moser [facility]    Antibiotics:  Anti-Infectives (From admission, onward)      Ordered     Dose/Rate Route Frequency Start Stop    05/15/24 1511  cefTRIAXone (ROCEPHIN) 2,000 mg in sodium chloride 0.9 % 100 mL MBP        Ordering Provider: Lino Jacob MD    2,000 mg  200 mL/hr over 30 Minutes Intravenous Every 12 Hours 05/15/24 1515 05/21/24 1459    05/07/24 1443  ampicillin 2000 mg IVPB in 100 mL NS (MBP)        Ordering Provider: Carlos Bermudez MD    2 g  200 mL/hr over 30 Minutes Intravenous Every 6 Hours 05/07/24 1500 05/21/24 1459    05/07/24 1302  vancomycin IVPB 2000 mg in 0.9% Sodium Chloride 500 mL        Ordering Provider: Bernabe Martines, PharmD    2,000 mg  250 mL/hr over 120 Minutes Intravenous Once 05/07/24 1400 05/07/24 1524    05/07/24 1225  piperacillin-tazobactam  "(ZOSYN) 3.375 g IVPB in 100 mL NS MBP (CD)        Ordering Provider: Desiree Garcia MD    3.375 g  over 30 Minutes Intravenous Once 24 1315 24 1302              Review of Systems:    See hpi      Physical Exam:   Vital Signs  Temp (24hrs), Av.4 °F (36.9 °C), Min:98.1 °F (36.7 °C), Max:99.1 °F (37.3 °C)    Temp  Min: 98.1 °F (36.7 °C)  Max: 99.1 °F (37.3 °C)  BP  Min: 116/77  Max: 162/89  Pulse  Min: 71  Max: 80  Resp  Min: 18  Max: 18  SpO2  Min: 92 %  Max: 95 %    GENERAL: alert, NAD   HEENT: Normocephalic, atraumatic.  PERRL. EOMI. No conjunctival injection. No icterus.    HEART: RRR; No murmur,  LUNGS: Clear to auscultation bilaterally   ABDOMEN: Soft, nontender,   EXT:  1+ edema  :  Without Wilkinson catheter.  MSK: No joint effusions or erythema  SKIN: no rash  Left PICC without redness tenderness      Laboratory Data    Results from last 7 days   Lab Units 24  0712 24  0504 24  0642   WBC 10*3/mm3 9.47 9.98 9.28   HEMOGLOBIN g/dL 9.1* 9.7* 9.3*   HEMATOCRIT % 27.9* 29.5* 28.0*   PLATELETS 10*3/mm3 244 250 230     Results from last 7 days   Lab Units 24  0712   SODIUM mmol/L 128*   POTASSIUM mmol/L 4.3   CHLORIDE mmol/L 92*   CO2 mmol/L 26.0   BUN mg/dL 9   CREATININE mg/dL 0.56*   GLUCOSE mg/dL 107*   CALCIUM mg/dL 8.6     Results from last 7 days   Lab Units 24  0644   ALK PHOS U/L 56   BILIRUBIN mg/dL 0.4   ALT (SGPT) U/L 12   AST (SGOT) U/L 30                           Estimated Creatinine Clearance: 145 mL/min (A) (by C-G formula based on SCr of 0.56 mg/dL (L)).      Microbiology:  Blood Culture   Date Value Ref Range Status   2024 Abnormal Stain (C)  Preliminary     BCID, PCR   Date Value Ref Range Status   2024 (A) Negative by BCID PCR. Culture to Follow. Final    Enterococcus faecalis. Arcelia/B (vancomycin resistance gene) not detected. Identification by BCID2 PCR.     No results found for: \"CULTURES\", \"HSVCX\", \"URCX\"  No results found for: " "\"EYECULTURE\", \"GCCX\", \"HSVCULTURE\", \"LABHSV\"  No results found for: \"LEGIONELLA\", \"MRSACX\", \"MUMPSCX\", \"MYCOPLASCX\"  No results found for: \"NOCARDIACX\", \"STOOLCX\"  No results found for: \"THROATCX\", \"UNSTIMCULT\", \"URINECX\", \"CULTURE\", \"VZVCULTUR\"  No results found for: \"VIRALCULTU\", \"WOUNDCX\"        Radiology:  Imaging Results (Last 72 Hours)       Procedure Component Value Units Date/Time    CT Head Without Contrast [[id number]] Collected: 05/17/24 1545     Updated: 05/17/24 1552    Narrative:      CT HEAD WO CONTRAST    Date of Exam: 5/17/2024 3:35 PM EDT    Indication: evalaute for bleeding, having AMS and fevers.    Comparison: CT brain dated 5/6/2024    Technique: Axial CT images were obtained of the head without contrast administration.  Automated exposure control and iterative construction methods were used.      Findings:  There is no evidence of hemorrhage. There is no mass effect or midline shift. There is diffuse brain atrophy. Periventricular hypodense areas compatible with chronic small vessel ischemia. Tiny round hypodensity left bubba likely a chronic lacunar   infarct. Also suspected small chronic right cerebellar lacunar infarct.    There is no extracerebral collection.    Ventricles are normal in size and configuration for patient's stated age.     Posterior fossa is within normal limits.    Calvarium and skull base appear intact.  Visualized sinuses show no air fluid levels. Visualized orbits are unremarkable.        Impression:      Impression:    Brain atrophy with chronic microvascular ischemic changes    No acute intracranial abnormality        Electronically Signed: Dereck Jackson MD    5/17/2024 3:49 PM EDT    Workstation ID: [id number]    XR Chest 1 View [[id number]] Collected: 05/17/24 1515     Updated: 05/17/24 1519    Narrative:      XR CHEST 1 VW    Date of Exam: 5/17/2024 2:12 PM EDT    Indication: fevers, sepsis    Comparison: 5/13/2024    Findings:  Cardiomediastinal silhouette is " enlarged, similar prior examination with persistent pulmonary vascular congestion. There is persistent basilar airspace disease and small to moderate right and likely trace left effusion. No superimposed airspace disease   no pneumothorax. No acute osseous abnormality identified.      Impression:      Impression:  No significant interval change identified.      Electronically Signed: Alen Soto MD    5/17/2024 3:16 PM EDT    Workstation ID: [id number]              Impression:   Enterococcus faecalis bacteremia  MV endocarditis of mechanical valve  Hypotension  Bioprosthetic AVR  Supratherapeutic INR  Anemia  Elevated procalcitonin  History of GI bleed with gastric AVM  Thoracic back pain  Hospital acquired delirium  Fever- improved   Supratherapeutic INR    PLAN/RECOMMENDATIONS:     High-grade enterococcal bacteremia is concerning in the setting of endovascular hardware.    Estimated Creatinine Clearance: 145 mL/min (A) (by C-G formula based on SCr of 0.56 mg/dL (L)).      Repeat blood cultures x 2 sets  No growth     Enterococcus isolate is susceptible to ampicillin and ceftriaxone    Fevers are concerning, but have improved over past 24 hours.  Will continue to monitor. Patient could have new infection or could have complications from mitral valve endocarditis which include stroke or a mycotic aneurysm with bleeding.      cont ampicillin 2 g IV every 4 hours    cont ceftriaxone 2 g IV every 12 hours    Duration is 6 weeks from 5/6/24 (6/17/24)        Plan is 6 weeks iv abx followed by chronic oral abx suppression    Looking at [facility] rehab  Discussed with Dr. Lopez this am  Copied text in this note has been reviewed and is accurate as of 05/18/24  Dr. Jacob to resume care Monday       Reny Ortiz, FRANCHESCA  5/18/2024  10:50 EDT

## 2024-05-18 NOTE — PROGRESS NOTES
"Pharmacy Consult  -  Warfarin  Kaleb Abraham is a  73 y.o. male receiving warfarin therapy.  Height - 180.3 cm (70.98\")  Weight - 102 kg (224 lb 3.2 oz)    Consulting Provider: - Kendra  Indication: - Afib, Mechanical Mitral valve  Goal INR: - 2.5 - 3.5  Home Regimen: Most recent dose of warfarin was 5mg daily although was adjusted to 4mg over the weekend due to elevated INR   - Patient has medication bottle of 4mg tablets and 1mg tablets at home     Bridge Therapy: Heparin gtt stopped on 5/12    Drug-Drug Interactions with current regimen:   Received 10mg IV Vitamin K on 5/6  Ampicillin-  Penicillins may enhance the anticoagulant effect of Vitamin K Antagonists.  Ceftriaxone- Cephalosporins may enhance the anticoagulant effect of Vitamin K Antagonists.    Warfarin Dosing During Admission:  Date  5/8 5/9 5/10 5/11 5/12 5/13 5/14 5/15 5/16   INR  1.46 1.58 1.88 2.69 2.44 2.12 2.43 3.11 3.66   Dose  3 mg 3 mg 3 mg 1.5 mg 3 mg 4 mg 3 mg 2 mg 1 mg      Date  5/17 5/18          INR  4.0 3.65          Dose  HOLD 2mg            Education Provided: Warfarin education provided on 5/8/2024 verbally.  Discussed effects of warfarin, importance of checking INR, drug-drug and drug-food interactions, and signs/symptoms of bleeding and clotting.  Patient deferred to wife who as in the room and manages his medications. She monitors his INR at home and then faxes results to Dr Maradiaga office.  All pertinent questions were answered.      Discharge Follow up:   Following Provider - Dr Peraza   Follow up time range or appointment - 2-3 days post discharge    Labs:  Results from last 7 days   Lab Units 05/18/24  0712 05/17/24  0504 05/16/24  0642 05/15/24  0335 05/14/24  1748 05/14/24  0430 05/13/24  0644 05/12/24  1242 05/12/24  0615   INR  3.65* 4.00* 3.66* 3.11*  --  2.43* 2.12*  --  2.44*   HEMOGLOBIN g/dL 9.1* 9.7* 9.3* 8.7* 9.8* 7.9* 8.7*   < > 8.9*   HEMATOCRIT % 27.9* 29.5* 28.0* 26.6* 30.3* 25.0* 26.6*   < > 27.9*    < > " = values in this interval not displayed.     Results from last 7 days   Lab Units 05/18/24  0712 05/17/24  0504 05/16/24  0642 05/14/24  0430 05/13/24  0644   SODIUM mmol/L 128* 128* 131*   < > 133*   POTASSIUM mmol/L 4.3 4.9 4.3   < > 4.0   CHLORIDE mmol/L 92* 92* 93*   < > 96*   CO2 mmol/L 26.0 26.0 26.0   < > 30.0*   BUN mg/dL 9 8 9   < > 10   CREATININE mg/dL 0.56* 0.56* 0.59*   < > 0.71*   CALCIUM mg/dL 8.6 8.5* 8.4*   < > 7.7*   BILIRUBIN mg/dL  --   --   --   --  0.4   ALK PHOS U/L  --   --   --   --  56   ALT (SGPT) U/L  --   --   --   --  12   AST (SGOT) U/L  --   --   --   --  30   GLUCOSE mg/dL 107* 113* 110*   < > 115*    < > = values in this interval not displayed.     Current dietary intake: 0% of documented meals 5/17  Diet Order   Procedures    Diet: Regular/House, Cardiac, Diabetic; Healthy Heart (2-3 Na+); Consistent Carbohydrate; Fluid Consistency: Thin (IDDSI 0)     Assessment/Plan:  Warfarin dosing for AFib, mechanical mitral valve  Goal INR: 2.5-3.5  5/18 INR - 3.65, decreased from 4.0  5/18 H/H - 9.1/27.9    Will start warfarin 2mg daily   Monitor s/s bleeding, clinical status, dietary intake and drug-drug interactions  Follow daily INR and adjust dose accordingly    Thanks,  Biju Moser, [facility]   5/18/2024  08:07 EDT

## 2024-05-19 LAB
ANION GAP SERPL CALCULATED.3IONS-SCNC: 7 MMOL/L (ref 5–15)
BUN SERPL-MCNC: 11 MG/DL (ref 8–23)
BUN/CREAT SERPL: 15.3 (ref 7–25)
CALCIUM SPEC-SCNC: 8.6 MG/DL (ref 8.6–10.5)
CHLORIDE SERPL-SCNC: 95 MMOL/L (ref 98–107)
CO2 SERPL-SCNC: 27 MMOL/L (ref 22–29)
CREAT SERPL-MCNC: 0.72 MG/DL (ref 0.76–1.27)
DEPRECATED RDW RBC AUTO: 52.6 FL (ref 37–54)
EGFRCR SERPLBLD CKD-EPI 2021: 97.1 ML/MIN/1.73
ERYTHROCYTE [DISTWIDTH] IN BLOOD BY AUTOMATED COUNT: 15.9 % (ref 12.3–15.4)
GLUCOSE BLDC GLUCOMTR-MCNC: 116 MG/DL (ref 70–130)
GLUCOSE BLDC GLUCOMTR-MCNC: 131 MG/DL (ref 70–130)
GLUCOSE BLDC GLUCOMTR-MCNC: 134 MG/DL (ref 70–130)
GLUCOSE BLDC GLUCOMTR-MCNC: 146 MG/DL (ref 70–130)
GLUCOSE SERPL-MCNC: 115 MG/DL (ref 65–99)
HCT VFR BLD AUTO: 25.8 % (ref 37.5–51)
HGB BLD-MCNC: 8.3 G/DL (ref 13–17.7)
INR PPP: 1.94 (ref 0.89–1.12)
MCH RBC QN AUTO: 28.5 PG (ref 26.6–33)
MCHC RBC AUTO-ENTMCNC: 32.2 G/DL (ref 31.5–35.7)
MCV RBC AUTO: 88.7 FL (ref 79–97)
PLATELET # BLD AUTO: 234 10*3/MM3 (ref 140–450)
PMV BLD AUTO: 9.9 FL (ref 6–12)
POTASSIUM SERPL-SCNC: 4.4 MMOL/L (ref 3.5–5.2)
PROTHROMBIN TIME: 22.3 SECONDS (ref 12.2–14.5)
RBC # BLD AUTO: 2.91 10*6/MM3 (ref 4.14–5.8)
SODIUM SERPL-SCNC: 129 MMOL/L (ref 136–145)
WBC NRBC COR # BLD AUTO: 7.09 10*3/MM3 (ref 3.4–10.8)

## 2024-05-19 PROCEDURE — 25010000002 ENOXAPARIN PER 10 MG

## 2024-05-19 PROCEDURE — 99233 SBSQ HOSP IP/OBS HIGH 50: CPT | Performed by: INTERNAL MEDICINE

## 2024-05-19 PROCEDURE — 82948 REAGENT STRIP/BLOOD GLUCOSE: CPT

## 2024-05-19 PROCEDURE — 80048 BASIC METABOLIC PNL TOTAL CA: CPT | Performed by: INTERNAL MEDICINE

## 2024-05-19 PROCEDURE — 85027 COMPLETE CBC AUTOMATED: CPT | Performed by: INTERNAL MEDICINE

## 2024-05-19 PROCEDURE — 25010000002 AMPICILLIN PER 500 MG: Performed by: INTERNAL MEDICINE

## 2024-05-19 PROCEDURE — 25010000002 CEFTRIAXONE PER 250 MG: Performed by: INTERNAL MEDICINE

## 2024-05-19 PROCEDURE — 85610 PROTHROMBIN TIME: CPT | Performed by: INTERNAL MEDICINE

## 2024-05-19 PROCEDURE — 25010000002 EPOETIN ALFA-EPBX 10000 UNIT/ML SOLUTION: Performed by: INTERNAL MEDICINE

## 2024-05-19 RX ORDER — WARFARIN SODIUM 2 MG/1
2 TABLET ORAL
Status: DISCONTINUED | OUTPATIENT
Start: 2024-05-20 | End: 2024-05-20

## 2024-05-19 RX ORDER — TIZANIDINE 4 MG/1
2 TABLET ORAL EVERY 8 HOURS PRN
Status: DISCONTINUED | OUTPATIENT
Start: 2024-05-19 | End: 2024-05-20 | Stop reason: HOSPADM

## 2024-05-19 RX ORDER — WARFARIN SODIUM 4 MG/1
4 TABLET ORAL
Status: COMPLETED | OUTPATIENT
Start: 2024-05-19 | End: 2024-05-19

## 2024-05-19 RX ORDER — SODIUM CHLORIDE 1 G/1
1 TABLET ORAL
Status: DISCONTINUED | OUTPATIENT
Start: 2024-05-19 | End: 2024-05-20 | Stop reason: HOSPADM

## 2024-05-19 RX ORDER — ENOXAPARIN SODIUM 100 MG/ML
1 INJECTION SUBCUTANEOUS EVERY 12 HOURS SCHEDULED
Status: DISCONTINUED | OUTPATIENT
Start: 2024-05-19 | End: 2024-05-20 | Stop reason: HOSPADM

## 2024-05-19 RX ADMIN — ENOXAPARIN SODIUM 100 MG: 100 INJECTION SUBCUTANEOUS at 21:27

## 2024-05-19 RX ADMIN — TIZANIDINE 2 MG: 4 TABLET ORAL at 01:18

## 2024-05-19 RX ADMIN — PANTOPRAZOLE SODIUM 40 MG: 40 TABLET, DELAYED RELEASE ORAL at 06:41

## 2024-05-19 RX ADMIN — METOPROLOL TARTRATE 50 MG: 50 TABLET, FILM COATED ORAL at 10:41

## 2024-05-19 RX ADMIN — AMPICILLIN SODIUM 2 G: 2 INJECTION, POWDER, FOR SOLUTION INTRAMUSCULAR; INTRAVENOUS at 02:27

## 2024-05-19 RX ADMIN — CEFTRIAXONE 2000 MG: 2 INJECTION, POWDER, FOR SOLUTION INTRAMUSCULAR; INTRAVENOUS at 14:13

## 2024-05-19 RX ADMIN — WARFARIN SODIUM 4 MG: 4 TABLET ORAL at 17:22

## 2024-05-19 RX ADMIN — Medication 5 MG: at 21:27

## 2024-05-19 RX ADMIN — OXYBUTYNIN CHLORIDE 10 MG: 5 TABLET, EXTENDED RELEASE ORAL at 10:41

## 2024-05-19 RX ADMIN — Medication 10 ML: at 21:27

## 2024-05-19 RX ADMIN — EPOETIN ALFA-EPBX 10000 UNITS: 10000 INJECTION, SOLUTION INTRAVENOUS; SUBCUTANEOUS at 14:14

## 2024-05-19 RX ADMIN — SODIUM CHLORIDE 1 G: 1 TABLET ORAL at 17:22

## 2024-05-19 RX ADMIN — METOPROLOL TARTRATE 50 MG: 50 TABLET, FILM COATED ORAL at 21:27

## 2024-05-19 RX ADMIN — TAMSULOSIN HYDROCHLORIDE 0.4 MG: 0.4 CAPSULE ORAL at 21:27

## 2024-05-19 RX ADMIN — CEFTRIAXONE 2000 MG: 2 INJECTION, POWDER, FOR SOLUTION INTRAMUSCULAR; INTRAVENOUS at 02:27

## 2024-05-19 RX ADMIN — HYDROCODONE BITARTRATE AND ACETAMINOPHEN 1 TABLET: 10; 325 TABLET ORAL at 02:28

## 2024-05-19 RX ADMIN — TIZANIDINE 2 MG: 4 TABLET ORAL at 18:59

## 2024-05-19 RX ADMIN — LEVOTHYROXINE SODIUM 25 MCG: 25 TABLET ORAL at 06:41

## 2024-05-19 RX ADMIN — HYDROCODONE BITARTRATE AND ACETAMINOPHEN 1 TABLET: 10; 325 TABLET ORAL at 10:41

## 2024-05-19 RX ADMIN — AMPICILLIN SODIUM 2 G: 2 INJECTION, POWDER, FOR SOLUTION INTRAMUSCULAR; INTRAVENOUS at 21:27

## 2024-05-19 RX ADMIN — PANTOPRAZOLE SODIUM 40 MG: 40 TABLET, DELAYED RELEASE ORAL at 17:23

## 2024-05-19 RX ADMIN — LIDOCAINE 1 PATCH: 4 PATCH TOPICAL at 10:41

## 2024-05-19 RX ADMIN — SODIUM CHLORIDE 1 G: 1 TABLET ORAL at 12:36

## 2024-05-19 RX ADMIN — HYDROCODONE BITARTRATE AND ACETAMINOPHEN 1 TABLET: 10; 325 TABLET ORAL at 17:22

## 2024-05-19 RX ADMIN — AMPICILLIN SODIUM 2 G: 2 INJECTION, POWDER, FOR SOLUTION INTRAMUSCULAR; INTRAVENOUS at 14:13

## 2024-05-19 RX ADMIN — SENNOSIDES AND DOCUSATE SODIUM 2 TABLET: 8.6; 5 TABLET ORAL at 18:59

## 2024-05-19 RX ADMIN — ALLOPURINOL 300 MG: 300 TABLET ORAL at 10:41

## 2024-05-19 RX ADMIN — AMPICILLIN SODIUM 2 G: 2 INJECTION, POWDER, FOR SOLUTION INTRAMUSCULAR; INTRAVENOUS at 10:41

## 2024-05-19 RX ADMIN — ENOXAPARIN SODIUM 100 MG: 100 INJECTION SUBCUTANEOUS at 12:36

## 2024-05-19 NOTE — PROGRESS NOTES
[facility] Medicine Services  PROGRESS NOTE    Patient Name: Kaleb Abraham  : 1950  MRN: [id number]    Date of Admission: 2024  Primary Care Physician: Manolo Enamorado MD    Subjective   Subjective     CC:  Sepsis    HPI:  She is doing better per wife.  Not eating or drinking very much.  She states that he gets Retacrit injections every 2 weeks as an outpatient.  His last injection was in April.  Mental status is much better.  Patient is requesting his home pain medications.      Objective   Objective     Vital Signs:   Temp:  [98.2 °F (36.8 °C)-98.8 °F (37.1 °C)] 98.2 °F (36.8 °C)  Heart Rate:  [57-79] 59  Resp:  [17-18] 17  BP: ()/(73-86) 119/74  Flow (L/min):  [2] 2     Physical Exam:  Constitutional: No acute distress, awake, alert  Respiratory: Clear to auscultation bilaterally, respiratory effort normal   Cardiovascular: RRR  Gastrointestinal: Positive bowel sounds, soft, nontender, nondistended  Musculoskeletal: No bilateral ankle edema  Psychiatric: Appropriate affect, cooperative  Neurologic: Oriented x 3, no focal deficits  Skin: No rashes      Results Reviewed:  LAB RESULTS:      Lab 24  0703 24  0712 24  0504 24  0642 05/15/24  0335 24  1748 24  0430   WBC 7.09 9.47 9.98 9.28 8.15  --  8.16   HEMOGLOBIN 8.3* 9.1* 9.7* 9.3* 8.7*   < > 7.9*   HEMATOCRIT 25.8* 27.9* 29.5* 28.0* 26.6*   < > 25.0*   PLATELETS 234 244 250 230 203  --  218   NEUTROS ABS  --  7.30*  --   --   --   --   --    IMMATURE GRANS (ABS)  --  0.06*  --   --   --   --   --    LYMPHS ABS  --  1.16  --   --   --   --   --    MONOS ABS  --  0.87  --   --   --   --   --    EOS ABS  --  0.02  --   --   --   --   --    MCV 88.7 87.5 87.5 86.4 86.4  --  90.6   PROTIME  --  36.5* 39.3* 36.7* 32.3*  --  26.6*    < > = values in this interval not displayed.         Lab 24  0712 24  0504 24  0642 05/15/24  0335 05/15/24  0019 24  0430  05/13/24  0644 05/13/24  0644 05/12/24  1608   SODIUM 128* 128* 131* 131*  --  133*   < > 133*  --    POTASSIUM 4.3 4.9 4.3 4.2  --  4.3   < > 4.0  --    CHLORIDE 92* 92* 93* 93*  --  98   < > 96*  --    CO2 26.0 26.0 26.0 32.0*  --  27.0   < > 30.0*  --    ANION GAP 10.0 10.0 12.0 6.0  --  8.0   < > 7.0  --    BUN 9 8 9 8  --  9   < > 10  --    CREATININE 0.56* 0.56* 0.59* 0.73*  --  0.57*   < > 0.71*  --    EGFR 104.7 104.7 103.1 96.7  --  104.2   < > 97.5  --    GLUCOSE 107* 113* 110* 113*  --  106*   < > 115*  --    CALCIUM 8.6 8.5* 8.4* 8.3*  --  8.1*   < > 7.7*  --    IONIZED CALCIUM  --   --   --   --   --   --   --  1.18  --    MAGNESIUM  --   --  1.7  --  2.1 1.6  --  2.0  --    PHOSPHORUS  --   --   --   --   --   --   --  2.8 2.9    < > = values in this interval not displayed.         Lab 05/13/24  0644   TOTAL PROTEIN 5.0*   ALBUMIN 2.6*   GLOBULIN 2.4   ALT (SGPT) 12   AST (SGOT) 30   BILIRUBIN 0.4   ALK PHOS 56         Lab 05/18/24  0712 05/17/24  0504 05/16/24  0642 05/15/24  0335 05/14/24  0430   PROTIME 36.5* 39.3* 36.7* 32.3* 26.6*   INR 3.65* 4.00* 3.66* 3.11* 2.43*             Lab 05/14/24  1003   ABO TYPING O   RH TYPING Positive   ANTIBODY SCREEN Negative         Brief Urine Lab Results  (Last result in the past 365 days)        Color   Clarity   Blood   Leuk Est   Nitrite   Protein   CREAT   Urine HCG        05/17/24 1605 Yellow   Clear   Negative   Negative   Negative   Negative                   Microbiology Results Abnormal       Procedure Component Value - Date/Time    Blood Culture - Blood, Hand, Left [[id number]]  (Normal) Collected: 05/18/24 0733    Lab Status: Preliminary result Specimen: Blood from Hand, Left Updated: 05/19/24 0801     Blood Culture No growth at 24 hours    Blood Culture - Blood, Hand, Right [[id number]]  (Normal) Collected: 05/18/24 0712    Lab Status: Preliminary result Specimen: Blood from Hand, Right Updated: 05/19/24 0746     Blood Culture No growth at 24 hours     Narrative:      Less than seven (7) mL's of blood was collected.  Insufficient quantity may yield false negative results.    Blood Culture - Blood, Hand, Right [[id number]]  (Normal) Collected: 05/10/24 0625    Lab Status: Final result Specimen: Blood from Hand, Right Updated: 05/15/24 0701     Blood Culture No growth at 5 days    Narrative:      Less than seven (7) mL's of blood was collected.  Insufficient quantity may yield false negative results.    Blood Culture - Blood, Arm, Right [[id number]]  (Normal) Collected: 05/10/24 0610    Lab Status: Final result Specimen: Blood from Arm, Right Updated: 05/15/24 0701     Blood Culture No growth at 5 days    Narrative:      Less than seven (7) mL's of blood was collected.  Insufficient quantity may yield false negative results.            CT Head Without Contrast    Result Date: 5/17/2024  CT HEAD WO CONTRAST Date of Exam: 5/17/2024 3:35 PM EDT Indication: evalaute for bleeding, having AMS and fevers. Comparison: CT brain dated 5/6/2024 Technique: Axial CT images were obtained of the head without contrast administration.  Automated exposure control and iterative construction methods were used. Findings: There is no evidence of hemorrhage. There is no mass effect or midline shift. There is diffuse brain atrophy. Periventricular hypodense areas compatible with chronic small vessel ischemia. Tiny round hypodensity left bubba likely a chronic lacunar infarct. Also suspected small chronic right cerebellar lacunar infarct. There is no extracerebral collection. Ventricles are normal in size and configuration for patient's stated age. Posterior fossa is within normal limits. Calvarium and skull base appear intact.  Visualized sinuses show no air fluid levels. Visualized orbits are unremarkable.     Impression: Impression: Brain atrophy with chronic microvascular ischemic changes No acute intracranial abnormality Electronically Signed: Dereck Jackson MD  5/17/2024 3:49 PM EDT   Workstation ID: [id number]    XR Chest 1 View    Result Date: 5/17/2024  XR CHEST 1 VW Date of Exam: 5/17/2024 2:12 PM EDT Indication: fevers, sepsis Comparison: 5/13/2024 Findings: Cardiomediastinal silhouette is enlarged, similar prior examination with persistent pulmonary vascular congestion. There is persistent basilar airspace disease and small to moderate right and likely trace left effusion. No superimposed airspace disease no pneumothorax. No acute osseous abnormality identified.     Impression: Impression: No significant interval change identified. Electronically Signed: Alen Soto MD  5/17/2024 3:16 PM EDT  Workstation ID: [id number]     Results for orders placed during the hospital encounter of 05/06/24    Adult Transesophageal Echo (COLEEN) W/ Cont if Necessary Per Protocol    Interpretation Summary    Left ventricular ejection fraction appears to be 56 - 60%.    Left ventricular wall thickness is consistent with mild to moderate concentric hypertrophy.    The left atrial cavity is moderately dilated.    There is a bioprosthetic aortic valve present.    Mild aortic valve stenosis is present.  Mean gradient 11 mmHg..  Mild aortic valve regurgitation.    There is a mechanical mitral valve prosthesis present.  There is mild mitral valve regurgitation.  No stenosis.    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  Measures 0.8 x 0.3 cm    I supervised and directed an independent trained observer with the assistance of monitoring the patient's level of consciousness and physiological status throughout the procedure.  Intraoperative service time of 20 minutes      Current medications:  Scheduled Meds:allopurinol, 300 mg, Oral, Daily  ampicillin, 2 g, Intravenous, Q6H  cefTRIAXone, 2,000 mg, Intravenous, Q12H  insulin lispro, 2-7 Units, Subcutaneous, 4x Daily AC & at Bedtime  levothyroxine, 25 mcg, Oral, Q AM  Lidocaine, 1 patch, Transdermal, Q24H  melatonin, 5 mg, Oral, Nightly  metoprolol  tartrate, 50 mg, Oral, Q12H  oxybutynin XL, 10 mg, Oral, Daily  pantoprazole, 40 mg, Oral, BID AC  sodium chloride, 10 mL, Intravenous, Q12H  tamsulosin, 0.4 mg, Oral, Nightly  warfarin, 2 mg, Oral, Daily      Continuous Infusions:Pharmacy to dose warfarin,       PRN Meds:.  acetaminophen **OR** [DISCONTINUED] acetaminophen **OR** acetaminophen    senna-docusate sodium **AND** polyethylene glycol **AND** bisacodyl **AND** bisacodyl    dextrose    dextrose    glucagon (human recombinant)    HYDROcodone-acetaminophen    Magnesium Cardiology Dose Replacement - Follow Nurse / BPA Driven Protocol    nitroglycerin    Pharmacy to dose warfarin    Phosphorus Replacement - Follow Nurse / BPA Driven Protocol    Potassium Replacement - Follow Nurse / BPA Driven Protocol    simethicone    sodium chloride    sodium chloride    temazepam    tiZANidine    Assessment & Plan   Assessment & Plan     Active Hospital Problems    Diagnosis  POA    **Prosthetic valve endocarditis (Mitral Valve) [I33.0]  Yes    S/P VT/TdP Arrest 5/10/2024 [I47.20]  Yes    Enterococcal bacteremia [R78.81, B95.2]  Yes    S/P prosthetic aortic valve [Z95.2]  Not Applicable    S/P mechanical aortic valve [Z95.2]  Not Applicable    Chronic anticoagulation (warfarin) [Z79.01]  Not Applicable    Supratherapeutic INR [R79.1]  Yes    MARTINEZ [G47.33]  Yes    Chronic atrial fibrillation [I48.20]  Yes    Anemia [D64.9]  Yes    CAD s/p CABG [I25.10]  Yes    T2DM [E11.9]  Yes    Hyperlipidemia [E78.5]  Yes    Hypothyroidism [E03.9]  Yes      Resolved Hospital Problems   No resolved problems to display.        Brief Hospital Course to date:  Kaleb Abraham is a 73 y.o. male with a history of CAD s/p CABG, T2DM, HTN, HLD, hypothyroidism, Afib, valvular heart disease s/p mechanical mitral valve and bioprosthetic aortic valve on Coumadin, MARTINEZ, tachybradycardia syndrome, CHF, and recent GI bleed s/p EGD on 4/22/24 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 s/p APC at [facility]  (also had Afib with RVR requiring cardioversion during that admission), who presented to the ED on 5/6/24 with complaints of generalized weakness, lower extremity edema, right lower back pain, fevers, and nausea. CT head, CT A/P, and CXR were negative on admission. Hb was 6.6. INR noted to be >10. Received Vitamin K. Initially admitted to hospitalist service. Despite blood transfusion and IV fluids, he remained hypotensive and bradycardic requiring ICU transfer 5/7/24. He required pressors. Further evaluation revealed bacteremia with Enterococcus faecalis. Infectious Disease was consulted. TTE did not show any vegetations but subsequent COLEEN did show a mitral valve vegetation. Cardiology followed for decompensated heart failure and bradycardia. GI also evaluated the patient in setting of anemia and recent GI bleed, they felt there was no need for repeat endoscopic evaluation at this time. On 5/10/24 patient developed V-tach arrest and a code was called, developed torsades and was ultimately cardioverted to sinus rhythm. Stabilized in ICU and now transferred to telemetry, hospitalist service resumed care on 5/16/24.       Septic shock, resolved  Enterococcus faecalis bacteremia  Mitral valve endocarditis of mechanical valve  --s/p ICU, vasopressor support  --Blood cultures 5/6/24 with Enterococcus faecalis, repeat blood cultures 5/10/24 negative; urine culture also grew same organism   --Initial TTE 5/7/24 with EF 56-60%, no evidence of vegetation  --Subsequent COLEEN 5/10/24 with mitral valve vegetation noted measuring 0.8 x 0.3 cm with mild mitral regurgitation   --ID following, suspects source may have been GI such as colonic inflammation vs related to recent endoscopic management of GI bleed  --Continue IV Ampicillin and Rocephin x 6 weeks total, followed by chronic oral antibiotic suppression  --PICC line placed 5/11/24  -Patient with fevers of 102.8 the morning of 5/17; no further fevers overnight, blood cultures  have remained negative. CT head negative for bleeding.    Hyponatremia  - Poor PO intake, received fluids yesterday with slight improvement, doesn't appear overloaded.   - Encouraged better PO intake today add added some salt tabs.      Subtheraputic INR  --INR >10 on admission, received Vitamin K. INR now subtheraputic in setting of mechanical valve.   - Will bridge with lovenox.   --Pharmacy to dose warfarin     Acute on chronic anemia  Recent GI bleed  --Recent GI bleed s/p EGD on 4/22/24 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 s/p APC at [facility]  --Hb 6.6 on admission, transfused 1 unit PRBC with improvement at that time, then Hb trended back down to 7.9 on 5/14/24 so was transfused another 1 unit PRBC  --GI has seen and signed off, continue BID Protonix, no indication for repeat EGD  - Of note, patient takes retacrit every 2 weeks as an outpatient per wife.  Confirm with pharmacy that he has not received it here and has not had it since April, this could be part of his worsening anemia.  -CBC trended down this morning to 8.3, repeat in am.   -Give dose of Retacrit today.     Acute on chronic HFpEF  Valvular heart disease s/p mechanical mitral valve and bioprosthetic aortic valve  --proBNP 22K on admission  --Echo as above  --Received IV Lasix on admission  --Cardiology has followed, continue Metoprolol, ACE inhibitor on hold due to hypotension     Afib  --Recent admission to [facility] where he had Afib with RVR and was cardioverted  --Cardiology following, have discontinued Amiodarone as may have led to supratherapeutic INR  --Continue Metoprolol and anticoagulation with warfarin     Hospital delirium, resolved  --Continue Scheduled melatonin, continue PRN Restoril   --Reorient as needed  --Cannot use Seroquel or other medications due to QT prolongation  --AVOID Ativan as wife states it made him worse (received a dose on 5/10/24)     Expected Discharge Location and Transportation: rehab    Expected Discharge    Expected Discharge Date: 5/21/2024; Expected Discharge Time:      DVT prophylaxis:  Medical and mechanical DVT prophylaxis orders are present.         AM-PAC 6 Clicks Score (PT): 12 (05/18/24 0815)    CODE STATUS:   Code Status and Medical Interventions:   Ordered at: 05/06/24 3231     Level Of Support Discussed With:    Patient     Code Status (Patient has no pulse and is not breathing):    CPR (Attempt to Resuscitate)     Medical Interventions (Patient has pulse or is breathing):    Full Support     I have prepared this progress note with copied portions of the prior day's progress note of my own authorship to preserve accuracy and maintain consistency of documentation. I have reviewed these portions and edited them for correctness. I verify that the above documentation accurately and truly represents the evaluation and management performed on today's date.       Brianna Lopez MD  05/19/24

## 2024-05-19 NOTE — PROGRESS NOTES
"Pharmacy Consult  -  Warfarin  Kaleb Abraham is a  73 y.o. male receiving warfarin therapy.  Height - 180.3 cm (70.98\")  Weight - 102 kg (224 lb 3.2 oz)    Consulting Provider: - Kendra  Indication: - Afib, Mechanical Mitral valve  Goal INR: - 2.5 - 3.5  Home Regimen: Most recent dose of warfarin was 5mg daily although was adjusted to 4mg over the weekend due to elevated INR   - Patient has medication bottle of 4mg tablets and 1mg tablets at home     Bridge Therapy: enoxaparin 1 mg/kg q12h    Drug-Drug Interactions with current regimen:   Received 10mg IV Vitamin K on 5/6  Ampicillin-  Penicillins may enhance the anticoagulant effect of Vitamin K Antagonists.  Ceftriaxone- Cephalosporins may enhance the anticoagulant effect of Vitamin K Antagonists.    Warfarin Dosing During Admission:  Date  5/8 5/9 5/10 5/11 5/12 5/13 5/14 5/15 5/16   INR  1.46 1.58 1.88 2.69 2.44 2.12 2.43 3.11 3.66   Dose  3 mg 3 mg 3 mg 1.5 mg 3 mg 4 mg 3 mg 2 mg 1 mg      Date  5/17 5/18 5/19         INR  4.0 3.65 1.94         Dose  HOLD 2mg 4mg           Education Provided: Warfarin education provided on 5/8/2024 verbally.  Discussed effects of warfarin, importance of checking INR, drug-drug and drug-food interactions, and signs/symptoms of bleeding and clotting.  Patient deferred to wife who as in the room and manages his medications. She monitors his INR at home and then faxes results to Dr Maradiaga office.  All pertinent questions were answered.      Discharge Follow up:   Following Provider - Dr Peraza   Follow up time range or appointment - 2-3 days post discharge    Labs:  Results from last 7 days   Lab Units 05/19/24  0703 05/18/24  0712 05/17/24  0504 05/16/24  0642 05/15/24  0335 05/14/24  1748 05/14/24  0430 05/13/24  0644   INR  1.94* 3.65* 4.00* 3.66* 3.11*  --  2.43* 2.12*   HEMOGLOBIN g/dL 8.3* 9.1* 9.7* 9.3* 8.7* 9.8* 7.9* 8.7*   HEMATOCRIT % 25.8* 27.9* 29.5* 28.0* 26.6* 30.3* 25.0* 26.6*     Results from last 7 days "   Lab Units 05/19/24  0703 05/18/24  0712 05/17/24  0504 05/14/24  0430 05/13/24  0644   SODIUM mmol/L 129* 128* 128*   < > 133*   POTASSIUM mmol/L 4.4 4.3 4.9   < > 4.0   CHLORIDE mmol/L 95* 92* 92*   < > 96*   CO2 mmol/L 27.0 26.0 26.0   < > 30.0*   BUN mg/dL 11 9 8   < > 10   CREATININE mg/dL 0.72* 0.56* 0.56*   < > 0.71*   CALCIUM mg/dL 8.6 8.6 8.5*   < > 7.7*   BILIRUBIN mg/dL  --   --   --   --  0.4   ALK PHOS U/L  --   --   --   --  56   ALT (SGPT) U/L  --   --   --   --  12   AST (SGOT) U/L  --   --   --   --  30   GLUCOSE mg/dL 115* 107* 113*   < > 115*    < > = values in this interval not displayed.     Current dietary intake: 50% of documented meals 5/18  Diet Order   Procedures    Diet: Regular/House, Cardiac, Diabetic; Healthy Heart (2-3 Na+); Consistent Carbohydrate; Fluid Consistency: Thin (IDDSI 0)     Assessment/Plan:  Warfarin dosing for AFib, mechanical mitral valve  Goal INR: 2.5-3.5  5/19 INR - 1.94, decreased from 3.65  5/19 H/H - 8.3/25.8    Will give warfarin 4mg 5/19  Resume warfarin 2mg daily 5/20  Enoxaparin 1mg/kg q12h until INR >2.5 (discussed with Dr. Lopez due to decreased Hgb)  Monitor s/s bleeding, clinical status, dietary intake and drug-drug interactions  Follow daily INR and adjust dose accordingly    Thanks,  Biju Moser [facility]   5/19/2024  10:46 EDT

## 2024-05-20 VITALS
WEIGHT: 219.8 LBS | HEIGHT: 71 IN | BODY MASS INDEX: 30.77 KG/M2 | SYSTOLIC BLOOD PRESSURE: 142 MMHG | DIASTOLIC BLOOD PRESSURE: 83 MMHG | HEART RATE: 56 BPM | RESPIRATION RATE: 18 BRPM | OXYGEN SATURATION: 95 % | TEMPERATURE: 98 F

## 2024-05-20 PROBLEM — R78.81 ENTEROCOCCAL BACTEREMIA: Status: RESOLVED | Noted: 2024-05-11 | Resolved: 2024-05-20

## 2024-05-20 PROBLEM — B95.2 ENTEROCOCCAL BACTEREMIA: Status: RESOLVED | Noted: 2024-05-11 | Resolved: 2024-05-20

## 2024-05-20 PROBLEM — I33.0 BACTERIAL ENDOCARDITIS: Status: RESOLVED | Noted: 2024-05-11 | Resolved: 2024-05-20

## 2024-05-20 LAB
ANION GAP SERPL CALCULATED.3IONS-SCNC: 8 MMOL/L (ref 5–15)
BUN SERPL-MCNC: 13 MG/DL (ref 8–23)
BUN/CREAT SERPL: 21.7 (ref 7–25)
CALCIUM SPEC-SCNC: 8.1 MG/DL (ref 8.6–10.5)
CHLORIDE SERPL-SCNC: 96 MMOL/L (ref 98–107)
CO2 SERPL-SCNC: 27 MMOL/L (ref 22–29)
CREAT SERPL-MCNC: 0.6 MG/DL (ref 0.76–1.27)
DEPRECATED RDW RBC AUTO: 52 FL (ref 37–54)
EGFRCR SERPLBLD CKD-EPI 2021: 102.6 ML/MIN/1.73
ERYTHROCYTE [DISTWIDTH] IN BLOOD BY AUTOMATED COUNT: 16.1 % (ref 12.3–15.4)
GLUCOSE BLDC GLUCOMTR-MCNC: 116 MG/DL (ref 70–130)
GLUCOSE BLDC GLUCOMTR-MCNC: 151 MG/DL (ref 70–130)
GLUCOSE SERPL-MCNC: 109 MG/DL (ref 65–99)
HCT VFR BLD AUTO: 26.4 % (ref 37.5–51)
HGB BLD-MCNC: 8.6 G/DL (ref 13–17.7)
INR PPP: 1.57 (ref 0.89–1.12)
MCH RBC QN AUTO: 28.9 PG (ref 26.6–33)
MCHC RBC AUTO-ENTMCNC: 32.6 G/DL (ref 31.5–35.7)
MCV RBC AUTO: 88.6 FL (ref 79–97)
PLATELET # BLD AUTO: 229 10*3/MM3 (ref 140–450)
PMV BLD AUTO: 9.6 FL (ref 6–12)
POTASSIUM SERPL-SCNC: 4.4 MMOL/L (ref 3.5–5.2)
PROTHROMBIN TIME: 19 SECONDS (ref 12.2–14.5)
RBC # BLD AUTO: 2.98 10*6/MM3 (ref 4.14–5.8)
SODIUM SERPL-SCNC: 131 MMOL/L (ref 136–145)
WBC NRBC COR # BLD AUTO: 7.17 10*3/MM3 (ref 3.4–10.8)

## 2024-05-20 PROCEDURE — 97530 THERAPEUTIC ACTIVITIES: CPT

## 2024-05-20 PROCEDURE — 82948 REAGENT STRIP/BLOOD GLUCOSE: CPT

## 2024-05-20 PROCEDURE — 25010000002 AMPICILLIN PER 500 MG: Performed by: INTERNAL MEDICINE

## 2024-05-20 PROCEDURE — 85610 PROTHROMBIN TIME: CPT | Performed by: INTERNAL MEDICINE

## 2024-05-20 PROCEDURE — 25010000002 ENOXAPARIN PER 10 MG

## 2024-05-20 PROCEDURE — 99239 HOSP IP/OBS DSCHRG MGMT >30: CPT | Performed by: INTERNAL MEDICINE

## 2024-05-20 PROCEDURE — 25010000002 CEFTRIAXONE PER 250 MG: Performed by: INTERNAL MEDICINE

## 2024-05-20 PROCEDURE — 97535 SELF CARE MNGMENT TRAINING: CPT

## 2024-05-20 PROCEDURE — 80048 BASIC METABOLIC PNL TOTAL CA: CPT | Performed by: INTERNAL MEDICINE

## 2024-05-20 PROCEDURE — 85027 COMPLETE CBC AUTOMATED: CPT | Performed by: INTERNAL MEDICINE

## 2024-05-20 PROCEDURE — 63710000001 INSULIN LISPRO (HUMAN) PER 5 UNITS: Performed by: INTERNAL MEDICINE

## 2024-05-20 RX ORDER — LEVOTHYROXINE SODIUM 0.03 MG/1
25 TABLET ORAL
Start: 2024-05-20

## 2024-05-20 RX ORDER — WARFARIN SODIUM 4 MG/1
4 TABLET ORAL
Status: DISCONTINUED | OUTPATIENT
Start: 2024-05-20 | End: 2024-05-20 | Stop reason: HOSPADM

## 2024-05-20 RX ORDER — ENOXAPARIN SODIUM 100 MG/ML
1 INJECTION SUBCUTANEOUS EVERY 12 HOURS SCHEDULED
Start: 2024-05-20

## 2024-05-20 RX ORDER — WARFARIN SODIUM 4 MG/1
TABLET ORAL
Start: 2024-05-20

## 2024-05-20 RX ORDER — GABAPENTIN 600 MG/1
600 TABLET ORAL 3 TIMES DAILY
Qty: 9 TABLET | Refills: 0 | Status: SHIPPED | OUTPATIENT
Start: 2024-05-20 | End: 2024-05-23

## 2024-05-20 RX ORDER — HYDROCODONE BITARTRATE AND ACETAMINOPHEN 10; 325 MG/1; MG/1
1 TABLET ORAL EVERY 6 HOURS PRN
Qty: 12 TABLET | Refills: 0 | Status: SHIPPED | OUTPATIENT
Start: 2024-05-20 | End: 2024-05-23

## 2024-05-20 RX ADMIN — Medication 10 ML: at 09:23

## 2024-05-20 RX ADMIN — OXYBUTYNIN CHLORIDE 10 MG: 5 TABLET, EXTENDED RELEASE ORAL at 09:21

## 2024-05-20 RX ADMIN — SODIUM CHLORIDE 1 G: 1 TABLET ORAL at 12:23

## 2024-05-20 RX ADMIN — AMPICILLIN SODIUM 2 G: 2 INJECTION, POWDER, FOR SOLUTION INTRAMUSCULAR; INTRAVENOUS at 09:22

## 2024-05-20 RX ADMIN — PANTOPRAZOLE SODIUM 40 MG: 40 TABLET, DELAYED RELEASE ORAL at 06:30

## 2024-05-20 RX ADMIN — LEVOTHYROXINE SODIUM 25 MCG: 25 TABLET ORAL at 06:30

## 2024-05-20 RX ADMIN — CEFTRIAXONE 2000 MG: 2 INJECTION, POWDER, FOR SOLUTION INTRAMUSCULAR; INTRAVENOUS at 03:28

## 2024-05-20 RX ADMIN — METOPROLOL TARTRATE 50 MG: 50 TABLET, FILM COATED ORAL at 09:21

## 2024-05-20 RX ADMIN — ENOXAPARIN SODIUM 100 MG: 100 INJECTION SUBCUTANEOUS at 09:22

## 2024-05-20 RX ADMIN — AMPICILLIN SODIUM 2 G: 2 INJECTION, POWDER, FOR SOLUTION INTRAMUSCULAR; INTRAVENOUS at 03:28

## 2024-05-20 RX ADMIN — TIZANIDINE 2 MG: 4 TABLET ORAL at 04:31

## 2024-05-20 RX ADMIN — SODIUM CHLORIDE 1 G: 1 TABLET ORAL at 09:21

## 2024-05-20 RX ADMIN — INSULIN LISPRO 2 UNITS: 100 INJECTION, SOLUTION INTRAVENOUS; SUBCUTANEOUS at 12:24

## 2024-05-20 RX ADMIN — HYDROCODONE BITARTRATE AND ACETAMINOPHEN 1 TABLET: 10; 325 TABLET ORAL at 12:23

## 2024-05-20 RX ADMIN — ALLOPURINOL 300 MG: 300 TABLET ORAL at 09:22

## 2024-05-20 RX ADMIN — HYDROCODONE BITARTRATE AND ACETAMINOPHEN 1 TABLET: 10; 325 TABLET ORAL at 00:17

## 2024-05-20 RX ADMIN — HYDROCODONE BITARTRATE AND ACETAMINOPHEN 1 TABLET: 10; 325 TABLET ORAL at 06:30

## 2024-05-20 NOTE — PROGRESS NOTES
"Pharmacy Consult  -  Warfarin  Kaleb Abraham is a  73 y.o. male receiving warfarin therapy.  Height - 180.3 cm (70.98\")  Weight - 99.7 kg (219 lb 12.8 oz)    Consulting Provider: - Kendra  Indication: - Afib, Mechanical Mitral valve  Goal INR: - 2.5 - 3.5  Home Regimen: Most recent dose of warfarin was 5mg daily although was adjusted to 4mg over the weekend due to elevated INR   - Patient has medication bottle of 4mg tablets and 1mg tablets at home     Bridge Therapy: Enoxaparin 1 mg/kg q12h    Drug-Drug Interactions with current regimen:   Received 10mg IV Vitamin K on 5/6  Allopurinol (PTA) - may enhance the anticoagulant effect of warfarin   Ampicillin - may enhance the anticoagulant effect of wafarin  Ceftriaxone - may enhance the anticoagulant effect of warfarin  Levothyroxine (PTA) - may enhance the anticoagulant effect of warfarin         Warfarin Dosing During Admission:  Date  5/8 5/9 5/10 5/11 5/12 5/13 5/14 5/15 5/16   INR  1.46 1.58 1.88 2.69 2.44 2.12 2.43 3.11 3.66   Dose  3 mg 3 mg 3 mg 1.5 mg 3 mg 4 mg 3 mg 2 mg 1 mg      Date  5/17 5/18 5/19 5/20        INR  4.0 3.65 1.94 1.57        Dose  HOLD 2mg 4mg 4mg           Education Provided: Warfarin education provided on 5/8/2024 verbally.  Discussed effects of warfarin, importance of checking INR, drug-drug and drug-food interactions, and signs/symptoms of bleeding and clotting.  Patient deferred to wife who as in the room and manages his medications. She monitors his INR at home and then faxes results to Dr Maradiaga office.  All pertinent questions were answered.      Discharge Follow up:   Following Provider - Dr Peraza   Follow up time range or appointment - 2-3 days post discharge    Labs:  Results from last 7 days   Lab Units 05/20/24  0336 05/19/24  0703 05/18/24  0712 05/17/24  0504 05/16/24  0642 05/15/24  0335 05/14/24  1748 05/14/24  0430   INR  1.57* 1.94* 3.65* 4.00* 3.66* 3.11*  --  2.43*   HEMOGLOBIN g/dL 8.6* 8.3* 9.1* 9.7* 9.3* " 8.7* 9.8* 7.9*   HEMATOCRIT % 26.4* 25.8* 27.9* 29.5* 28.0* 26.6* 30.3* 25.0*     Results from last 7 days   Lab Units 05/20/24  0336 05/19/24  0703 05/18/24  0712   SODIUM mmol/L 131* 129* 128*   POTASSIUM mmol/L 4.4 4.4 4.3   CHLORIDE mmol/L 96* 95* 92*   CO2 mmol/L 27.0 27.0 26.0   BUN mg/dL 13 11 9   CREATININE mg/dL 0.60* 0.72* 0.56*   CALCIUM mg/dL 8.1* 8.6 8.6   GLUCOSE mg/dL 109* 115* 107*     Current dietary intake: 50 - 75% of documented meals 5/19  Diet Order   Procedures    Diet: Regular/House, Cardiac, Diabetic; Healthy Heart (2-3 Na+); Consistent Carbohydrate; Fluid Consistency: Thin (IDDSI 0)     Assessment/Plan:    1. Patient's INR is subtherapeutic today at 1.57. Goal INR 2.5 - 3.5 for mechanical mitral valve.  2. Continue warfarin 4 mg this evening. Continue Lovenox bridge until INR > 2.5   3. Daily PT/INR ordered.  4. Monitor signs/symptoms of bleeding, dietary intake, and drug-drug interactions. Make dose adjustments as necessary.    Discharge recommendations: Continue previous home dose of warfarin 4 mg daily.     Pharmacy will continue to follow.    Thanks,  Kishan Aden, PharmD  05/20/24  11:32 EDT

## 2024-05-20 NOTE — PLAN OF CARE
Goal Outcome Evaluation:  Plan of Care Reviewed With: patient        Progress: improving  Outcome Evaluation: Patient required less assistance to complete transfers compared to previous treatment session and increased ambulation distance compared to previous treatment session. Patient continues to present below baseline for mobility and would continue to benefit from skilled PT to address strength, balance and activity tolerance deficits. Continue current PT POC.      Anticipated Discharge Disposition (PT): inpatient rehabilitation facility

## 2024-05-20 NOTE — CASE MANAGEMENT/SOCIAL WORK
Case Management Discharge Note      Final Note: Pt discharging today to [facility]. CM confirmed with Marie facility liaison, Pt can be accepted today. A copy of the discharge summary has been faxed to [phone]. RN to call report to [phone], unit 100.Any controlled meds will be escribed to [facility] pharmacy (pharmacy verified in [facility]). EMS scheduled today, 5/20, @ 6146 who will  Pt in room. PCS in dropbox. Pt and spouse aware and agreeable to plan. No other discharge needs identified.         Selected Continued Care - Admitted Since 5/6/2024       Destination Coordination complete.      Service Provider Selected Services Address Phone Fax Patient Preferred    [facility] [address] [place] [address] [address] [address] [phone] --              Durable Medical Equipment    No services have been selected for the patient.                Dialysis/Infusion    No services have been selected for the patient.                Home Medical Care       Service Provider Selected Services Address Phone Fax Patient Preferred    [facility] ,  Home Nursing [address], [address], [facility] [address] [phone] -- --              Therapy    No services have been selected for the patient.                Community Resources    No services have been selected for the patient.                Community & DME    No services have been selected for the patient.                    Transportation Services  Ambulance: Other (5/20 @ 8563)    Final Discharge Disposition Code: 03 - skilled nursing facility (SNF)

## 2024-05-20 NOTE — PLAN OF CARE
Goal Outcome Evaluation:  Plan of Care Reviewed With: patient, spouse        Progress: improving  Outcome Evaluation: Pt. with improved bed mobility, transfer independence and LBD independence today.  Pt. continues with impaired strength, balance, strength and activity tolerance impacting PLOF ADLs warranting continued skilled OT services.  Continue to recommend IRF at discharge.      Anticipated Discharge Disposition (OT): inpatient rehabilitation facility

## 2024-05-20 NOTE — DISCHARGE SUMMARY
[facility] Medicine Services  DISCHARGE SUMMARY    Patient Name: Kaleb Abraham  : 1950  MRN: [id number]    Date of Admission: 2024  1:10 PM  Date of Discharge:  2024  Primary Care Physician: Manolo Enamorado MD    Consults       Date and Time Order Name Status Description    2024 12:54 PM Inpatient Infectious Diseases Consult Completed     2024  6:37 AM Inpatient Gastroenterology Consult Completed     2024 10:36 PM Inpatient Cardiology Consult Completed             Hospital Course     Presenting Problem: sepsis    Active Hospital Problems    Diagnosis  POA    S/P VT/TdP Arrest 5/10/2024 [I47.20]  Yes    S/P prosthetic aortic valve [Z95.2]  Not Applicable    S/P mechanical aortic valve [Z95.2]  Not Applicable    Chronic anticoagulation (warfarin) [Z79.01]  Not Applicable    Supratherapeutic INR [R79.1]  Yes    MARTINEZ [G47.33]  Yes    Chronic atrial fibrillation [I48.20]  Yes    Anemia [D64.9]  Yes    CAD s/p CABG [I25.10]  Yes    T2DM [E11.9]  Yes    Hyperlipidemia [E78.5]  Yes    Hypothyroidism [E03.9]  Yes      Resolved Hospital Problems    Diagnosis Date Resolved POA    **Prosthetic valve endocarditis (Mitral Valve) [I33.0] 2024 Yes    Enterococcal bacteremia [R78.81, B95.2] 2024 Yes          Hospital Course:  Kaleb Abraham is a 73 y.o. male with a history of CAD s/p CABG, T2DM, HTN, HLD, hypothyroidism, Afib, valvular heart disease s/p mechanical mitral valve and bioprosthetic aortic valve on Coumadin, MARTINEZ, tachybradycardia syndrome, CHF, and recent GI bleed s/p EGD on 24 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 s/p APC at [facility] (also had Afib with RVR requiring cardioversion during that admission), who presented to the ED on 24 with complaints of generalized weakness, lower extremity edema, right lower back pain, fevers, and nausea. CT head, CT A/P, and CXR were negative on admission. Hb was 6.6. INR noted to be >10. Received  Vitamin K. Initially admitted to hospitalist service. Despite blood transfusion and IV fluids, he remained hypotensive and bradycardic requiring ICU transfer 5/7/24. He required pressors. Further evaluation revealed bacteremia with Enterococcus faecalis. Infectious Disease was consulted. TTE did not show any vegetations but subsequent COLEEN did show a mitral valve vegetation. Cardiology followed for decompensated heart failure and bradycardia. GI also evaluated the patient in setting of anemia and recent GI bleed, they felt there was no need for repeat endoscopic evaluation at this time. On 5/10/24 patient developed V-tach arrest and a code was called, developed torsades and was ultimately cardioverted to sinus rhythm. Stabilized in ICU and now transferred to telemetry, hospitalist service resumed care on 5/16/24.        Septic shock, resolved  S/P Vtach arrest 5/10  Enterococcus faecalis bacteremia  Mitral valve endocarditis of mechanical valve  --Admitted to ICU on arrival and req vasopressor support. Blood cultures 5/6/24 with Enterococcus faecalis, repeat blood cultures 5/10/24 negative; urine culture also grew same organism   --Initial TTE 5/7/24 with EF 56-60%, no evidence of vegetation. Subsequent COLEEN 5/10/24 with mitral valve vegetation noted measuring 0.8 x 0.3 cm with mild mitral regurgitation   --ID following, suspects source may have been GI such as colonic inflammation vs related to recent endoscopic management of GI bleed  --Continue IV Ampicillin and Rocephin x 6 weeks total via PICC line placed 5/11/24. Followed by chronic oral antibiotic suppression     Hyponatremia, improved.  - Poor PO intake, received fluids yesterday with slight improvement, doesn't appear overloaded.      Acute on chronic HFpEF  Valvular heart disease s/p mechanical mitral valve and bioprosthetic aortic valve  Subtheraputic INR on arrival.  --INR >10 on admission, received Vitamin K. INR now subtheraputic in setting of mechanical  valve. Continue to  bridge with lovenox + daily coumadin until INR between 2.5-3.5.  --Needs daily INRs at rehab.  --Cardiology has followed, continue Metoprolol, ACE inhibitor on hold due to hypotension - can resume as needed.     Discharge Follow Up Recommendations for outpatient labs/diagnostics:   PCP in 1-2 weeks upon release from rehab   Dr. Jacob 2 weeks    Day of Discharge     HPI: Up in bed with wife in room. Overall doing better. Says he is ready to go to rehab but really hoping to get home soon.    Review of Systems  Gen- No fevers, chills  CV- No chest pain, palpitations  Resp- No cough, dyspnea  GI- No N/V/D, abd pain    Vital Signs:   Temp:  [97.8 °F (36.6 °C)-98.7 °F (37.1 °C)] 98 °F (36.7 °C)  Heart Rate:  [56-75] 56  Resp:  [16-18] 18  BP: (126-160)/(76-84) 142/83      Physical Exam:  Constitutional: No acute distress, awake, alert  HENT: NCAT, mucous membranes moist  Respiratory: Clear to auscultation bilaterally, respiratory effort normal   Cardiovascular: RRR, no murmurs, rubs, or gallops  Gastrointestinal: Positive bowel sounds, soft, nontender, nondistended  Musculoskeletal: No bilateral ankle edema  Psychiatric: Appropriate affect, cooperative  Neurologic: Oriented x 3, strength symmetric in all extremities, Cranial Nerves grossly intact to confrontation, speech clear  Skin: No rashes     Pertinent  and/or Most Recent Results     LAB RESULTS:      Lab 05/20/24  0336 05/19/24  0703 05/18/24  0712 05/17/24  0504 05/16/24  0642   WBC 7.17 7.09 9.47 9.98 9.28   HEMOGLOBIN 8.6* 8.3* 9.1* 9.7* 9.3*   HEMATOCRIT 26.4* 25.8* 27.9* 29.5* 28.0*   PLATELETS 229 234 244 250 230   NEUTROS ABS  --   --  7.30*  --   --    IMMATURE GRANS (ABS)  --   --  0.06*  --   --    LYMPHS ABS  --   --  1.16  --   --    MONOS ABS  --   --  0.87  --   --    EOS ABS  --   --  0.02  --   --    MCV 88.6 88.7 87.5 87.5 86.4   PROTIME 19.0* 22.3* 36.5* 39.3* 36.7*         Lab 05/20/24  0336 05/19/24  0703 05/18/24  0712  05/17/24  0504 05/16/24  0642 05/15/24  0335 05/15/24  0019 05/14/24  0430   SODIUM 131* 129* 128* 128* 131*   < >  --  133*   POTASSIUM 4.4 4.4 4.3 4.9 4.3   < >  --  4.3   CHLORIDE 96* 95* 92* 92* 93*   < >  --  98   CO2 27.0 27.0 26.0 26.0 26.0   < >  --  27.0   ANION GAP 8.0 7.0 10.0 10.0 12.0   < >  --  8.0   BUN 13 11 9 8 9   < >  --  9   CREATININE 0.60* 0.72* 0.56* 0.56* 0.59*   < >  --  0.57*   EGFR 102.6 97.1 104.7 104.7 103.1   < >  --  104.2   GLUCOSE 109* 115* 107* 113* 110*   < >  --  106*   CALCIUM 8.1* 8.6 8.6 8.5* 8.4*   < >  --  8.1*   MAGNESIUM  --   --   --   --  1.7  --  2.1 1.6    < > = values in this interval not displayed.             Lab 05/20/24  0336 05/19/24  0703 05/18/24  0712 05/17/24  0504 05/16/24  0642   PROTIME 19.0* 22.3* 36.5* 39.3* 36.7*   INR 1.57* 1.94* 3.65* 4.00* 3.66*             Lab 05/14/24  1003   ABO TYPING O   RH TYPING Positive   ANTIBODY SCREEN Negative         Brief Urine Lab Results  (Last result in the past 365 days)        Color   Clarity   Blood   Leuk Est   Nitrite   Protein   CREAT   Urine HCG        05/17/24 1605 Yellow   Clear   Negative   Negative   Negative   Negative                 Microbiology Results (last 10 days)       Procedure Component Value - Date/Time    Blood Culture - Blood, Hand, Left [[id number]]  (Normal) Collected: 05/18/24 0733    Lab Status: Preliminary result Specimen: Blood from Hand, Left Updated: 05/20/24 0802     Blood Culture No growth at 2 days    Blood Culture - Blood, Hand, Right [[id number]]  (Normal) Collected: 05/18/24 0712    Lab Status: Preliminary result Specimen: Blood from Hand, Right Updated: 05/20/24 0745     Blood Culture No growth at 2 days    Narrative:      Less than seven (7) mL's of blood was collected.  Insufficient quantity may yield false negative results.            CT Head Without Contrast    Result Date: 5/17/2024  CT HEAD WO CONTRAST Date of Exam: 5/17/2024 3:35 PM EDT Indication: evalaute for bleeding,  having AMS and fevers. Comparison: CT brain dated 5/6/2024 Technique: Axial CT images were obtained of the head without contrast administration.  Automated exposure control and iterative construction methods were used. Findings: There is no evidence of hemorrhage. There is no mass effect or midline shift. There is diffuse brain atrophy. Periventricular hypodense areas compatible with chronic small vessel ischemia. Tiny round hypodensity left bubba likely a chronic lacunar infarct. Also suspected small chronic right cerebellar lacunar infarct. There is no extracerebral collection. Ventricles are normal in size and configuration for patient's stated age. Posterior fossa is within normal limits. Calvarium and skull base appear intact.  Visualized sinuses show no air fluid levels. Visualized orbits are unremarkable.     Impression: Brain atrophy with chronic microvascular ischemic changes No acute intracranial abnormality Electronically Signed: Dereck Jackson MD  5/17/2024 3:49 PM EDT  Workstation ID: [id number]    XR Chest 1 View    Result Date: 5/17/2024  XR CHEST 1 VW Date of Exam: 5/17/2024 2:12 PM EDT Indication: fevers, sepsis Comparison: 5/13/2024 Findings: Cardiomediastinal silhouette is enlarged, similar prior examination with persistent pulmonary vascular congestion. There is persistent basilar airspace disease and small to moderate right and likely trace left effusion. No superimposed airspace disease no pneumothorax. No acute osseous abnormality identified.     Impression: No significant interval change identified. Electronically Signed: Alen Soto MD  5/17/2024 3:16 PM EDT  Workstation ID: [id number]    XR Chest 1 View    Result Date: 5/13/2024  XR CHEST 1 VW Date of Exam: 5/13/2024 1:19 AM EDT Indication: Resp Distress Comparison: 5/11/2024 Findings: Patient is status post median sternotomy. Cardiac valvular prosthesis is seen. Cardiac silhouette is enlarged. Pulmonary vasculature is indistinct. There are  small bilateral pleural effusions with bibasilar atelectasis, edema, or infiltrates. No pneumothorax is seen. No acute osseous lesion is seen. There are senescent changes of the skeletal structures. Left arm PICC terminates overlying the SVC.     Impression: 1.Cardiomegaly with interstitial pulmonary edema, small bilateral pleural effusions, and bibasilar atelectasis, edema, or infiltrates. Electronically Signed: Matthias Dobbs MD  5/13/2024 7:33 AM EDT  Workstation ID: [id number]    XR Chest 1 View    Result Date: 5/11/2024  XR CHEST 1 VW Date of Exam: 5/11/2024 9:53 AM EDT Indication: f/u edema Comparison: Chest x-ray 5/7/2024 Findings: New defibrillator pad projects over the central chest. Right upper extremity PICC line has been removed. Stable cardiomegaly. There is a new or newly apparent small right-sided pleural effusion. Similar interstitial edema. No pneumothorax.     Impression: New defibrillator pad projects over the central chest. Right upper extremity PICC line has been removed. Stable cardiomegaly. There is a new or newly apparent small right-sided pleural effusion. Similar interstitial edema. No pneumothorax. Electronically Signed: Stef Vergara MD  5/11/2024 10:39 AM EDT  Workstation ID: [id number]    Adult Transesophageal Echo (COLEEN) W/ Cont if Necessary Per Protocol    Result Date: 5/10/2024    Left ventricular ejection fraction appears to be 56 - 60%.   Left ventricular wall thickness is consistent with mild to moderate concentric hypertrophy.   The left atrial cavity is moderately dilated.   There is a bioprosthetic aortic valve present.   Mild aortic valve stenosis is present.  Mean gradient 11 mmHg..  Mild aortic valve regurgitation.   There is a mechanical mitral valve prosthesis present.  There is mild mitral valve regurgitation.  No stenosis.   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  Measures 0.8 x 0.3 cm I supervised and directed an independent trained observer  with the assistance of monitoring the patient's level of consciousness and physiological status throughout the procedure.  Intraoperative service time of 20 minutes              Results for orders placed during the hospital encounter of 05/06/24    Adult Transesophageal Echo (COLEEN) W/ Cont if Necessary Per Protocol    Interpretation Summary    Left ventricular ejection fraction appears to be 56 - 60%.    Left ventricular wall thickness is consistent with mild to moderate concentric hypertrophy.    The left atrial cavity is moderately dilated.    There is a bioprosthetic aortic valve present.    Mild aortic valve stenosis is present.  Mean gradient 11 mmHg..  Mild aortic valve regurgitation.    There is a mechanical mitral valve prosthesis present.  There is mild mitral valve regurgitation.  No stenosis.    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  Measures 0.8 x 0.3 cm    I supervised and directed an independent trained observer with the assistance of monitoring the patient's level of consciousness and physiological status throughout the procedure.  Intraoperative service time of 20 minutes      Plan for Follow-up of Pending Labs/Results:   Pending Labs       Order Current Status    Blood Culture - Blood, Hand, Left Preliminary result    Blood Culture - Blood, Hand, Right Preliminary result          Discharge Details        Discharge Medications        New Medications        Instructions Start Date   ampicillin 2 g in sodium chloride 0.9 % 100 mL IVPB   2 g, Intravenous, Every 6 Hours      cefTRIAXone 2,000 mg in sodium chloride 0.9 % 100 mL IVPB   2,000 mg, Intravenous, Every 12 Hours      Enoxaparin Sodium 100 MG/ML solution prefilled syringe syringe  Commonly known as: LOVENOX   1 mg/kg (100 mg), Subcutaneous, Every 12 Hours Scheduled             Changes to Medications        Instructions Start Date   levothyroxine 25 MCG tablet  Commonly known as: SYNTHROID, LEVOTHROID  What changed:  Another medication with the same name was removed. Continue taking this medication, and follow the directions you see here.   25 mcg, Oral, Every Early Morning      warfarin 4 MG tablet  Commonly known as: COUMADIN  What changed:   medication strength  how much to take  how to take this  when to take this  additional instructions  Another medication with the same name was removed. Continue taking this medication, and follow the directions you see here.   Indications: Atrial Fibrillation, Presence of Mechanical Artificial Heart Valve             Continue These Medications        Instructions Start Date   alfuzosin 10 MG 24 hr tablet  Commonly known as: UROXATRAL   10 mg, Oral, Daily      allopurinol 300 MG tablet  Commonly known as: ZYLOPRIM   300 mg, Oral, Daily      amitriptyline 25 MG tablet  Commonly known as: ELAVIL   50 mg, Oral, Nightly      fenofibrate 160 MG tablet   160 mg, Oral, Daily      gabapentin 600 MG tablet  Commonly known as: NEURONTIN   600 mg, Oral, 3 Times Daily      HYDROcodone-acetaminophen  MG per tablet  Commonly known as: NORCO   1 tablet, Oral, Every 6 Hours PRN      icosapent ethyl 1 g capsule capsule  Commonly known as: VASCEPA   2 g, Oral, 2 Times Daily With Meals      Melatonin 10 MG tablet   10 mg, Oral, Daily      metFORMIN 500 MG tablet  Commonly known as: GLUCOPHAGE   500 mg, Oral, Nightly      metoprolol tartrate 50 MG tablet  Commonly known as: LOPRESSOR   50 mg, Oral, 2 Times Daily      multivitamin with minerals tablet tablet   1 tablet, Oral, Daily      Nexlizet 180-10 MG tablet  Generic drug: Bempedoic Acid-Ezetimibe   1 tablet, Oral, Daily      Nitrostat 0.4 MG SL tablet  Generic drug: nitroglycerin   0.4 mg, Sublingual, Every 5 Minutes PRN, PT. HAS NOT HAD TO TAKE THIS.      oxybutynin XL 10 MG 24 hr tablet  Commonly known as: DITROPAN-XL   10 mg, Oral, Daily      pantoprazole 40 MG EC tablet  Commonly known as: PROTONIX   40 mg, Oral, Daily      Praluent 150 MG/ML  injection pen  Generic drug: Alirocumab   150 mg, Subcutaneous, Every 14 Days      tiZANidine 2 MG tablet  Commonly known as: ZANAFLEX   2 mg, Oral, As Needed             Stop These Medications      lisinopril 40 MG tablet  Commonly known as: PRINIVIL,ZESTRIL              No Known Allergies      Discharge Disposition:  Skilled Nursing Facility (DC - External)    Diet:  Hospital:  Diet Order   Procedures    Diet: Regular/House, Cardiac, Diabetic; Healthy Heart (2-3 Na+); Consistent Carbohydrate; Fluid Consistency: Thin (IDDSI 0)            Activity:      Restrictions or Other Recommendations:         CODE STATUS:    Code Status and Medical Interventions:   Ordered at: 05/06/24 4473     Level Of Support Discussed With:    Patient     Code Status (Patient has no pulse and is not breathing):    CPR (Attempt to Resuscitate)     Medical Interventions (Patient has pulse or is breathing):    Full Support       Future Appointments   Date Time Provider Department Center   5/20/2024  1:45 PM MED 7  [facility]       Additional Instructions for the Follow-ups that You Need to Schedule       Ambulatory Referral to Home Health   As directed      Face to Face Visit Date: 5/10/2024   Follow-up provider for Plan of Care?: I treated the patient in an acute care facility and will not continue treatment after discharge.   Follow-up provider: ASHLEY MALLOY [[id number]]   Reason/Clinical Findings: Pt presents with generalized weakness, decreased activity tolerance, and balance deficits warranting skilled IPPT services. Pt required maxA x2 for mobility with cues for sequencing and was limited by c/o LBP.   Describe mobility limitations that make leaving home difficult: Impaired Functional Mobility, Gait, Balance, and Endurance   Nursing/Therapeutic Services Requested: Skilled Nursing Physical Therapy Occupational Therapy   Skilled nursing orders: Other   PT orders: Therapeutic exercise Gait Training Transfer training Strengthening Home  safety assessment   Weight Bearing Status: As Tolerated   Occupational orders: Activities of daily living Strengthening Energy conservation Home safety assessment   Frequency: 1 Week 1        Discharge Follow-up with PCP   As directed       Currently Documented PCP:    Manolo Enamorado MD    PCP Phone Number:    [phone]     Follow Up Details: 1-2 week follow up upon release from SNF        Discharge Follow-up with Specified Provider: Dr. Jacob; 2 Weeks   As directed      To: Dr. Jacob   Follow Up: 2 Weeks                      Mary Zepeda II,   05/20/24      Time Spent on Discharge:  I spent  40  minutes on this discharge activity which included: face-to-face encounter with the patient, reviewing the data in the system, coordination of the care with the nursing staff as well as consultants, documentation, and entering orders.

## 2024-05-20 NOTE — THERAPY PROGRESS REPORT/RE-CERT
Patient Name: Kaleb Abraham  : 1950    MRN: [id number]                              Today's Date: 2024       Admit Date: 2024    Visit Dx:     ICD-10-CM ICD-9-CM   1. Acute on chronic anemia  D64.9 285.9   2. S/P mechanical aortic valve  Z95.2 V43.3   3. Supratherapeutic INR  R79.1 790.92   4. History of GI bleed  Z87.19 V12.79   5. Anticoagulated on Coumadin  Z79.01 V58.61   6. Generalized weakness  R53.1 780.79   7. History of CHF (congestive heart failure)  Z86.79 V12.59   8. Weakness  R53.1 780.79   9. Enterococcal bacteremia  R78.81 790.7    B95.2 041.04     Patient Active Problem List   Diagnosis    Anemia    Aortic stenosis    CAD s/p CABG    T2DM    Hyperlipidemia    Hypertension    Hypothyroidism    Mitral regurgitation    MARTINEZ    Hypersomnia, unspecified    Chronic atrial fibrillation    Supratherapeutic INR    S/P prosthetic aortic valve    S/P mechanical aortic valve    Chronic anticoagulation (warfarin)    S/P VT/TdP Arrest 5/10/2024     Past Medical History:   Diagnosis Date    Anemia     Aortic stenosis     CAD (coronary artery disease)     Diabetes mellitus     Hyperlipidemia     Hypertension     Hypothyroidism     Mitral regurgitation      Past Surgical History:   Procedure Laterality Date    CAPSULE ENDOSCOPY      2023 and 2023 per dr. schrader    COLONOSCOPY      2023 Dr. Schrader    CORONARY ARTERY BYPASS GRAFT      CORONARY STENT PLACEMENT      UPPER GASTROINTESTINAL ENDOSCOPY      2023 Dr. Schrader, 2023 Dr. Liu, 2024 Dr. Stout [place]      General Information       Row Name 24 1146          OT Time and Intention    Document Type progress note/recertification  -GERHARD     Mode of Treatment occupational therapy  -GERHARD       Row Name 24 1146          General Information    Patient Profile Reviewed yes  -GERHARD     Existing Precautions/Restrictions fall;cardiac;other (see comments)  sore chest from chest compressions per pt.  -GERHARD      Barriers to Rehab medically complex;previous functional deficit  -       Row Name 05/20/24 1146          Cognition    Orientation Status (Cognition) oriented x 3  -       Row Name 05/20/24 1146          Safety Issues, Functional Mobility    Safety Issues Affecting Function (Mobility) insight into deficits/self-awareness;safety precaution awareness;safety precautions follow-through/compliance;awareness of need for assistance  -     Impairments Affecting Function (Mobility) balance;endurance/activity tolerance;strength;postural/trunk control;motor planning;pain  -               User Key  (r) = Recorded By, (t) = Taken By, (c) = Cosigned By      Initials Name Provider Type    GERHARD Kimi Christianson, OT Occupational Therapist                     Mobility/ADL's       Row Name 05/20/24 1147          Bed Mobility    Supine-Sit [place] (Bed Mobility) verbal cues;minimum assist (75% patient effort);2 person assist  -     Assistive Device (Bed Mobility) bed rails;head of bed elevated  -     Comment, (Bed Mobility) increased time and effort, cues to sequence movement and how to use rails more effectively  -       Row Name 05/20/24 1147          Transfers    Transfers sit-stand transfer;stand-sit transfer;bed-chair transfer  -     Comment, (Transfers) cues needed to fully scoot to edge of surface and for hand placement, elevated bed with transfer  up to recliner  -[facility] Name 05/20/24 1147          Bed-Chair Transfer    Bed-Chair [place] (Transfers) verbal cues;minimum assist (75% patient effort);2 person assist  -GERHARD     Assistive Device (Bed-Chair Transfers) other (see comments)  -     Comment, (Bed-Chair Transfer) BUE support  -       Row Name 05/20/24 1147          Sit-Stand Transfer    Sit-Stand [place] (Transfers) verbal cues;nonverbal cues (demo/gesture);minimum assist (75% patient effort);2 person assist  -     Assistive Device (Sit-Stand Transfers) walker, front-wheeled  UE  support from EOB and over to recliner  -       Row Name 05/20/24 1147          Stand-Sit Transfer    Stand-Sit [place] (Transfers) minimum assist (75% patient effort);2 person assist;nonverbal cues (demo/gesture)  -GERHARD     Comment, (Stand-Sit Transfer) cues to reach back to lower more slowly  -       Row Name 05/20/24 1147          Functional Mobility    Functional Mobility- Ind. Level minimum assist (75% patient effort);1 person + 1 person to manage equipment  -     Functional Mobility- Device walker, front-wheeled  -     Functional Mobility-Distance (Feet) 18  -     Functional Mobility- Safety Issues step length decreased  -     Functional Mobility- Comment chair follow needed  -       Row Name 05/20/24 1147          Activities of Daily Living    BADL Assessment/Intervention lower body dressing;upper body dressing  -[facility] Name 05/20/24 1147          Lower Body Dressing Assessment/Training    [place] Level (Lower Body Dressing) doff;don;socks;standby assist  -GERHARD     Position (Lower Body Dressing) supported sitting  -GERHARD     Comment, (Lower Body Dressing) extra time and effort  -       Row Name 05/20/24 1147          Upper Body Dressing Assessment/Training    [place] Level (Upper Body Dressing) don;pajama/robe;maximum assist (25% patient effort)  -GERHARD     Position (Upper Body Dressing) edge of bed sitting  -GERHARD               User Key  (r) = Recorded By, (t) = Taken By, (c) = Cosigned By      Initials Name Provider Type    Kimi Pena, OT Occupational Therapist                   Obj/Interventions       Row Name 05/20/24 1150          Balance    Static Sitting Balance supervision  -     Dynamic Sitting Balance contact guard  -GERHARD     Position, Sitting Balance unsupported;sitting edge of bed  -GERHARD     Static Standing Balance minimal assist;verbal cues  with first stand pt. with posterior lean needing min A, after that progressed to CGA  -GERHARD     Dynamic Standing Balance minimal  assist;2-person assist;verbal cues  -GERHARD     Position/Device Used, Standing Balance walker, front-wheeled  -GERHARD     Balance Interventions sit to stand;occupation based/functional task  -GERHARD     Comment, Balance UPD, LBD, posterior lean with first stand  -GERHARD               User Key  (r) = Recorded By, (t) = Taken By, (c) = Cosigned By      Initials Name Provider Type    Kimi Pena, OT Occupational Therapist                   Goals/Plan       Row Name 05/20/24 1154          Transfer Goal 1 (OT)    Progress/Outcome (Transfer Goal 1, OT) good progress toward goal;goal ongoing  -GERHARD       Row Name 05/20/24 1154          Dressing Goal 1 (OT)    Activity/Device (Dressing Goal 1, OT) lower body dressing  -GERHARD     Progress/Outcome (Dressing Goal 1, OT) goal partially met;goal ongoing;goal revised this date  AE not needed  -GERHARD       Row Name 05/20/24 1154          Toileting Goal 1 (OT)    Progress/Outcome (Toileting Goal 1, OT) goal ongoing  -GERHARD       Row Name 05/20/24 1154          Grooming Goal 1 (OT)    Progress/Outcome (Grooming Goal 1, OT) goal ongoing  -GERHARD               User Key  (r) = Recorded By, (t) = Taken By, (c) = Cosigned By      Initials Name Provider Type    Kimi Pena, OT Occupational Therapist                   Clinical Impression       Row Name 05/20/24 1151          Pain Assessment    Pretreatment Pain Rating 6/10  -GERHARD     Pain Location generalized  -GERHARD     Pain Intervention(s) Ambulation/increased activity;Repositioned;Nursing Notified  -GERHARD       Row Name 05/20/24 1151          Plan of Care Review    Plan of Care Reviewed With patient;spouse  -GERHARD     Progress improving  -GERHARD     Outcome Evaluation Pt. with improved bed mobility, transfer independence and LBD independence today.  Pt. continues with impaired strength, balance, strength and activity tolerance impacting PLOF ADLs warranting continued skilled OT services.  Continue to recommend IRF at discharge.  -       Row Name 05/20/24 1151           Therapy Assessment/Plan (OT)    Rehab Potential (OT) good, to achieve stated therapy goals  -GERHARD     Therapy Frequency (OT) daily  -GERHRAD       Row Name 05/20/24 1151          Therapy Plan Review/Discharge Plan (OT)    Anticipated Discharge Disposition (OT) inpatient rehabilitation facility  -GERHARD       Row Name 05/20/24 1151          Vital Signs    Pre Systolic BP Rehab 142  -GERHARD     Pre Treatment Diastolic BP 83  -GERHARD     Pretreatment Heart Rate (beats/min) 60  -GERHARD     Posttreatment Heart Rate (beats/min) 58  -GERHARD     Pre SpO2 (%) 95  -GERHARD     O2 Delivery Pre Treatment room air  -GERHARD     O2 Delivery Intra Treatment room air  -GERHARD     Post SpO2 (%) 95  -GERHARD     O2 Delivery Post Treatment room air  -GERHARD     Pre Patient Position Supine  -GERHARD     Intra Patient Position Standing  -GERHARD     Post Patient Position Sitting  -GERHARD       Row Name 05/20/24 1151          Positioning and Restraints    Pre-Treatment Position in bed  -GERHARD     Post Treatment Position chair  -GERHARD     In Chair notified nsg;reclined;call light within reach;encouraged to call for assist;exit alarm on;waffle cushion;legs elevated;heels elevated;on mechanical lift sling  family alerted to progress  -GERHARD               User Key  (r) = Recorded By, (t) = Taken By, (c) = Cosigned By      Initials Name Provider Type    Kimi Pena, OT Occupational Therapist                   Outcome Measures       Row Name 05/20/24 1155          How much help from another is currently needed...    Putting on and taking off regular lower body clothing? 3  -GERHARD     Bathing (including washing, rinsing, and drying) 2  -GERHARD     Toileting (which includes using toilet bed pan or urinal) 2  -GERHARD     Putting on and taking off regular upper body clothing 2  -GERHARD     Taking care of personal grooming (such as brushing teeth) 3  -GERHARD     Eating meals 3  -GERHARD     AM-PAC 6 Clicks Score (OT) 15  -GERHARD       Row Name 05/20/24 1148          How much help from another person do you currently need...    Turning  from your back to your side while in flat bed without using bedrails? 3  -ML     Moving from lying on back to sitting on the side of a flat bed without bedrails? 2  -ML     Moving to and from a bed to a chair (including a wheelchair)? 3  -ML     Standing up from a chair using your arms (e.g., wheelchair, bedside chair)? 3  -ML     Climbing 3-5 steps with a railing? 2  -ML     To walk in hospital room? 3  -ML     AM-PAC 6 Clicks Score (PT) 16  -ML     Highest Level of Mobility Goal 5 --> Static standing  -ML       Row Name 05/20/24 1155 05/20/24 1148       Functional Assessment    Outcome Measure Options AM-PAC 6 Clicks Daily Activity (OT)  -GERHARD AM-PAC 6 Clicks Basic Mobility (PT)  -ML              User Key  (r) = Recorded By, (t) = Taken By, (c) = Cosigned By      Initials Name Provider Type    Kimi Pean, OT Occupational Therapist    Brianna Smith Physical Therapist                    Occupational Therapy Education       Title: PT OT SLP Therapies (In Progress)       Topic: Occupational Therapy (In Progress)       Point: ADL training (In Progress)       Description:   Instruct learner(s) on proper safety adaptation and remediation techniques during self care or transfers.   Instruct in proper use of assistive devices.                  Learning Progress Summary             Patient Acceptance, E, VU,NR by GERHARD at 5/20/2024 1155    Comment: transfer safety/technique, bed mobility sequencing, ADL progression, weight shifting    Acceptance, E,TB, NR by PARKER at 5/17/2024 0825    Acceptance, E, VU,NR by TORIN at 5/14/2024 1523    Acceptance, E,TB, VU by DOROTA at 5/14/2024 1307    Acceptance, E, NR by TORIN at 5/13/2024 1125    Acceptance, E, NR by TORIN at 5/8/2024 1529   Family Acceptance, E, VU,NR by GERHARD at 5/20/2024 1155    Comment: transfer safety/technique, bed mobility sequencing, ADL progression, weight shifting    Acceptance, E,TB, VU by DOROTA at 5/14/2024 1307   Significant Other Acceptance, E, NR by TORIN at 5/13/2024 1125     Acceptance, E, NR by KL at 5/8/2024 1529                         Point: Home exercise program (Done)       Description:   Instruct learner(s) on appropriate technique for monitoring, assisting and/or progressing therapeutic exercises/activities.                  Learning Progress Summary             Patient Acceptance, E,D, VU,NR by KL at 5/15/2024 1459    Acceptance, E,TB, VU by EB at 5/14/2024 1307   Family Acceptance, E,TB, VU by EB at 5/14/2024 1307   Significant Other Acceptance, E,D, VU,NR by KL at 5/15/2024 1459                         Point: Precautions (Done)       Description:   Instruct learner(s) on prescribed precautions during self-care and functional transfers.                  Learning Progress Summary             Patient Acceptance, E, VU,NR by GERHARD at 5/20/2024 1155    Comment: transfer safety/technique, bed mobility sequencing, ADL progression, weight shifting    Acceptance, E,TB, NR by KF at 5/17/2024 0825    Acceptance, E,D, VU,NR by KL at 5/15/2024 1459    Acceptance, E, VU,NR by  at 5/14/2024 1523    Acceptance, E,TB, VU by EB at 5/14/2024 1307    Acceptance, E, NR by KL at 5/13/2024 1125    Acceptance, E, NR by  at 5/10/2024 1347    Acceptance, E, NR by  at 5/8/2024 1529   Family Acceptance, E, VU,NR by GERHARD at 5/20/2024 1155    Comment: transfer safety/technique, bed mobility sequencing, ADL progression, weight shifting    Acceptance, E,TB, VU by EB at 5/14/2024 1307   Significant Other Acceptance, E,D, VU,NR by KL at 5/15/2024 1459    Acceptance, E, NR by  at 5/13/2024 1125    Acceptance, E, NR by  at 5/10/2024 1347    Acceptance, E, NR by  at 5/8/2024 1529                         Point: Body mechanics (In Progress)       Description:   Instruct learner(s) on proper positioning and spine alignment during self-care, functional mobility activities and/or exercises.                  Learning Progress Summary             Patient Acceptance, E,TB, NR by KF at 5/17/2024 0825     Acceptance, E,D, VU,NR by  at 5/15/2024 1459    Acceptance, E, VU,NR by  at 5/14/2024 1523    Acceptance, E,TB, VU by  at 5/14/2024 1307    Acceptance, E, NR by  at 5/13/2024 1125    Acceptance, E, NR by  at 5/10/2024 1347    Acceptance, E, NR by  at 5/8/2024 1529   Family Acceptance, E,TB, VU by EB at 5/14/2024 1307   Significant Other Acceptance, E,D, VU,NR by  at 5/15/2024 1459    Acceptance, E, NR by  at 5/13/2024 1125    Acceptance, E, NR by  at 5/10/2024 1347    Acceptance, E, NR by  at 5/8/2024 1529                                         User Key       Initials Effective Dates Name Provider Type Discipline    GERHARD 07/11/23 -  Kimi Christianson, OT Occupational Therapist OT    DOROTA 09/22/22 -  Hever Andersen RN Registered Nurse Nurse     08/09/23 -  Jodie Rojas OT Occupational Therapist OT     02/05/24 -  Margaret Ramírez OT Occupational Therapist OT                  OT Recommendation and Plan  Therapy Frequency (OT): daily  Plan of Care Review  Plan of Care Reviewed With: patient, spouse  Progress: improving  Outcome Evaluation: Pt. with improved bed mobility, transfer independence and LBD independence today.  Pt. continues with impaired strength, balance, strength and activity tolerance impacting PLOF ADLs warranting continued skilled OT services.  Continue to recommend IRF at discharge.     Time Calculation:         Time Calculation- OT       Row Name 05/20/24 1156             Time Calculation- OT    OT Start Time 1117  -GERHARD      OT Received On 05/20/24  -GERHARD      OT Goal Re-Cert Due Date 05/30/24  -GERHARD         Timed Charges    54367 - OT Therapeutic Activity Minutes 6  -GERHARD      92137 - OT Self Care/Mgmt Minutes 6  -GERHARD         Total Minutes    Timed Charges Total Minutes 12  -GERHARD       Total Minutes 12  -GERHARD                User Key  (r) = Recorded By, (t) = Taken By, (c) = Cosigned By      Initials Name Provider Type    Kimi Pena, OT Occupational Therapist                  Therapy  Charges for Today       Code Description Service Date Service Provider Modifiers Qty    [id number] HC OT SELF CARE/MGMT/TRAIN EA 15 MIN 5/20/2024 Kimi Christianson, OT GO 1                 Kimi Christianson, OT  5/20/2024

## 2024-05-20 NOTE — THERAPY PROGRESS REPORT/RE-CERT
Patient Name: Kaleb Abraham  : 1950    MRN: [id number]                              Today's Date: 2024       Admit Date: 2024    Visit Dx:     ICD-10-CM ICD-9-CM   1. Acute on chronic anemia  D64.9 285.9   2. S/P mechanical aortic valve  Z95.2 V43.3   3. Supratherapeutic INR  R79.1 790.92   4. History of GI bleed  Z87.19 V12.79   5. Anticoagulated on Coumadin  Z79.01 V58.61   6. Generalized weakness  R53.1 780.79   7. History of CHF (congestive heart failure)  Z86.79 V12.59   8. Weakness  R53.1 780.79   9. Enterococcal bacteremia  R78.81 790.7    B95.2 041.04     Patient Active Problem List   Diagnosis    Anemia    Aortic stenosis    CAD s/p CABG    T2DM    Hyperlipidemia    Hypertension    Hypothyroidism    Mitral regurgitation    MARTINEZ    Hypersomnia, unspecified    Chronic atrial fibrillation    Supratherapeutic INR    S/P prosthetic aortic valve    S/P mechanical aortic valve    Chronic anticoagulation (warfarin)    S/P VT/TdP Arrest 5/10/2024     Past Medical History:   Diagnosis Date    Anemia     Aortic stenosis     CAD (coronary artery disease)     Diabetes mellitus     Hyperlipidemia     Hypertension     Hypothyroidism     Mitral regurgitation      Past Surgical History:   Procedure Laterality Date    CAPSULE ENDOSCOPY      2023 and 2023 per dr. schrader    COLONOSCOPY      2023 Dr. Schrader    CORONARY ARTERY BYPASS GRAFT      CORONARY STENT PLACEMENT      UPPER GASTROINTESTINAL ENDOSCOPY      2023 Dr. Schrader, 2023 Dr. Liu, 2024 Dr. Stout [place]      General Information       Row Name 24 1144          Physical Therapy Time and Intention    Document Type progress note/recertification  -ML     Mode of Treatment physical therapy  -ML       Row Name 24 1144          General Information    Patient Profile Reviewed yes  -ML     Existing Precautions/Restrictions fall;cardiac  -ML     Barriers to Rehab medically complex;previous  functional deficit  -ML       Row Name 05/20/24 1144          Cognition    Orientation Status (Cognition) oriented x 3  -ML       Row Name 05/20/24 1144          Safety Issues, Functional Mobility    Safety Issues Affecting Function (Mobility) awareness of need for assistance;insight into deficits/self-awareness;safety precaution awareness;safety precautions follow-through/compliance;sequencing abilities  -ML     Impairments Affecting Function (Mobility) balance;endurance/activity tolerance;strength;postural/trunk control;motor planning;pain  -ML               User Key  (r) = Recorded By, (t) = Taken By, (c) = Cosigned By      Initials Name Provider Type    ML Brianna Velarde Physical Therapist                   Mobility       Row Name 05/20/24 1145          Bed Mobility    Bed Mobility supine-sit  -ML     Supine-Sit [place] (Bed Mobility) verbal cues;minimum assist (75% patient effort);2 person assist  -ML     Assistive Device (Bed Mobility) bed rails;head of bed elevated  -ML       Row Name 05/20/24 1145          Bed-Chair Transfer    Bed-Chair [place] (Transfers) verbal cues;minimum assist (75% patient effort);2 person assist  -ML     Assistive Device (Bed-Chair Transfers) walker, front-wheeled  -ML       Row Name 05/20/24 1145          Sit-Stand Transfer    Sit-Stand [place] (Transfers) verbal cues;nonverbal cues (demo/gesture);minimum assist (75% patient effort);2 person assist  -ML     Assistive Device (Sit-Stand Transfers) walker, front-wheeled  -ML       Row Name 05/20/24 1145          Gait/Stairs (Locomotion)    [place] Level (Gait) verbal cues;nonverbal cues (demo/gesture);minimum assist (75% patient effort);1 person assist;1 person to manage equipment  -ML     Assistive Device (Gait) walker, front-wheeled  -ML     Distance in Feet (Gait) 18  -ML     Deviations/Abnormal Patterns (Gait) gait speed decreased;stride length decreased;bilateral deviations;carlos decreased;festinating/shuffling   -ML     Bilateral Gait Deviations forward flexed posture;heel strike decreased  -ML               User Key  (r) = Recorded By, (t) = Taken By, (c) = Cosigned By      Initials Name Provider Type    Brianna Smith Physical Therapist                   Obj/Interventions       Row Name 05/20/24 1145          Range of Motion Comprehensive    General Range of Motion bilateral lower extremity ROM WFL  -ML       Row Name 05/20/24 1145          Strength Comprehensive (MMT)    General Manual Muscle Testing (MMT) Assessment lower extremity strength deficits identified  -ML     Comment, General Manual Muscle Testing (MMT) Assessment BLE grossly 3+/5  -ML       Row Name 05/20/24 1145          Balance    Balance Assessment sitting static balance;sitting dynamic balance;sit to stand dynamic balance;standing static balance;standing dynamic balance  -ML     Static Sitting Balance supervision  -ML     Dynamic Sitting Balance contact guard  -ML     Position, Sitting Balance unsupported;sitting edge of bed  -ML     Sit to Stand Dynamic Balance verbal cues;non-verbal cues (demo/gesture);minimal assist;2-person assist  -ML     Static Standing Balance verbal cues;non-verbal cues (demo/gesture);contact guard  -ML     Dynamic Standing Balance verbal cues;non-verbal cues (demo/gesture);minimal assist;2-person assist  -ML     Position/Device Used, Standing Balance supported;walker, front-wheeled  -ML     Balance Interventions sitting;standing;sit to stand;supported;occupation based/functional task  -ML               User Key  (r) = Recorded By, (t) = Taken By, (c) = Cosigned By      Initials Name Provider Type    Brianna Smith Physical Therapist                   Goals/Plan       Row Name 05/20/24 1148          Bed Mobility Goal 1 (PT)    Activity/Assistive Device (Bed Mobility Goal 1, PT) sit to supine/supine to sit  -ML     [place] Level/Cues Needed (Bed Mobility Goal 1, PT) contact guard required  -ML     Time Frame (Bed Mobility  Goal 1, PT) 10 days  -ML     Progress/Outcomes (Bed Mobility Goal 1, PT) goal ongoing  -       Row Name 05/20/24 1148          Transfer Goal 1 (PT)    Activity/Assistive Device (Transfer Goal 1, PT) sit-to-stand/stand-to-sit;bed-to-chair/chair-to-bed;walker, rolling  -ML     [place] Level/Cues Needed (Transfer Goal 1, PT) contact guard required  -ML     Time Frame (Transfer Goal 1, PT) long term goal (LTG);10 days  -ML     Progress/Outcome (Transfer Goal 1, PT) goal ongoing  -       Row Name 05/20/24 1148          Gait Training Goal 1 (PT)    Activity/Assistive Device (Gait Training Goal 1, PT) gait (walking locomotion);assistive device use;decrease fall risk;increase endurance/gait distance;walker, rolling  -ML     [place] Level (Gait Training Goal 1, PT) contact guard required  -ML     Distance (Gait Training Goal 1, PT) 100  -ML     Time Frame (Gait Training Goal 1, PT) long term goal (LTG);10 days  -ML     Progress/Outcome (Gait Training Goal 1, PT) progress slower than expected;goal ongoing  -[facility] Name 05/20/24 1148          Therapy Assessment/Plan (PT)    Planned Therapy Interventions (PT) balance training;bed mobility training;gait training;home exercise program;neuromuscular re-education;patient/family education;postural re-education;ROM (range of motion);strengthening;stretching;transfer training  -ML               User Key  (r) = Recorded By, (t) = Taken By, (c) = Cosigned By      Initials Name Provider Type    ML Brianna Velarde Physical Therapist                   Clinical Impression       Row Name 05/20/24 1146          Pain    Pretreatment Pain Rating 6/10  -ML     Posttreatment Pain Rating 6/10  -ML     Pain Location generalized  -ML     Pain Intervention(s) Repositioned;Ambulation/increased activity;Nursing Notified  -       Row Name 05/20/24 1146          Plan of Care Review    Plan of Care Reviewed With patient  -ML     Progress improving  -ML     Outcome Evaluation Patient  required less assistance to complete transfers compared to previous treatment session and increased ambulation distance compared to previous treatment session. Patient continues to present below baseline for mobility and would continue to benefit from skilled PT to address strength, balance and activity tolerance deficits. Continue current PT POC.  -ML       Row Name 05/20/24 1146          Therapy Assessment/Plan (PT)    Rehab Potential (PT) good, to achieve stated therapy goals  -ML     Criteria for Skilled Interventions Met (PT) yes;meets criteria;skilled treatment is necessary  -ML     Therapy Frequency (PT) daily  -ML       Row Name 05/20/24 1146          Vital Signs    Pre Patient Position Supine  -ML     Intra Patient Position Standing  -ML     Post Patient Position Sitting  -ML       Row Name 05/20/24 1146          Positioning and Restraints    Pre-Treatment Position in bed  -ML     Post Treatment Position chair  -ML     In Chair notified nsg;reclined;call light within reach;encouraged to call for assist;exit alarm on;waffle cushion;on mechanical lift sling;legs elevated  -ML               User Key  (r) = Recorded By, (t) = Taken By, (c) = Cosigned By      Initials Name Provider Type    Brianna Smith Physical Therapist                   Outcome Measures       Row Name 05/20/24 1148          How much help from another person do you currently need...    Turning from your back to your side while in flat bed without using bedrails? 3  -ML     Moving from lying on back to sitting on the side of a flat bed without bedrails? 2  -ML     Moving to and from a bed to a chair (including a wheelchair)? 3  -ML     Standing up from a chair using your arms (e.g., wheelchair, bedside chair)? 3  -ML     Climbing 3-5 steps with a railing? 2  -ML     To walk in hospital room? 3  -ML     AM-PAC 6 Clicks Score (PT) 16  -ML     Highest Level of Mobility Goal 5 --> Static standing  -ML       Row Name 05/20/24 1146           Functional Assessment    Outcome Measure Options AM-PAC 6 Clicks Basic Mobility (PT)  -ML               User Key  (r) = Recorded By, (t) = Taken By, (c) = Cosigned By      Initials Name Provider Type    Brianna Smith Physical Therapist                                 Physical Therapy Education       Title: PT OT SLP Therapies (In Progress)       Topic: Physical Therapy (In Progress)       Point: Mobility training (In Progress)       Learning Progress Summary             Patient Acceptance, E, VU,NR by ML at 5/20/2024 1149    Acceptance, E, NR by ML at 5/16/2024 1003    Acceptance, E,TB, VU by EB at 5/14/2024 1307    Acceptance, E, NR by ND at 5/10/2024 1541    Acceptance, E, VU by SG at 5/9/2024 1658    Acceptance, E,TB, NR by ES at 5/8/2024 1405   Family Acceptance, E, NR by ML at 5/16/2024 1003    Acceptance, E,TB, VU by EB at 5/14/2024 1307   Significant Other Acceptance, E,TB, NR by ES at 5/8/2024 1405                         Point: Home exercise program (In Progress)       Learning Progress Summary             Patient Acceptance, E, NR by ML at 5/16/2024 1003    Acceptance, E,TB, VU by EB at 5/14/2024 1307    Acceptance, E, VU by SG at 5/9/2024 1658   Family Acceptance, E, NR by ML at 5/16/2024 1003    Acceptance, E,TB, VU by EB at 5/14/2024 1307                         Point: Body mechanics (In Progress)       Learning Progress Summary             Patient Acceptance, E, NR by ML at 5/16/2024 1003    Acceptance, E,TB, VU by EB at 5/14/2024 1307    Acceptance, E, NR by ND at 5/10/2024 1541    Acceptance, E, VU by SG at 5/9/2024 1658    Acceptance, E,TB, NR by ES at 5/8/2024 1405   Family Acceptance, E, NR by ML at 5/16/2024 1003    Acceptance, E,TB, VU by EB at 5/14/2024 1307   Significant Other Acceptance, E,TB, NR by ES at 5/8/2024 1405                         Point: Precautions (In Progress)       Learning Progress Summary             Patient Acceptance, E, VU,NR by ML at 5/20/2024 1149    Acceptance, E, NR  by ML at 5/16/2024 1003    Acceptance, E,TB, VU by EB at 5/14/2024 1307    Acceptance, E, NR by ND at 5/10/2024 1541    Acceptance, E, VU by SG at 5/9/2024 1658    Acceptance, E,TB, NR by ES at 5/8/2024 1405   Family Acceptance, E, NR by ML at 5/16/2024 1003    Acceptance, E,TB, VU by EB at 5/14/2024 1307   Significant Other Acceptance, E,TB, NR by ES at 5/8/2024 1405                                         User Key       Initials Effective Dates Name Provider Type Discipline    EB 09/22/22 -  Hever Andersen, COLEMAN Registered Nurse Nurse    ML 04/22/21 -  Brianna Velarde Physical Therapist PT    SG 09/22/22 -  Roger Crews, RN Registered Nurse Nurse    ES 08/11/22 -  Renee Mendoza, JP Physical Therapist PT    ND 11/16/23 -  Helen Simpson, PT Physical Therapist PT                  PT Recommendation and Plan  Planned Therapy Interventions (PT): balance training, bed mobility training, gait training, home exercise program, neuromuscular re-education, patient/family education, postural re-education, ROM (range of motion), strengthening, stretching, transfer training  Plan of Care Reviewed With: patient  Progress: improving  Outcome Evaluation: Patient required less assistance to complete transfers compared to previous treatment session and increased ambulation distance compared to previous treatment session. Patient continues to present below baseline for mobility and would continue to benefit from skilled PT to address strength, balance and activity tolerance deficits. Continue current PT POC.     Time Calculation:         PT Charges       Row Name 05/20/24 1149             Time Calculation    Start Time 1122  -ML      PT Received On 05/20/24  -ML      PT Goal Re-Cert Due Date 05/30/24  -ML         Timed Charges    22009 - PT Therapeutic Activity Minutes 12  -ML         Total Minutes    Timed Charges Total Minutes 12  -ML       Total Minutes 12  -ML                User Key  (r) = Recorded By, (t) = Taken By, (c) =  Cosigned By      Initials Name Provider Type    Brianna Smith Physical Therapist                  Therapy Charges for Today       Code Description Service Date Service Provider Modifiers Qty    89372070889 HC PT THERAPEUTIC ACT EA 15 MIN 5/20/2024 Brianna Velarde GP 1            PT G-Codes  Outcome Measure Options: AM-PAC 6 Clicks Basic Mobility (PT)  AM-PAC 6 Clicks Score (PT): 16  AM-PAC 6 Clicks Score (OT): 9  PT Discharge Summary  Anticipated Discharge Disposition (PT): inpatient rehabilitation facility    Brianna Velarde  5/20/2024

## 2024-05-23 LAB
BACTERIA SPEC AEROBE CULT: NORMAL
BACTERIA SPEC AEROBE CULT: NORMAL

## 2024-05-24 ENCOUNTER — TELEMEDICINE (OUTPATIENT)
Dept: CARDIOLOGY | Facility: HOSPITAL | Age: 74
End: 2024-05-24
Payer: MEDICARE

## 2024-05-24 VITALS — DIASTOLIC BLOOD PRESSURE: 62 MMHG | HEART RATE: 82 BPM | SYSTOLIC BLOOD PRESSURE: 116 MMHG | OXYGEN SATURATION: 98 %

## 2024-05-24 DIAGNOSIS — I50.32 CHRONIC HEART FAILURE WITH PRESERVED EJECTION FRACTION (HFPEF): Primary | ICD-10-CM

## 2024-05-24 DIAGNOSIS — I48.20 CHRONIC ATRIAL FIBRILLATION: Chronic | ICD-10-CM

## 2024-05-24 NOTE — PROGRESS NOTES
Mode of visit: Video  Location of patient: Rehab facility  Do you consent to the use of telemedicine in your medical care today? Yes      Chief Complaint  Hospital Follow Up Visit    [facility]  Telemedicine note    This was a video enabled telemedicine encounter.  Converted to telephone visit due to poor connection.    Subjective    History of Present Illness {CC  Problem List  Visit  Diagnosis   Encounters  Notes  Medications  Labs  Result Review Imaging  Media :23}     Kaleb Abraham, 73 y.o. male with history of CAD s/p CABG, T2DM, HTN, HLD, hypothyroidism, Afib, valvular heart disease s/p mechanical mitral valve and bioprosthetic aortic valve on Coumadin, MARTINEZ, tachybradycardia syndrome, CHF, and recent GI bleed s/p EGD  presents to [facility] telemedicine visit for Hospital Follow Up Visit    Patient recently hospitalized at [facility] after presenting to the ED on 5/6/24 with complaints of generalized weakness, lower extremity edema, right lower back pain, fevers, and nausea. CT head, CT A/P, and CXR were negative on admission. Hb was 6.6. INR noted to be >10. Received Vitamin K. Initially admitted to hospitalist service. Despite blood transfusion and IV fluids, he remained hypotensive and bradycardic requiring ICU transfer 5/7/24. He required pressors. Further evaluation revealed bacteremia with Enterococcus faecalis. Infectious Disease was consulted. TTE did not show any vegetations but subsequent COLEEN did show a mitral valve vegetation. Cardiology followed for decompensated heart failure and bradycardia. GI also evaluated the patient in setting of anemia and recent GI bleed, they felt there was no need for repeat endoscopic evaluation at this time. On 5/10/24 patient developed V-tach arrest and a code was called, developed torsades and was ultimately cardioverted to sinus rhythm. Stabilized in ICU and transferred to  telemetry.  Patient was followed by Dr. Gardner and ID and during hospitalization.  COLEEN with small vegetation on mitral valve, deemed to be prohibitive candidate for redo surgery after discussion with Dr. Gardner and Dr. Jacob.  TTE during hospitalization with LVEF 56-60%.  Recommendation to discharge on Lopressor 50 Mg twice daily, will warfarin as directed by pharmacy, no amiodarone.  Patient was discharged to [facility] and rehab.    Presents today feeling well overall from a cardiac standpoint after hospital discharge. Reports he walked over 100 steps today with no limiting cardiac symptoms.  No exertional chest pain, no shortness of breath.  Volume status well-controlled since discharge. Still a little chest soreness from CPR, but otherwise no cardiac complaints.  Reports he has maintained on Lovenox while awaiting INR to be therapeutic.  Planning to follow-up with Dr. Jacob and Dr. Gardner as instructed.      Objective     Vital Signs:   Vitals:    05/24/24 1235   BP: 116/62   Pulse: 82   SpO2: 98%     There is no height or weight on file to calculate BMI.  Physical Exam  Vitals reviewed.   Constitutional:       Appearance: Normal appearance.   HENT:      Head: Normocephalic.   Pulmonary:      Effort: Pulmonary effort is normal. No respiratory distress.   Neurological:      Mental Status: He is alert.   Psychiatric:         Mood and Affect: Mood normal.         Behavior: Behavior normal.         Thought Content: Thought content normal.        Data Reviewed:{ Labs  Result Review  Imaging  Med Tab  Media :23}     Adult Transesophageal Echo (COLEEN) W/ Cont if Necessary Per Protocol (05/10/2024 11:16)  Adult Transthoracic Echo Complete W/ Cont if Necessary Per Protocol (05/07/2024 09:52)  Basic Metabolic Panel (05/20/2024 03:36)  Protime-INR (05/20/2024 03:36)  CBC (No Diff) (05/19/2024 07:03)    Assessment and Plan {CC Problem List  Visit Diagnosis  ROS  Review (Popup)  Health Maintenance   [facility]  BestPractice  Medications  SmartSets  SnapShot Encounters  Media :23}     This visit has been scheduled as a video visit.  Converted to telephone visit after video connection lost while patient at rehab facility.    1. Chronic heart failure with preserved ejection fraction (HFpEF)  -Recent COLEEN 5/10/2024 with LVEF 56 to 60%.  -Reports volume status well-controlled, no exertional dyspnea on walking today.  -Afterload well-controlled  -Continue metoprolol as prescribed  -Continue routine monitoring per rehab facility  -Continue primary cardiology follow-up as scheduled, wife reports she will call cardiology to schedule follow-up.    2. Chronic atrial fibrillation  -Persistent atrial fibrillation  -Recent hospitalization with prolonged QT, recommendation to stay off amiodarone.  -Denies tachypalpitation  -Continue anticoagulation with Lovenox bridge as directed until INR therapeutic.  Monitoring and management per skilled nursing facility.  -Continue metoprolol to tartrate as prescribed    3.  History of bioprosthetic aortic valve  -Hospital TTE did not show any vegetations but subsequent COLEEN did show a mitral valve vegetation.   -Primary cardiology/ID followed during hospitalization  -Continue with ID recommendations for IV antibiotics  -Follow-up with ID, primary cardiology as scheduled      Follow Up {Instructions Charge Capture  Follow-up Communications :23}   Return if symptoms worsen or fail to improve.        Patient was given instructions and counseling regarding his condition or for health maintenance advice. Please see specific information pulled into the AVS if appropriate.  Patient was instructed to call the [facility] with any questions, concerns, or worsening symptoms.    *Please note that portions of this note were completed with a voice recognition program. Efforts were made to edit the dictations, but occasionally words are mistranscribed.